# Patient Record
Sex: MALE | Race: BLACK OR AFRICAN AMERICAN | NOT HISPANIC OR LATINO | Employment: OTHER | URBAN - METROPOLITAN AREA
[De-identification: names, ages, dates, MRNs, and addresses within clinical notes are randomized per-mention and may not be internally consistent; named-entity substitution may affect disease eponyms.]

---

## 2017-01-23 ENCOUNTER — TELEPHONE (OUTPATIENT)
Dept: INTERNAL MEDICINE | Facility: CLINIC | Age: 72
End: 2017-01-23

## 2017-01-23 NOTE — TELEPHONE ENCOUNTER
Patient reports having burning pain in outer side of left thigh on Saturday, now has resolved. R thigh is normal. Pt reports this as he usually has bilateral, symmetric pains. Continues on Pamelor daily. Notify office if symptoms recur or worsen.    Ron Esquivel MD  Internal Medicine

## 2017-01-24 ENCOUNTER — PATIENT MESSAGE (OUTPATIENT)
Dept: RADIATION ONCOLOGY | Facility: CLINIC | Age: 72
End: 2017-01-24

## 2017-02-02 ENCOUNTER — LAB VISIT (OUTPATIENT)
Dept: LAB | Facility: HOSPITAL | Age: 72
End: 2017-02-02
Attending: RADIOLOGY
Payer: MEDICARE

## 2017-02-02 DIAGNOSIS — C61 PROSTATE CANCER: ICD-10-CM

## 2017-02-02 LAB — COMPLEXED PSA SERPL-MCNC: 0.02 NG/ML

## 2017-02-02 PROCEDURE — 84153 ASSAY OF PSA TOTAL: CPT

## 2017-02-02 PROCEDURE — 36415 COLL VENOUS BLD VENIPUNCTURE: CPT

## 2017-02-07 ENCOUNTER — HOSPITAL ENCOUNTER (OUTPATIENT)
Dept: RADIATION THERAPY | Facility: HOSPITAL | Age: 72
Discharge: HOME | End: 2017-02-07
Attending: RADIOLOGY
Payer: MEDICARE

## 2017-02-07 PROCEDURE — 77290 THER RAD SIMULAJ FIELD CPLX: CPT | Mod: 26,,, | Performed by: RADIOLOGY

## 2017-02-07 PROCEDURE — 77334 RADIATION TREATMENT AID(S): CPT | Mod: 26,,, | Performed by: RADIOLOGY

## 2017-02-07 PROCEDURE — 77263 THER RADIOLOGY TX PLNG CPLX: CPT | Mod: ,,, | Performed by: RADIOLOGY

## 2017-02-07 PROCEDURE — 77290 THER RAD SIMULAJ FIELD CPLX: CPT | Mod: TC | Performed by: RADIOLOGY

## 2017-02-07 PROCEDURE — 77334 RADIATION TREATMENT AID(S): CPT | Mod: TC | Performed by: RADIOLOGY

## 2017-02-07 PROCEDURE — 77014 HC CT GUIDANCE RADIATION THERAPY FLDS PLACEMENT: CPT | Mod: TC | Performed by: RADIOLOGY

## 2017-02-13 PROCEDURE — 77301 RADIOTHERAPY DOSE PLAN IMRT: CPT | Mod: TC | Performed by: RADIOLOGY

## 2017-02-13 PROCEDURE — 77301 RADIOTHERAPY DOSE PLAN IMRT: CPT | Mod: 26,,, | Performed by: RADIOLOGY

## 2017-02-14 PROCEDURE — 77417 THER RADIOLOGY PORT IMAGE(S): CPT | Performed by: RADIOLOGY

## 2017-02-14 PROCEDURE — 77300 RADIATION THERAPY DOSE PLAN: CPT | Mod: 26,,, | Performed by: RADIOLOGY

## 2017-02-14 PROCEDURE — 77338 DESIGN MLC DEVICE FOR IMRT: CPT | Mod: 26,,, | Performed by: RADIOLOGY

## 2017-02-14 PROCEDURE — 77385 HC IMRT, SIMPLE: CPT | Performed by: RADIOLOGY

## 2017-02-14 PROCEDURE — G6002 STEREOSCOPIC X-RAY GUIDANCE: HCPCS | Mod: 26,,, | Performed by: RADIOLOGY

## 2017-02-14 PROCEDURE — 77338 DESIGN MLC DEVICE FOR IMRT: CPT | Mod: TC | Performed by: RADIOLOGY

## 2017-02-14 PROCEDURE — 77300 RADIATION THERAPY DOSE PLAN: CPT | Mod: TC | Performed by: RADIOLOGY

## 2017-02-15 PROCEDURE — 77385 HC IMRT, SIMPLE: CPT | Performed by: RADIOLOGY

## 2017-02-15 PROCEDURE — G6002 STEREOSCOPIC X-RAY GUIDANCE: HCPCS | Mod: 26,,, | Performed by: RADIOLOGY

## 2017-02-16 PROCEDURE — 77385 HC IMRT, SIMPLE: CPT | Performed by: RADIOLOGY

## 2017-02-16 PROCEDURE — G6002 STEREOSCOPIC X-RAY GUIDANCE: HCPCS | Mod: 26,,, | Performed by: RADIOLOGY

## 2017-02-17 ENCOUNTER — DOCUMENTATION ONLY (OUTPATIENT)
Dept: RADIATION ONCOLOGY | Facility: CLINIC | Age: 72
End: 2017-02-17

## 2017-02-17 PROCEDURE — G6002 STEREOSCOPIC X-RAY GUIDANCE: HCPCS | Mod: 26,,, | Performed by: RADIOLOGY

## 2017-02-17 PROCEDURE — 77385 HC IMRT, SIMPLE: CPT | Performed by: RADIOLOGY

## 2017-02-17 NOTE — PLAN OF CARE
Problem: Patient Care Overview  Goal: Plan of Care Review  Outcome: Ongoing (interventions implemented as appropriate)  Pt. On day 4 of xrt to prostate bed.  Doing well.  Just started.

## 2017-02-20 PROCEDURE — 77385 HC IMRT, SIMPLE: CPT | Performed by: RADIOLOGY

## 2017-02-20 PROCEDURE — G6002 STEREOSCOPIC X-RAY GUIDANCE: HCPCS | Mod: 26,,, | Performed by: RADIOLOGY

## 2017-02-20 PROCEDURE — 77336 RADIATION PHYSICS CONSULT: CPT | Performed by: RADIOLOGY

## 2017-02-21 PROCEDURE — G6002 STEREOSCOPIC X-RAY GUIDANCE: HCPCS | Mod: 26,,, | Performed by: RADIOLOGY

## 2017-02-21 PROCEDURE — 77385 HC IMRT, SIMPLE: CPT | Performed by: RADIOLOGY

## 2017-02-21 PROCEDURE — 77417 THER RADIOLOGY PORT IMAGE(S): CPT | Performed by: RADIOLOGY

## 2017-02-23 ENCOUNTER — DOCUMENTATION ONLY (OUTPATIENT)
Dept: RADIATION ONCOLOGY | Facility: CLINIC | Age: 72
End: 2017-02-23

## 2017-02-23 PROCEDURE — G6002 STEREOSCOPIC X-RAY GUIDANCE: HCPCS | Mod: 26,,, | Performed by: RADIOLOGY

## 2017-02-23 PROCEDURE — 77385 HC IMRT, SIMPLE: CPT | Performed by: RADIOLOGY

## 2017-02-23 NOTE — PLAN OF CARE
Problem: Patient Care Overview  Goal: Plan of Care Review  Outcome: Ongoing (interventions implemented as appropriate)  Pt. On day 7 of xrt to prostate bed.  Doing well.  No dysuria or hematuria.  Nocturia x 2.

## 2017-02-24 PROCEDURE — G6002 STEREOSCOPIC X-RAY GUIDANCE: HCPCS | Mod: 26,,, | Performed by: RADIOLOGY

## 2017-02-24 PROCEDURE — 77385 HC IMRT, SIMPLE: CPT | Performed by: RADIOLOGY

## 2017-02-27 PROCEDURE — G6002 STEREOSCOPIC X-RAY GUIDANCE: HCPCS | Mod: 26,,, | Performed by: RADIOLOGY

## 2017-02-27 PROCEDURE — 77385 HC IMRT, SIMPLE: CPT | Performed by: RADIOLOGY

## 2017-03-01 ENCOUNTER — HOSPITAL ENCOUNTER (OUTPATIENT)
Dept: RADIATION THERAPY | Facility: HOSPITAL | Age: 72
Discharge: HOME | End: 2017-03-01
Attending: RADIOLOGY
Payer: MEDICARE

## 2017-03-01 PROCEDURE — G6002 STEREOSCOPIC X-RAY GUIDANCE: HCPCS | Mod: 26,,, | Performed by: RADIOLOGY

## 2017-03-01 PROCEDURE — 77417 THER RADIOLOGY PORT IMAGE(S): CPT | Performed by: RADIOLOGY

## 2017-03-01 PROCEDURE — 77385 HC IMRT, SIMPLE: CPT | Performed by: RADIOLOGY

## 2017-03-02 ENCOUNTER — DOCUMENTATION ONLY (OUTPATIENT)
Dept: RADIATION ONCOLOGY | Facility: CLINIC | Age: 72
End: 2017-03-02

## 2017-03-02 PROCEDURE — 77336 RADIATION PHYSICS CONSULT: CPT | Performed by: RADIOLOGY

## 2017-03-02 PROCEDURE — 77385 HC IMRT, SIMPLE: CPT | Performed by: RADIOLOGY

## 2017-03-02 PROCEDURE — G6002 STEREOSCOPIC X-RAY GUIDANCE: HCPCS | Mod: 26,,, | Performed by: RADIOLOGY

## 2017-03-02 NOTE — PLAN OF CARE
Problem: Patient Care Overview  Goal: Plan of Care Review  Outcome: Ongoing (interventions implemented as appropriate)  Pt. On day 12 of xrt to prostate.  No c/o burning or hematuria.  x2 nocturia.

## 2017-03-03 PROCEDURE — 77385 HC IMRT, SIMPLE: CPT | Performed by: RADIOLOGY

## 2017-03-03 PROCEDURE — G6002 STEREOSCOPIC X-RAY GUIDANCE: HCPCS | Mod: 26,,, | Performed by: RADIOLOGY

## 2017-03-06 PROCEDURE — 77385 HC IMRT, SIMPLE: CPT | Performed by: RADIOLOGY

## 2017-03-06 PROCEDURE — G6002 STEREOSCOPIC X-RAY GUIDANCE: HCPCS | Mod: 26,,, | Performed by: RADIOLOGY

## 2017-03-07 PROCEDURE — G6002 STEREOSCOPIC X-RAY GUIDANCE: HCPCS | Mod: 26,,, | Performed by: RADIOLOGY

## 2017-03-07 PROCEDURE — 77385 HC IMRT, SIMPLE: CPT | Performed by: RADIOLOGY

## 2017-03-08 PROCEDURE — G6002 STEREOSCOPIC X-RAY GUIDANCE: HCPCS | Mod: 26,,, | Performed by: RADIOLOGY

## 2017-03-08 PROCEDURE — 77385 HC IMRT, SIMPLE: CPT | Performed by: RADIOLOGY

## 2017-03-08 PROCEDURE — 77336 RADIATION PHYSICS CONSULT: CPT | Performed by: RADIOLOGY

## 2017-03-09 PROCEDURE — 77385 HC IMRT, SIMPLE: CPT | Performed by: RADIOLOGY

## 2017-03-09 PROCEDURE — G6002 STEREOSCOPIC X-RAY GUIDANCE: HCPCS | Mod: 26,,, | Performed by: RADIOLOGY

## 2017-03-10 ENCOUNTER — DOCUMENTATION ONLY (OUTPATIENT)
Dept: RADIATION ONCOLOGY | Facility: CLINIC | Age: 72
End: 2017-03-10

## 2017-03-10 PROCEDURE — 77385 HC IMRT, SIMPLE: CPT | Performed by: RADIOLOGY

## 2017-03-10 PROCEDURE — 77386 HC IMRT, COMPLEX: CPT | Performed by: RADIOLOGY

## 2017-03-10 PROCEDURE — 77417 THER RADIOLOGY PORT IMAGE(S): CPT | Performed by: RADIOLOGY

## 2017-03-10 PROCEDURE — G6002 STEREOSCOPIC X-RAY GUIDANCE: HCPCS | Mod: 26,,, | Performed by: RADIOLOGY

## 2017-03-10 NOTE — PLAN OF CARE
Problem: Patient Care Overview  Goal: Plan of Care Review  Outcome: Ongoing (interventions implemented as appropriate)  Pt. On day 17 of xrt to prostate bed.  Doing well.  No dysuria or hematuria.  Nocturia x2.  No diarrhea.

## 2017-03-13 PROCEDURE — 77385 HC IMRT, SIMPLE: CPT | Performed by: RADIOLOGY

## 2017-03-13 PROCEDURE — G6002 STEREOSCOPIC X-RAY GUIDANCE: HCPCS | Mod: 26,,, | Performed by: RADIOLOGY

## 2017-03-14 PROCEDURE — G6002 STEREOSCOPIC X-RAY GUIDANCE: HCPCS | Mod: 26,,, | Performed by: RADIOLOGY

## 2017-03-14 PROCEDURE — 77385 HC IMRT, SIMPLE: CPT | Performed by: RADIOLOGY

## 2017-03-15 PROCEDURE — G6002 STEREOSCOPIC X-RAY GUIDANCE: HCPCS | Mod: 26,,, | Performed by: RADIOLOGY

## 2017-03-15 PROCEDURE — 77385 HC IMRT, SIMPLE: CPT | Performed by: RADIOLOGY

## 2017-03-16 ENCOUNTER — DOCUMENTATION ONLY (OUTPATIENT)
Dept: RADIATION ONCOLOGY | Facility: CLINIC | Age: 72
End: 2017-03-16

## 2017-03-16 PROCEDURE — 77385 HC IMRT, SIMPLE: CPT | Performed by: RADIOLOGY

## 2017-03-16 PROCEDURE — 77417 THER RADIOLOGY PORT IMAGE(S): CPT | Performed by: RADIOLOGY

## 2017-03-16 PROCEDURE — G6002 STEREOSCOPIC X-RAY GUIDANCE: HCPCS | Mod: 26,,, | Performed by: RADIOLOGY

## 2017-03-17 PROCEDURE — 77385 HC IMRT, SIMPLE: CPT | Performed by: RADIOLOGY

## 2017-03-17 PROCEDURE — G6002 STEREOSCOPIC X-RAY GUIDANCE: HCPCS | Mod: 26,,, | Performed by: RADIOLOGY

## 2017-03-17 PROCEDURE — 77336 RADIATION PHYSICS CONSULT: CPT | Performed by: RADIOLOGY

## 2017-03-17 NOTE — PLAN OF CARE
Problem: Patient Care Overview  Goal: Plan of Care Review  Outcome: Ongoing (interventions implemented as appropriate)  Ay 21 of radiation to the prostate  C/o hot flashes

## 2017-03-20 PROCEDURE — G6002 STEREOSCOPIC X-RAY GUIDANCE: HCPCS | Mod: 26,,, | Performed by: RADIOLOGY

## 2017-03-20 PROCEDURE — 77385 HC IMRT, SIMPLE: CPT | Performed by: RADIOLOGY

## 2017-03-21 ENCOUNTER — TELEPHONE (OUTPATIENT)
Dept: INTERNAL MEDICINE | Facility: CLINIC | Age: 72
End: 2017-03-21

## 2017-03-21 DIAGNOSIS — G62.9 NEUROPATHY: ICD-10-CM

## 2017-03-21 PROCEDURE — G6002 STEREOSCOPIC X-RAY GUIDANCE: HCPCS | Mod: 26,,, | Performed by: RADIOLOGY

## 2017-03-21 PROCEDURE — 77385 HC IMRT, SIMPLE: CPT | Performed by: RADIOLOGY

## 2017-03-21 RX ORDER — NORTRIPTYLINE HYDROCHLORIDE 50 MG/1
50 CAPSULE ORAL NIGHTLY
Qty: 90 CAPSULE | Refills: 3 | Status: SHIPPED | OUTPATIENT
Start: 2017-03-21 | End: 2017-04-21

## 2017-03-21 NOTE — TELEPHONE ENCOUNTER
Called and spoke to pt he states that the Pamelor he is taking for his numbness in thighs has been working   However for the last month he has been feeling more tingling than usual   He wanted to know if you can up the dosage on his Pamelor or come in to see you?

## 2017-03-21 NOTE — TELEPHONE ENCOUNTER
----- Message from Dayna Vega sent at 3/21/2017  1:38 PM CDT -----  Contact: Self/[phone]  Pt said that he is calling in regards to he has been having numbness in both of his thighs for about a year pt stated that he is taking nortriptyline (PAMELOR) 25 MG capsule and he wants to know if the doctor wants to increase the dosage or speak with the pt to discuss the problem he is having. Please call and advise          Thank you

## 2017-03-22 PROCEDURE — 77385 HC IMRT, SIMPLE: CPT | Performed by: RADIOLOGY

## 2017-03-22 PROCEDURE — G6002 STEREOSCOPIC X-RAY GUIDANCE: HCPCS | Mod: 26,,, | Performed by: RADIOLOGY

## 2017-03-23 ENCOUNTER — DOCUMENTATION ONLY (OUTPATIENT)
Dept: RADIATION ONCOLOGY | Facility: CLINIC | Age: 72
End: 2017-03-23

## 2017-03-23 PROCEDURE — 77385 HC IMRT, SIMPLE: CPT | Performed by: RADIOLOGY

## 2017-03-23 PROCEDURE — G6002 STEREOSCOPIC X-RAY GUIDANCE: HCPCS | Mod: 26,,, | Performed by: RADIOLOGY

## 2017-03-23 NOTE — PLAN OF CARE
Problem: Patient Care Overview  Goal: Plan of Care Review  Outcome: Ongoing (interventions implemented as appropriate)  Pt. On day 26 of xrt to prostate bed.  No dysuria or hematuria.  Nocturia x 2-3.  No diarrhea.

## 2017-03-24 PROCEDURE — G6002 STEREOSCOPIC X-RAY GUIDANCE: HCPCS | Mod: 26,,, | Performed by: RADIOLOGY

## 2017-03-24 PROCEDURE — 77417 THER RADIOLOGY PORT IMAGE(S): CPT | Performed by: RADIOLOGY

## 2017-03-24 PROCEDURE — 77385 HC IMRT, SIMPLE: CPT | Performed by: RADIOLOGY

## 2017-03-27 PROCEDURE — 77336 RADIATION PHYSICS CONSULT: CPT | Performed by: RADIOLOGY

## 2017-03-29 PROCEDURE — G6002 STEREOSCOPIC X-RAY GUIDANCE: HCPCS | Mod: 26,,, | Performed by: RADIOLOGY

## 2017-03-29 PROCEDURE — 77385 HC IMRT, SIMPLE: CPT | Performed by: RADIOLOGY

## 2017-03-30 PROCEDURE — 77385 HC IMRT, SIMPLE: CPT | Performed by: RADIOLOGY

## 2017-03-30 PROCEDURE — G6002 STEREOSCOPIC X-RAY GUIDANCE: HCPCS | Mod: 26,,, | Performed by: RADIOLOGY

## 2017-03-31 ENCOUNTER — TELEPHONE (OUTPATIENT)
Dept: GASTROENTEROLOGY | Facility: CLINIC | Age: 72
End: 2017-03-31

## 2017-03-31 ENCOUNTER — DOCUMENTATION ONLY (OUTPATIENT)
Dept: RADIATION ONCOLOGY | Facility: CLINIC | Age: 72
End: 2017-03-31

## 2017-03-31 PROCEDURE — G6002 STEREOSCOPIC X-RAY GUIDANCE: HCPCS | Mod: 26,,, | Performed by: RADIOLOGY

## 2017-03-31 PROCEDURE — 77385 HC IMRT, SIMPLE: CPT | Performed by: RADIOLOGY

## 2017-03-31 NOTE — TELEPHONE ENCOUNTER
Spoke with patient in regards to scheduling for a follow up Colonoscopy. Patient stated that he will call back after his radiation treatment.

## 2017-03-31 NOTE — PLAN OF CARE
Problem: Patient Care Overview  Goal: Plan of Care Review  Outcome: Ongoing (interventions implemented as appropriate)  Day 30 of radiation to the prostate bed  Tolerating well

## 2017-04-03 ENCOUNTER — HOSPITAL ENCOUNTER (OUTPATIENT)
Dept: RADIATION THERAPY | Facility: HOSPITAL | Age: 72
Discharge: HOME | End: 2017-04-03
Attending: RADIOLOGY
Payer: MEDICARE

## 2017-04-03 PROCEDURE — 77385 HC IMRT, SIMPLE: CPT | Performed by: RADIOLOGY

## 2017-04-03 PROCEDURE — G6002 STEREOSCOPIC X-RAY GUIDANCE: HCPCS | Mod: 26,,, | Performed by: RADIOLOGY

## 2017-04-04 PROCEDURE — 77417 THER RADIOLOGY PORT IMAGE(S): CPT | Performed by: RADIOLOGY

## 2017-04-04 PROCEDURE — G6002 STEREOSCOPIC X-RAY GUIDANCE: HCPCS | Mod: 26,,, | Performed by: RADIOLOGY

## 2017-04-04 PROCEDURE — 77336 RADIATION PHYSICS CONSULT: CPT | Performed by: RADIOLOGY

## 2017-04-04 PROCEDURE — 77385 HC IMRT, SIMPLE: CPT | Performed by: RADIOLOGY

## 2017-04-05 PROCEDURE — 77385 HC IMRT, SIMPLE: CPT | Performed by: RADIOLOGY

## 2017-04-05 PROCEDURE — G6002 STEREOSCOPIC X-RAY GUIDANCE: HCPCS | Mod: 26,,, | Performed by: RADIOLOGY

## 2017-04-06 ENCOUNTER — DOCUMENTATION ONLY (OUTPATIENT)
Dept: RADIATION ONCOLOGY | Facility: CLINIC | Age: 72
End: 2017-04-06

## 2017-04-06 PROCEDURE — G6002 STEREOSCOPIC X-RAY GUIDANCE: HCPCS | Mod: 26,,, | Performed by: RADIOLOGY

## 2017-04-06 PROCEDURE — 77385 HC IMRT, SIMPLE: CPT | Performed by: RADIOLOGY

## 2017-04-06 NOTE — PLAN OF CARE
Problem: Patient Care Overview  Goal: Plan of Care Review  Outcome: Ongoing (interventions implemented as appropriate)  Day 34 of radiation to the prostate bed no complaints

## 2017-04-07 PROCEDURE — 77385 HC IMRT, SIMPLE: CPT | Performed by: RADIOLOGY

## 2017-04-07 PROCEDURE — G6002 STEREOSCOPIC X-RAY GUIDANCE: HCPCS | Mod: 26,,, | Performed by: RADIOLOGY

## 2017-04-10 PROCEDURE — 77385 HC IMRT, SIMPLE: CPT | Performed by: RADIOLOGY

## 2017-04-10 PROCEDURE — G6002 STEREOSCOPIC X-RAY GUIDANCE: HCPCS | Mod: 26,,, | Performed by: RADIOLOGY

## 2017-04-11 PROCEDURE — G6002 STEREOSCOPIC X-RAY GUIDANCE: HCPCS | Mod: 26,,, | Performed by: RADIOLOGY

## 2017-04-11 PROCEDURE — 77385 HC IMRT, SIMPLE: CPT | Performed by: RADIOLOGY

## 2017-04-11 PROCEDURE — 77336 RADIATION PHYSICS CONSULT: CPT | Performed by: RADIOLOGY

## 2017-04-12 ENCOUNTER — DOCUMENTATION ONLY (OUTPATIENT)
Dept: RADIATION ONCOLOGY | Facility: CLINIC | Age: 72
End: 2017-04-12

## 2017-04-12 PROCEDURE — 77385 HC IMRT, SIMPLE: CPT | Performed by: RADIOLOGY

## 2017-04-12 PROCEDURE — G6002 STEREOSCOPIC X-RAY GUIDANCE: HCPCS | Mod: 26,,, | Performed by: RADIOLOGY

## 2017-04-20 NOTE — PLAN OF CARE
Problem: Patient Care Overview  Goal: Plan of Care Review  Outcome: Outcome(s) achieved Date Met:  04/20/17  Radiation to the prostate bed completed on 4/12/17  F/u appt made

## 2017-04-21 ENCOUNTER — OFFICE VISIT (OUTPATIENT)
Dept: INTERNAL MEDICINE | Facility: CLINIC | Age: 72
End: 2017-04-21
Payer: MEDICARE

## 2017-04-21 VITALS
HEIGHT: 72 IN | SYSTOLIC BLOOD PRESSURE: 120 MMHG | HEART RATE: 84 BPM | DIASTOLIC BLOOD PRESSURE: 74 MMHG | WEIGHT: 193.13 LBS | BODY MASS INDEX: 26.16 KG/M2

## 2017-04-21 VITALS
BODY MASS INDEX: 26.16 KG/M2 | HEART RATE: 84 BPM | HEIGHT: 72 IN | WEIGHT: 193.13 LBS | DIASTOLIC BLOOD PRESSURE: 74 MMHG | SYSTOLIC BLOOD PRESSURE: 120 MMHG

## 2017-04-21 DIAGNOSIS — M81.8 OSTEOPOROSIS, IDIOPATHIC: ICD-10-CM

## 2017-04-21 DIAGNOSIS — E55.9 VITAMIN D INSUFFICIENCY: ICD-10-CM

## 2017-04-21 DIAGNOSIS — E78.5 HYPERLIPIDEMIA, UNSPECIFIED HYPERLIPIDEMIA TYPE: ICD-10-CM

## 2017-04-21 DIAGNOSIS — Z00.00 ANNUAL PHYSICAL EXAM: Primary | ICD-10-CM

## 2017-04-21 DIAGNOSIS — I51.89 LEFT VENTRICULAR DIASTOLIC DYSFUNCTION WITH PRESERVED SYSTOLIC FUNCTION: ICD-10-CM

## 2017-04-21 DIAGNOSIS — I70.0 ABDOMINAL AORTIC ATHEROSCLEROSIS: ICD-10-CM

## 2017-04-21 DIAGNOSIS — C61 PROSTATE CANCER: ICD-10-CM

## 2017-04-21 DIAGNOSIS — Z23 NEED FOR 23-POLYVALENT PNEUMOCOCCAL POLYSACCHARIDE VACCINE: ICD-10-CM

## 2017-04-21 DIAGNOSIS — I35.9 AORTIC VALVE DISEASE: ICD-10-CM

## 2017-04-21 DIAGNOSIS — Z00.00 ENCOUNTER FOR PREVENTIVE HEALTH EXAMINATION: Primary | ICD-10-CM

## 2017-04-21 DIAGNOSIS — K62.5 RECTAL BLEEDING: ICD-10-CM

## 2017-04-21 DIAGNOSIS — R21 SKIN RASH: ICD-10-CM

## 2017-04-21 DIAGNOSIS — G62.9 NEUROPATHY: ICD-10-CM

## 2017-04-21 DIAGNOSIS — I10 ESSENTIAL HYPERTENSION: ICD-10-CM

## 2017-04-21 DIAGNOSIS — Z12.11 COLON CANCER SCREENING: ICD-10-CM

## 2017-04-21 DIAGNOSIS — N39.3 STRESS INCONTINENCE, MALE: ICD-10-CM

## 2017-04-21 DIAGNOSIS — M79.606: ICD-10-CM

## 2017-04-21 PROCEDURE — 99999 PR PBB SHADOW E&M-EST. PATIENT-LVL III: CPT | Mod: PBBFAC,,, | Performed by: INTERNAL MEDICINE

## 2017-04-21 PROCEDURE — 99999 PR PBB SHADOW E&M-EST. PATIENT-LVL IV: CPT | Mod: PBBFAC,,, | Performed by: NURSE PRACTITIONER

## 2017-04-21 PROCEDURE — 3074F SYST BP LT 130 MM HG: CPT | Mod: S$GLB,,, | Performed by: INTERNAL MEDICINE

## 2017-04-21 PROCEDURE — 3078F DIAST BP <80 MM HG: CPT | Mod: S$GLB,,, | Performed by: INTERNAL MEDICINE

## 2017-04-21 PROCEDURE — G0439 PPPS, SUBSEQ VISIT: HCPCS | Mod: S$GLB,,, | Performed by: NURSE PRACTITIONER

## 2017-04-21 PROCEDURE — 99499 UNLISTED E&M SERVICE: CPT | Mod: S$GLB,,, | Performed by: INTERNAL MEDICINE

## 2017-04-21 PROCEDURE — 3074F SYST BP LT 130 MM HG: CPT | Mod: S$GLB,,, | Performed by: NURSE PRACTITIONER

## 2017-04-21 PROCEDURE — 99499 UNLISTED E&M SERVICE: CPT | Mod: S$GLB,,, | Performed by: NURSE PRACTITIONER

## 2017-04-21 PROCEDURE — 99397 PER PM REEVAL EST PAT 65+ YR: CPT | Mod: S$GLB,,, | Performed by: INTERNAL MEDICINE

## 2017-04-21 PROCEDURE — 3078F DIAST BP <80 MM HG: CPT | Mod: S$GLB,,, | Performed by: NURSE PRACTITIONER

## 2017-04-21 RX ORDER — TRIAMCINOLONE ACETONIDE 1 MG/G
CREAM TOPICAL 2 TIMES DAILY
Qty: 80 G | Refills: 2 | Status: SHIPPED | OUTPATIENT
Start: 2017-04-21 | End: 2017-05-16 | Stop reason: SDUPTHER

## 2017-04-21 RX ORDER — NORTRIPTYLINE HYDROCHLORIDE 10 MG/1
10 CAPSULE ORAL NIGHTLY
Qty: 90 CAPSULE | Refills: 3 | Status: SHIPPED | OUTPATIENT
Start: 2017-04-21 | End: 2017-04-21 | Stop reason: SDUPTHER

## 2017-04-21 RX ORDER — NORTRIPTYLINE HYDROCHLORIDE 10 MG/1
10 CAPSULE ORAL NIGHTLY
Qty: 30 CAPSULE | Refills: 0 | Status: SHIPPED | OUTPATIENT
Start: 2017-04-21 | End: 2017-11-14

## 2017-04-21 RX ORDER — AMOXICILLIN 500 MG
2 CAPSULE ORAL DAILY
COMMUNITY
End: 2021-05-27

## 2017-04-21 RX ORDER — NORTRIPTYLINE HYDROCHLORIDE 25 MG/1
25 CAPSULE ORAL NIGHTLY
Qty: 90 CAPSULE | Refills: 3 | Status: SHIPPED | OUTPATIENT
Start: 2017-04-21 | End: 2017-11-14

## 2017-04-21 RX ORDER — TRIAMCINOLONE ACETONIDE 1 MG/G
CREAM TOPICAL 2 TIMES DAILY
Qty: 80 G | Refills: 2 | Status: SHIPPED | OUTPATIENT
Start: 2017-04-21 | End: 2017-04-21 | Stop reason: SDUPTHER

## 2017-04-21 NOTE — MR AVS SNAPSHOT
[facility]  [address]  [place] [address]  Phone: [phone]  Fax: [phone]                  Primitivo Mitchell   2017 11:00 AM   Office Visit    Description:  Male : 1945   Provider:  [facility], NOM 3   Department:  [facility]           Reason for Visit     [facility] 3           Diagnoses this Visit        Comments    Encounter for preventive health examination    -  Primary            To Do List           Future Appointments        Provider Department Dept Phone    2017 1:00 PM MD [facility] [phone]    2017 9:00 AM LAB, APPOINTMENT [place] [facility] [phone]    2017 11:00 AM MD [facility] - Radiation Oncology [phone]      Goals (5 Years of Data)     None      [facility]     [facility] -  Assistance  Unless otherwise directed by your provider, please contact [facility], our nurse care line that is available for  assistance.     Registered nurses in the [facility] provide: appointment scheduling, clinical advisement, health education, and other advisory services.  Call: [phone] (toll free)               Medications           Message regarding Medications     Verify the changes and/or additions to your medication regime listed below are the same as discussed with your clinician today.  If any of these changes or additions are incorrect, please notify your healthcare provider.        STOP taking these medications     bicalutamide (CASODEX) 50 MG Tab Take 1 tablet (50 mg total) by mouth once daily.           Verify that the below list of medications is an accurate representation of the medications you are currently taking.  If none reported, the list may be blank. If incorrect, please contact your healthcare provider. Carry this list with you in case of emergency.           Current  Medications     atorvastatin (LIPITOR) 20 MG tablet Take 1 tablet (20 mg total) by mouth once daily.    cholecalciferol, vitamin D3, 5,000 unit Tab Take 5,000 Units by mouth once daily.    fluticasone (FLONASE) 50 mcg/actuation nasal spray 2 sprays by Each Nare route once daily.    ketotifen (ALAWAY) 0.025 % ophthalmic solution 1 drop 2 (two) times daily.    losartan (COZAAR) 50 MG tablet Take 1 tablet (50 mg total) by mouth once daily.    omeprazole (PRILOSEC) 40 MG capsule Take 1 capsule (40 mg total) by mouth daily as needed.    fish oil-omega-3 fatty acids 300-1,000 mg capsule Take 2 g by mouth once daily.    nortriptyline (PAMELOR) 10 MG capsule Take 1 capsule (10 mg total) by mouth every evening. Take 35 mg total per day    nortriptyline (PAMELOR) 25 MG capsule Take 1 capsule (25 mg total) by mouth every evening. Take 35 mg total per day    triamcinolone acetonide 0.1% (KENALOG) 0.1 % cream Apply topically 2 (two) times daily.           Clinical Reference Information           Your Vitals Were     BP Pulse Height Weight BMI    120/74 (BP Location: Right arm, Patient Position: Sitting, BP Method: Manual) 84 6' (1.829 m) 87.6 kg (193 lb 2 oz) 26.19 kg/m2      Blood Pressure          Most Recent Value    BP  120/74      Allergies as of 4/21/2017     Latex, Natural Rubber    Omnipaque 140 [Iohexol]    Allegra-d 12 Hour [Fexofenadine-pseudoephedrine]    Azithromycin    Bactrim [Sulfamethoxazole-trimethoprim]    Boniva [Ibandronate]    Celebrex [Celecoxib]    Ciprofloxacin    Claritin-d 12 Hour [Loratadine-pseudoephedrine]    Imipramine    Neomycin-polymyxin-hc    Neurontin [Gabapentin]    Nitrofuran Analogues    Sulfa (Sulfonamide Antibiotics)    Adhesive    Androderm [Testosterone]      Immunizations Administered on Date of Encounter - 4/21/2017     Name Date Dose VIS Date Route    Pneumococcal Polysaccharide - 23 Valent  Deferred   -- -- --      Instructions      Counseling and Referral of Other  Preventative  (Italic type indicates deductible and co-insurance are waived)    Patient Name: Primitivo Mitchell  Today's Date: 4/21/2017      SERVICE LIMITATIONS RECOMMENDATION    Vaccines    · Pneumococcal (once after 65)    · Influenza (annually)    · Hepatitis B (if medium/high risk)    · Prevnar 13      Hepatitis B medium/high risk factors:       - End-stage renal disease       - Hemophiliacs who received Factor VII or         IX concentrates       - Clients of institutions for the mentally             retarded       - Persons who live in the same house as          a HepB carrier       - Homosexual men       - Illicit injectable drug abusers     Pneumococcal: ? With latex allergy     Influenza: due fall 2017     Hepatitis B: N/A     Prevnar 13: Done, no repeat necessary    Prostate cancer screening (annually to age 75)     Prostate specific antigen (PSA) Shared decision making with Provider. Sometimes a co-pay may be required if the patient decides to have this test. The USPSTF no longer recommends prostate cancer screening routinely in medicine: N/A    Colorectal cancer screening (to age 75)    · Fecal occult blood test (annual)  · Flexible sigmoidoscopy (5y)  · Screening colonoscopy (10y)  · Barium enema   Last done 2014, recommend to repeat every 3  years    Diabetes self-management training (no USPSTF recommendations)  Requires referral by treating physician for patient with diabetes or renal disease. 10 hours of initial DSMT sessions of no less than 30 minutes each in a continuous 12-month period. 2 hours of follow-up DSMT in subsequent years.  N/A    Glaucoma screening (no USPSTF recommendation)  Diabetes mellitus, family history   , age 50 or over    American, age 65 or over  Last done 2015, recommend to repeat every 1  years    Medical nutrition therapy for diabetes or renal disease (no recommended schedule)  Requires referral by treating physician for patient with diabetes or  renal disease or kidney transplant within the past 3 years.  Can be provided in same year as diabetes self-management training (DSMT), and CMS recommends medical nutrition therapy take place after DSMT. Up to 3 hours for initial year and 2 hours in subsequent years.  N/A    Cardiovascular screening blood tests (every 5 years)  · Fasting lipid panel  Order as a panel if possible  Last done 12/2016, recommend to repeat every 1  years    Diabetes screening tests (at least every 3 years, Medicare covers annually or at 6-month intervals for prediabetic patients)  · Fasting blood sugar (FBS) or glucose tolerance test (GTT)  Patient must be diagnosed with one of the following:       - Hypertension       - Dyslipidemia       - Obesity (BMI 30kg/m2)       - Previous elevated impaired FBS or GTT       ... or any two of the following:       - Overweight (BMI 25 but <30)       - Family history of diabetes       - Age 65 or older       - History of gestational diabetes or birth of baby weighing more than 9 pounds  Last done 12/21016, recommend to repeat every 1  years    Abdominal aortic aneurysm screening (once)  · Sonogram   Limited to patients who meet one of the following criteria:       - Men who are 65-75 years old and have smoked more than 100 cigarette in their lifetime       - Anyone with a family history of abdominal aortic aneurysm       - Anyone recommended for screening by the USPSTF  N/A    HIV screening (annually for increased risk patients)  · HIV-1 and HIV-2 by EIA, or LEX, rapid antibody test or oral mucosa transudate  Patients must be at increased risk for HIV infection per USPSTF guidelines or pregnant. Tests covered annually for patient at increased risk or as requested by the patient. Pregnant patients may receive up to 3 tests during pregnancy.  Risks discussed, screening is not recommended    Smoking cessation counseling (up to 8 sessions per year)  Patients must be asymptomatic of tobacco-related  conditions to receive as a preventative service.  Non-smoker    Subsequent annual wellness visit  At least 12 months since last AWV  Return in one year     The following information is provided to all patients.  This information is to help you find resources for any of the problems found today that may be affecting your health:                Living healthy guide: www.[facility].[place].[facility]      Understanding Diabetes: www.diabetes.org      Eating healthy: www.cdc.gov/healthyweight      CDC home safety checklist: www.cdc.gov/steadi/patient.html      Agency on Aging: www.goea.[place].[facility]      Alcoholics anonymous (AA): www.aa.org      Physical Activity: www.roz.nih.gov/hn5rnji      Tobacco use: www.quitwithusla.org          Language Assistance Services     ATTENTION: Language assistance services are available, free of charge. Please call [phone].      ATENCIÓN: Si marcial toledo, tiene a oro disposición servicios gratuitos de asistencia lingüística. Llame al [phone].     CHÚ Ý: N?u b?n nói Ti?ng Vi?t, có các d?ch v? h? tr? ngôn ng? mi?n phí dành cho b?n. G?i s? [phone].         [facility] complies with applicable Federal civil rights laws and does not discriminate on the basis of race, color, national origin, age, disability, or sex.

## 2017-04-21 NOTE — PROGRESS NOTES
Subjective:       Patient ID: Primitivo Mitchell is a 71 y.o. male.    Chief Complaint: Annual Exam    HPI    Last visit with me 12/2016. Since then seen by Rad Onc for prostate cancer. Has upcoming visits with Lab, Cardiology, and Rad Onc.    This last Sunday after bowel movement had some blood on tissue paper, noted small blood the next day. Last radiation treatment was on Thursday, bleeding didn't occur until 5 days later. Stool normal without blood. No constipation. Takes Ensure milk two times a day.     Last colonoscopy 3 yrs ago, reports due for follow up because of colon polyps.     Reports continued pain in bilateral thighs. Tried increased dose but felt very dizzy.     Continues to exercise, doing walking 2 miles, also home exercise gym.     Red bumps on legs last 3 days.    Reviewed PMH, PSH, SH, FH, allergies, and medications.     Review of Systems   All other systems reviewed and are negative.      Objective:      Physical Exam   Constitutional: He is oriented to person, place, and time. No distress.   -American man whose Body mass index is 26.19 kg/(m^2).    HENT:   Head: Atraumatic.   Right Ear: Tympanic membrane normal.   Left Ear: Tympanic membrane normal.   Mouth/Throat: Oropharynx is clear and moist. No oropharyngeal exudate.   Eyes: Pupils are equal, round, and reactive to light. Right eye exhibits no discharge. Left eye exhibits no discharge.   Neck: Normal range of motion. No thyromegaly present.   Cardiovascular: Normal rate and regular rhythm.    Murmur heard.   Systolic (RUSB) murmur is present with a grade of 1/6   Pulmonary/Chest: Effort normal and breath sounds normal. He has no wheezes. He has no rales.   Abdominal: Soft. He exhibits no distension and no mass. There is no hepatosplenomegaly. There is no tenderness. There is no rigidity, no guarding and negative Shields's sign.   Genitourinary: Rectum normal. Rectal exam shows no external hemorrhoid and no fissure.    Musculoskeletal: He exhibits no edema or tenderness.   No tenderness to palpation along lateral thighs   Lymphadenopathy:     He has no cervical adenopathy.   Neurological: He is alert and oriented to person, place, and time.   Skin: Skin is warm and dry. Rash noted. Rash is papular (along b/L LE distal to knees).   Psychiatric: He has a normal mood and affect. His behavior is normal.   Nursing note and vitals reviewed.      Vitals:    04/21/17 1004   BP: 120/74   BP Location: Right arm   Patient Position: Sitting   BP Method: Manual   Pulse: 84   Weight: 87.6 kg (193 lb 2 oz)   Height: 6' (1.829 m)     Body mass index is 26.19 kg/(m^2).    RESULTS: Reviewed labs from last 12 months    Assessment:       1. Annual physical exam    2. Prostate cancer    3. Abdominal aortic atherosclerosis    4. Colon cancer screening    5. Neuropathy    6. Leg pain, lateral, unspecified laterality    7. Skin rash    8. Need for 23-polyvalent pneumococcal polysaccharide vaccine    9. Rectal bleeding    10. Hyperlipidemia, unspecified hyperlipidemia type    11. Essential hypertension    12. Vitamin D insufficiency        Plan:   Primitivo was seen today for annual exam.    Diagnoses and all orders for this visit:    Annual physical exam:  Age-appropriate health screening reviewed, indicated tests ordered.     Prostate cancer:  Treated by Rad Onc, recently completed round of radiation treatment.    Abdominal aortic atherosclerosis:  Seen on CT 09/2016, continue treatment of hypertension and hyperlipidemia.    Colon cancer screening  -     Case request GI: COLONOSCOPY    Neuropathy:  Will also refer to PM&R to ensure dx is appropriate, may have some tendinitis or muscular etiology. Increase Pamelor for now given good response.  -     Discontinue: nortriptyline (PAMELOR) 10 MG capsule; Take 1 capsule (10 mg total) by mouth every evening. Take 35 mg total per day  -     nortriptyline (PAMELOR) 25 MG capsule; Take 1 capsule (25 mg total)  by mouth every evening. Take 35 mg total per day  -     Ambulatory Referral to Physical Medicine Rehab  -     nortriptyline (PAMELOR) 10 MG capsule; Take 1 capsule (10 mg total) by mouth every evening. Take 35 mg total per day    Leg pain, lateral, unspecified laterality  -     Ambulatory Referral to Physical Medicine Rehab    Skin rash:  Unclear etiology of papular rash, try Kenalog, Please notify the office if the symptoms persist or worsen and will refer to Dermatology.  -     Discontinue: triamcinolone acetonide 0.1% (KENALOG) 0.1 % cream; Apply topically 2 (two) times daily.  -     triamcinolone acetonide 0.1% (KENALOG) 0.1 % cream; Apply topically 2 (two) times daily.    Need for 23-polyvalent pneumococcal polysaccharide vaccine:  Unable to get vaccine due to allergy to neomycin.  -     Pneumococcal Polysaccharide Vaccine (23 Valent) (SQ/IM)    Rectal bleeding:  May be secondary to recent radiation for prostate, also possible internal hemorrhoids. Has upcoming colonoscopy.    Hyperlipidemia, unspecified hyperlipidemia type  -     Lipid panel; Future    Essential hypertension  -     Comprehensive metabolic panel; Future  -     CBC Without Differential; Future    Vitamin D insufficiency  -     Vitamin D; Future    Return in about 6 months (around 10/21/2017) for fasting labs 1 week prior.  Ron Esquivel MD  Internal Medicine    Portions of this note were completed using [employer] dictation software. Please excuse typographical or syntax errors.

## 2017-04-21 NOTE — MR AVS SNAPSHOT
[facility]  [address]  [facility] [address]  Phone: [phone]  Fax: [phone]                  Primitivo Mitchell   2017 10:00 AM   Office Visit    Description:  Male : 1945   Provider:  Ron Esquivel MD   Department:  [facility]           Reason for Visit     Annual Exam           Diagnoses this Visit        Comments    Annual physical exam    -  Primary     Prostate cancer         Abdominal aortic atherosclerosis         Colon cancer screening         Neuropathy         Leg pain, lateral, unspecified laterality         Skin rash         Need for 23-polyvalent pneumococcal polysaccharide vaccine         Rectal bleeding         Hyperlipidemia, unspecified hyperlipidemia type         Essential hypertension         Vitamin D insufficiency                To Do List           Future Appointments        Provider Department Dept Phone    2017 11:00 AM HRA, NOM 3 [facility] Internal Medicine [phone]    2017 9:00 AM LAB, APPOINTMENT [place] [facility] [phone]    2017 11:00 AM Andrea Ochoa Jr., MD [facility] - Radiation Oncology [phone]      To Schedule:     Please call the Endoscopy Department at [phone] to schedule your appointment.          Goals (5 Years of Data)     None      Follow-Up and Disposition     Return in about 6 months (around 10/21/2017) for fasting labs 1 week prior.    Follow-up and Disposition History       These Medications        Disp Refills Start End    nortriptyline (PAMELOR) 25 MG capsule 90 capsule 3 2017    Take 1 capsule (25 mg total) by mouth every evening. Take 35 mg total per day - Oral    Pharmacy: [facility] - [facility] [address] [facility] Ph #: [phone]       triamcinolone acetonide 0.1% (KENALOG) 0.1 % cream 80 g 2 2017    Apply topically 2 (two) times daily. - Topical (Top)    Pharmacy:  [facility] [address] [place] [address], [place] - [address] Ph #: [phone]       nortriptyline (PAMELOR) 10 MG capsule 30 capsule 0 4/21/2017     Take 1 capsule (10 mg total) by mouth every evening. Take 35 mg total per day - Oral    Pharmacy: [facility] [address] [place] [address], [place] - [address] Ph #: [phone]         [facility]     [facility] Nurse Care Line - 24/7 Assistance  Unless otherwise directed by your provider, please contact [facility], our nurse care line that is available for 24/7 assistance.     Registered nurses in the [facility] provide: appointment scheduling, clinical advisement, health education, and other advisory services.  Call: [phone] (toll free)               Medications           Message regarding Medications     Verify the changes and/or additions to your medication regime listed below are the same as discussed with your clinician today.  If any of these changes or additions are incorrect, please notify your healthcare provider.        START taking these NEW medications        Refills    nortriptyline (PAMELOR) 25 MG capsule 3    Sig: Take 1 capsule (25 mg total) by mouth every evening. Take 35 mg total per day    Class: Normal    Route: Oral    triamcinolone acetonide 0.1% (KENALOG) 0.1 % cream 2    Sig: Apply topically 2 (two) times daily.    Class: Normal    Route: Topical (Top)    nortriptyline (PAMELOR) 10 MG capsule 0    Sig: Take 1 capsule (10 mg total) by mouth every evening. Take 35 mg total per day    Class: Normal    Route: Oral      STOP taking these medications     tadalafil (CIALIS) 5 MG tablet Take 1 PO QOD           Verify that the below list of medications is an accurate representation of the medications you are currently taking.  If none reported, the list may be blank. If incorrect, please contact your healthcare provider. Carry this list with you in case of emergency.           Current Medications     atorvastatin (LIPITOR) 20 MG tablet  Take 1 tablet (20 mg total) by mouth once daily.    cholecalciferol, vitamin D3, 5,000 unit Tab Take 5,000 Units by mouth once daily.    fluticasone (FLONASE) 50 mcg/actuation nasal spray 2 sprays by Each Nare route once daily.    ketotifen (ALAWAY) 0.025 % ophthalmic solution 1 drop 2 (two) times daily.    losartan (COZAAR) 50 MG tablet Take 1 tablet (50 mg total) by mouth once daily.    omeprazole (PRILOSEC) 40 MG capsule Take 1 capsule (40 mg total) by mouth daily as needed.    bicalutamide (CASODEX) 50 MG Tab Take 1 tablet (50 mg total) by mouth once daily.    nortriptyline (PAMELOR) 10 MG capsule Take 1 capsule (10 mg total) by mouth every evening. Take 35 mg total per day    nortriptyline (PAMELOR) 25 MG capsule Take 1 capsule (25 mg total) by mouth every evening. Take 35 mg total per day    triamcinolone acetonide 0.1% (KENALOG) 0.1 % cream Apply topically 2 (two) times daily.           Clinical Reference Information           Your Vitals Were     BP Pulse Height Weight BMI    120/74 (BP Location: Right arm, Patient Position: Sitting, BP Method: Manual) 84 6' (1.829 m) 87.6 kg (193 lb 2 oz) 26.19 kg/m2      Blood Pressure          Most Recent Value    BP  120/74      Allergies as of 4/21/2017     Latex, Natural Rubber    Omnipaque 140 [Iohexol]    Allegra-d 12 Hour [Fexofenadine-pseudoephedrine]    Azithromycin    Bactrim [Sulfamethoxazole-trimethoprim]    Boniva [Ibandronate]    Celebrex [Celecoxib]    Ciprofloxacin    Claritin-d 12 Hour [Loratadine-pseudoephedrine]    Imipramine    Neomycin-polymyxin-hc    Neurontin [Gabapentin]    Nitrofuran Analogues    Sulfa (Sulfonamide Antibiotics)    Adhesive    Androderm [Testosterone]      Immunizations Administered on Date of Encounter - 4/21/2017     Name Date Dose VIS Date Route    Pneumococcal Polysaccharide - 23 Valent  Deferred   -- -- --      Orders Placed During Today's Visit      Normal Orders This Visit    Ambulatory Referral to Physical Medicine Rehab      Case request GI: COLONOSCOPY     Pneumococcal Polysaccharide Vaccine (23 Valent) (SQ/IM)     Future Labs/Procedures Expected by Expires    CBC Without Differential  4/21/2017 (Approximate) 6/20/2018    Comprehensive metabolic panel  4/21/2017 4/21/2018    Lipid panel  4/21/2017 6/20/2018    Vitamin D  4/21/2017 (Approximate) 6/20/2018      Language Assistance Services     ATTENTION: Language assistance services are available, free of charge. Please call [phone].      ATENCIÓN: Si habla español, tiene a oro disposición servicios gratuitos de asistencia lingüística. Llame al [phone].     CHÚ Ý: N?u b?n nói Ti?ng Vi?t, có các d?ch v? h? tr? ngôn ng? mi?n phí dành cho b?n. G?i s? [phone].         [facility] complies with applicable Federal civil rights laws and does not discriminate on the basis of race, color, national origin, age, disability, or sex.

## 2017-04-21 NOTE — PROGRESS NOTES
Primitivo Mitchell presented for a  Medicare AWV and comprehensive Health Risk Assessment today. The following components were reviewed and updated:    · Medical history  · Family History  · Social history  · Allergies and Current Medications  · Health Risk Assessment  · Health Maintenance  · Care Team     ** See Completed Assessments for Annual Wellness Visit within the encounter summary.**       The following assessments were completed:  · Living Situation  · CAGE  · Depression Screening  · Timed Get Up and Go  · Whisper Test  · Cognitive Function Screening  ·   ·   ·   · Nutrition Screening  · ADL Screening  · PAQ Screening    Vitals:    04/21/17 1111   BP: 120/74   BP Location: Right arm   Patient Position: Sitting   BP Method: Manual   Pulse: 84   Weight: 87.6 kg (193 lb 2 oz)   Height: 6' (1.829 m)     Body mass index is 26.19 kg/(m^2).  Physical Exam   Constitutional: He is oriented to person, place, and time. He appears well-developed.   Musculoskeletal: Normal range of motion.   Neurological: He is alert and oriented to person, place, and time.   Skin: Skin is warm and dry.   Psychiatric: He has a normal mood and affect.         Diagnoses and health risks identified today and associated recommendations/orders:    1. Encounter for preventive health examination  Here for Health Risk Assessment. Follow up in one year.    2. Essential hypertension  Chronic, stable on current medications. Followed by PCP.    3. Aortic valve disease  Chronic, stable.  Followed by Cardiology.    4. Abdominal aortic atherosclerosis  Chronic, stable on current medications. Noted on CT of abdomen 12/14/16.  Followed by PCP.    5. Left ventricular diastolic dysfunction with preserved systolic function  Chronic, stable on current medications. Noted on Stress ECHO 11/11/14.   Followed by PCP.    6. Prostate cancer  Chronic, stable leuprolide injections, radiation. Followed by Urology, Radiation Oncology.    7. Stress incontinence,  male  Chronic, stable. Followed by Urology.    8. Neuropathy  Chronic, stable on current medications. Followed by PCP.    9. Hyperlipidemia, unspecified hyperlipidemia type  Chronic, stable on current medications. Followed by PCP, Cardiology.    10. Osteoporosis, idiopathic  Chronic, stable. Followed by PCP.      Provided Primitivo with a 5-10 year written screening schedule and personal prevention plan. Recommendations were developed using the USPSTF age appropriate recommendations. Education, counseling, and referrals were provided as needed. After Visit Summary printed and given to patient which includes a list of additional screenings\tests needed.    Return in about 6 months (around 10/21/2017).with PCP.    Rachel Kevin NP

## 2017-04-21 NOTE — PATIENT INSTRUCTIONS
Counseling and Referral of Other Preventative  (Italic type indicates deductible and co-insurance are waived)    Patient Name: Primitivo Mitchell  Today's Date: 4/21/2017      SERVICE LIMITATIONS RECOMMENDATION    Vaccines    · Pneumococcal (once after 65)    · Influenza (annually)    · Hepatitis B (if medium/high risk)    · Prevnar 13      Hepatitis B medium/high risk factors:       - End-stage renal disease       - Hemophiliacs who received Factor VII or         IX concentrates       - Clients of institutions for the mentally             retarded       - Persons who live in the same house as          a HepB carrier       - Homosexual men       - Illicit injectable drug abusers     Pneumococcal: ? With latex allergy     Influenza: due fall 2017     Hepatitis B: N/A     Prevnar 13: Done, no repeat necessary    Prostate cancer screening (annually to age 75)     Prostate specific antigen (PSA) Shared decision making with Provider. Sometimes a co-pay may be required if the patient decides to have this test. The USPSTF no longer recommends prostate cancer screening routinely in medicine: N/A    Colorectal cancer screening (to age 75)    · Fecal occult blood test (annual)  · Flexible sigmoidoscopy (5y)  · Screening colonoscopy (10y)  · Barium enema   Last done 2014, recommend to repeat every 3  years    Diabetes self-management training (no USPSTF recommendations)  Requires referral by treating physician for patient with diabetes or renal disease. 10 hours of initial DSMT sessions of no less than 30 minutes each in a continuous 12-month period. 2 hours of follow-up DSMT in subsequent years.  N/A    Glaucoma screening (no USPSTF recommendation)  Diabetes mellitus, family history   , age 50 or over    American, age 65 or over  Last done 2015, recommend to repeat every 1  years    Medical nutrition therapy for diabetes or renal disease (no recommended schedule)  Requires referral by treating  physician for patient with diabetes or renal disease or kidney transplant within the past 3 years.  Can be provided in same year as diabetes self-management training (DSMT), and CMS recommends medical nutrition therapy take place after DSMT. Up to 3 hours for initial year and 2 hours in subsequent years.  N/A    Cardiovascular screening blood tests (every 5 years)  · Fasting lipid panel  Order as a panel if possible  Last done 12/2016, recommend to repeat every 1  years    Diabetes screening tests (at least every 3 years, Medicare covers annually or at 6-month intervals for prediabetic patients)  · Fasting blood sugar (FBS) or glucose tolerance test (GTT)  Patient must be diagnosed with one of the following:       - Hypertension       - Dyslipidemia       - Obesity (BMI 30kg/m2)       - Previous elevated impaired FBS or GTT       ... or any two of the following:       - Overweight (BMI 25 but <30)       - Family history of diabetes       - Age 65 or older       - History of gestational diabetes or birth of baby weighing more than 9 pounds  Last done 12/21016, recommend to repeat every 1  years    Abdominal aortic aneurysm screening (once)  · Sonogram   Limited to patients who meet one of the following criteria:       - Men who are 65-75 years old and have smoked more than 100 cigarette in their lifetime       - Anyone with a family history of abdominal aortic aneurysm       - Anyone recommended for screening by the USPSTF  N/A    HIV screening (annually for increased risk patients)  · HIV-1 and HIV-2 by EIA, or LEX, rapid antibody test or oral mucosa transudate  Patients must be at increased risk for HIV infection per USPSTF guidelines or pregnant. Tests covered annually for patient at increased risk or as requested by the patient. Pregnant patients may receive up to 3 tests during pregnancy.  Risks discussed, screening is not recommended    Smoking cessation counseling (up to 8 sessions per year)  Patients must  be asymptomatic of tobacco-related conditions to receive as a preventative service.  Non-smoker    Subsequent annual wellness visit  At least 12 months since last AWV  Return in one year     The following information is provided to all patients.  This information is to help you find resources for any of the problems found today that may be affecting your health:                Living healthy guide: www.[facility].[place].[facility]      Understanding Diabetes: www.diabetes.org      Eating healthy: www.cdc.gov/healthyweight      CDC home safety checklist: www.cdc.gov/steadi/patient.html      Agency on Aging: www.goea.[place].[facility]      Alcoholics anonymous (AA): www.aa.org      Physical Activity: www.roz.nih.gov/dl3wfak      Tobacco use: www.quitwithusla.org

## 2017-04-22 ENCOUNTER — HOSPITAL ENCOUNTER (EMERGENCY)
Facility: HOSPITAL | Age: 72
Discharge: HOME | End: 2017-04-22
Attending: FAMILY MEDICINE
Payer: MEDICARE

## 2017-04-22 VITALS
WEIGHT: 189 LBS | RESPIRATION RATE: 16 BRPM | TEMPERATURE: 98 F | DIASTOLIC BLOOD PRESSURE: 62 MMHG | SYSTOLIC BLOOD PRESSURE: 109 MMHG | HEART RATE: 87 BPM | BODY MASS INDEX: 25.6 KG/M2 | OXYGEN SATURATION: 98 % | HEIGHT: 72 IN

## 2017-04-22 DIAGNOSIS — K60.2 ANAL FISSURE: Primary | ICD-10-CM

## 2017-04-22 LAB
BUN SERPL-MCNC: 21 MG/DL (ref 6–30)
CHLORIDE SERPL-SCNC: 102 MMOL/L (ref 95–110)
CREAT SERPL-MCNC: 1 MG/DL (ref 0.5–1.4)
GLUCOSE SERPL-MCNC: 94 MG/DL (ref 70–110)
HCT VFR BLD CALC: 40 %PCV (ref 36–54)
POC IONIZED CALCIUM: 1.12 MMOL/L (ref 1.06–1.42)
POC TCO2 (MEASURED): 26 MMOL/L (ref 23–29)
POTASSIUM BLD-SCNC: 4.1 MMOL/L (ref 3.5–5.1)
SAMPLE: NORMAL
SODIUM BLD-SCNC: 140 MMOL/L (ref 136–145)

## 2017-04-22 PROCEDURE — 99283 EMERGENCY DEPT VISIT LOW MDM: CPT | Mod: ,,, | Performed by: PHYSICIAN ASSISTANT

## 2017-04-22 PROCEDURE — 99283 EMERGENCY DEPT VISIT LOW MDM: CPT

## 2017-04-22 NOTE — ED NOTES
GENERAL: The patient is a well-developed, well-nourished male in no apparent distress. He is alert and oriented x3.    HEENT: Head is normocephalic and atraumatic. Extraocular muscles are intact. Pupils are equal, round, and reactive to light and accommodation. Nares appeared normal. Mouth is well hydrated and without lesions. Mucous membranes are moist. Posterior pharynx clear of any exudate or lesions.    NECK: Supple. No carotid bruits. No lymphadenopathy or thyromegaly.    LUNGS: Clear to auscultation.    HEART: Regular rate and rhythm without murmur.     ABDOMEN: Soft, nontender, and nondistended. Positive bowel sounds. No hepatosplenomegaly was noted.  Reports felt a nodule in his rectum area and bright red blood on his stool and toilet paper this am.     EXTREMITIES: Without any cyanosis, clubbing, rash, lesions or edema.     NEUROLOGIC: Cranial nerves II through XII are grossly intact.     PSYCHIATRIC: Flat affect, but denies suicidal or homicidal ideations.    SKIN: No ulceration or induration present.

## 2017-04-22 NOTE — ED PROVIDER NOTES
"Encounter Date: 4/22/2017       History     Chief Complaint   Patient presents with    Hemorrhoids     Pt blood with stool.      Review of patient's allergies indicates:   Allergen Reactions    Latex, natural rubber Rash    Omnipaque 140 [iohexol] Rash     Extensive rash over trunk font and back and legs the morning after CT dye given in late afternon the previous day. Omnipaque 350: 100cc IV & 30cc oral. Rash is severe, but listed as moderate because not shortness of breath    Allegra-d 12 hour [fexofenadine-pseudoephedrine] Other (See Comments)     Raise BP    Azithromycin     Bactrim [sulfamethoxazole-trimethoprim]     Boniva [ibandronate]     Celebrex [celecoxib]     Ciprofloxacin     Claritin-d 12 hour [loratadine-pseudoephedrine]     Imipramine     Neomycin-polymyxin-hc     Neurontin [gabapentin]     Nitrofuran analogues     Sulfa (sulfonamide antibiotics)     Adhesive Rash     Adhesive tape causes rash     Androderm [testosterone] Rash     HPI Comments: Patient is a 71-year-old male with a past medical history of prostate cancer receiving radiation, HTN, HLD who presents the ED with blood in stool in pain with BM.  Patient states that he has been constipated for the past few days.  He has to strain more than usual.  He reports noticing blood with wiping 2 days ago.  Today, he noticed blood with stool and with wiping.  He states that when he has a BM he had a "burning" sensation that was relieved after it past.  He denies any pain at rest, abdominal pain, fever, chills, urinary difficulties, nausea.  Patient has no other complaint at this time.    The history is provided by the patient.     Past Medical History:   Diagnosis Date    Aortic valve disorders 5/29/2013    3/21/2013: [place]. Mild aortic valve sclerosis. 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    ED (erectile dysfunction)     Enlarged prostate     Floaters in visual field     Hyperlipidemia     Hypertension     " Osteoporosis     Pre-operative cardiovascular examination 5/18/2016 5/23/2016: Plan is robotic prostatectomy.    Prostate cancer 4/5/2016    Stress incontinence, male 7/11/2016     Past Surgical History:   Procedure Laterality Date    COLONOSCOPY W/ POLYPECTOMY      PROSTATE SURGERY  05/23/2016    Prostatectomy for prostate cancer, done at [facility]     Family History   Problem Relation Age of Onset    Heart disease Mother     Alzheimer's disease Mother     Other Father      Colon problems    Ulcerative colitis Sister     Prostate cancer Brother     Anxiety disorder Son     Early death Paternal Grandmother     Cancer Brother      Social History   Substance Use Topics    Smoking status: Never Smoker    Smokeless tobacco: Never Used    Alcohol use No     Review of Systems   Constitutional: Negative for chills and fever.   HENT: Negative for ear pain and sore throat.    Eyes: Negative for visual disturbance.   Gastrointestinal: Positive for blood in stool, constipation and rectal pain (with BM). Negative for abdominal pain.   Endocrine: Negative for polyuria.   Genitourinary: Negative for dysuria and hematuria.   Musculoskeletal: Negative for back pain.   Skin: Negative for pallor and rash.   Neurological: Negative for weakness and headaches.       Physical Exam   Initial Vitals   BP Pulse Resp Temp SpO2   04/22/17 1037 04/22/17 1037 04/22/17 1037 04/22/17 1037 04/22/17 1037   109/62 87 16 97.7 °F (36.5 °C) 98 %     Physical Exam    Nursing note and vitals reviewed.  Constitutional: He appears well-developed and well-nourished.   HENT:   Head: Normocephalic and atraumatic.   Nose: Nose normal.   Eyes: Conjunctivae and EOM are normal.   Neck: Normal range of motion.   Cardiovascular: Normal rate, regular rhythm and normal heart sounds. Exam reveals no friction rub.    No murmur heard.  Pulmonary/Chest: Breath sounds normal. No respiratory distress. He has no wheezes. He has no rales.   Abdominal: Soft.  Bowel sounds are normal. He exhibits no distension. There is no tenderness.   Genitourinary: Rectal exam shows fissure and tenderness. Rectal exam shows no external hemorrhoid, no internal hemorrhoid and no mass. : Acceptable.  Genitourinary Comments: Hemoccult positive   Musculoskeletal: Normal range of motion.   Neurological: He is alert and oriented to person, place, and time. He has normal strength. No sensory deficit.   Skin: Skin is warm and dry. No erythema.   Psychiatric: He has a normal mood and affect. Thought content normal.         ED Course   Procedures  Labs Reviewed   ISTAT PROCEDURE             Medical Decision Making:   History:   Old Medical Records: I decided to obtain old medical records.  Clinical Tests:   Lab Tests: Ordered and Reviewed       APC / Resident Notes:   Patient presents the ED with blood in stool, constipation, pain with BM for 2 days.  On exam, hemodynamically stable.  Abdomen soft, nontender nondistended.  On rectal exam, no external or internal hemorrhoids appreciated.  There is tenderness and anal fissure.  Hemoccult positive. Given patient's history and physical exam, his symptoms today most likely due to anal fissure.  Istat chem with hct of 40%. Not anemic today. I advised high-fiber diet and stool softeners for constipation.  Sitz bath for 10 minutes 2 times a day as needed.  Follow-up with PCP and other appointments as scheduled.  Strict ED return precaution given.  Patient and wife voice understanding.  All questions answered.  They're comfortable with plan and stable for discharge.  I have reviewed patient's chart and labs and discussed this case with my supervising M.D.              ED Course     Clinical Impression:   The encounter diagnosis was Anal fissure.    Disposition:   Disposition: Discharged  Condition: Stable       Beryl Diaz PA-C  04/23/17 2049

## 2017-04-22 NOTE — DISCHARGE INSTRUCTIONS
Consider high fiber diet as discussed.  Eat foods that have wheat such as brown rice, wheat pasta, and wheat bread. Increase fruit and vegetable intake as the skin in these have fiber.  Consider Stool softeners in addition such as Colace or other generic medication.   Use warm sitz bath.  Soak for 10-15 minutes 2-3 times a day with warm water.  Follow-up with primary care physician if your symptoms do not improve or worsen or return to the emergency department.    Future Appointments  Date Time Provider Department Center   4/25/2017 1:00 PM Aubree Beltran MD [facility] PHYSMED [facility]   5/17/2017 10:00 AM MD [facility]IA [facility]   6/20/2017 9:00 AM LAB, APPOINTMENT [facility] LAB [facility]   6/27/2017 11:00 AM Andrea Ochoa Jr., MD [facility] RAD ONC [facility]       Eating a High-Fiber Diet  Fiber is what gives strength and structure to plants. Most grains, beans, vegetables, and fruits contain fiber. Foods rich in fiber are often low in calories and fat, and they fill you up more. They may also reduce your risks for certain health problems. To find out the amount of fiber in canned, packaged, or frozen foods, read the Nutrition Facts label. It tells you how much fiber is in a serving.    Types of fiber and their benefits  There are two types of fiber: insoluble and soluble. They both aid digestion and help you maintain a healthy weight.  · Insoluble fiber. This is found in whole grains, cereals, certain fruits and vegetables such as apple skin, corn, and carrots. Insoluble fiber may prevent constipation and reduce the risk for certain types of cancer.  · Soluble fiber. This type of fiber is in oats, beans, and certain fruits and vegetables such as strawberries and peas. Soluble fiber can reduce cholesterol, which may help lower the risk for heart disease. It also helps control blood sugar levels.  Look for high-fiber foods  Try these foods to add fiber to your diet:  · Whole-grain breads and cereals.  Try to eat 6 to 8 ounces a day. Include wheat and oat bran cereals, whole-wheat muffins or toast, and corn tortillas in your meals.  · Fruits. Try to eat 2 cups a day. Apples, oranges, strawberries, pears, and bananas are good sources. (Note: Fruit juice is low in fiber.)  · Vegetables. Try to eat at least 2.5 cups a day. Add asparagus, carrots, broccoli, peas, and corn to your meals.  · Beans. One cup of cooked lentils gives you over 15 grams of fiber. Try navy beans, lentils, and chickpeas.  · Seeds. A small handful of seeds gives you about 3 grams of fiber. Try sunflower seeds.  Keep track of your fiber  Keep track of how much fiber you eat. Start by reading food labels. Then eat a variety of foods high in fiber. As you begin to eat more fiber, ask your healthcare provider how much water you should be drinking to keep your digestive system working smoothly.  You should aim for a certain amount of fiber in your diet each day. If you are a woman, that amount is between 25 and 28 grams per day. Men should aim for 30 to 33 grams per day. After age 50, your daily fiber needs drop to 22 grams for women and 28 grams for men.  Before you reach for the fiber supplements, think about this. Fiber is found naturally in healthy whole foods. It gives you that feeling of fullness after you eat. Taking fiber supplements or eating fiber-enriched foods will not give you this full feeling.  Your fiber intake is a good measure for the quality of your overall diet. If you are missing out on your daily amount of fiber, you may be lacking other important nutrients as well.  Date Last Reviewed: 5/11/2015  © 8042-5040 [employer]. [address] [place] [address], [place] [address]. All rights reserved. This information is not intended as a substitute for professional medical care. Always follow your healthcare professional's instructions.      Anal Fissure    The anal canal is the end portion of the intestinal tract. It includes the  rectum and anus. Stool is passed through the anus. Sometimes a crack or tear develops in the lining of the anal canal. This condition is called an anal fissure.  Anal fissures are caused by trauma or stretching of the anal canal. This is most often due to constipation (having hard, wowxmtbzr-oc-elyt stools). Severe diarrhea or insertion of an object into the anal canal may also cause a fissure.  Symptoms include pain and bleeding, especially during a bowel movement. Sometimes there is swelling, itching, and skin irritation. The area may spasm, causing more pain and skin separation. The most common complication is infection, which may lead to an abscess (pocket of pus). When this happens, there may also be discharge from the fissure.  An anal fissure usually heals on its own with no special treatment. Home care instructions to help prevent constipation and relieve symptoms may be given. Once the area has healed, follow-up tests may be needed.  In some cases, a fissure does not heal on its own. Surgery may then be needed to close the tear.  Home care  Medicines  Your child may be prescribed medicines, such as pain relievers, stool softeners, or laxatives. Make sure to follow all instructions when giving any of these medicines to your child. Note: Do not give your child over-the-counter medicines without talking to the provider first.  General care  · To help ease constipation, you may be told to:  ¨ Increase the fiber in your childs diet. This includes foods such as whole grains, fresh fruits, and vegetables. Fiber adds bulk to stool and absorbs water to soften stool. This helps stool pass through the colon more easily. If needed, a fiber supplement may also be prescribed.  ¨ Encourage your child to drink lots of water. This can also help soften stool.  · To help relieve pain and relax the muscles in the anus, have your child soak in a bath with a few inches of warm water. This is a called a sitz bath. Your child  should do this for 10 to 15 minutes at time, a few times a day, or as advised.  · Keep a careful record of when your child has a bowel movement and the type of stool that was passed. This may help the provider determine future care for your child. Older children should be encouraged to keep their own record.  · Check your childs anus for bleeding or signs of infection (see below). Older children may be asked to help monitor their own symptoms.  Follow-up care  Follow up with your childs healthcare provider as advised. If testing was done, youll be told the results and any new findings that may affect your childs care.  When to seek medical advice  Unless your childs healthcare provider advises otherwise, call the provider right away if:  · Your child is 3 months old or younger and has a fever of 100.4°F (38°C) or higher. (Seek treatment right away. Fever in a young baby can be a sign of a serious infection.)  · Your child is younger than 2 years of age and has a fever of 100.4°F (38°C) that lasts for more than 1 day.  · Your child is 2 years old or older and has a fever of 100.4°F (38°C) that lasts for more than 3 days.  · Your child has repeated fevers above 104°F (40°C).  Also call the provider right away if:  · Your child symptoms worsen, or they dont improve with home care measures.  · Your child has signs of infection such as increased redness, swelling, or foul-smelling drainage in the area around the fissure.  · Your child has ongoing constipation or explosive diarrhea.  Call 911  Call 911 right away if:  · Your child has trouble breathing.  · Your child has trouble swallowing.  · Your child faints.  · Your child has an unusually fast heart rate.  · Your child has large amounts of bleeding from the anus or blood in the stool.  Date Last Reviewed: 6/15/2015  © 7700-9460 [employer]. [address] [place] [address], [place] [address]. All rights reserved. This information is not intended as a  substitute for professional medical care. Always follow your healthcare professional's instructions.

## 2017-04-22 NOTE — ED AVS SNAPSHOT
[facility]  [address] [facility] [address]               Primitivo Mitchell   2017 11:17 AM   ED    Description:  Male : 1945   Department:  [facility]           Your Care was Coordinated By:     Provider Role From To    Manuel Velazquez MD Attending Provider 17 1211 --    Beryl Diaz PA-C Physician Assistant 17 1211 --      Reason for Visit     Hemorrhoids           Diagnoses this Visit        Comments    Anal fissure    -  Primary       ED Disposition     None           To Do List           Follow-up Information     Schedule an appointment as soon as possible for a visit with Ron Esquivel MD.    Specialty:  Internal Medicine    Contact information:    [address]  [place] [address]  [phone]          Follow up with [facility].    Specialty:  Emergency Medicine    Why:  If symptoms worsen    Contact information:    [address] [facility] [id number] [phone]      [facility]     [facility] Nurse Care Line -  Assistance  Unless otherwise directed by your provider, please contact [facility], our nurse care line that is available for  assistance.     Registered nurses in the [facility] provide: appointment scheduling, clinical advisement, health education, and other advisory services.  Call: [phone] (toll free)               Medications           Message regarding Medications     Verify the changes and/or additions to your medication regime listed below are the same as discussed with your clinician today.  If any of these changes or additions are incorrect, please notify your healthcare provider.             Verify that the below list of medications is an accurate representation of the medications you are currently taking.  If none reported, the list may be blank. If incorrect, please contact your healthcare provider. Carry this  list with you in case of emergency.           Current Medications     atorvastatin (LIPITOR) 20 MG tablet Take 1 tablet (20 mg total) by mouth once daily.    cholecalciferol, vitamin D3, 5,000 unit Tab Take 5,000 Units by mouth once daily.    fish oil-omega-3 fatty acids 300-1,000 mg capsule Take 2 g by mouth once daily.    fluticasone (FLONASE) 50 mcg/actuation nasal spray 2 sprays by Each Nare route once daily.    ketotifen (ALAWAY) 0.025 % ophthalmic solution 1 drop 2 (two) times daily.    losartan (COZAAR) 50 MG tablet Take 1 tablet (50 mg total) by mouth once daily.    nortriptyline (PAMELOR) 10 MG capsule Take 1 capsule (10 mg total) by mouth every evening. Take 35 mg total per day    nortriptyline (PAMELOR) 25 MG capsule Take 1 capsule (25 mg total) by mouth every evening. Take 35 mg total per day    omeprazole (PRILOSEC) 40 MG capsule Take 1 capsule (40 mg total) by mouth daily as needed.    triamcinolone acetonide 0.1% (KENALOG) 0.1 % cream Apply topically 2 (two) times daily.           Clinical Reference Information           Your Vitals Were     BP Pulse Temp Resp Height Weight    109/62 87 97.7 °F (36.5 °C) (Oral) 16 6' (1.829 m) 85.7 kg (189 lb)    SpO2 BMI             98% 25.63 kg/m2         Allergies as of 4/22/2017        Reactions    Latex, Natural Rubber Rash    Omnipaque 140 [Iohexol] Rash    Extensive rash over trunk font and back and legs the morning after CT dye given in late afternon the previous day. Omnipaque 350: 100cc IV & 30cc oral. Rash is severe, but listed as moderate because not shortness of breath    Allegra-d 12 Hour [Fexofenadine-pseudoephedrine] Other (See Comments)    Raise BP    Azithromycin     Bactrim [Sulfamethoxazole-trimethoprim]     Boniva [Ibandronate]     Celebrex [Celecoxib]     Ciprofloxacin     Claritin-d 12 Hour [Loratadine-pseudoephedrine]     Imipramine     Neomycin-polymyxin-hc     Neurontin [Gabapentin]     Nitrofuran Analogues     Sulfa (Sulfonamide Antibiotics)      Adhesive Rash    Adhesive tape causes rash     Androderm [Testosterone] Rash      Immunizations Administered on Date of Encounter - 4/22/2017     None      ED Micro, Lab, POCT     Start Ordered       Status Ordering Provider    04/22/17 1244 04/22/17 1244  ISTAT PROCEDURE  Once      Final result     04/22/17 1232 04/22/17 1231  ISTAT CHEM8  Once      Acknowledged       ED Imaging Orders     None        Discharge Instructions       Consider high fiber diet as discussed.  Eat foods that have wheat such as brown rice, wheat pasta, and wheat bread. Increase fruit and vegetable intake as the skin in these have fiber.  Consider Stool softeners in addition such as Colace or other generic medication.   Use warm sitz bath.  Soak for 10-15 minutes 2-3 times a day with warm water.  Follow-up with primary care physician if your symptoms do not improve or worsen or return to the emergency department.    Future Appointments  Date Time Provider Department Center   4/25/2017 1:00 PM Aubree Beltran MD [facility] PHYSMED [facility]   5/17/2017 10:00 AM MD ANIA [facility] [address]   6/20/2017 9:00 AM LAB, APPOINTMENT [facility]   6/27/2017 11:00 AM Andrea Ochoa Jr., MD [facility] RAD ONC [facility]       Eating a High-Fiber Diet  Fiber is what gives strength and structure to plants. Most grains, beans, vegetables, and fruits contain fiber. Foods rich in fiber are often low in calories and fat, and they fill you up more. They may also reduce your risks for certain health problems. To find out the amount of fiber in canned, packaged, or frozen foods, read the Nutrition Facts label. It tells you how much fiber is in a serving.    Types of fiber and their benefits  There are two types of fiber: insoluble and soluble. They both aid digestion and help you maintain a healthy weight.  · Insoluble fiber. This is found in whole grains, cereals, certain fruits and vegetables such as apple skin, corn, and carrots.  Insoluble fiber may prevent constipation and reduce the risk for certain types of cancer.  · Soluble fiber. This type of fiber is in oats, beans, and certain fruits and vegetables such as strawberries and peas. Soluble fiber can reduce cholesterol, which may help lower the risk for heart disease. It also helps control blood sugar levels.  Look for high-fiber foods  Try these foods to add fiber to your diet:  · Whole-grain breads and cereals. Try to eat 6 to 8 ounces a day. Include wheat and oat bran cereals, whole-wheat muffins or toast, and corn tortillas in your meals.  · Fruits. Try to eat 2 cups a day. Apples, oranges, strawberries, pears, and bananas are good sources. (Note: Fruit juice is low in fiber.)  · Vegetables. Try to eat at least 2.5 cups a day. Add asparagus, carrots, broccoli, peas, and corn to your meals.  · Beans. One cup of cooked lentils gives you over 15 grams of fiber. Try navy beans, lentils, and chickpeas.  · Seeds. A small handful of seeds gives you about 3 grams of fiber. Try sunflower seeds.  Keep track of your fiber  Keep track of how much fiber you eat. Start by reading food labels. Then eat a variety of foods high in fiber. As you begin to eat more fiber, ask your healthcare provider how much water you should be drinking to keep your digestive system working smoothly.  You should aim for a certain amount of fiber in your diet each day. If you are a woman, that amount is between 25 and 28 grams per day. Men should aim for 30 to 33 grams per day. After age 50, your daily fiber needs drop to 22 grams for women and 28 grams for men.  Before you reach for the fiber supplements, think about this. Fiber is found naturally in healthy whole foods. It gives you that feeling of fullness after you eat. Taking fiber supplements or eating fiber-enriched foods will not give you this full feeling.  Your fiber intake is a good measure for the quality of your overall diet. If you are missing out on your  daily amount of fiber, you may be lacking other important nutrients as well.  Date Last Reviewed: 5/11/2015 © 2000-2016 [employer]. [address] [place] [address], [place] [address]. All rights reserved. This information is not intended as a substitute for professional medical care. Always follow your healthcare professional's instructions.      Anal Fissure    The anal canal is the end portion of the intestinal tract. It includes the rectum and anus. Stool is passed through the anus. Sometimes a crack or tear develops in the lining of the anal canal. This condition is called an anal fissure.  Anal fissures are caused by trauma or stretching of the anal canal. This is most often due to constipation (having hard, biodpdnyh-rt-gknv stools). Severe diarrhea or insertion of an object into the anal canal may also cause a fissure.  Symptoms include pain and bleeding, especially during a bowel movement. Sometimes there is swelling, itching, and skin irritation. The area may spasm, causing more pain and skin separation. The most common complication is infection, which may lead to an abscess (pocket of pus). When this happens, there may also be discharge from the fissure.  An anal fissure usually heals on its own with no special treatment. Home care instructions to help prevent constipation and relieve symptoms may be given. Once the area has healed, follow-up tests may be needed.  In some cases, a fissure does not heal on its own. Surgery may then be needed to close the tear.  Home care  Medicines  Your child may be prescribed medicines, such as pain relievers, stool softeners, or laxatives. Make sure to follow all instructions when giving any of these medicines to your child. Note: Do not give your child over-the-counter medicines without talking to the provider first.  General care  · To help ease constipation, you may be told to:  ¨ Increase the fiber in your childs diet. This includes foods such as whole  grains, fresh fruits, and vegetables. Fiber adds bulk to stool and absorbs water to soften stool. This helps stool pass through the colon more easily. If needed, a fiber supplement may also be prescribed.  ¨ Encourage your child to drink lots of water. This can also help soften stool.  · To help relieve pain and relax the muscles in the anus, have your child soak in a bath with a few inches of warm water. This is a called a sitz bath. Your child should do this for 10 to 15 minutes at time, a few times a day, or as advised.  · Keep a careful record of when your child has a bowel movement and the type of stool that was passed. This may help the provider determine future care for your child. Older children should be encouraged to keep their own record.  · Check your childs anus for bleeding or signs of infection (see below). Older children may be asked to help monitor their own symptoms.  Follow-up care  Follow up with your childs healthcare provider as advised. If testing was done, youll be told the results and any new findings that may affect your childs care.  When to seek medical advice  Unless your childs healthcare provider advises otherwise, call the provider right away if:  · Your child is 3 months old or younger and has a fever of 100.4°F (38°C) or higher. (Seek treatment right away. Fever in a young baby can be a sign of a serious infection.)  · Your child is younger than 2 years of age and has a fever of 100.4°F (38°C) that lasts for more than 1 day.  · Your child is 2 years old or older and has a fever of 100.4°F (38°C) that lasts for more than 3 days.  · Your child has repeated fevers above 104°F (40°C).  Also call the provider right away if:  · Your child symptoms worsen, or they dont improve with home care measures.  · Your child has signs of infection such as increased redness, swelling, or foul-smelling drainage in the area around the fissure.  · Your child has ongoing constipation or explosive  diarrhea.  Call 911  Call 911 right away if:  · Your child has trouble breathing.  · Your child has trouble swallowing.  · Your child faints.  · Your child has an unusually fast heart rate.  · Your child has large amounts of bleeding from the anus or blood in the stool.  Date Last Reviewed: 6/15/2015  © 3137-6750 [employer]. [address] [place] [address]. All rights reserved. This information is not intended as a substitute for professional medical care. Always follow your healthcare professional's instructions.    Your Scheduled Appointments     Apr 25, 2017  1:00 PM CDT   Consult with Aubree Beltran MD   [facility] ([facility] )    [address]  [place] [id number] [phone]            May 17, 2017 10:00 AM CDT   Established Patient Visit with Wisam Verde MD   [facility] ([facility]    [address] [place] [address], [address]  [facility] [address]   [phone]            Jun 20, 2017  9:00 AM CDT   Non-Fasting Lab with LAB, APPOINTMENT [place] [facility] ([facility])    [address] [facility] [id number] [phone]            Jun 27, 2017 11:00 AM CDT   Established Patient with Andrea Ochoa Jr., MD   [facility] ([facility] )    [address]  [place] [id number] [phone]               [facility] complies with applicable Federal civil rights laws and does not discriminate on the basis of race, color, national origin, age, disability, or sex.        Language Assistance Services     ATTENTION: Language assistance services are available, free of charge. Please call [phone].      ATENCIÓN: Si habla español, tiene a oro disposición servicios gratuitos de asistencia lingüística. Llame al [phone].     CHÚ Ý: N?u b?n nói Ti?ng Vi?t, có các d?ch v? h? tr? ngôn ng? mi?n phí dành cho b?n.  G?i s? [phone].

## 2017-04-23 ENCOUNTER — NURSE TRIAGE (OUTPATIENT)
Dept: ADMINISTRATIVE | Facility: CLINIC | Age: 72
End: 2017-04-23

## 2017-04-23 NOTE — TELEPHONE ENCOUNTER
"  Reason for Disposition   [1] Follow-up call to recent contact AND [2] information only call, no triage required    Answer Assessment - Initial Assessment Questions  1. REASON FOR CALL or QUESTION: "What is your reason for calling today?" or "How can I best help you?" or "What question do you have that I can help answer?"      Patient wanted advice as to what should he do if he has more blood in his stool.  Patient advised to call back to [facility] at the time of any finding. We will advise according to what is going on at that time    Protocols used: ST INFORMATION ONLY CALL-A-    "

## 2017-04-24 ENCOUNTER — TELEPHONE (OUTPATIENT)
Dept: INTERNAL MEDICINE | Facility: CLINIC | Age: 72
End: 2017-04-24

## 2017-04-24 NOTE — TELEPHONE ENCOUNTER
----- Message from Mejia Saldana MA sent at 4/21/2017 12:35 PM CDT -----  Contact: [employer] [facility] - [phone]   Did not receive the Rx for nortriptyline (PAMELOR) 25 MG capsule. Directions are confusing. Please call. Thanks!

## 2017-04-24 NOTE — TELEPHONE ENCOUNTER
----- Message from Chasity Jason sent at 4/24/2017  1:38 PM CDT -----  Contact: Self/[phone]  Pt states that he went to the Er on 4/15 because he has blood in his stool. Pt would like some information from the Dr in regards to his diagnose. PLease call and advise.

## 2017-04-24 NOTE — TELEPHONE ENCOUNTER
Spoke to pt states he went to the ED on Sat /see ED notes/  He is fine today no blood in stool wants to know if he can cont the preporation H?

## 2017-04-25 ENCOUNTER — INITIAL CONSULT (OUTPATIENT)
Dept: PHYSICAL MEDICINE AND REHAB | Facility: CLINIC | Age: 72
End: 2017-04-25
Payer: MEDICARE

## 2017-04-25 VITALS
HEIGHT: 72 IN | DIASTOLIC BLOOD PRESSURE: 79 MMHG | BODY MASS INDEX: 25.98 KG/M2 | HEART RATE: 85 BPM | WEIGHT: 191.81 LBS | SYSTOLIC BLOOD PRESSURE: 129 MMHG

## 2017-04-25 DIAGNOSIS — Z90.79 S/P PROSTATECTOMY: ICD-10-CM

## 2017-04-25 DIAGNOSIS — G62.9 NEUROPATHY: ICD-10-CM

## 2017-04-25 DIAGNOSIS — M54.16 LUMBAR RADICULOPATHY: Primary | ICD-10-CM

## 2017-04-25 DIAGNOSIS — M54.41 CHRONIC BILATERAL LOW BACK PAIN WITH BILATERAL SCIATICA: ICD-10-CM

## 2017-04-25 DIAGNOSIS — G89.29 CHRONIC BILATERAL LOW BACK PAIN WITH BILATERAL SCIATICA: ICD-10-CM

## 2017-04-25 DIAGNOSIS — M54.42 CHRONIC BILATERAL LOW BACK PAIN WITH BILATERAL SCIATICA: ICD-10-CM

## 2017-04-25 PROBLEM — M54.40 CHRONIC BILATERAL LOW BACK PAIN WITH SCIATICA: Status: ACTIVE | Noted: 2017-04-25

## 2017-04-25 PROCEDURE — 99204 OFFICE O/P NEW MOD 45 MIN: CPT | Mod: S$GLB,,, | Performed by: PHYSICAL MEDICINE & REHABILITATION

## 2017-04-25 PROCEDURE — 3074F SYST BP LT 130 MM HG: CPT | Mod: S$GLB,,, | Performed by: PHYSICAL MEDICINE & REHABILITATION

## 2017-04-25 PROCEDURE — 3078F DIAST BP <80 MM HG: CPT | Mod: S$GLB,,, | Performed by: PHYSICAL MEDICINE & REHABILITATION

## 2017-04-25 PROCEDURE — 99999 PR PBB SHADOW E&M-EST. PATIENT-LVL III: CPT | Mod: PBBFAC,,, | Performed by: PHYSICAL MEDICINE & REHABILITATION

## 2017-04-25 PROCEDURE — 1159F MED LIST DOCD IN RCRD: CPT | Mod: S$GLB,,, | Performed by: PHYSICAL MEDICINE & REHABILITATION

## 2017-04-25 PROCEDURE — 1125F AMNT PAIN NOTED PAIN PRSNT: CPT | Mod: S$GLB,,, | Performed by: PHYSICAL MEDICINE & REHABILITATION

## 2017-04-25 PROCEDURE — 1160F RVW MEDS BY RX/DR IN RCRD: CPT | Mod: S$GLB,,, | Performed by: PHYSICAL MEDICINE & REHABILITATION

## 2017-04-25 PROCEDURE — 99499 UNLISTED E&M SERVICE: CPT | Mod: S$GLB,,, | Performed by: PHYSICAL MEDICINE & REHABILITATION

## 2017-04-25 NOTE — LETTER
April 30, 2017      Ron Esquivel MD  [address]  [facility] [address]           [facility]  [address]  [facility] [address]  Phone: [phone]          Patient: Primitivo Mitchell   MR Number: [id number]   YOB: 1945   Date of Visit: 4/25/2017       Dear Dr. Ron Esquivel:    Thank you for referring Primitivo Mitchell to me for evaluation. Attached you will find relevant portions of my assessment and plan of care.    If you have questions, please do not hesitate to call me. I look forward to following Primitivo Mitchell along with you.    Sincerely,    Aubree Beltran MD    Enclosure  CC:  No Recipients    If you would like to receive this communication electronically, please contact [email or web address] or [phone] to request more information on [employer] access.    For providers and/or their staff who would like to refer a patient to [facility], please contact us through our one-stop-shop provider referral line, [facility], at [phone].    If you feel you have received this communication in error or would no longer like to receive these types of communications, please e-mail [email or web address]

## 2017-04-25 NOTE — PROGRESS NOTES
Subjective:       Patient ID: Primitivo Mitchell is a 71 y.o. male.    Chief Complaint: Leg Pain    HPI   Mr. Mitchell , is RT handed male with a history of stage II (T2c, N0, M0) adenocarcinoma of the prostate, who is coming first time to clinic for both leg pain.   He reports some of back pain, but legs hurts more than back. He is s/p chemo therapy, and XRT that recently started.  Back Pain Description:  Lenght: pain is a chronic pain, although it fluctuates in intensity. Length > since May 2016, after prostate surgery.   No past, recent injury, falls.  Intensity:  CURRENT  LEG pain is 3-4/10,  AVERAGE  Pain 3-4 /10. at BEST 2-3/10 , At WORST 6 /10 on the WORST day.   Location: pain is localized at  outsides of both legs, from hip to knee, It is more legs pain than back pain.   Radiation:no radiation of leg pain, he feels that both  pain are not related.   Timing : it is a constant pain,  day/night pain , does not awakes him at night, worse with activity, states no specific time.   QUALITY:   It is a dull numbness,   No Sharp/shooting, no  burning, tingling,   No leg weakness.    Worsening factors:  Leg hurts with sitting ,   Alleviating factors: Lying down,  Nortriptyline.   Symptoms interfere with daily activity, sleeping and work.   Current medications:  Nortriptyline at bedtime, helps.  Failed medications:None.   Prior procedures.   PT/OT: None.   Patient denies night fever/night sweats, bowel incontinence, significant weight loss and significant motor weakness ( red flags).  Patient denies any suicidal or homicidal ideations.  He states that he wants to know what is the reason for his pain, and does not need medications.  Here for evaluation and treatment.     Review of Systems   Constitutional: Negative for appetite change and fatigue.   Eyes: Negative for visual disturbance.   Respiratory: Negative for shortness of breath.    Cardiovascular: Negative for chest pain.   Gastrointestinal: Negative for  constipation and diarrhea.   Genitourinary: Negative for dysuria, frequency and urgency.   Musculoskeletal: Positive for back pain. Negative for arthralgias, gait problem, joint swelling, myalgias, neck pain and neck stiffness.   Neurological: Negative for dizziness, tremors, weakness, numbness and headaches.   Psychiatric/Behavioral: Negative for dysphoric mood.   All other systems reviewed and are negative.        Objective:      Physical Exam    GENERAL: The patient is alert, oriented, pleasant.   HEENT; PERRLA  NECK: supple,   MUSCULOSKELETAL:   Gait is normal .  Cervical spine : full  AROM in cervical spine   Lumbar spine, full range of motion in all planes, flexion to 90 degrees , ext. 0.  Side bending and rotation to 35-40 degrees, b/l.  Straight leg raising Negative bilaterally.   Full range of motion in all joints x4 extremities.   Muscle strength 5/5 throughout x4 extremities.   No  joint laxity throughout x4 extremities.   No pain to IR in hips b/l.  NEUROLOGIC: Cranial nerves II through XII intact.   Deep tendon reflexes is normal, +2 in the upper and lower extremities bilaterally.   Muscle tone is normal.   Sensory is intact to light touch and pinprick throughout x4 extremities.       Assessment:       1. Neuropathy    2. Lumbar radiculopathy    3. Chronic bilateral low back pain with bilateral sciatica    4. S/P prostatectomy        Plan:       Lumbar radiculopathy  -     X-Ray Lumbar Complete With Flex And Ext; Future  -     MRI Lumbar Spine Without Contrast; Future    Neuropathy  -     X-Ray Lumbar Complete With Flex And Ext; Future  -     MRI Lumbar Spine Without Contrast; Future    Chronic bilateral low back pain with bilateral sciatica  -     X-Ray Lumbar Complete With Flex And Ext; Future  -     MRI Lumbar Spine Without Contrast; Future    S/P prostatectomy  -     X-Ray Lumbar Complete With Flex And Ext; Future  -     MRI Lumbar Spine Without Contrast; Future    RTC in 4-5 weeks.     Total time  spent face to face with patient was 45 minutes.   More than 50% of that time was spent in counseling on diagnosis , prognosis and treatment options.   I also caunsel patient  on common and most usual side effect of prescribed medications.    I reviewed Primary care , and other specialty's notes to better coordinate patient's  care.   All questions were answered, and patient voiced understanding.

## 2017-04-25 NOTE — MR AVS SNAPSHOT
[facility]  [address]  [facility] [address]  Phone: [phone]                  Primitivo Mitchell   2017 1:00 PM   Initial consult    Description:  Male : 1945   Provider:  Aubree Beltran MD   Department:  [facility]           Reason for Visit     Leg Pain           Diagnoses this Visit        Comments    Lumbar radiculopathy    -  Primary     Neuropathy         Chronic bilateral low back pain with bilateral sciatica         S/P prostatectomy                To Do List           Future Appointments        Provider Department Dept Phone    2017 10:00 AM [facility] MRI OPEN [facility] [phone]    2017 12:00 PM [facility] [id number] LIMIT [facility] [phone]    2017 2:20 PM [facility] [phone]    2017 9:00 AM LAB, APPOINTMENT [place] [facility] [phone]    2017 11:00 AM Andrea Ochoa Jr., MD [facility] - Radiation Oncology [phone]      Goals (5 Years of Data)     None      Follow-Up and Disposition     Return in about 4 weeks (around 2017).      [facility]     [facility] Nurse Care Line - 24/ Assistance  Unless otherwise directed by your provider, please contact [facility], our nurse care line that is available for / assistance.     Registered nurses in the [facility] provide: appointment scheduling, clinical advisement, health education, and other advisory services.  Call: [phone] (toll free)               Medications           Message regarding Medications     Verify the changes and/or additions to your medication regime listed below are the same as discussed with your clinician today.  If any of these changes or additions are incorrect, please notify your healthcare provider.             Verify that the below list of medications is an accurate representation of the  medications you are currently taking.  If none reported, the list may be blank. If incorrect, please contact your healthcare provider. Carry this list with you in case of emergency.           Current Medications     atorvastatin (LIPITOR) 20 MG tablet Take 1 tablet (20 mg total) by mouth once daily.    cholecalciferol, vitamin D3, 5,000 unit Tab Take 5,000 Units by mouth once daily.    fish oil-omega-3 fatty acids 300-1,000 mg capsule Take 2 g by mouth once daily.    fluticasone (FLONASE) 50 mcg/actuation nasal spray 2 sprays by Each Nare route once daily.    ketotifen (ALAWAY) 0.025 % ophthalmic solution 1 drop 2 (two) times daily.    losartan (COZAAR) 50 MG tablet Take 1 tablet (50 mg total) by mouth once daily.    nortriptyline (PAMELOR) 10 MG capsule Take 1 capsule (10 mg total) by mouth every evening. Take 35 mg total per day    nortriptyline (PAMELOR) 25 MG capsule Take 1 capsule (25 mg total) by mouth every evening. Take 35 mg total per day    omeprazole (PRILOSEC) 40 MG capsule Take 1 capsule (40 mg total) by mouth daily as needed.    triamcinolone acetonide 0.1% (KENALOG) 0.1 % cream Apply topically 2 (two) times daily.           Clinical Reference Information           Your Vitals Were     BP Pulse Height Weight BMI    129/79 85 6' (1.829 m) 87 kg (191 lb 12.8 oz) 26.01 kg/m2      Blood Pressure          Most Recent Value    BP  129/79      Allergies as of 4/25/2017     Latex, Natural Rubber    Omnipaque 140 [Iohexol]    Allegra-d 12 Hour [Fexofenadine-pseudoephedrine]    Azithromycin    Bactrim [Sulfamethoxazole-trimethoprim]    Boniva [Ibandronate]    Celebrex [Celecoxib]    Ciprofloxacin    Claritin-d 12 Hour [Loratadine-pseudoephedrine]    Imipramine    Neomycin-polymyxin-hc    Neurontin [Gabapentin]    Nitrofuran Analogues    Sulfa (Sulfonamide Antibiotics)    Adhesive    Androderm [Testosterone]      Immunizations Administered on Date of Encounter - 4/25/2017     None      Orders Placed During  Today's Visit     Future Labs/Procedures Expected by Expires    MRI Lumbar Spine Without Contrast  4/25/2017 4/25/2018    X-Ray Lumbar Complete With Flex And Ext  4/25/2017 4/25/2018      Language Assistance Services     ATTENTION: Language assistance services are available, free of charge. Please call [phone].      ATENCIÓN: Si habla español, tiene a oro disposición servicios gratuitos de asistencia lingüística. Llame al [phone].     CHÚ Ý: N?u b?n nói Ti?ng Vi?t, có các d?ch v? h? tr? ngôn ng? mi?n phí dành cho b?n. G?i s? [phone].         [facility] complies with applicable Federal civil rights laws and does not discriminate on the basis of race, color, national origin, age, disability, or sex.

## 2017-04-27 ENCOUNTER — TELEPHONE (OUTPATIENT)
Dept: INTERNAL MEDICINE | Facility: CLINIC | Age: 72
End: 2017-04-27

## 2017-04-27 ENCOUNTER — TELEPHONE (OUTPATIENT)
Dept: GASTROENTEROLOGY | Facility: CLINIC | Age: 72
End: 2017-04-27

## 2017-04-27 NOTE — TELEPHONE ENCOUNTER
----- Message from Ana Sutton sent at 4/26/2017 11:24 AM CDT -----  Contact: Self/ [phone]   Pt stated he went to hospital and they put him on a stool softener over the weekend. He want to know how long should he stay on the stool softener. Please call and advise     Thank you

## 2017-04-27 NOTE — TELEPHONE ENCOUNTER
----- Message from Filipe Zarco sent at 4/27/2017 11:16 AM CDT -----  Contact: [phone]/self  Pt would like to schedule an colonoscopy  Please advise

## 2017-04-27 NOTE — TELEPHONE ENCOUNTER
Patient needs a fu colonoscopy. Patient has a few new health issues that he would like to discuss with Dr. Osorio. Appointment scheduled for May 8. Patient verbalized understanding.

## 2017-05-08 ENCOUNTER — OFFICE VISIT (OUTPATIENT)
Dept: GASTROENTEROLOGY | Facility: CLINIC | Age: 72
End: 2017-05-08
Payer: MEDICARE

## 2017-05-08 VITALS
DIASTOLIC BLOOD PRESSURE: 70 MMHG | WEIGHT: 193.81 LBS | HEART RATE: 84 BPM | HEIGHT: 72 IN | BODY MASS INDEX: 26.25 KG/M2 | SYSTOLIC BLOOD PRESSURE: 134 MMHG

## 2017-05-08 DIAGNOSIS — Z86.010 HISTORY OF ADENOMATOUS POLYP OF COLON: Chronic | ICD-10-CM

## 2017-05-08 DIAGNOSIS — Z92.3 HISTORY OF RADIATION THERAPY: Chronic | ICD-10-CM

## 2017-05-08 DIAGNOSIS — K21.9 GASTROESOPHAGEAL REFLUX DISEASE WITHOUT ESOPHAGITIS: ICD-10-CM

## 2017-05-08 DIAGNOSIS — K59.09 CHRONIC CONSTIPATION: Primary | Chronic | ICD-10-CM

## 2017-05-08 PROBLEM — Z86.0101 HISTORY OF ADENOMATOUS POLYP OF COLON: Status: ACTIVE | Noted: 2017-05-08

## 2017-05-08 PROCEDURE — 99204 OFFICE O/P NEW MOD 45 MIN: CPT | Mod: S$GLB,,, | Performed by: INTERNAL MEDICINE

## 2017-05-08 PROCEDURE — 1160F RVW MEDS BY RX/DR IN RCRD: CPT | Mod: S$GLB,,, | Performed by: INTERNAL MEDICINE

## 2017-05-08 PROCEDURE — 1126F AMNT PAIN NOTED NONE PRSNT: CPT | Mod: S$GLB,,, | Performed by: INTERNAL MEDICINE

## 2017-05-08 PROCEDURE — 3078F DIAST BP <80 MM HG: CPT | Mod: S$GLB,,, | Performed by: INTERNAL MEDICINE

## 2017-05-08 PROCEDURE — 3075F SYST BP GE 130 - 139MM HG: CPT | Mod: S$GLB,,, | Performed by: INTERNAL MEDICINE

## 2017-05-08 PROCEDURE — 99999 PR PBB SHADOW E&M-EST. PATIENT-LVL III: CPT | Mod: PBBFAC,,, | Performed by: INTERNAL MEDICINE

## 2017-05-08 PROCEDURE — 1159F MED LIST DOCD IN RCRD: CPT | Mod: S$GLB,,, | Performed by: INTERNAL MEDICINE

## 2017-05-08 PROCEDURE — 1157F ADVNC CARE PLAN IN RCRD: CPT | Mod: 8P,S$GLB,, | Performed by: INTERNAL MEDICINE

## 2017-05-08 RX ORDER — PNEUMOCOCCAL VACCINE POLYVALENT 25; 25; 25; 25; 25; 25; 25; 25; 25; 25; 25; 25; 25; 25; 25; 25; 25; 25; 25; 25; 25; 25; 25 UG/.5ML; UG/.5ML; UG/.5ML; UG/.5ML; UG/.5ML; UG/.5ML; UG/.5ML; UG/.5ML; UG/.5ML; UG/.5ML; UG/.5ML; UG/.5ML; UG/.5ML; UG/.5ML; UG/.5ML; UG/.5ML; UG/.5ML; UG/.5ML; UG/.5ML; UG/.5ML; UG/.5ML; UG/.5ML; UG/.5ML
INJECTION, SOLUTION INTRAMUSCULAR; SUBCUTANEOUS
COMMUNITY
Start: 2017-04-21 | End: 2017-05-16

## 2017-05-08 NOTE — MR AVS SNAPSHOT
"    [facility]  [address]  [address]  [place] [address]  Phone: [phone]                  Primitivo Mitchell   2017 9:20 AM   Office Visit    Description:  Male : 1945   Provider:  Segundo Osorio MD   Department:  [place] [facility]           Diagnoses this Visit        Comments    Chronic constipation    -  Primary Now with bowel movements daily or every other day; recent history of "anal fissure" diagnosis, symptoms resolved    Gastroesophageal reflux disease without esophagitis         History of adenomatous polyp of colon     Last colonoscopy occurred 2015, with diminutive tubular adenoma removed; follow-up colonoscopy in 2018 recommended    History of radiation therapy     Completed radiation therapy to prostate 2017, completed 38 treatments           To Do List           Future Appointments        Provider Department Dept Phone    2017 2:20 PM MD [facility] [phone]    2017 9:00 AM LAB, APPOINTMENT [place] [facility] [phone]    2017 11:00 AM MD [facility] - Radiation Oncology [phone]      Goals (5 Years of Data)     None      Follow-Up and Disposition     Return if symptoms worsen or fail to improve.      [facility]     [facility] Nurse Care Line - 24/ Assistance  Unless otherwise directed by your provider, please contact [facility], our nurse care line that is available for /7 assistance.     Registered nurses in the [facility] provide: appointment scheduling, clinical advisement, health education, and other advisory services.  Call: [phone] (toll free)               Medications           Message regarding Medications     Verify the changes and/or additions to your medication regime listed below are the same as discussed with your clinician today.  If any of these " changes or additions are incorrect, please notify your healthcare provider.             Verify that the below list of medications is an accurate representation of the medications you are currently taking.  If none reported, the list may be blank. If incorrect, please contact your healthcare provider. Carry this list with you in case of emergency.           Current Medications     atorvastatin (LIPITOR) 20 MG tablet Take 1 tablet (20 mg total) by mouth once daily.    cholecalciferol, vitamin D3, 5,000 unit Tab Take 5,000 Units by mouth once daily.    fish oil-omega-3 fatty acids 300-1,000 mg capsule Take 2 g by mouth once daily.    fluticasone (FLONASE) 50 mcg/actuation nasal spray 2 sprays by Each Nare route once daily.    ketotifen (ALAWAY) 0.025 % ophthalmic solution 1 drop 2 (two) times daily.    losartan (COZAAR) 50 MG tablet Take 1 tablet (50 mg total) by mouth once daily.    nortriptyline (PAMELOR) 10 MG capsule Take 1 capsule (10 mg total) by mouth every evening. Take 35 mg total per day    nortriptyline (PAMELOR) 25 MG capsule Take 1 capsule (25 mg total) by mouth every evening. Take 35 mg total per day    omeprazole (PRILOSEC) 40 MG capsule Take 1 capsule (40 mg total) by mouth daily as needed.    PNEUMOVAX 23 25 mcg/0.5 mL Syrg     triamcinolone acetonide 0.1% (KENALOG) 0.1 % cream Apply topically 2 (two) times daily.           Clinical Reference Information           Your Vitals Were     BP Pulse Height Weight BMI    134/70 84 6' (1.829 m) 87.9 kg (193 lb 12.6 oz) 26.28 kg/m2      Blood Pressure          Most Recent Value    BP  134/70      Allergies as of 5/8/2017     Latex, Natural Rubber    Omnipaque 140 [Iohexol]    Allegra-d 12 Hour [Fexofenadine-pseudoephedrine]    Azithromycin    Bactrim [Sulfamethoxazole-trimethoprim]    Boniva [Ibandronate]    Celebrex [Celecoxib]    Ciprofloxacin    Claritin-d 12 Hour [Loratadine-pseudoephedrine]    Imipramine    Neomycin-polymyxin-hc    Neurontin [Gabapentin]     Nitrofuran Analogues    Sulfa (Sulfonamide Antibiotics)    Adhesive    Androderm [Testosterone]      Immunizations Administered on Date of Encounter - 5/8/2017     None      Instructions    -Recommended follow-up colonoscopy in January 2018 for his history of adenomatous colon polyps.  --Last colonoscopy occurred 20 January 2015 with 1, 3 mm, tumor adenoma removed.  -Encouraged use of increased soluble fiber as well as current treatment with stool softeners to maintain bowel habits.  -Follow-up office visit as needed for recurrence of rectal bleeding and/or anorectal pain.  - Lifestyle changes to decrease esophageal reflux symptoms and damage. Recommendations include: weight loss, avoidance of coffee, chocolate, carbonated beverages, acidic foods (tomatoes, tomato sauce, citrus fruits);  large, fatty meals; 4 h between evening meal and recumbency. Also discussed elevation of the head of the bed using foam wedge between boxspring and mattress or with blocks under the head of the bed.         Language Assistance Services     ATTENTION: Language assistance services are available, free of charge. Please call [phone].      ATENCIÓN: Si marcial [place]ledo, tiene a oro disposición servicios gratuitos de asistencia lingüística. Llame al [phone].     SID Ý: N?u b?n nói Ti?ng Vi?t, có các d?ch v? h? tr? ngôn ng? mi?n phí dành cho b?n. G?i s? [phone].         [facility] complies with applicable Federal civil rights laws and does not discriminate on the basis of race, color, national origin, age, disability, or sex.

## 2017-05-08 NOTE — PATIENT INSTRUCTIONS
-Recommended follow-up colonoscopy in January 2018 for his history of adenomatous colon polyps.  --Last colonoscopy occurred 20 January 2015 with 1, 3 mm, tumor adenoma removed.  -Encouraged use of increased soluble fiber as well as current treatment with stool softeners to maintain bowel habits.  -Follow-up office visit as needed for recurrence of rectal bleeding and/or anorectal pain.  - Lifestyle changes to decrease esophageal reflux symptoms and damage. Recommendations include: weight loss, avoidance of coffee, chocolate, carbonated beverages, acidic foods (tomatoes, tomato sauce, citrus fruits);  large, fatty meals; 4 h between evening meal and recumbency. Also discussed elevation of the head of the bed using foam wedge between boxspring and mattress or with blocks under the head of the bed.

## 2017-05-08 NOTE — PROGRESS NOTES
[place] - Gastroenterology  History & Physical / New Patient  [facility]      SUBJECTIVE:     PCP: Ron Esquivel MD    Chief Complaint/Reason for Admission: No chief complaint on file.      History of Present Illness:  Patient is a 71 y.o. male presents with a long history of chronic constipation, with recent changes related to robotic prostatectomy, with subsequent radiation therapy treatments for prostate cancer.  Radiation treatments, 38 sessions, ended on 12 April 2017.  He was evaluated on 22 April in the emergency room for complaints of rectal bleeding and mild anorectal discomfort.  Diagnoses of anal fissure made at that time.  Symptoms are currently resolved.  Patient has a history of having had adenomatous colon polyps removed.  Last colonoscopy occurred 20 January 2015, with removal of a 3 mm, tubular adenoma.  Recommendations at the time of the review of the pathology report indicated that he shouldn't have a follow-up colonoscopy in 3 years.  Currently, he is taking PPI therapy only 3 or 4 days per week, with minimal reflux symptoms.    Accompanied by: No one .    Outpatient Medications Prior to Visit   Medication Sig Dispense Refill    atorvastatin (LIPITOR) 20 MG tablet Take 1 tablet (20 mg total) by mouth once daily. 90 tablet 3    cholecalciferol, vitamin D3, 5,000 unit Tab Take 5,000 Units by mouth once daily.      fish oil-omega-3 fatty acids 300-1,000 mg capsule Take 2 g by mouth once daily.      fluticasone (FLONASE) 50 mcg/actuation nasal spray 2 sprays by Each Nare route once daily. 16 g 3    ketotifen (ALAWAY) 0.025 % ophthalmic solution 1 drop 2 (two) times daily.      losartan (COZAAR) 50 MG tablet Take 1 tablet (50 mg total) by mouth once daily. 90 tablet 3    nortriptyline (PAMELOR) 10 MG capsule Take 1 capsule (10 mg total) by mouth every evening. Take 35 mg total per day 30 capsule 0    nortriptyline (PAMELOR) 25 MG capsule Take 1 capsule (25 mg total) by mouth  every evening. Take 35 mg total per day 90 capsule 3    omeprazole (PRILOSEC) 40 MG capsule Take 1 capsule (40 mg total) by mouth daily as needed. 90 capsule 3    triamcinolone acetonide 0.1% (KENALOG) 0.1 % cream Apply topically 2 (two) times daily. 80 g 2     No facility-administered medications prior to visit.        Review of patient's allergies indicates:   Allergen Reactions    Latex, natural rubber Rash    Omnipaque 140 [iohexol] Rash     Extensive rash over trunk font and back and legs the morning after CT dye given in late afternon the previous day. Omnipaque 350: 100cc IV & 30cc oral. Rash is severe, but listed as moderate because not shortness of breath    Allegra-d 12 hour [fexofenadine-pseudoephedrine] Other (See Comments)     Raise BP    Azithromycin     Bactrim [sulfamethoxazole-trimethoprim]     Boniva [ibandronate]     Celebrex [celecoxib]     Ciprofloxacin     Claritin-d 12 hour [loratadine-pseudoephedrine]     Imipramine     Neomycin-polymyxin-hc     Neurontin [gabapentin]     Nitrofuran analogues     Sulfa (sulfonamide antibiotics)     Adhesive Rash     Adhesive tape causes rash     Androderm [testosterone] Rash        Past Medical History:   Diagnosis Date    Aortic valve disorders 5/29/2013    3/21/2013: [facility]. Mild aortic valve sclerosis. 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    Chronic bilateral low back pain with sciatica 4/25/2017    ED (erectile dysfunction)     Enlarged prostate     Floaters in visual field     Hyperlipidemia     Hypertension     Osteoporosis     Pre-operative cardiovascular examination 5/18/2016 5/23/2016: Plan is robotic prostatectomy.    Prostate cancer 4/5/2016    Stress incontinence, male 7/11/2016     Past Surgical History:   Procedure Laterality Date    COLONOSCOPY      COLONOSCOPY W/ POLYPECTOMY      PROSTATE SURGERY  05/23/2016    Prostatectomy for prostate cancer, done at [facility]     Family History   Problem  Relation Age of Onset    Heart disease Mother     Alzheimer's disease Mother     Other Father      Colon problems    Ulcerative colitis Sister     Prostate cancer Brother     Anxiety disorder Son     Early death Paternal Grandmother     Cancer Brother      Social History   Substance Use Topics    Smoking status: Never Smoker    Smokeless tobacco: Never Used    Alcohol use No   -retired, also caring for his spouse, and mother.    Review of Systems:  Review of Systems   Constitutional: Negative for appetite change, chills, diaphoresis, fatigue, fever and unexpected weight change.   HENT: Negative for postnasal drip, sore throat, trouble swallowing and voice change.    Eyes: Negative for visual disturbance.   Respiratory: Negative for cough, choking, chest tightness, shortness of breath and wheezing.    Cardiovascular: Negative for chest pain and leg swelling.   Gastrointestinal: Positive for anal bleeding and constipation. Negative for abdominal distention, abdominal pain, blood in stool, diarrhea, nausea, rectal pain and vomiting.   Endocrine: Negative for cold intolerance, heat intolerance and polyuria.   Genitourinary: Negative for difficulty urinating.   Musculoskeletal: Negative for arthralgias, back pain, gait problem and joint swelling.   Skin: Negative.    Allergic/Immunologic: Negative for food allergies.   Neurological: Negative for dizziness, seizures, speech difficulty and headaches.   Hematological: Does not bruise/bleed easily.   Psychiatric/Behavioral: Negative.          OBJECTIVE:     Vital Signs (Most Recent):  /70  Pulse 84  Ht 6' (1.829 m)  Wt 87.9 kg (193 lb 12.6 oz)  BMI 26.28 kg/m2    Physical Exam:  Physical Exam   Constitutional: He is oriented to person, place, and time. He appears well-developed and well-nourished.   HENT:   Head: Normocephalic.   Eyes: EOM are normal. Pupils are equal, round, and reactive to light. No scleral icterus.   Neck: No JVD present. No tracheal  deviation present.   Cardiovascular: Normal rate, regular rhythm and normal heart sounds.  Exam reveals no gallop and no friction rub.    No murmur heard.  Pulmonary/Chest: Effort normal and breath sounds normal. He has no wheezes. He has no rales. He exhibits no tenderness.   Abdominal: Soft. Normal appearance and bowel sounds are normal. He exhibits no distension, no pulsatile liver, no abdominal bruit, no pulsatile midline mass and no mass. There is no hepatosplenomegaly. There is no tenderness. There is no rebound and no guarding. No hernia.   Mild obesity, nontender throughout examined area   Musculoskeletal: Normal range of motion. He exhibits no edema.   Lymphadenopathy:     He has no cervical adenopathy.   Neurological: He is alert and oriented to person, place, and time. No cranial nerve deficit. He exhibits normal muscle tone.   Skin: Skin is warm and dry. No rash noted.   Psychiatric: He has a normal mood and affect. Thought content normal.   Nursing note and vitals reviewed.      Laboratory:  Complete Blood Count  Lab Results   Component Value Date    RBC 4.46 (L) 09/21/2016    HGB 13.7 (L) 09/21/2016    HCT 40 04/22/2017    MCV 94 09/21/2016    MCH 30.7 09/21/2016    MCHC 32.8 09/21/2016    RDW 14.1 09/21/2016     09/21/2016    MPV 10.9 09/21/2016    GRAN 13.0 (H) 06/02/2016    GRAN 83.5 (H) 06/02/2016    LYMPH 1.4 06/02/2016    LYMPH 8.9 (L) 06/02/2016    MONO 0.8 06/02/2016    MONO 4.8 06/02/2016    EOS 0.4 06/02/2016    BASO 0.03 06/02/2016    EOSINOPHIL 2.6 06/02/2016    BASOPHIL 0.2 06/02/2016    DIFFMETHOD Automated 06/02/2016       Comprehensive Metabolic Panel  Lab Results   Component Value Date    GLU 87 09/21/2016    BUN 23 09/21/2016    CREATININE 1.1 09/21/2016     09/21/2016    K 4.8 09/21/2016     09/21/2016    PROT 7.6 09/21/2016    ALBUMIN 3.9 09/21/2016    BILITOT 0.3 09/21/2016    AST 28 09/21/2016    ALKPHOS 70 09/21/2016    CO2 25 09/21/2016    ALT 26 09/21/2016     "ANIONGAP 9 09/21/2016    EGFRNONAA >60.0 09/21/2016    ESTGFRAFRICA >60.0 09/21/2016       TSH  Lab Results   Component Value Date    TSH 0.794 02/15/2013     Diagnostic Results: -No pertinent imaging data available      ASSESSMENT/PLAN:     Diagnoses and all orders for this visit:    Chronic constipation  Comments:  Now with bowel movements daily or every other day; recent history of "anal fissure" diagnosis, symptoms resolved    Gastroesophageal reflux disease without esophagitis    History of adenomatous polyp of colon  Comments:  Last colonoscopy occurred January 2015, with diminutive tubular adenoma removed; follow-up colonoscopy in January 2018 recommended    History of radiation therapy  Comments:  Completed radiation therapy to prostate 12 April 2017, completed 38 treatments      Plan:   Return if symptoms worsen or fail to improve.    -Recommended follow-up colonoscopy in January 2018 for his history of adenomatous colon polyps.  --Last colonoscopy occurred 20 January 2015 with 1, 3 mm, tumor adenoma removed.  -Encouraged use of increased soluble fiber as well as current treatment with stool softeners to maintain bowel habits.  -Follow-up office visit as needed for recurrence of rectal bleeding and/or anorectal pain.  - Lifestyle changes to decrease esophageal reflux symptoms and damage. Recommendations include: weight loss, avoidance of coffee, chocolate, carbonated beverages, acidic foods (tomatoes, tomato sauce, citrus fruits);  large, fatty meals; 4 h between evening meal and recumbency. Also discussed elevation of the head of the bed using foam wedge between boxspring and mattress or with blocks under the head of the bed.        Segundo Osorio MD, FACP, FACG, AGAF [facility]  [address], [place] [address]  Phone: [phone] Fax: [phone]    [address], [address], [place] [address]  Phone: [phone] Fax: [phone]  "

## 2017-05-10 ENCOUNTER — TELEPHONE (OUTPATIENT)
Dept: RADIATION ONCOLOGY | Facility: CLINIC | Age: 72
End: 2017-05-10

## 2017-05-10 NOTE — TELEPHONE ENCOUNTER
Phone review s/p xrt; pt states he is doing very well. No dysuria or hematuria at this time. No diarrhea. Nocturia x 2-3.

## 2017-05-11 ENCOUNTER — OFFICE VISIT (OUTPATIENT)
Dept: PODIATRY | Facility: CLINIC | Age: 72
End: 2017-05-11
Payer: MEDICARE

## 2017-05-11 VITALS
HEIGHT: 72 IN | DIASTOLIC BLOOD PRESSURE: 67 MMHG | RESPIRATION RATE: 18 BRPM | BODY MASS INDEX: 26.28 KG/M2 | SYSTOLIC BLOOD PRESSURE: 117 MMHG | WEIGHT: 194 LBS | HEART RATE: 75 BPM

## 2017-05-11 DIAGNOSIS — Q82.8 POROKERATOSIS: ICD-10-CM

## 2017-05-11 DIAGNOSIS — M79.671 FOOT PAIN, RIGHT: Primary | ICD-10-CM

## 2017-05-11 PROCEDURE — 1160F RVW MEDS BY RX/DR IN RCRD: CPT | Mod: S$GLB,,, | Performed by: PODIATRIST

## 2017-05-11 PROCEDURE — 1159F MED LIST DOCD IN RCRD: CPT | Mod: S$GLB,,, | Performed by: PODIATRIST

## 2017-05-11 PROCEDURE — 99213 OFFICE O/P EST LOW 20 MIN: CPT | Mod: S$GLB,,, | Performed by: PODIATRIST

## 2017-05-11 PROCEDURE — 99999 PR PBB SHADOW E&M-EST. PATIENT-LVL III: CPT | Mod: PBBFAC,,, | Performed by: PODIATRIST

## 2017-05-11 PROCEDURE — 1125F AMNT PAIN NOTED PAIN PRSNT: CPT | Mod: S$GLB,,, | Performed by: PODIATRIST

## 2017-05-11 PROCEDURE — 3074F SYST BP LT 130 MM HG: CPT | Mod: S$GLB,,, | Performed by: PODIATRIST

## 2017-05-11 PROCEDURE — 3078F DIAST BP <80 MM HG: CPT | Mod: S$GLB,,, | Performed by: PODIATRIST

## 2017-05-12 NOTE — PROGRESS NOTES
Subjective:      Patient ID: Primitivo Mitchell is a 71 y.o. male.    Chief Complaint: PCP ( Rachel Kevin, NP 4/21/17); Foot Problem; Callouses; and Foot Pain    Primitivo is a 71 y.o. male who presents to the clinic complaining of R foot painful callus    Review of Systems   Constitution: Negative for chills, decreased appetite and fever.   Cardiovascular: Negative for leg swelling.   Skin: Positive for dry skin and nail changes.   Musculoskeletal: Negative for arthritis, joint pain, joint swelling and myalgias.   Gastrointestinal: Negative for nausea and vomiting.   Neurological: Negative for loss of balance, numbness and paresthesias.           Objective:      Physical Exam   Constitutional: He is oriented to person, place, and time. He appears well-developed and well-nourished.   Cardiovascular: Intact distal pulses.    dorsalis pedis and posterior tibial pulses are palpable bilaterally. Capillary refill time is within normal limits. + pedal hair growth          Musculoskeletal: Normal range of motion. He exhibits no edema or tenderness.   Adequate joint range of motion without pain, limitation, nor crepitation Bilateral feet and ankle joints. Muscle strength is 5/5 in all groups bilaterally.         Neurological: He is alert and oriented to person, place, and time. He has normal strength. No sensory deficit.   [place] 5.07 monofilament is intact bilateral feet.      Skin: Skin is warm, dry and intact. No lesion and no rash noted. No erythema.   Nucleated solitary  hyperkeratotic lesion noted to plantar R 5th MTPJ. There is TTP to lesion . Divergent skin lines are noted      Psychiatric: He has a normal mood and affect. His behavior is normal.   Vitals reviewed.            Assessment:       Encounter Diagnoses   Name Primary?    Foot pain, right Yes    Porokeratosis          Plan:       Primitivo was seen today for pcp, foot problem, callouses and foot pain.    Diagnoses and all orders  for this visit:    Foot pain, right    Porokeratosis      I counseled the patient on his conditions, their implications and medical management.    The affected area was cleansed with an alcohol prep pad. Next utilizing a mechanical   the hyperkeratotic tissues were filed. Attention was then directed to the nucleated center where this area was carefully excised. No pinpoint bleeding was encountered.    Felt offloading aperture  applied to site to help offload region     Discussed use of OTC wart medications as per packing instructions     Use pumice stone to region after bathing 2-3x/week to decrease callus build up       .

## 2017-05-16 ENCOUNTER — OFFICE VISIT (OUTPATIENT)
Dept: INTERNAL MEDICINE | Facility: CLINIC | Age: 72
End: 2017-05-16
Payer: MEDICARE

## 2017-05-16 VITALS
HEART RATE: 70 BPM | SYSTOLIC BLOOD PRESSURE: 107 MMHG | WEIGHT: 191.13 LBS | DIASTOLIC BLOOD PRESSURE: 59 MMHG | HEIGHT: 72 IN | BODY MASS INDEX: 25.89 KG/M2

## 2017-05-16 DIAGNOSIS — R21 SKIN RASH: ICD-10-CM

## 2017-05-16 DIAGNOSIS — R21 RASH AND NONSPECIFIC SKIN ERUPTION: Primary | ICD-10-CM

## 2017-05-16 PROCEDURE — 3074F SYST BP LT 130 MM HG: CPT | Mod: S$GLB,,, | Performed by: PHYSICIAN ASSISTANT

## 2017-05-16 PROCEDURE — 1126F AMNT PAIN NOTED NONE PRSNT: CPT | Mod: S$GLB,,, | Performed by: PHYSICIAN ASSISTANT

## 2017-05-16 PROCEDURE — 1160F RVW MEDS BY RX/DR IN RCRD: CPT | Mod: S$GLB,,, | Performed by: PHYSICIAN ASSISTANT

## 2017-05-16 PROCEDURE — 99999 PR PBB SHADOW E&M-EST. PATIENT-LVL IV: CPT | Mod: PBBFAC,,, | Performed by: PHYSICIAN ASSISTANT

## 2017-05-16 PROCEDURE — 1159F MED LIST DOCD IN RCRD: CPT | Mod: S$GLB,,, | Performed by: PHYSICIAN ASSISTANT

## 2017-05-16 PROCEDURE — 3078F DIAST BP <80 MM HG: CPT | Mod: S$GLB,,, | Performed by: PHYSICIAN ASSISTANT

## 2017-05-16 PROCEDURE — 99213 OFFICE O/P EST LOW 20 MIN: CPT | Mod: S$GLB,,, | Performed by: PHYSICIAN ASSISTANT

## 2017-05-16 RX ORDER — TRIAMCINOLONE ACETONIDE 1 MG/G
CREAM TOPICAL 2 TIMES DAILY
Qty: 80 G | Refills: 2 | Status: SHIPPED | OUTPATIENT
Start: 2017-05-16 | End: 2018-01-16

## 2017-05-16 NOTE — PROGRESS NOTES
Subjective:       Patient ID: Primitivo Mitchell is a 71 y.o. male.        Chief Complaint: Rash (on pt stomach/chest itchy x1day)    HPI Comments: Primitivo Mitchell is an established patient of Ron Esquivel MD here today for urgent care visit.    Several week ago seen by PCP for rash on lower legs.  Resolved when Kenalog cream for a couple of days.  This morning, woke up with the same rash on the lower legs and now also on the abdomen.  Slightly pruritic.  Not painful.  No new detergents or exposures of which he is aware.  No chest pain, shortness of breath, lip swelling.  He notes he had a GI virus a couple of days ago but that resolved within about 8 hours.           Review of Systems   Constitutional: Negative for appetite change, chills, fatigue and fever.   HENT: Negative for congestion and sore throat.    Eyes: Negative for visual disturbance.   Respiratory: Negative for cough, chest tightness and shortness of breath.    Cardiovascular: Negative for chest pain, palpitations and leg swelling.   Gastrointestinal: Negative for abdominal pain, blood in stool, constipation, diarrhea, nausea and vomiting.   Genitourinary: Negative for dysuria, frequency, hematuria and urgency.   Musculoskeletal: Negative for arthralgias and back pain.   Skin: Positive for rash.   Neurological: Negative for dizziness, syncope, weakness and headaches.   Psychiatric/Behavioral: Negative for dysphoric mood and sleep disturbance. The patient is not nervous/anxious.        Objective:      Physical Exam   Constitutional: He appears well-developed and well-nourished.   HENT:   Head: Normocephalic.   Right Ear: External ear normal.   Left Ear: External ear normal.   Mouth/Throat: Oropharynx is clear and moist.   Eyes: Pupils are equal, round, and reactive to light.   Cardiovascular: Normal rate, regular rhythm and normal heart sounds.  Exam reveals no gallop and no friction rub.    No murmur heard.  Pulmonary/Chest: Effort normal and  "breath sounds normal. No respiratory distress.   Abdominal: Soft. There is no tenderness.   Musculoskeletal: He exhibits no edema.   Neurological: He is alert.   Skin: Skin is warm and dry. Rash noted. Rash is papular (pinpoint pink papules on abdomen and lower legs). He is not diaphoretic.   Psychiatric: He has a normal mood and affect.   Nursing note and vitals reviewed.      Assessment:       1. Rash and nonspecific skin eruption    2. Skin rash        Plan:       Primitivo was seen today for rash.    Diagnoses and all orders for this visit:    Rash and nonspecific skin eruption  -     Ambulatory referral to Dermatology  -     triamcinolone acetonide 0.1% (KENALOG) 0.1 % cream; Apply topically 2 (two) times daily.    Pt has been given instructions populated from [facility] database and has verbalized understanding of the after visit summary and information contained wherein.    Follow up with a primary care provider. May go to ER for acute shortness of breath, lightheadedness, fever, or any other emergent complaints or changes in condition.    "This note will be shared with the patient"    Future Appointments  Date Time Provider Department Center   6/20/2017 9:00 AM LAB, APPOINTMENT [facility] LAB [facility]   6/27/2017 11:00 AM Andrea Ochoa Jr., MD [facility] RAD [facility] [place] [facility]   6/29/2017 1:45 PM MD [facility]   7/10/2017 3:15 PM Fern Son MD [facility] DERM [place] [facility]               "

## 2017-05-16 NOTE — MR AVS SNAPSHOT
[facility] Medicine  [address]  [place] [address]  Phone: [phone]  Fax: [phone]                  Primitivo Mitchell   2017 3:00 PM   Office Visit    Description:  Male : 1945   Provider:  Dyana Jane PA-C   Department:  [facility] - Internal Medicine           Reason for Visit     Rash           Diagnoses this Visit        Comments    Rash and nonspecific skin eruption    -  Primary     Skin rash                To Do List           Future Appointments        Provider Department Dept Phone    2017 9:00 AM LAB, APPOINTMENT [place] [facility] [phone]    2017 11:00 AM Andrea Ochoa Jr., MD [facility] - Radiation Oncology [phone]    7/10/2017 3:15 PM Fern Son MD [facility] - Dermatology [phone]      Goals (5 Years of Data)     None      Follow-Up and Disposition     Return if symptoms worsen or fail to improve.       These Medications        Disp Refills Start End    triamcinolone acetonide 0.1% (KENALOG) 0.1 % cream 80 g 2 2017    Apply topically 2 (two) times daily. - Topical (Top)    Pharmacy: [facility] [address] [facility] [id number] [facility] [address]  #: [phone]         [facility]     [facility] -  Assistance  Unless otherwise directed by your provider, please contact [facility], our nurse care line that is available for  assistance.     Registered nurses in the [facility] provide: appointment scheduling, clinical advisement, health education, and other advisory services.  Call: [phone] (toll free)               Medications           Message regarding Medications     Verify the changes and/or additions to your medication regime listed below are the same as discussed with your clinician today.  If any of these changes or additions are incorrect, please notify your healthcare provider.        STOP taking these medications      PNEUMOVAX 23 25 mcg/0.5 mL Syrg            Verify that the below list of medications is an accurate representation of the medications you are currently taking.  If none reported, the list may be blank. If incorrect, please contact your healthcare provider. Carry this list with you in case of emergency.           Current Medications     atorvastatin (LIPITOR) 20 MG tablet Take 1 tablet (20 mg total) by mouth once daily.    cholecalciferol, vitamin D3, 5,000 unit Tab Take 5,000 Units by mouth once daily.    fish oil-omega-3 fatty acids 300-1,000 mg capsule Take 2 g by mouth once daily.    fluticasone (FLONASE) 50 mcg/actuation nasal spray 2 sprays by Each Nare route once daily.    ketotifen (ALAWAY) 0.025 % ophthalmic solution 1 drop 2 (two) times daily.    losartan (COZAAR) 50 MG tablet Take 1 tablet (50 mg total) by mouth once daily.    nortriptyline (PAMELOR) 10 MG capsule Take 1 capsule (10 mg total) by mouth every evening. Take 35 mg total per day    nortriptyline (PAMELOR) 25 MG capsule Take 1 capsule (25 mg total) by mouth every evening. Take 35 mg total per day    omeprazole (PRILOSEC) 40 MG capsule Take 1 capsule (40 mg total) by mouth daily as needed.    triamcinolone acetonide 0.1% (KENALOG) 0.1 % cream Apply topically 2 (two) times daily.           Clinical Reference Information           Your Vitals Were     BP Pulse Height Weight BMI    107/59 (BP Location: Right arm, Patient Position: Sitting, BP Method: Manual) 70 6' (1.829 m) 86.7 kg (191 lb 2.2 oz) 25.92 kg/m2      Blood Pressure          Most Recent Value    BP  (!)  107/59      Allergies as of 5/16/2017     Latex, Natural Rubber    Omnipaque 140 [Iohexol]    Allegra-d 12 Hour [Fexofenadine-pseudoephedrine]    Azithromycin    Bactrim [Sulfamethoxazole-trimethoprim]    Boniva [Ibandronate]    Celebrex [Celecoxib]    Ciprofloxacin    Claritin-d 12 Hour [Loratadine-pseudoephedrine]    Imipramine    Neomycin-polymyxin-hc    Neurontin [Gabapentin]     Nitrofuran Analogues    Sulfa (Sulfonamide Antibiotics)    Adhesive    Androderm [Testosterone]      Immunizations Administered on Date of Encounter - 5/16/2017     None      Orders Placed During Today's Visit      Normal Orders This Visit    Ambulatory referral to Dermatology       Instructions      Self-Care for Skin Rashes     Pat your skin dry. Do not rub.     When your skin reacts to a substance your body is sensitive to, it can cause a rash. You can treat most rashes at home by keeping the skin clean and dry. Many rashes may get better on their own within 2 to 3 days. You may need medical attention if your rash itches, drains, or hurts, particularly if the rash is getting worse.  What can cause a skin rash?  · Sun poisoning, caused by too much exposure to the sun  · An irritant or allergic reaction to a certain type of food, plant, or chemical, such as  shellfish, poison ivy, and or cleaning products  · An infection caused by a fungus (ringworm), virus (chickenpox), or bacteria (strep)  · Bites or infestation caused by insects or pests, such as ticks, lice, or mites  · Dry skin, which is often seen during the winter months and in older people  How can I control itching and skin damage?  · Take soothing lukewarm baths in a colloidal oatmeal product. You can buy this at the [employer].  · Do your best not to scratch. Clip fingernails short, especially in young children, to reduce skin damage if scratching does occur.  · Use moisturizing skin lotion instead of scratching your dry skin.  · Use sunscreen whenever going out into direct sun.  · Use only mild cleansing agents whenever possible.  · Wash with mild, nonirritating soap and warm water.  · Wear clothing that breathes, such as cotton shirts or canvas shoes.  · If fluid is seeping from the rash, cover it loosely with clean gauze to absorb the discharge.  · Many rashes are contagious. Prevent the rash from spreading to others by washing your hands often before  or after touching others with any skin rash.  Use medicine  · Antihistamines such as diphenhydramine can help control itching. But use with caution because they can make you drowsy.  · Using over-the-counter hydrocortisone cream on small rashes may help reduce swelling and itching  · Most over-the-counter antifungal medicines can treat athletes foot and many other fungal infections of the skin.  Check with your healthcare provider  Call your healthcare provider if:  · You were told that you have a fungal infection on your skin to make sure you have the correct type of medicine.  · You have questions or concerns about medicines or their side effects.     Call 911  Call 911 if either of these occur:  · Your tongue or lips start to swell  · You have difficulty breathing      Call your healthcare provider  Call your healthcare provider if any of these occur:  · Temperature of more than 101.0°F (38.3°C), or as directed  · Sore throat, a cough, or unusual fatigue  · Red, oozy, or painful rash gets worse. These are signs of infection.  · Rash covers your face, genitals, or most of your body  · Crusty sores or red rings that begin to spread  · You were exposed to someone who has a contagious rash, such as scabies or lice.  · Red bulls-eye rash with a white center (a sign of Lyme disease)  · You were told that you have resistant bacteria (MRSA) on your skin.   Date Last Reviewed: 5/12/2015  © 2589-0243 [employer]. [address] [place] [address]. All rights reserved. This information is not intended as a substitute for professional medical care. Always follow your healthcare professional's instructions.             Language Assistance Services     ATTENTION: Language assistance services are available, free of charge. Please call [phone].      ATENCIÓN: Si lastla tre, tiene a oro disposición servicios gratuitos de asistencia lingüística. Llame al [phone].     SID Ý: N?u b?n nói Ti?ng  Vi?t, có các d?ch v? h? tr? ngôn ng? mi?n phí dành cho b?n. G?i s? [phone].         [facility] complies with applicable Federal civil rights laws and does not discriminate on the basis of race, color, national origin, age, disability, or sex.         Statement Selected

## 2017-05-16 NOTE — PATIENT INSTRUCTIONS
Self-Care for Skin Rashes     Pat your skin dry. Do not rub.     When your skin reacts to a substance your body is sensitive to, it can cause a rash. You can treat most rashes at home by keeping the skin clean and dry. Many rashes may get better on their own within 2 to 3 days. You may need medical attention if your rash itches, drains, or hurts, particularly if the rash is getting worse.  What can cause a skin rash?  · Sun poisoning, caused by too much exposure to the sun  · An irritant or allergic reaction to a certain type of food, plant, or chemical, such as  shellfish, poison ivy, and or cleaning products  · An infection caused by a fungus (ringworm), virus (chickenpox), or bacteria (strep)  · Bites or infestation caused by insects or pests, such as ticks, lice, or mites  · Dry skin, which is often seen during the winter months and in older people  How can I control itching and skin damage?  · Take soothing lukewarm baths in a colloidal oatmeal product. You can buy this at the [employer].  · Do your best not to scratch. Clip fingernails short, especially in young children, to reduce skin damage if scratching does occur.  · Use moisturizing skin lotion instead of scratching your dry skin.  · Use sunscreen whenever going out into direct sun.  · Use only mild cleansing agents whenever possible.  · Wash with mild, nonirritating soap and warm water.  · Wear clothing that breathes, such as cotton shirts or canvas shoes.  · If fluid is seeping from the rash, cover it loosely with clean gauze to absorb the discharge.  · Many rashes are contagious. Prevent the rash from spreading to others by washing your hands often before or after touching others with any skin rash.  Use medicine  · Antihistamines such as diphenhydramine can help control itching. But use with caution because they can make you drowsy.  · Using over-the-counter hydrocortisone cream on small rashes may help reduce swelling and itching  · Most  over-the-counter antifungal medicines can treat athletes foot and many other fungal infections of the skin.  Check with your healthcare provider  Call your healthcare provider if:  · You were told that you have a fungal infection on your skin to make sure you have the correct type of medicine.  · You have questions or concerns about medicines or their side effects.     Call 911  Call 911 if either of these occur:  · Your tongue or lips start to swell  · You have difficulty breathing      Call your healthcare provider  Call your healthcare provider if any of these occur:  · Temperature of more than 101.0°F (38.3°C), or as directed  · Sore throat, a cough, or unusual fatigue  · Red, oozy, or painful rash gets worse. These are signs of infection.  · Rash covers your face, genitals, or most of your body  · Crusty sores or red rings that begin to spread  · You were exposed to someone who has a contagious rash, such as scabies or lice.  · Red bulls-eye rash with a white center (a sign of Lyme disease)  · You were told that you have resistant bacteria (MRSA) on your skin.   Date Last Reviewed: 5/12/2015  © 9805-9598 [employer]. [address] [place] [address], [place] [address]. All rights reserved. This information is not intended as a substitute for professional medical care. Always follow your healthcare professional's instructions.

## 2017-05-18 ENCOUNTER — OFFICE VISIT (OUTPATIENT)
Dept: PODIATRY | Facility: CLINIC | Age: 72
End: 2017-05-18
Payer: MEDICARE

## 2017-05-18 ENCOUNTER — TELEPHONE (OUTPATIENT)
Dept: PODIATRY | Facility: CLINIC | Age: 72
End: 2017-05-18

## 2017-05-18 VITALS
DIASTOLIC BLOOD PRESSURE: 81 MMHG | BODY MASS INDEX: 25.87 KG/M2 | WEIGHT: 191 LBS | TEMPERATURE: 98 F | HEIGHT: 72 IN | SYSTOLIC BLOOD PRESSURE: 134 MMHG | HEART RATE: 74 BPM

## 2017-05-18 DIAGNOSIS — M77.51 CAPSULITIS OF TOE OF RIGHT FOOT: Primary | ICD-10-CM

## 2017-05-18 PROCEDURE — 3075F SYST BP GE 130 - 139MM HG: CPT | Mod: S$GLB,,, | Performed by: PODIATRIST

## 2017-05-18 PROCEDURE — 1159F MED LIST DOCD IN RCRD: CPT | Mod: S$GLB,,, | Performed by: PODIATRIST

## 2017-05-18 PROCEDURE — 99213 OFFICE O/P EST LOW 20 MIN: CPT | Mod: S$GLB,,, | Performed by: PODIATRIST

## 2017-05-18 PROCEDURE — 3079F DIAST BP 80-89 MM HG: CPT | Mod: S$GLB,,, | Performed by: PODIATRIST

## 2017-05-18 PROCEDURE — 1125F AMNT PAIN NOTED PAIN PRSNT: CPT | Mod: S$GLB,,, | Performed by: PODIATRIST

## 2017-05-18 PROCEDURE — 99999 PR PBB SHADOW E&M-EST. PATIENT-LVL III: CPT | Mod: PBBFAC,,, | Performed by: PODIATRIST

## 2017-05-18 PROCEDURE — 1160F RVW MEDS BY RX/DR IN RCRD: CPT | Mod: S$GLB,,, | Performed by: PODIATRIST

## 2017-05-18 RX ORDER — MELOXICAM 15 MG/1
15 TABLET ORAL DAILY
Qty: 30 TABLET | Refills: 0 | Status: SHIPPED | OUTPATIENT
Start: 2017-05-18 | End: 2018-01-16

## 2017-05-18 NOTE — TELEPHONE ENCOUNTER
----- Message from Chanelle Junior sent at 5/18/2017  9:24 AM CDT -----  Contact: self@ home   Pt is calling to get  A appt for tomorrow for right foot pain.

## 2017-05-18 NOTE — PROGRESS NOTES
Subjective:      Patient ID: Primitivo Mitchell is a 71 y.o. male.    Chief Complaint: John Langford is a 71 y.o. male who presents to the podiatry clinic  with complaint of  right foot pain. Onset of the symptoms was several weeks ago. Precipitating event: none known. Current symptoms include: ability to bear weight, but with some pain.  Reports White discoloration to the skin from compound w usage. He is concerned for infection and presented as a walk in         Review of Systems   Constitution: Negative for chills, decreased appetite and fever.   Cardiovascular: Negative for leg swelling.   Musculoskeletal: Negative for arthritis, joint pain, joint swelling and myalgias.   Gastrointestinal: Negative for nausea and vomiting.   Neurological: Negative for loss of balance, numbness and paresthesias.           Objective:       Vitals:    05/18/17 1134   BP: 134/81   Pulse: 74   Temp: 97.5 °F (36.4 °C)   TempSrc: Oral   Weight: 86.6 kg (191 lb)   Height: 6' (1.829 m)   PainSc:   4        Physical Exam   Constitutional: He is oriented to person, place, and time. He appears well-developed and well-nourished.   Cardiovascular: Intact distal pulses.    dorsalis pedis and posterior tibial pulses are palpable bilaterally. Capillary refill time is within normal limits. + pedal hair growth          Musculoskeletal: Normal range of motion. He exhibits no edema or tenderness.   Adequate joint range of motion without pain, limitation, nor crepitation Bilateral feet and ankle joints. Muscle strength is 5/5 in all groups bilaterally.      Tenderness on palpation of the R 2nd  metatarsal phalangeal joint. Mild tenderness with palpation of the adjacent intermetatarsal space with a negative tony's click      Neurological: He is alert and oriented to person, place, and time. He has normal strength. No sensory deficit.   [place]-Melissa 5.07 monofilament is intact bilateral feet.      Skin: Skin is warm, dry and intact.  No lesion and no rash noted. No erythema.   Psychiatric: He has a normal mood and affect. His behavior is normal.   Vitals reviewed.            Assessment:       Encounter Diagnosis   Name Primary?    Capsulitis of toe of right foot Yes         Plan:       Primitivo was seen today for callouses.    Diagnoses and all orders for this visit:    Capsulitis of toe of right foot    Other orders  -     meloxicam (MOBIC) 15 MG tablet; Take 1 tablet (15 mg total) by mouth once daily.      I counseled the patient on his conditions, their implications and medical management.    Patient instructed on adequate icing techniques. Patient should ice the affected area at least once per day x 10 minutes for 10 days . I advised the  patient that extra icing would also be beneficial to ensure adequate anti inflammatory effect     Mobic prescribed. Patient was instructed on dosing information. Discontinue if adverse effects occur     Felt offloading aperture  applied to site to help offload region     Continue  compound W as prescribed   .

## 2017-05-24 RX ORDER — OMEPRAZOLE 40 MG/1
CAPSULE, DELAYED RELEASE ORAL
Qty: 90 CAPSULE | Refills: 0 | Status: SHIPPED | OUTPATIENT
Start: 2017-05-24 | End: 2018-05-31

## 2017-06-20 ENCOUNTER — LAB VISIT (OUTPATIENT)
Dept: LAB | Facility: HOSPITAL | Age: 72
End: 2017-06-20
Attending: RADIOLOGY
Payer: MEDICARE

## 2017-06-20 DIAGNOSIS — C61 PROSTATE CANCER: ICD-10-CM

## 2017-06-20 LAB — COMPLEXED PSA SERPL-MCNC: <0.01 NG/ML

## 2017-06-20 PROCEDURE — 36415 COLL VENOUS BLD VENIPUNCTURE: CPT

## 2017-06-20 PROCEDURE — 84153 ASSAY OF PSA TOTAL: CPT

## 2017-06-26 ENCOUNTER — OFFICE VISIT (OUTPATIENT)
Dept: RADIATION ONCOLOGY | Facility: CLINIC | Age: 72
End: 2017-06-26
Payer: MEDICARE

## 2017-06-26 VITALS
HEART RATE: 80 BPM | HEIGHT: 72 IN | WEIGHT: 193.31 LBS | SYSTOLIC BLOOD PRESSURE: 146 MMHG | BODY MASS INDEX: 26.18 KG/M2 | RESPIRATION RATE: 16 BRPM | DIASTOLIC BLOOD PRESSURE: 79 MMHG

## 2017-06-26 DIAGNOSIS — C61 PROSTATE CANCER: Primary | ICD-10-CM

## 2017-06-26 PROCEDURE — 99999 PR PBB SHADOW E&M-EST. PATIENT-LVL III: CPT | Mod: PBBFAC,,, | Performed by: RADIOLOGY

## 2017-06-26 PROCEDURE — 99499 UNLISTED E&M SERVICE: CPT | Mod: S$GLB,,, | Performed by: RADIOLOGY

## 2017-06-26 NOTE — PROGRESS NOTES
Subjective:       Patient ID: Primitivo Mitchell is a 71 y.o. male.    Chief Complaint: Prostate Cancer (f/u after xrt;psa)    This patient returns today for initial follow up visit.     Mr. Mitchell has a history of pathological stage II (T2c, N0, M0) adenocarcinoma of the prostate.  He was referred to Dr. Reyes in February of this year for evaluation of an increasing PSA and BPH symptoms. He had been taking Finasteride and Jayln. PSA had increased to 3.4 ng/ml. GEORGI revealed a 30 gm prostate with no palpable nodules. The patient underwent TRUS and biopsy of the prostate on 3/24/16. The prostate measured 29 cc. Biopsies from the Lt. apex and Rt. base returned positive for [place] score 6 (3+3) adenocarcinoma. The patient subsequently underwent RALP in May of 2016. Pathology revealed a 46 gm prostate measuring 5.5 x 4 x 3.5 cm. There was [place] 6 (3+3) adenocarcinoma involving 5% of the prostate. There was no perineural or lympho vascular invasion. No ELLYN or seminal vesicle involvement. Tumor was present at the apical margin. Nine Rt. pelvic nodes and 13 Lt. pelvic nodes were negative for tumor involvement. Postoperatively PSA in June returned at 0.06 ng/ml. Repeat PSA on 11/8/16 returned at 0.32 ng/ml. The patient was seen in multidisciplinary clinic.  GEORGI was negative but repeat PSA on 12/8/16 returned at 0.41 ng/ml.  The patient was referred for salvage irradiation to the prostate bed.  We also elected to treat the patient with LHRH agonist.  He was given a 6 month  Eligard injection on 12/27/16.  He completed 68.4 Gy to the prostate bed on 4/12/17.  Today, the patient states he feels well.  No significant  complaints.        Review of Systems   Constitutional: Negative for activity change, appetite change, chills, fatigue and fever.   Gastrointestinal: Positive for constipation. Negative for abdominal pain, blood in stool and diarrhea.   Genitourinary: Negative for difficulty urinating, dysuria, hematuria and  urgency.       Objective:      Physical Exam   Constitutional: He appears well-developed and well-nourished. No distress.   Genitourinary:   Genitourinary Comments: rectal - deferred.        PSA - < 0.01 ng/ml  Assessment:       1. Prostate cancer        Plan:       Patient doing well.  Recovered from the acute effects of radiotherapy.  Discussed the PSA results.  Explained he currently has a very good response.  Will continue to follow PSA. Plan follow up in 6 months with psa

## 2017-07-19 ENCOUNTER — TELEPHONE (OUTPATIENT)
Dept: PODIATRY | Facility: CLINIC | Age: 72
End: 2017-07-19

## 2017-07-19 RX ORDER — CLOTRIMAZOLE 1 %
CREAM (GRAM) TOPICAL 2 TIMES DAILY
Qty: 113 G | Refills: 2 | Status: SHIPPED | OUTPATIENT
Start: 2017-07-19 | End: 2019-10-14

## 2017-07-19 NOTE — TELEPHONE ENCOUNTER
----- Message from Latrell Rojas sent at 7/19/2017  1:51 PM CDT -----  Contact: self/home  Pt would like to speak with you regarding another brand of creme due to the medication being expensive.

## 2017-07-20 RX ORDER — KETOCONAZOLE 20 MG/G
CREAM TOPICAL DAILY
Qty: 60 G | Refills: 3 | Status: SHIPPED | OUTPATIENT
Start: 2017-07-20 | End: 2017-07-20 | Stop reason: CLARIF

## 2017-07-20 RX ORDER — KETOCONAZOLE 20 MG/G
CREAM TOPICAL DAILY
Qty: 60 G | Refills: 3 | Status: SHIPPED | OUTPATIENT
Start: 2017-07-20 | End: 2018-07-20 | Stop reason: SDUPTHER

## 2017-07-20 NOTE — TELEPHONE ENCOUNTER
Called pt made aware dr. Hi sent script to [facility]  For  kentconzole  2%  Per pt thank you

## 2017-08-19 ENCOUNTER — OFFICE VISIT (OUTPATIENT)
Dept: URGENT CARE | Facility: CLINIC | Age: 72
End: 2017-08-19
Payer: MEDICARE

## 2017-08-19 VITALS
TEMPERATURE: 98 F | SYSTOLIC BLOOD PRESSURE: 125 MMHG | WEIGHT: 192 LBS | HEIGHT: 72 IN | OXYGEN SATURATION: 97 % | RESPIRATION RATE: 18 BRPM | DIASTOLIC BLOOD PRESSURE: 80 MMHG | HEART RATE: 73 BPM | BODY MASS INDEX: 26.01 KG/M2

## 2017-08-19 DIAGNOSIS — T78.40XA ALLERGIC REACTION CAUSED BY A DRUG, INITIAL ENCOUNTER: Primary | ICD-10-CM

## 2017-08-19 PROCEDURE — 1159F MED LIST DOCD IN RCRD: CPT | Mod: S$GLB,,, | Performed by: EMERGENCY MEDICINE

## 2017-08-19 PROCEDURE — 99214 OFFICE O/P EST MOD 30 MIN: CPT | Mod: 25,S$GLB,, | Performed by: EMERGENCY MEDICINE

## 2017-08-19 PROCEDURE — 3008F BODY MASS INDEX DOCD: CPT | Mod: S$GLB,,, | Performed by: EMERGENCY MEDICINE

## 2017-08-19 PROCEDURE — 3074F SYST BP LT 130 MM HG: CPT | Mod: S$GLB,,, | Performed by: EMERGENCY MEDICINE

## 2017-08-19 PROCEDURE — 3079F DIAST BP 80-89 MM HG: CPT | Mod: S$GLB,,, | Performed by: EMERGENCY MEDICINE

## 2017-08-19 PROCEDURE — 96372 THER/PROPH/DIAG INJ SC/IM: CPT | Mod: S$GLB,,, | Performed by: EMERGENCY MEDICINE

## 2017-08-19 RX ORDER — DOXYCYCLINE 100 MG/1
100 CAPSULE ORAL 2 TIMES DAILY
Qty: 20 CAPSULE | Refills: 0 | Status: SHIPPED | OUTPATIENT
Start: 2017-08-19 | End: 2017-08-29

## 2017-08-19 RX ORDER — BETAMETHASONE SODIUM PHOSPHATE AND BETAMETHASONE ACETATE 3; 3 MG/ML; MG/ML
6 INJECTION, SUSPENSION INTRA-ARTICULAR; INTRALESIONAL; INTRAMUSCULAR; SOFT TISSUE
Status: COMPLETED | OUTPATIENT
Start: 2017-08-19 | End: 2017-08-19

## 2017-08-19 RX ADMIN — BETAMETHASONE SODIUM PHOSPHATE AND BETAMETHASONE ACETATE 6 MG: 3; 3 INJECTION, SUSPENSION INTRA-ARTICULAR; INTRALESIONAL; INTRAMUSCULAR; SOFT TISSUE at 11:08

## 2017-08-19 NOTE — PATIENT INSTRUCTIONS
Generalized Allergic Reaction (Other)  You are having an allergic reaction. Almost anything can cause one. Different people are allergic to different things. It is usually something that you ate or swallowed, came into contact with by getting or putting it on your skin or clothes, or something you breathed in the air. This can be very annoying and sometimes scary.  Most of us think of allergic reactions when we have a rash or itchy skin. Symptoms can include:  · Rash, hives, redness, welts, blisters  · Itching, burning, stinging, pain  · Dry, flaky, cracking, scaly skin  · Swelling of the face, lips or other parts of the body  · Hoarse voice  · Trouble swallowing, feeling like your throat is closing  · Trouble breathing, wheezing  · Nausea, vomiting, diarrhea, stomach cramps  · Feeling faint or lightheaded, rapid heart rate  Sometimes the cause may be obvious. However, there are so many things that can cause a reaction that you may not be able to figure out. The most important things to help find your allergen are:  · Remembering when it started  · What you were doing at the time or just before that  · Any activities you were involved in  · Any new products or contacts  Here are some common causes, but remember almost anything can cause a reaction, and you may not even be aware that you came into contact with one of these things.  · Dust, mold, pollen  · Plants, such aspoison ivy and poison oak are common ones, but there are many others  · Animals  · Foods such as shrimp, shellfish, peanuts, milk products, gluten, eggs; also colorings, flavorings, additives  · Insect bites or stings such as bees, mosquitos, flees, ticks  · Medicines such as penicillin, sulfa drugs, amoxicillin, aspirin, ibuprofen; any medicine can cause a reaction  · Jewelry such as nickel, gold (new, or something youve worn for a while including zippers, and buttons)  · Latex such as in gloves, clothes, toys, balloons, or some tapes (some people  allergic to latex may also have problems with foods like bananas, avocados, kiwi, papaya, or chestnuts)  · Lotions, perfumes, cosmetics, soaps, shampoos, skincare products, nail products  · Chemicals or dyes in clothing, linen, , hair dyes, soaps, iodine  Many viruses and common colds can cause a rash, but is not an allergic reaction. Sometimes it is hard to tell the difference between allergies, sensitivity and intolerance to something. This is especially true with food. Many things can cause diarrhea, vomiting, stomach cramps, and skin irritation.  Home care    The goal of our treatment is to help relieve the symptoms, and get you feeling better. The rash will usually fade over several days, but can sometimes last a couple of weeks. Over the next couple of days, there may be times when it is gets a little worse, and then better again. Here are some things to do:  · If you know what you are allergic to, avoid it because future reactions could be worse than this one.  · Avoid tight clothing and anything that heats up your skin (hot showers/baths, direct sunlight) since heat will make itching worse.  · An ice pack will relieve local areas of intense itching and redness. Dont put the ice directly on the skin, because it can damage the skin. You can also ice put it in a plastic bag. Wrap it in something like a thin towel, kevin shirt, or cloth, or use a bag of frozen peas.  · Oral Benadryl (diphenhydramine) is an antihistamine available at drug and grocery stores. Unless a prescription antihistamine was given, Benadryl may be used to reduce itching if large areas of the skin are involved. It may make you sleepy, so be careful using it in the daytime or when going to school, working, or driving. [NOTE: Do not use Benadryl if you have glaucoma or if you are a man with trouble urinating due to an enlarged prostate.] There are antihistamines that causes less drowsiness and are good alternatives for daytime use. Ask  your pharmacist for suggestions.  · Do not use Benadryl cream on your skin, because in some people it can cause a further reaction, and make you allergic to Benadryl.  · Try not to scratch. This can tear the skin and cause an infection.  · Using heat-steam to clean your home, using high-efficiency particulate (HEPA) vacuums and filters, avoiding food and pet triggers, exterminating cockroaches, and frequent house cleaning are a few of the strategies used to decrease allergic reactions.  Follow-up care  Follow up with your healthcare provider, or as advised. If you had a severe reaction today, or if you have had several mild-moderate allergic reactions in the past, ask your doctor about allergy testing to find out what you are allergic to. If your reaction included dizziness, fainting or trouble breathing or swallowing, ask your doctor about carrying injectable epinephrine for home use.  Call 911  Call 911 if any of these occur:  · Trouble breathing or swallowing, wheezing  · Hoarse voice or trouble speaking  · Confused  · Very drowsy or trouble awakening  · Fainting or loss of consciousness  · Rapid heart rate  · Low blood pressure  · Feeling of doom  · Nausea, vomiting, abdominal pain, diarrhea  · Vomiting blood, or large amounts of blood in stool  · Seizure  When to seek medical advice  Call your healthcare provider right away if any of these occur:  · Spreading areas of itching, redness or swelling  · New or worse swelling in the face, eyelids, lips, mouth, throat or tongue  · Dizziness, weakness  · Signs of infection:  ¨ Spreading redness  ¨ Increased pain or swelling  ¨ Fever (1 degree above your normal temperature) lasting for 24 to 48 hours  Date Last Reviewed: 7/30/2015  © 7146-7019 [employer]. [address] [place] [id number]. All rights reserved. This information is not intended as a substitute for professional medical care. Always follow your healthcare professional's  instructions.        Drug Reaction: Allergic  You are having an allergic reaction to a drug you have taken. This causes an itchy rash and sometimes swelling of various parts of the body. It may also cause trouble swallowing or breathing. The rash may take a few hours or up to 2 weeks to go away. In the future, remember to tell your doctor about your allergy to this drug so that drugs of this type won't be used again.  Any medicine can cause an allergic reaction. However, penicillin and related drugs, sulfa drugs, aspirin, ibuprofen, and seizure medicines cause the most allergic reactions. Vaccines may also trigger allergies. People whose parents or siblings have allergies are at a higher risk of developing a drug allergy. Allergy testing may sometimes be required to determine the cause.  Symptoms may occur within minutes, hours, or even weeks after exposure to the drug. It can be a mild or severe reaction, or potentially life threatening. Most of us think of allergic reactions when we have a rash or itchy skin. Symptoms can include:  · Rash, hives, redness, welts, blisters  · Itching, burning, stinging, pain  · Dry, flaky, cracking, scaly skin  · Swelling of the face, lips or other parts of the body  · Fever.  Sometimes fever is the only symptom of a drug reaction.  In elderly people, the risk of fever increases with the number of drugs the older person takes.  More severe symptoms include:  · Trouble swallowing, feeling like your throat is closing  · Trouble breathing, wheezing  · Hoarse voice or trouble speaking  · Nausea, vomiting, diarrhea, stomach cramps  · Feeling faint or lightheaded, rapid heart rate  Home care    The goal of treatment is to help relieve the symptoms, and get you feeling better. Mild to moderate drug reactions usually respond quickly to antihistamines and steroids. The rash will usually fade over several days, but can sometimes last a couple of weeks. Over the next couple of days, there may  be times when it is gets a little worse, and then better again. Here are some things to do:  · Throw the drug away and do not take it again. The next reaction may be much worse.  · Add this drug reaction to your electronic medical record.  · When getting a new medicine, always tell the healthcare provider that you are allergic to this drug. Make certain they write it down in your medical record.  · Avoid tight clothing and anything that heats up your skin (hot showers/baths, direct sunlight) since heat will make itching worse.  · An ice pack (ice cubes in a plastic bag, wrapped in a thin towel, or a frozen bag of peas) will relieve local areas of intense itching and redness.  Don't put ice directly on the skin.  · Avoid scratching which may worsen the reaction, damage your skin and lead to an infection.  · Oral Benadryl (diphenhydramine) is an antihistamine available at drug and grocery stores. Unless a prescription antihistamine was given, Benadryl may be used to reduce itching if large areas of the skin are involved. It may make you sleepy, so be careful using it in the daytime or when going to school, working, or driving. [Note: Do not use Benadryl if you have glaucoma or if you are a man with trouble urinating due to an enlarged prostate.]  There are other antihistamines that cause less drowsiness and are a good alternative for daytime use. Ask your pharmacist for suggestions.  · Do not use Benadryl cream on your skin, because in some people it can cause a further reaction, and make you allergic to Benadryl.  · Calamine lotion or oatmeal baths sometimes help with itching.  Follow-up care  Follow up with your healthcare provider, or as advised if your symptoms do not continue to improve or they get worse.  Call 911  Call 911 if any of these occur:  · Trouble breathing or swallowing, wheezing  · New or worsening swelling in the mouth, throat, or tongue  · Hoarse voice or trouble speaking   · Fainting or loss of  consciousness  · Rapid heart rate  · Low blood pressure  · Feeling of doom  · Nausea, vomiting, abdominal pain, diarrhea  When to seek medical advice  Call your healthcare provider right away if any of these occur:  · Shortness of breath  · Increased swelling in the face, eyelids, or lips  · Dizziness, weakness  · Continuing or recurring symptoms  · Spreading areas of itching, redness or swelling  · Signs of infection:  ¨ Spreading redness  ¨ Increased pain or swelling  ¨ Fever (1 degree above your normal temperature) lasting for 24 to 48 hours Or, whatever your healthcare provider told you to report based on your medical condition  ¨ Colored fluid draining from the inflamed area  Date Last Reviewed: 7/30/2015 © 2000-2016 [employer]. [address] [place] [address], [place] [address]. All rights reserved. This information is not intended as a substitute for professional medical care. Always follow your healthcare professional's instructions.      Yrn Blank MD  Go to the Emergency Department for any problems  Stop the neosporin.

## 2017-08-19 NOTE — PROGRESS NOTES
Subjective:       Patient ID: Primitivo Mitchell is a 71 y.o. male.    Vitals:  height is 6' (1.829 m) and weight is 87.1 kg (192 lb). His oral temperature is 97.6 °F (36.4 °C). His blood pressure is 125/80 and his pulse is 73. His respiration is 18 and oxygen saturation is 97%.     Chief Complaint: Rash    Arm was wrapped up by cristobal verma last Saturday. Rash came on and has gradually worsened. He has been using Neosporen, but not getting any better. Pt. Forgot he is allergic to neosporin, noticed rash 2 d ago, been using neosporin daily, no drainage.  Right handed.      Rash   This is a new problem. The current episode started in the past 7 days. The problem has been gradually worsening since onset. The affected locations include the right arm and right hand. The rash is characterized by blistering and redness. He was exposed to plant contact. Pertinent negatives include no fever, joint pain, shortness of breath or sore throat. Treatments tried: neosporin. The treatment provided no relief.     Review of Systems   Constitution: Negative for chills and fever.   HENT: Negative for sore throat.    Respiratory: Negative for shortness of breath.    Skin: Positive for rash (right arm). Negative for itching.   Musculoskeletal: Negative for joint pain.       Objective:      Physical Exam   Constitutional: He is oriented to person, place, and time. He appears well-developed and well-nourished.   HENT:   Head: Normocephalic and atraumatic.   Eyes: Pupils are equal, round, and reactive to light.   Neck: Normal range of motion. Neck supple.   Cardiovascular: Normal rate, regular rhythm, normal heart sounds and intact distal pulses.    Pulmonary/Chest: Breath sounds normal.   Musculoskeletal: Normal range of motion.   Lymphadenopathy:   NO RIGHT AXILLARY LN palpable.   Neurological: He is alert and oriented to person, place, and time.   Skin:        Right mid forearm erythema with vesicles intact, itchy, no pustules noted.    Psychiatric: He has a normal mood and affect. His behavior is normal.       Assessment:       1. Allergic reaction caused by a drug, initial encounter        Plan:         Allergic reaction caused by a drug, initial encounter  -     betamethasone acetate-betamethasone sodium phosphate injection 6 mg; Inject 1 mL (6 mg total) into the muscle one time.  -     doxycycline (VIBRAMYCIN) 100 MG Cap; Take 1 capsule (100 mg total) by mouth 2 (two) times daily. Start in 1-2 days if not improved.  Dispense: 20 capsule; Refill: 0      Yrn Blank MD  Go to the Emergency Department for any problems

## 2017-08-21 ENCOUNTER — TELEPHONE (OUTPATIENT)
Dept: URGENT CARE | Facility: CLINIC | Age: 72
End: 2017-08-21

## 2017-08-30 ENCOUNTER — TELEPHONE (OUTPATIENT)
Dept: INTERNAL MEDICINE | Facility: CLINIC | Age: 72
End: 2017-08-30

## 2017-08-30 ENCOUNTER — PATIENT MESSAGE (OUTPATIENT)
Dept: INTERNAL MEDICINE | Facility: CLINIC | Age: 72
End: 2017-08-30

## 2017-08-30 DIAGNOSIS — Z12.11 COLON CANCER SCREENING: Primary | ICD-10-CM

## 2017-08-30 NOTE — TELEPHONE ENCOUNTER
----- Message from Shantanu Dickens sent at 8/29/2017  2:31 PM CDT -----  Contact: Self [phone]  Type: Orders Request    What orders/ testing are being requested? Colonoscopy    Is there a future appointment scheduled for the patient with PCP? Yes     When?11/14/2017    Comments:Advice    Thanks

## 2017-08-30 NOTE — TELEPHONE ENCOUNTER
"Order placed. Please notify the patient and give the patient endoscopy number to call to schedule:    "Please call the Endoscopy team at [phone] to schedule your colonoscopy."  "

## 2017-09-06 ENCOUNTER — TELEPHONE (OUTPATIENT)
Dept: GASTROENTEROLOGY | Facility: CLINIC | Age: 72
End: 2017-09-06

## 2017-09-06 NOTE — TELEPHONE ENCOUNTER
----- Message from Kandy Ruff sent at 9/6/2017 10:13 AM CDT -----  Contact: [phone]/ self   Pt wants to know when was the last time he had a colonoscopy . Pt had the colonoscopy with Dr Osorio . Please advise

## 2017-09-06 NOTE — TELEPHONE ENCOUNTER
Left a message for patient to call the office back in regards to last Colonoscopy which was on 01/20/2015. Patient is due for his follow up Colonoscopy on 01/2018.

## 2017-09-06 NOTE — TELEPHONE ENCOUNTER
----- Message from Lu Herr RN sent at 9/6/2017 10:09 AM CDT -----  Contact: ILDA Herr RN Endoscopy  Mr. Mitchell is trying to get report of a colonoscopy done by Dr. Buchanan on jan.20,2015. I cannot find this proc. In [facility] documents from [facility]. Please call pt. If u can be of assistance.Harry, Ec

## 2017-09-12 ENCOUNTER — OFFICE VISIT (OUTPATIENT)
Dept: INTERNAL MEDICINE | Facility: CLINIC | Age: 72
End: 2017-09-12
Payer: COMMERCIAL

## 2017-09-12 ENCOUNTER — HOSPITAL ENCOUNTER (OUTPATIENT)
Dept: RADIOLOGY | Facility: HOSPITAL | Age: 72
Discharge: HOME | End: 2017-09-12
Attending: INTERNAL MEDICINE
Payer: COMMERCIAL

## 2017-09-12 DIAGNOSIS — Z77.090 ASBESTOS EXPOSURE: Primary | ICD-10-CM

## 2017-09-12 DIAGNOSIS — Z77.090 ASBESTOS EXPOSURE: ICD-10-CM

## 2017-09-12 PROCEDURE — 99999 PR PBB SHADOW E&M-EST. PATIENT-LVL II: CPT | Mod: PBBFAC,,, | Performed by: INTERNAL MEDICINE

## 2017-09-12 PROCEDURE — 99212 OFFICE O/P EST SF 10 MIN: CPT | Mod: PBBFAC,PN,25 | Performed by: INTERNAL MEDICINE

## 2017-09-12 PROCEDURE — 94010 BREATHING CAPACITY TEST: CPT | Mod: S$GLB,,, | Performed by: INTERNAL MEDICINE

## 2017-09-12 PROCEDURE — 71020 XR CHEST PA AND LATERAL: CPT | Mod: TC,PO

## 2017-09-12 PROCEDURE — 99499 UNLISTED E&M SERVICE: CPT | Mod: S$GLB,,, | Performed by: INTERNAL MEDICINE

## 2017-09-12 NOTE — PROGRESS NOTES
Subjective:       Patient ID: Primitivo Mitchell is a 71 y.o. male.    Chief Complaint: No chief complaint on file.    HPI  WC  Review of Systems    Objective:      Physical Exam    Assessment:       1. Asbestos exposure        Plan:       Diagnoses and all orders for this visit:    Asbestos exposure  -     X-Ray Chest PA And Lateral; Future      No Follow-up on file.

## 2017-09-25 ENCOUNTER — TELEPHONE (OUTPATIENT)
Dept: INTERNAL MEDICINE | Facility: CLINIC | Age: 72
End: 2017-09-25

## 2017-09-25 NOTE — TELEPHONE ENCOUNTER
Spoke with the patient.  He reports neuropathy in lateral thighs generally better on nortriptyline 35 mg nightly, however occasionally he still has flares of pain. recommended trying to increase Pamelor to 50 mg (25 mg 2 tabs every night); counseled about side effects. If not effective, consider topical capsaicin and/or change to pregabalin (has gabapentin allergy).

## 2017-09-28 ENCOUNTER — OFFICE VISIT (OUTPATIENT)
Dept: URGENT CARE | Facility: CLINIC | Age: 72
End: 2017-09-28
Payer: MEDICARE

## 2017-09-28 VITALS
TEMPERATURE: 97 F | DIASTOLIC BLOOD PRESSURE: 66 MMHG | RESPIRATION RATE: 16 BRPM | OXYGEN SATURATION: 98 % | HEART RATE: 80 BPM | WEIGHT: 192 LBS | HEIGHT: 72 IN | SYSTOLIC BLOOD PRESSURE: 113 MMHG | BODY MASS INDEX: 26.01 KG/M2

## 2017-09-28 DIAGNOSIS — L30.9 DERMATITIS: Primary | ICD-10-CM

## 2017-09-28 PROCEDURE — 3074F SYST BP LT 130 MM HG: CPT | Mod: S$GLB,,, | Performed by: PHYSICIAN ASSISTANT

## 2017-09-28 PROCEDURE — 3078F DIAST BP <80 MM HG: CPT | Mod: S$GLB,,, | Performed by: PHYSICIAN ASSISTANT

## 2017-09-28 PROCEDURE — 3008F BODY MASS INDEX DOCD: CPT | Mod: S$GLB,,, | Performed by: PHYSICIAN ASSISTANT

## 2017-09-28 PROCEDURE — 1159F MED LIST DOCD IN RCRD: CPT | Mod: S$GLB,,, | Performed by: PHYSICIAN ASSISTANT

## 2017-09-28 PROCEDURE — 99214 OFFICE O/P EST MOD 30 MIN: CPT | Mod: S$GLB,,, | Performed by: PHYSICIAN ASSISTANT

## 2017-09-28 RX ORDER — METHYLPREDNISOLONE 4 MG/1
4 TABLET ORAL
Qty: 1 PACKAGE | Refills: 0 | Status: SHIPPED | OUTPATIENT
Start: 2017-09-28 | End: 2017-10-04

## 2017-09-28 RX ORDER — DOXYCYCLINE 100 MG/1
100 CAPSULE ORAL 2 TIMES DAILY
Qty: 20 CAPSULE | Refills: 0 | Status: SHIPPED | OUTPATIENT
Start: 2017-09-28 | End: 2017-10-08

## 2017-09-28 NOTE — PATIENT INSTRUCTIONS
Nonspecific Dermatitis  Dermatitis is a skin rash caused by something that touches the skin and makes it irritated and inflamed.  Your skin may be red, swollen, dry, and may be cracked. Blisters may form and ooze. The rash will itch.  Dermatitis can form on the face and neck, backs of hands, forearms, genitals, and lower legs. Dermatitis is not passed from person to person.  Talk with your health care provider about what may have caused the rash. Common things that cause skin allergies are metal in jewelry, plants like poison ivy or poison oak, and certain skin care products. You will need to avoid the source of your rash in the future to prevent it from coming back. In some cases, the cause of the dermatitis may not be found.  Treatment is done to relieve itching and prevent the rash from coming back. The rash should go away in a few days to a few weeks.  Home care  The health care provider may prescribe medications to relieve swelling and itching. Follow all instructions when using these medications.  · Avoid anything that heats up your skin, such as hot showers or baths, or direct sunlight. This can make itching worse.  · Stay away from whatever you think caused the rash.  · Bathe in warm, not hot, water. Apply a moisturizing lotion after bathing to prevent dry skin.  · Avoid skin irritants such as wool or silk clothing, grease, oils, harsh soaps, and detergents.  · Apply cold compresses to soothe your sores to help relieve your symptoms. Do this for 30 minutes 3 to 4 times a day. You can make a cold compress by soaking a cloth in cold water. Squeeze out excess water. You can add colloidal oatmeal to the water to help reduce itching. For severe itching in a small area, apply an ice pack wrapped in a thin towel. Do this for 20 minutes 3 to 4 times a day.  · You can also help relieve large areas of itching by taking a lukewarm bath with colloidal oatmeal added to the water.  · Use hydrocortisone cream for redness  and irritation, unless another medicine was prescribed. You can also use benzocaine anesthetic cream or spray.  · Use oral diphenhydramine to help reduce itching. This is an antihistamine you can buy at drug and grocery stores. It can make you sleepy, so use lower doses during the daytime. Or you can use loratadine. This is an antihistamine that will not make you sleepy. Dont use diphenhydramine if you have glaucoma or have trouble urinating because of an enlarged prostate.  · Wash your hands or use an antibacterial gel often to prevent the spread of the rash.  Follow-up care  Follow up with your health care provider. Make an appointment with your health care provider if your symptoms do not get better in the next 1 to 2 weeks.  When to seek medical advice  Call your health care provider right away if any of these occur:  · Spreading of the rash to other parts of your body  · Severe swelling of your face, eyelids, mouth, throat or tongue  · Trouble urinating due to swelling in the genital area  · Fever of 100.4°F (38°C) or higher  · Redness or swelling that gets worse  · Pain that gets worse  · Foul-smelling fluid leaking from the skin  · Yellow-brown crusts on the open blisters  · Joint pain   Date Last Reviewed: 7/23/2014  © 4179-2107 [employer]. [address] [place] [address], [place] [address]. All rights reserved. This information is not intended as a substitute for professional medical care. Always follow your healthcare professional's instructions.      FOLLOW UP WITH DERMATOLOGIST AS DISCUSSED    Please follow up with your Primary care provider within 2-5 days if your signs and symptoms have not resolved or worsen.     If your condition worsens or fails to improve we recommend that you receive another evaluation at the emergency room immediately or contact your primary medical clinic to discuss your concerns.   You must understand that you have received an Urgent Care treatment only and that you may  be released before all of your medical problems are known or treated. You, the patient, will arrange for follow up care as instructed.

## 2017-09-28 NOTE — PROGRESS NOTES
Subjective:       Patient ID: Primitivo Mitchell is a 71 y.o. male.    Vitals:  height is 6' (1.829 m) and weight is 87.1 kg (192 lb). His temperature is 97 °F (36.1 °C). His blood pressure is 113/66 and his pulse is 80. His respiration is 16 and oxygen saturation is 98%.     Chief Complaint: Rash (RT ARM)    Rash   This is a recurrent problem. The current episode started yesterday. The problem is unchanged. The affected locations include the right arm. The rash is characterized by blistering and draining. He was exposed to nothing. Pertinent negatives include no fever, joint pain, shortness of breath or sore throat. Past treatments include nothing. The treatment provided no relief.     Review of Systems   Constitution: Negative for chills and fever.   HENT: Negative for sore throat.    Respiratory: Negative for shortness of breath.    Skin: Positive for rash. Negative for itching.        RT FOREARM RASH   Musculoskeletal: Negative for joint pain.       Objective:      Physical Exam   Constitutional: He is oriented to person, place, and time. He appears well-developed and well-nourished.   HENT:   Head: Normocephalic and atraumatic. Head is without abrasion, without contusion and without laceration.   Right Ear: External ear normal.   Left Ear: External ear normal.   Nose: Nose normal.   Mouth/Throat: Oropharynx is clear and moist.   Eyes: Conjunctivae, EOM and lids are normal. Pupils are equal, round, and reactive to light.   Neck: Trachea normal, full passive range of motion without pain and phonation normal. Neck supple.   Cardiovascular: Normal rate, regular rhythm and normal heart sounds.    Pulmonary/Chest: Effort normal and breath sounds normal. No stridor. No respiratory distress.   Musculoskeletal: Normal range of motion.   Neurological: He is alert and oriented to person, place, and time.   Skin: Skin is warm, dry and intact. Capillary refill takes less than 2 seconds. Rash noted. No abrasion, no  bruising, no burn, no ecchymosis, no laceration, no lesion and no purpura noted. Rash is vesicular. Rash is not macular, not papular, not maculopapular, not nodular, not pustular and not urticarial. No erythema.        1 serous vesicle about 2mm in diameter noted to L forearm without E/E/E.    Psychiatric: He has a normal mood and affect. His speech is normal and behavior is normal. Judgment and thought content normal. Cognition and memory are normal.   Nursing note and vitals reviewed.      Assessment:       1. Dermatitis        Plan:         Dermatitis  -     methylPREDNISolone (MEDROL DOSEPACK) 4 mg tablet; Take 1 tablet (4 mg total) by mouth as needed (Take as directed). Take as directed  Dispense: 1 Package; Refill: 0  -     doxycycline (VIBRAMYCIN) 100 MG Cap; Take 1 capsule (100 mg total) by mouth 2 (two) times daily.  Dispense: 20 capsule; Refill: 0        Nonspecific Dermatitis  Dermatitis is a skin rash caused by something that touches the skin and makes it irritated and inflamed.  Your skin may be red, swollen, dry, and may be cracked. Blisters may form and ooze. The rash will itch.  Dermatitis can form on the face and neck, backs of hands, forearms, genitals, and lower legs. Dermatitis is not passed from person to person.  Talk with your health care provider about what may have caused the rash. Common things that cause skin allergies are metal in jewelry, plants like poison ivy or poison oak, and certain skin care products. You will need to avoid the source of your rash in the future to prevent it from coming back. In some cases, the cause of the dermatitis may not be found.  Treatment is done to relieve itching and prevent the rash from coming back. The rash should go away in a few days to a few weeks.  Home care  The health care provider may prescribe medications to relieve swelling and itching. Follow all instructions when using these medications.  · Avoid anything that heats up your skin, such as hot  showers or baths, or direct sunlight. This can make itching worse.  · Stay away from whatever you think caused the rash.  · Bathe in warm, not hot, water. Apply a moisturizing lotion after bathing to prevent dry skin.  · Avoid skin irritants such as wool or silk clothing, grease, oils, harsh soaps, and detergents.  · Apply cold compresses to soothe your sores to help relieve your symptoms. Do this for 30 minutes 3 to 4 times a day. You can make a cold compress by soaking a cloth in cold water. Squeeze out excess water. You can add colloidal oatmeal to the water to help reduce itching. For severe itching in a small area, apply an ice pack wrapped in a thin towel. Do this for 20 minutes 3 to 4 times a day.  · You can also help relieve large areas of itching by taking a lukewarm bath with colloidal oatmeal added to the water.  · Use hydrocortisone cream for redness and irritation, unless another medicine was prescribed. You can also use benzocaine anesthetic cream or spray.  · Use oral diphenhydramine to help reduce itching. This is an antihistamine you can buy at drug and grocery stores. It can make you sleepy, so use lower doses during the daytime. Or you can use loratadine. This is an antihistamine that will not make you sleepy. Dont use diphenhydramine if you have glaucoma or have trouble urinating because of an enlarged prostate.  · Wash your hands or use an antibacterial gel often to prevent the spread of the rash.  Follow-up care  Follow up with your health care provider. Make an appointment with your health care provider if your symptoms do not get better in the next 1 to 2 weeks.  When to seek medical advice  Call your health care provider right away if any of these occur:  · Spreading of the rash to other parts of your body  · Severe swelling of your face, eyelids, mouth, throat or tongue  · Trouble urinating due to swelling in the genital area  · Fever of 100.4°F (38°C) or higher  · Redness or swelling that  gets worse  · Pain that gets worse  · Foul-smelling fluid leaking from the skin  · Yellow-brown crusts on the open blisters  · Joint pain   Date Last Reviewed: 7/23/2014  © 9811-9548 [employer]. [address] [place] [address], [place] [address]. All rights reserved. This information is not intended as a substitute for professional medical care. Always follow your healthcare professional's instructions.      FOLLOW UP WITH DERMATOLOGIST AS DISCUSSED    Please follow up with your Primary care provider within 2-5 days if your signs and symptoms have not resolved or worsen.     If your condition worsens or fails to improve we recommend that you receive another evaluation at the emergency room immediately or contact your primary medical clinic to discuss your concerns.   You must understand that you have received an Urgent Care treatment only and that you may be released before all of your medical problems are known or treated. You, the patient, will arrange for follow up care as instructed.

## 2017-09-30 ENCOUNTER — TELEPHONE (OUTPATIENT)
Dept: URGENT CARE | Facility: CLINIC | Age: 72
End: 2017-09-30

## 2017-10-02 ENCOUNTER — OFFICE VISIT (OUTPATIENT)
Dept: URGENT CARE | Facility: CLINIC | Age: 72
End: 2017-10-02
Payer: MEDICARE

## 2017-10-02 VITALS
TEMPERATURE: 98 F | HEIGHT: 72 IN | SYSTOLIC BLOOD PRESSURE: 116 MMHG | OXYGEN SATURATION: 98 % | DIASTOLIC BLOOD PRESSURE: 76 MMHG | WEIGHT: 192 LBS | HEART RATE: 81 BPM | RESPIRATION RATE: 16 BRPM | BODY MASS INDEX: 26.01 KG/M2

## 2017-10-02 DIAGNOSIS — T14.8XXA BLISTER: Primary | ICD-10-CM

## 2017-10-02 PROCEDURE — 99214 OFFICE O/P EST MOD 30 MIN: CPT | Mod: S$GLB,,, | Performed by: NURSE PRACTITIONER

## 2017-10-02 RX ORDER — MUPIROCIN 20 MG/G
OINTMENT TOPICAL
Qty: 1 TUBE | Refills: 0 | Status: SHIPPED | OUTPATIENT
Start: 2017-10-02 | End: 2018-01-16

## 2017-10-02 NOTE — PATIENT INSTRUCTIONS
Complete the Doxycycline that you were already prescribed.  Bactroban as directed for 2 days.  F/u with Derm if this continues.  Blister (Adult)  A blister is a raised area of skin with clear, watery fluid inside. A blister can occur when the skin is damaged. Blisters can hurt when they are pressed, or if they break open.  Blisters can be caused in many ways. This can happen if the skin is rubbed too hard or often. Or they can occur if the skin is hurt by the sun, a virus, or even a medicine.  Most blisters need little treatment. They often dry up and go away in a few days to weeks after the cause is stopped. A blister may need to be cleaned. A broken (open) blister may be bandaged to prevent infection. Blisters caused by insect bites or drug reactions may be more serious. These should be looked at by a healthcare provider.  Home care  Your healthcare provider may prescribe pain medicine. He or she may also prescribe an antibiotic cream or ointment to put on an open blister. Follow all instructions when using these medicines.  General care:  · Follow all instructions on how to care for the blister. If a bandage was put on, change the bandage as instructed. .  · If the blister breaks, the area will leak a clear fluid for a day or 2. Wash the area with soap and water every day or as advised by your healthcare provider.  · You may use over-the-counter pain medicines to control pain, unless another medicine was prescribed. If you have chronic liver or kidney disease, talk with your healthcare provider before using these medicines. Also talk with your provider if you've had a stomach ulcer or gastrointestinal bleeding.  Follow-up care  Follow up with your healthcare provider, or as advised.  When to seek medical advice  Call your healthcare provider right away if any of these occur:  · Fever of 100.4°F (38°C) or higher  · Redness or swelling that is new or gets worse  · Foul-smelling fluid leaking from the  blister  · Pain doesnt go away, or gets worse  · Increase in size of the blister  · Blister doesnt get better after several days  Date Last Reviewed: 9/1/2016  © 6201-5299 [employer], "[employer]. [address] [place] [address], [place] [address]. All rights reserved. This information is not intended as a substitute for professional medical care. Always follow your healthcare professional's instructions.

## 2017-10-02 NOTE — PROGRESS NOTES
Subjective:       Patient ID: Primitivo Mitchell is a 71 y.o. male.    Vitals:  height is 6' (1.829 m) and weight is 87.1 kg (192 lb). His temperature is 97.8 °F (36.6 °C). His blood pressure is 116/76 and his pulse is 81. His respiration is 16 and oxygen saturation is 98%.     Chief Complaint: Rash    Patient seen twice before for dermatitis to his forearms.  Seen previously and told to try medrol dosepak and then start Doxy if it is not better.  He is still on Medrol and started the Doxy yesterday.  He has no fever.  He noticed that now it's no longer an itchy rash but a fluid filled blister.  There was one right next to it but he popped it healed after that.  He wants advisement before popping the second one.  No complaints in terms or itching, burning, or pain.  No fever.  1st time this happened it was after he used Neosporin, he has not used that since.      Rash   This is a recurrent problem. The current episode started more than 1 month ago. The affected locations include the right arm. The rash is characterized by blistering. He was exposed to nothing. Pertinent negatives include no congestion, cough, facial edema, fatigue, fever, joint pain, shortness of breath or sore throat. Past treatments include nothing. The treatment provided no relief. His past medical history is significant for allergies. There is no history of asthma or eczema.     Review of Systems   Constitution: Negative for chills, fatigue and fever.   HENT: Negative for congestion and sore throat.    Respiratory: Negative for cough and shortness of breath.    Skin: Positive for suspicious lesions. Negative for itching.   Musculoskeletal: Negative for joint pain.       Objective:      Physical Exam   Constitutional: He is oriented to person, place, and time. He appears well-developed and well-nourished.   HENT:   Head: Normocephalic and atraumatic. Head is without abrasion, without contusion and without laceration.   Right Ear: External ear  normal.   Left Ear: External ear normal.   Nose: Nose normal.   Mouth/Throat: Oropharynx is clear and moist.   Eyes: Conjunctivae, EOM and lids are normal. Pupils are equal, round, and reactive to light.   Neck: Trachea normal, full passive range of motion without pain and phonation normal. Neck supple.   Cardiovascular: Normal rate, regular rhythm and normal heart sounds.    Pulmonary/Chest: Effort normal and breath sounds normal. No stridor. No respiratory distress.   Musculoskeletal: Normal range of motion.   Neurological: He is alert and oriented to person, place, and time.   Skin: Skin is warm, dry and intact. Capillary refill takes less than 2 seconds. Lesion noted. No abrasion, no bruising, no burn, no ecchymosis, no laceration and no rash noted. No erythema.        Singular fluid filled blister to R forearm, circular approx .5cm.  Non tender to palpation.  No active drainage.  No erythema.     Psychiatric: He has a normal mood and affect. His speech is normal and behavior is normal. Judgment and thought content normal. Cognition and memory are normal.   Nursing note and vitals reviewed.      Pt requesting drainage of blister.  Cleaned with alcohol.  Punctured with 25G needle with serous and scant bloody drainage expressed.  Now flat.  Covered with bactroban and bandaid.  Instructed on wound care and when and how long to use Bactroban.    Assessment:       1. Blister        Plan:         Blister  -     mupirocin (BACTROBAN) 2 % ointment; Apply topically to wound twice a day  Dispense: 1 Tube; Refill: 0    Instructed on wound care and when and how long to use Bactroban.  Continue with Doxy.  F/u with derm if this continues.

## 2017-10-04 ENCOUNTER — TELEPHONE (OUTPATIENT)
Dept: URGENT CARE | Facility: CLINIC | Age: 72
End: 2017-10-04

## 2017-11-01 RX ORDER — FLUTICASONE PROPIONATE 50 MCG
2 SPRAY, SUSPENSION (ML) NASAL DAILY
Qty: 16 G | Refills: 0 | Status: SHIPPED | OUTPATIENT
Start: 2017-11-01 | End: 2017-11-14 | Stop reason: SDUPTHER

## 2017-11-14 ENCOUNTER — OFFICE VISIT (OUTPATIENT)
Dept: INTERNAL MEDICINE | Facility: CLINIC | Age: 72
End: 2017-11-14
Payer: MEDICARE

## 2017-11-14 VITALS
WEIGHT: 197.06 LBS | DIASTOLIC BLOOD PRESSURE: 74 MMHG | HEIGHT: 72 IN | BODY MASS INDEX: 26.69 KG/M2 | SYSTOLIC BLOOD PRESSURE: 122 MMHG | HEART RATE: 88 BPM

## 2017-11-14 DIAGNOSIS — R09.81 SINUS CONGESTION: ICD-10-CM

## 2017-11-14 DIAGNOSIS — C61 PROSTATE CANCER: ICD-10-CM

## 2017-11-14 DIAGNOSIS — G62.9 NEUROPATHY: Primary | ICD-10-CM

## 2017-11-14 DIAGNOSIS — M81.8 OSTEOPOROSIS, IDIOPATHIC: ICD-10-CM

## 2017-11-14 DIAGNOSIS — I10 ESSENTIAL HYPERTENSION: ICD-10-CM

## 2017-11-14 DIAGNOSIS — E78.5 HYPERLIPIDEMIA, UNSPECIFIED HYPERLIPIDEMIA TYPE: ICD-10-CM

## 2017-11-14 DIAGNOSIS — E55.9 VITAMIN D INSUFFICIENCY: ICD-10-CM

## 2017-11-14 PROCEDURE — 99499 UNLISTED E&M SERVICE: CPT | Mod: S$GLB,,, | Performed by: INTERNAL MEDICINE

## 2017-11-14 PROCEDURE — 99999 PR PBB SHADOW E&M-EST. PATIENT-LVL III: CPT | Mod: PBBFAC,,, | Performed by: INTERNAL MEDICINE

## 2017-11-14 PROCEDURE — 99214 OFFICE O/P EST MOD 30 MIN: CPT | Mod: S$GLB,,, | Performed by: INTERNAL MEDICINE

## 2017-11-14 RX ORDER — NORTRIPTYLINE HYDROCHLORIDE 50 MG/1
50 CAPSULE ORAL NIGHTLY
Qty: 90 CAPSULE | Refills: 3 | Status: SHIPPED | OUTPATIENT
Start: 2017-11-14 | End: 2018-05-31

## 2017-11-14 RX ORDER — FLUTICASONE PROPIONATE 50 MCG
2 SPRAY, SUSPENSION (ML) NASAL DAILY
Qty: 48 G | Refills: 3 | Status: SHIPPED | OUTPATIENT
Start: 2017-11-14 | End: 2019-07-25 | Stop reason: SDUPTHER

## 2017-11-14 RX ORDER — MOMETASONE FUROATE 1 MG/G
OINTMENT TOPICAL
COMMUNITY
Start: 2017-10-16 | End: 2018-01-16

## 2017-11-14 NOTE — PROGRESS NOTES
Subjective:       Patient ID: Primitivo Mitchell is a 72 y.o. male.    Chief Complaint: Follow-up    HPI    Last visit with me 04/2017. Since then seen by [facility], PM&R, Gastroenterology, Podiatry, Rad Onc, Cardiology, Urgent Care.     Took Pamelor 50 mg once, but otherwise on 35 mg daily after starting to have more neuropathy.    Went to outside ENT, has been taking Flonase 2 sprays daily. Given prescription for medication and was not sent to [facility].     Continue regular follow up with Rad Onc, reports good response to treatment, has had good PSA level response. Radiotherapy effects have resolved.    Review of Systems   All other systems reviewed and are negative.      Objective:      Physical Exam   Constitutional: He is oriented to person, place, and time. No distress.   -American man whose Body mass index is 26.73 kg/m².    Neurological: He is alert and oriented to person, place, and time.   Nursing note and vitals reviewed.      Vitals:    11/14/17 1130   BP: 122/74   BP Location: Right arm   Patient Position: Sitting   BP Method: Large (Manual)   Pulse: 88   Weight: 89.4 kg (197 lb 1.5 oz)   Height: 6' (1.829 m)     Body mass index is 26.73 kg/m².    RESULTS: Reviewed labs from last 6 months    Assessment:       1. Neuropathy    2. Sinus congestion    3. Essential hypertension    4. Hyperlipidemia, unspecified hyperlipidemia type    5. Osteoporosis, idiopathic    6. Prostate cancer    7. Vitamin D insufficiency        Plan:   Primitivo was seen today for follow-up.    Diagnoses and all orders for this visit:    Neuropathy:  Patient continues to have neuropathy and anterior upper legs, likely related to surgery for prostate cancer.  Has had some relief with use of nortriptyline.  After discussion, decision made to increase dose, counseled the patient to monitor for side effects.   -     nortriptyline (PAMELOR) 50 MG capsule; Take 1 capsule (50 mg total) by mouth every evening.    Sinus congestion:   improved with use of Flonase, continue daily use.  -     fluticasone (FLONASE) 50 mcg/actuation nasal spray; 2 sprays by Each Nare route once daily.    Essential hypertension:  Prior diagnosis, well controlled on current management. No changes at this time, will continue to monitor.   -     Comprehensive metabolic panel; Future  -     CBC Without Differential; Future    Hyperlipidemia, unspecified hyperlipidemia type:  Prior diagnosis, well controlled on current management. No changes at this time, will continue to monitor.   -     Lipid panel; Future    Osteoporosis, idiopathic:  Not on treatment currently, continue Ca and Vit D and recheck DXA at next visit to see if symptoms have resolved.  -     Vitamin D; Future    Prostate cancer:  Good response to surgery and XRT, continue follow up with Rad Onc.    Vitamin D insufficiency:  Continue supplementation, recheck levels.  -     Vitamin D; Future    Return in about 6 months (around 5/14/2018) for EPP annual exam. reorder DXA at that time.  Ron Esquivel MD  Internal Medicine    Portions of this note were completed using [employer] dictation software. Please excuse typographical or syntax errors.

## 2017-11-15 ENCOUNTER — LAB VISIT (OUTPATIENT)
Dept: LAB | Facility: HOSPITAL | Age: 72
End: 2017-11-15
Attending: INTERNAL MEDICINE
Payer: MEDICARE

## 2017-11-15 DIAGNOSIS — E78.5 HYPERLIPIDEMIA, UNSPECIFIED HYPERLIPIDEMIA TYPE: ICD-10-CM

## 2017-11-15 DIAGNOSIS — M81.8 OSTEOPOROSIS, IDIOPATHIC: ICD-10-CM

## 2017-11-15 DIAGNOSIS — I10 ESSENTIAL HYPERTENSION: ICD-10-CM

## 2017-11-15 DIAGNOSIS — E55.9 VITAMIN D INSUFFICIENCY: ICD-10-CM

## 2017-11-15 LAB
25(OH)D3+25(OH)D2 SERPL-MCNC: 35 NG/ML
ALBUMIN SERPL BCP-MCNC: 3.6 G/DL
ALP SERPL-CCNC: 94 U/L
ALT SERPL W/O P-5'-P-CCNC: 23 U/L
ANION GAP SERPL CALC-SCNC: 9 MMOL/L
AST SERPL-CCNC: 27 U/L
BILIRUB SERPL-MCNC: 0.4 MG/DL
BUN SERPL-MCNC: 16 MG/DL
CALCIUM SERPL-MCNC: 9.7 MG/DL
CHLORIDE SERPL-SCNC: 105 MMOL/L
CHOLEST SERPL-MCNC: 146 MG/DL
CHOLEST/HDLC SERPL: 2.9 {RATIO}
CO2 SERPL-SCNC: 26 MMOL/L
CREAT SERPL-MCNC: 1.1 MG/DL
ERYTHROCYTE [DISTWIDTH] IN BLOOD BY AUTOMATED COUNT: 13 %
EST. GFR  (AFRICAN AMERICAN): >60 ML/MIN/1.73 M^2
EST. GFR  (NON AFRICAN AMERICAN): >60 ML/MIN/1.73 M^2
GLUCOSE SERPL-MCNC: 96 MG/DL
HCT VFR BLD AUTO: 37.6 %
HDLC SERPL-MCNC: 50 MG/DL
HDLC SERPL: 34.2 %
HGB BLD-MCNC: 12.6 G/DL
LDLC SERPL CALC-MCNC: 73.6 MG/DL
MCH RBC QN AUTO: 32.7 PG
MCHC RBC AUTO-ENTMCNC: 33.5 G/DL
MCV RBC AUTO: 98 FL
NONHDLC SERPL-MCNC: 96 MG/DL
PLATELET # BLD AUTO: 222 K/UL
PMV BLD AUTO: 10.1 FL
POTASSIUM SERPL-SCNC: 4.9 MMOL/L
PROT SERPL-MCNC: 7.7 G/DL
RBC # BLD AUTO: 3.85 M/UL
SODIUM SERPL-SCNC: 140 MMOL/L
TRIGL SERPL-MCNC: 112 MG/DL
WBC # BLD AUTO: 6.42 K/UL

## 2017-11-15 PROCEDURE — 82306 VITAMIN D 25 HYDROXY: CPT

## 2017-11-15 PROCEDURE — 85027 COMPLETE CBC AUTOMATED: CPT

## 2017-11-15 PROCEDURE — 36415 COLL VENOUS BLD VENIPUNCTURE: CPT

## 2017-11-15 PROCEDURE — 80061 LIPID PANEL: CPT

## 2017-11-15 PROCEDURE — 80053 COMPREHEN METABOLIC PANEL: CPT

## 2017-11-16 ENCOUNTER — PATIENT MESSAGE (OUTPATIENT)
Dept: INTERNAL MEDICINE | Facility: CLINIC | Age: 72
End: 2017-11-16

## 2017-11-20 ENCOUNTER — OFFICE VISIT (OUTPATIENT)
Dept: URGENT CARE | Facility: CLINIC | Age: 72
End: 2017-11-20
Payer: MEDICARE

## 2017-11-20 VITALS
SYSTOLIC BLOOD PRESSURE: 118 MMHG | BODY MASS INDEX: 26.68 KG/M2 | DIASTOLIC BLOOD PRESSURE: 69 MMHG | TEMPERATURE: 97 F | HEIGHT: 72 IN | RESPIRATION RATE: 18 BRPM | WEIGHT: 197 LBS | OXYGEN SATURATION: 98 % | HEART RATE: 82 BPM

## 2017-11-20 DIAGNOSIS — R21 EXANTHEM: Primary | ICD-10-CM

## 2017-11-20 PROCEDURE — 96372 THER/PROPH/DIAG INJ SC/IM: CPT | Mod: S$GLB,,, | Performed by: EMERGENCY MEDICINE

## 2017-11-20 PROCEDURE — 99214 OFFICE O/P EST MOD 30 MIN: CPT | Mod: 25,S$GLB,, | Performed by: EMERGENCY MEDICINE

## 2017-11-20 RX ORDER — BETAMETHASONE SODIUM PHOSPHATE AND BETAMETHASONE ACETATE 3; 3 MG/ML; MG/ML
6 INJECTION, SUSPENSION INTRA-ARTICULAR; INTRALESIONAL; INTRAMUSCULAR; SOFT TISSUE
Status: COMPLETED | OUTPATIENT
Start: 2017-11-20 | End: 2017-11-20

## 2017-11-20 RX ADMIN — BETAMETHASONE SODIUM PHOSPHATE AND BETAMETHASONE ACETATE 6 MG: 3; 3 INJECTION, SUSPENSION INTRA-ARTICULAR; INTRALESIONAL; INTRAMUSCULAR; SOFT TISSUE at 12:11

## 2017-11-20 NOTE — PROGRESS NOTES
Subjective:       Patient ID: Primitivo Mitchell is a 72 y.o. male.    Vitals:  height is 6' (1.829 m) and weight is 89.4 kg (197 lb). His oral temperature is 97 °F (36.1 °C). His blood pressure is 118/69 and his pulse is 82. His respiration is 18 and oxygen saturation is 98%.     Chief Complaint: Rash    He has a rash on his left chest. He recently took Mucinex DM for the first time and also had an ink pen that leaked through his shirt and onto his skin and the next day he started having the rash.  OK with steroid IM.      Rash   This is a new problem. The current episode started in the past 7 days. The problem has been gradually worsening since onset. The affected locations include the chest. The rash is characterized by redness, swelling, burning and itchiness. He was exposed to a new medication. Pertinent negatives include no fever, joint pain, shortness of breath or sore throat. Treatments tried: aleo vera gel. The treatment provided moderate relief. His past medical history is significant for allergies.     Review of Systems   Constitution: Negative for chills and fever.   HENT: Negative for sore throat.    Respiratory: Negative for shortness of breath.    Skin: Positive for itching and rash.   Musculoskeletal: Negative for joint pain.       Objective:      Physical Exam   Constitutional: He is oriented to person, place, and time. He appears well-developed and well-nourished.   HENT:   Head: Normocephalic and atraumatic.   Eyes: EOM are normal. Pupils are equal, round, and reactive to light.   Neck: Normal range of motion. Neck supple.   Cardiovascular: Normal rate, regular rhythm and normal heart sounds.    Pulmonary/Chest: Breath sounds normal.   Musculoskeletal: Normal range of motion.   Neurological: He is alert and oriented to person, place, and time.   Skin:        Large oval erythema left anterior chest, no vesicles/pustules/drainage noted   Psychiatric: He has a normal mood and affect. His behavior is  normal.       Assessment:       1. Exanthem        Plan:         Exanthem  -     betamethasone acetate-betamethasone sodium phosphate injection 6 mg; Inject 1 mL (6 mg total) into the muscle one time.      Yrn Blank MD  Go to the Emergency Department for any problems  Call your PCP for follow up next available.  Discussed with pt and wife unclear etiology, ? Allergic reaction/contact dermatitis/shingles, tx for allergic reaction, f/u with PCP and/or Dermatology, and if noticed water blisters return ASAP for reassessment and ? Shingles treatment.  Also call PCP for OK taking antihistamine.

## 2017-11-20 NOTE — PATIENT INSTRUCTIONS
Local Allergic Reaction, Other  You are having an allergic reaction. Almost anything can cause one. Different people are allergic to different things. It is usually something that you ate or swallowed, came into contact with by getting or putting it on your skin or clothes, or something you breathed in the air. This can be very annoying and sometimes scary.  Symptoms of an allergic reaction can include:  · Rash, hives, redness, welts, blisters  · Itching, burning, stinging, pain  · Dry, flaky, cracking, scaly skin  · Swelling of the face, lips or other parts of the body  Sometimes the cause of an allergic reaction may be obvious. To help identify your allergen, remember:  · When it started  · What you were doing at the time or just before that  · Any activities you were involved in  · Any new products or contacts  Here are some common causes, but remember almost anything can cause a reaction, and you may not even be aware that you came into contact with one of these things.  · Dust, mold, pollen  · Plants such as poison ivy and poison oak are common ones, but there are many others  · Animals  · Foods such as shrimp, shellfish, peanuts, milk products, gluten, eggs; also colorings, flavorings, additives  · Insect bites or stings such as bees, mosquitos, flees, ticks  · Medicines such as penicillin, sulfa drugs, amoxicillin, aspirin, ibuprofen; any medicine can cause a reaction  · Jewelry such as nickel, gold  (new, or something youve worn for a while including zippers, and  buttons)  · Latex such as in gloves, clothes, toys, balloons, or some tapes (some people allergic to latex may also have problems with foods like bananas, avocados, kiwi, papaya, or chestnuts)  · Lotions, perfumes, cosmetics, soaps, shampoos, skincare products, nail products  · Chemicals or dyes in clothing, linen, , hair dyes, soaps, iodine  Home care    The goal of our treatment is to help relieve the symptoms, and get you feeling  better. The rash will usually fade over several days, but can sometimes last a couple of weeks. Over the next couple of days, there may be times when it is gets a little worse, and then better again. Here are some things to do:  · If you know what you are allergic to, avoid it because future reactions could be worse than this one.  · Avoid tight clothing and anything that heats up your skin (hot showers/baths, direct sunlight) since heat will make itching worse.  · An ice pack will relieve local areas of intense itching and redness. Dont put the ice directly on the skin, because it can damage the skin. You can also ice put it in a plastic bag. Wrap it in something like a towel, kevin shirt, or cloth.  · Oral Benadryl (diphenhydramine) is an antihistamine available at drug and grocery stores. Unless a prescription antihistamine was given, Benadryl may be used to reduce itching if large areas of the skin are involved. It may make you sleepy, so be careful using it in the daytime or when going to school, working, or driving. [NOTE: Do not use Benadryl if you have glaucoma or if you are a man with trouble urinating due to an enlarged prostate.] There are antihistamines that causes less drowsiness and is a good alternatives for daytime use. Ask your pharmacist for suggestions.  · Do not use Benadryl cream on your skin, because in some people it can cause a further reaction, and make you allergic to Benadryl.  · Try not to scratch. This can tear the skin and cause an infection.  · Using heat-steam to clean your home, using high-efficiency particulate (HEPA) vacuums and filters, avoiding food and pet triggers, exterminating cockroaches, and frequent house cleaning are a few of the strategies used to decrease allergic reactions.  Follow-up care  Follow up with your healthcare provider, or as advised if your symptoms do not continue to improve or get worse.  Call 911  Call 911 if any of these occur:  · Trouble breathing or  swallowing, wheezing  · New or worsening swelling in the mouth, throat, or tongue  · Hoarse voice or trouble speaking  · Confused   · Very drowsy or trouble awakening  · Fainting or loss of consciousness  · Rapid heart rate  · Low blood pressure  · Feeling of doom  · Nausea, vomiting, abdominal pain, diarrhea  · Vomiting blood, or large amounts of blood in stool  · Seizure  When to seek medical advice  Call your healthcare provider right away if any of the following occur:  · Spreading areas of itching, redness or swelling  · New or worse swelling in the face, eyelids, or  lips  · Dizziness, weakness  · Signs of infection:  ¨ Spreading redness  ¨ Increased pain or swelling  ¨ Fever of 100.4ºF (38ºC) or higher, or as directed by your healthcare provider  ¨ Colored fluid draining from the inflamed areas  Date Last Reviewed: 7/30/2015  © 5463-7152 [employer]. [address] [place] [address]. All rights reserved. This information is not intended as a substitute for professional medical care. Always follow your healthcare professional's instructions.        Shingles (Herpes Zoster)     Talk to your healthcare provider about the shingles vaccine.     Shingles is also called herpes zoster. It is a painful skin rash caused by the herpes zoster virus. This is the same virus that causes chickenpox. After a person has chickenpox, the virus remains inactive in the nerve cells. Years later, the virus can become active again and travel to the skin. Most people have shingles only once, but it is possible to have it more than once.  What are the risk factors for shingles?  Anyone who has ever had chickenpox can develop shingles. But your risk is greater if you:  · Are 50 years of age or older  · Have an illness that weakens your immune system, such as HIV/AIDS  · Have cancer, especially Hodgkin disease or lymphoma  · Take medicines that weaken your immune system  What are the symptoms of shingles?  · The  first sign of shingles is usually pain, burning, tingling, or itching on one part of your face or body. You may also feel as if you have the flu, with fever and chills.  · A red rash with small blisters appears within a few days. The rash may appear as follows:   ¨ The blisters can occur anywhere, but theyre most common on the back, chest, or abdomen.  ¨ They usually appear on only one side of the body, spreading along the nerve pathway where the virus was inactive.   ¨ The rash can also form around an eye, along one side of the face or neck, or in the mouth.  ¨ In a few people, usually those with weakened immune systems, shingles appear on more than one part of the body at once.  · After a few days, the blisters become dry and form a crust. The crust falls off in days to weeks. The blisters generally do not leave scars.  How is shingles treated?  For most people, shingles heals on its own in a few weeks. But treatment is recommended to help relieve pain, speed healing, and reduce the risk of complications. Antiviral medicines are prescribed within the first 72 hours of the appearance of the rash. To lessen symptoms:  · Apply ice packs (wrapped in a thin towel) or cool compresses, or soak in a cool bath.  · Use calamine lotion to calm itchy skin.  · Ask your healthcare provider about over-the-counter pain relievers. If your pain is severe, your healthcare provider may prescribe stronger pain medicines.  What are the complications of shingles?  Shingles often goes away with no lasting effects. But some people have serious problems long after the blisters have healed:  · Postherpetic neuralgia. This is the most common complication. It is severe nerve pain at the place where the rash used to be. It can last for months, or even years after you have had shingles. Medicines can be prescribed to help relieve the pain and improve quality of life.  · Bacterial infection. Shingles blisters may become infected with bacteria.  Antibiotic medicine is used to treat the infection.  · Eye problems. A person with shingles on the face should see his or her healthcare provider right away. Shingles can cause serious problems with vision, and even blindness.  Very rarely shingles can also lead to pneumonia, hearing problems, brain inflammation, or even death.   When to seek medical care  Contact your healthcare provider if you experience any of the following:  · Symptoms that dont go away with treatment  · A rash or blisters near your eye  · Increased drainage, fever, or rash after treatment, or severe pain that doesnt go away   How can shingles be prevented?  You can only get shingles if you have had chicken pox in the past. Those who have never had chickenpox can get the virus from you. Although instead of developing shingles, the person may get chickenpox. Until your blisters form scabs, avoid contact with others, especially the following:  · Pregnant women who have never had chickenpox or the vaccine  · Infants who were born early (prematurely) or who had low weight at birth  · People with weak immune system (for example, people receiving chemotherapy for cancer, people who have had organ transplants, or people with HIV infections)     The shingles vaccine  If youre 60 years of age or older, ask your healthcare provider if you should receive the shingles vaccine. The vaccine makes it less likely that you will develop shingles. If you do develop shingles, your symptoms will likely be milder than if you hadnt been vaccinated. Note: Although the vaccine is licensed for people 50 years of age or older, the CDC does not recommend the vaccine for those who are 50 to 59 years old.   Date Last Reviewed: 10/1/2016  © 9634-2952 [employer]. [address] [place] [address]. All rights reserved. This information is not intended as a substitute for professional medical care. Always follow your healthcare professional's  instructions.      Shingles info FYI    Call your PCP and/or Dermatologist today for follow up next available, and return ASAP if see water blisters to area.    Yrn Blank MD  Go to the Emergency Department for any problems  Call your PCP for follow up next available.

## 2017-12-27 ENCOUNTER — TELEPHONE (OUTPATIENT)
Dept: RADIATION ONCOLOGY | Facility: CLINIC | Age: 72
End: 2017-12-27

## 2017-12-27 DIAGNOSIS — C61 PROSTATE CANCER: Primary | ICD-10-CM

## 2017-12-27 NOTE — TELEPHONE ENCOUNTER
----- Message from Angela Casillas sent at 12/27/2017 12:56 PM CST -----  Pt needs to speak to you. Please call at [phone].

## 2017-12-28 ENCOUNTER — LAB VISIT (OUTPATIENT)
Dept: LAB | Facility: HOSPITAL | Age: 72
End: 2017-12-28
Payer: MEDICARE

## 2017-12-28 DIAGNOSIS — C61 PROSTATE CANCER: ICD-10-CM

## 2017-12-28 LAB — COMPLEXED PSA SERPL-MCNC: <0.01 NG/ML

## 2017-12-28 PROCEDURE — 84153 ASSAY OF PSA TOTAL: CPT

## 2017-12-28 PROCEDURE — 36415 COLL VENOUS BLD VENIPUNCTURE: CPT

## 2018-01-04 ENCOUNTER — OFFICE VISIT (OUTPATIENT)
Dept: CARDIOLOGY | Facility: CLINIC | Age: 73
End: 2018-01-04
Attending: INTERNAL MEDICINE
Payer: MEDICARE

## 2018-01-04 VITALS
HEART RATE: 78 BPM | SYSTOLIC BLOOD PRESSURE: 130 MMHG | DIASTOLIC BLOOD PRESSURE: 68 MMHG | BODY MASS INDEX: 26.55 KG/M2 | WEIGHT: 196 LBS | HEIGHT: 72 IN

## 2018-01-04 DIAGNOSIS — I10 ESSENTIAL HYPERTENSION: ICD-10-CM

## 2018-01-04 DIAGNOSIS — I35.9 AORTIC VALVE DISEASE: ICD-10-CM

## 2018-01-04 DIAGNOSIS — C61 PROSTATE CANCER: ICD-10-CM

## 2018-01-04 DIAGNOSIS — I70.0 ABDOMINAL AORTIC ATHEROSCLEROSIS: ICD-10-CM

## 2018-01-04 PROCEDURE — 99214 OFFICE O/P EST MOD 30 MIN: CPT | Mod: S$GLB,,, | Performed by: INTERNAL MEDICINE

## 2018-01-04 RX ORDER — ATORVASTATIN CALCIUM 20 MG/1
20 TABLET, FILM COATED ORAL DAILY
Qty: 90 TABLET | Refills: 3 | Status: SHIPPED | OUTPATIENT
Start: 2018-01-04 | End: 2018-07-05 | Stop reason: SDUPTHER

## 2018-01-04 RX ORDER — ASPIRIN 81 MG/1
81 TABLET ORAL DAILY
Qty: 90 TABLET | Refills: 3 | COMMUNITY
Start: 2018-01-04 | End: 2018-07-05 | Stop reason: SDUPTHER

## 2018-01-04 RX ORDER — LOSARTAN POTASSIUM 50 MG/1
50 TABLET ORAL DAILY
Qty: 90 TABLET | Refills: 3 | Status: SHIPPED | OUTPATIENT
Start: 2018-01-04 | End: 2018-07-05 | Stop reason: SDUPTHER

## 2018-01-04 NOTE — PROGRESS NOTES
Subjective:     Primitivo Mitchell is a 72 y.o. male with hypertension. He is overweight. He has mild aortic valve sclerosis. 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 He was seen at [facility] in 11/2014 and a Stress Echocardiogram was done. He did 9 minutes and there was no chest pain or significant ST depression. The Echo was read as an inferolateral wall motion abnormality and the aortic root was mildly dilated. The left ventricular function was mildly depressed. In 4/2016 he was diagnosed with prostate cancer and after thinking the options over he decided on robotic prostatectomy that was done on 5/23/2016. He had a CT scan of the abdomen that revealed aortic plaquing. There is no exertional chest pain or exertional dyspnea. No palpitations or weak spells. No bleeding. Feeling well overall.     Hypertension   This is a chronic problem. The current episode started more than 1 year ago. The problem is unchanged. The problem is controlled (usually 120-130/70-80 mmHg at home). Pertinent negatives include no anxiety, blurred vision, chest pain, headaches, malaise/fatigue, neck pain, orthopnea, palpitations, peripheral edema, PND, shortness of breath or sweats. There is no history of angina.       Review of Systems   Constitution: Negative for chills, fever, malaise/fatigue and weight loss.   HENT: Negative for nosebleeds.    Eyes: Negative for blurred vision, pain, vision loss in left eye and vision loss in right eye.   Cardiovascular: Negative for chest pain, claudication, dyspnea on exertion, irregular heartbeat, leg swelling, near-syncope, orthopnea, palpitations, paroxysmal nocturnal dyspnea and syncope.   Respiratory: Negative for cough, hemoptysis, shortness of breath, sputum production and wheezing.    Endocrine: Negative for cold intolerance and heat intolerance.   Hematologic/Lymphatic: Negative for bleeding problem. Does not bruise/bleed easily.   Skin: Negative for color change and  rash.   Musculoskeletal: Negative for falls and neck pain.   Gastrointestinal: Negative for heartburn, hematemesis, hematochezia, hemorrhoids, jaundice, melena, nausea and vomiting.   Genitourinary: Positive for bladder incontinence. Negative for dysuria and hematuria.   Neurological: Negative for dizziness, focal weakness, headaches, light-headedness, loss of balance, numbness and vertigo.   Psychiatric/Behavioral: Negative for altered mental status, depression and memory loss. The patient is not nervous/anxious.    Allergic/Immunologic: Negative for hives and persistent infections.       Current Outpatient Prescriptions on File Prior to Visit   Medication Sig Dispense Refill    atorvastatin (LIPITOR) 20 MG tablet Take 1 tablet (20 mg total) by mouth once daily. 90 tablet 3    cholecalciferol, vitamin D3, 5,000 unit Tab Take 5,000 Units by mouth once daily.      clotrimazole (ANTIFUNGAL, CLOTRIMAZOLE,) 1 % cream Apply topically 2 (two) times daily. 113 g 2    fish oil-omega-3 fatty acids 300-1,000 mg capsule Take 2 g by mouth once daily.      fluticasone (FLONASE) 50 mcg/actuation nasal spray 2 sprays by Each Nare route once daily. 48 g 3    ketoconazole (NIZORAL) 2 % cream Apply topically once daily. 60 g 3    ketotifen (ALAWAY) 0.025 % ophthalmic solution 1 drop 2 (two) times daily.      losartan (COZAAR) 50 MG tablet Take 1 tablet (50 mg total) by mouth once daily. 90 tablet 3    meloxicam (MOBIC) 15 MG tablet Take 1 tablet (15 mg total) by mouth once daily. 30 tablet 0    mometasone (ELOCON) 0.1 % ointment       mupirocin (BACTROBAN) 2 % ointment Apply topically to wound twice a day 1 Tube 0    nortriptyline (PAMELOR) 50 MG capsule Take 1 capsule (50 mg total) by mouth every evening. 90 capsule 3    omeprazole (PRILOSEC) 40 MG capsule TAKE 1 CAPSULE EVERY DAY AS NEEDED AS DIRECTED 90 capsule 0    triamcinolone acetonide 0.1% (KENALOG) 0.1 % cream Apply topically 2 (two) times daily. 80 g 2     No  current facility-administered medications on file prior to visit.        /68   Pulse 78   Ht 6' (1.829 m)   Wt 88.9 kg (196 lb)   BMI 26.58 kg/m²       Objective:     Physical Exam   Constitutional: He is oriented to person, place, and time. He appears well-developed and well-nourished. He does not appear ill. No distress.   HENT:   Head: Normocephalic and atraumatic.   Nose: Nose normal.   Mouth/Throat: No oropharyngeal exudate.   Eyes: Right eye exhibits no discharge. Left eye exhibits no discharge. Right conjunctiva is not injected. Left conjunctiva is not injected. Right pupil is round. Left pupil is round. Pupils are equal.   Neck: No JVD present. Carotid bruit is not present. No thyromegaly present.   Cardiovascular: Normal rate, regular rhythm, S1 normal and S2 normal.  Exam reveals no gallop.    Murmur heard.   Midsystolic murmur is present with a grade of 2/6  at the upper right sternal border  High-pitched blowing decrescendo early diastolic murmur is present with a grade of 2/6  at the upper right sternal border, lower left sternal border  Pulses:       Radial pulses are 2+ on the right side, and 2+ on the left side.        Dorsalis pedis pulses are 2+ on the right side, and 2+ on the left side.        Posterior tibial pulses are 2+ on the right side, and 2+ on the left side.   Pulmonary/Chest: Effort normal and breath sounds normal.   Abdominal: Soft. Normal appearance. There is no hepatosplenomegaly. There is no tenderness.   Musculoskeletal:        Right ankle: He exhibits no swelling, no ecchymosis and no deformity.        Left ankle: He exhibits no swelling, no ecchymosis and no deformity.   Lymphadenopathy:        Head (right side): No submandibular adenopathy present.        Head (left side): No submandibular adenopathy present.     He has no cervical adenopathy.   Neurological: He is alert and oriented to person, place, and time. He is not disoriented. No cranial nerve deficit or sensory  deficit.   Skin: Skin is warm, dry and intact. No rash noted. He is not diaphoretic. No cyanosis. Nails show no clubbing.   Psychiatric: He has a normal mood and affect. His speech is normal and behavior is normal. Judgment and thought content normal. Cognition and memory are normal.       Assessment:      1. Aortic valve disease    2. Abdominal aortic atherosclerosis    3. Essential hypertension    4. Prostate cancer        Plan:     1. Aortic Valve Disease   2011: Treated for presumed endocarditis.   3/21/2013: Echo: Mild aortic valve sclerosis.    5/5/2015: Echo: Normal left ventricular size and systolic function. Moderate aortic valve sclerosis - 1.8 m/s - trace AR.   Reassurance.   Followed.    2. Peripheral Artery Disease   2015: Ct Scan: Aortic plaquing seen.   9/2/2015: Chol 199. HDL 60. . .   On atorvastatin 20 mg Q24.   12/14/2016: Chol 156. HDL 57. LDL 82. TG 85.   11/15/2017: Chol 146. HDL 50. LDL 74. .   On atorvastatin 20 mg Q24.   On aspirin 81 mg Q24.   Well controlled lipid panel.    3. Hypertension   2009: Diagnosed.   On losartan 50 mg Q24.   Keeping log at home.   Well controlled.    4. Wall Motion Abnormalities on Echo   11/2014: Stress Echo: 9:00 min. No CP. ECG negative. Echo: inferolateral WMA with mild LV dysfunction. Aortic root mildly enlarged.   Followed.    5. Prostate Cancer   4/2016: Diagnosed.   5/23/2016: Robotic prostatectomy.   Dr. Manuel Reyes.    6. Erectile Dysfunction   On tadalafil for BPH with good effect.   May take 20 mg PRN.    7. Primary Care   Dr. Ron Esquivel.    F/u 6 months.    Wisam Verde M.D.

## 2018-01-16 ENCOUNTER — OFFICE VISIT (OUTPATIENT)
Dept: RADIATION ONCOLOGY | Facility: CLINIC | Age: 73
End: 2018-01-16
Payer: MEDICARE

## 2018-01-16 VITALS
HEIGHT: 68 IN | RESPIRATION RATE: 16 BRPM | HEART RATE: 74 BPM | SYSTOLIC BLOOD PRESSURE: 152 MMHG | DIASTOLIC BLOOD PRESSURE: 85 MMHG | WEIGHT: 196.13 LBS | BODY MASS INDEX: 29.72 KG/M2

## 2018-01-16 DIAGNOSIS — C61 PROSTATE CANCER: Primary | ICD-10-CM

## 2018-01-16 DIAGNOSIS — Z92.3 HISTORY OF RADIATION THERAPY: ICD-10-CM

## 2018-01-16 PROCEDURE — 99212 OFFICE O/P EST SF 10 MIN: CPT | Mod: S$GLB,,, | Performed by: RADIOLOGY

## 2018-01-16 PROCEDURE — 99999 PR PBB SHADOW E&M-EST. PATIENT-LVL III: CPT | Mod: PBBFAC,,, | Performed by: RADIOLOGY

## 2018-01-16 NOTE — PROGRESS NOTES
Subjective:       Patient ID: Primitivo Mitchell is a 72 y.o. male.    Chief Complaint: Prostate Cancer (6mo f/u;psa)    This patent returns for follow up visit.    Mr. Mitchell has a history of pathological stage II (T2c, N0, M0) adenocarcinoma of the prostate.  He initially presented in 2016, PSA had increased to 3.4 ng/ml. GEORGI revealed a 30 gm prostate with no palpable nodules. The patient underwent TRUS and biopsy of the prostate on 3/24/16. The prostate measured 29 cc. Biopsies from the Lt. apex and Rt. base returned positive for Skinny score 6 (3+3) adenocarcinoma. The patient subsequently underwent RALP in May of 2016. Pathology revealed a 46 gm prostate measuring 5.5 x 4 x 3.5 cm. There was Arbuckle 6 (3+3) adenocarcinoma involving 5% of the prostate. There was no perineural or lympho vascular invasion. No ELLYN or seminal vesicle involvement. Tumor was present at the apical margin. Nine Rt. pelvic nodes and 13 Lt. pelvic nodes were negative for tumor involvement. Postoperatively PSA in June returned at 0.06 ng/ml. Repeat PSA on 11/8/16 returned at 0.32 ng/ml. GEORGI was negative but repeat PSA on 12/8/16 returned at 0.41 ng/ml.  The patient was referred for salvage irradiation to the prostate bed.  We also elected to treat the patient with LHRH agonist.  He was given a 6 month  Eligard injection on 12/27/16.  He completed 68.4 Gy to the prostate bed on 4/12/17.  Today, the patient states he feels well.  No significant  complaints.  Denies incontinence.      Review of Systems   Constitutional: Negative for activity change, appetite change, chills and fatigue.   Respiratory: Negative for cough and shortness of breath.    Gastrointestinal: Positive for blood in stool (one or two episodes in the past 6 months.). Negative for abdominal pain and constipation.   Genitourinary: Negative for difficulty urinating, dysuria, flank pain, frequency and urgency.       Objective:      Physical Exam   Constitutional: He  appears well-developed. No distress.   Abdominal: Soft. He exhibits no distension.       PSA - < 0.01 ng/ml  Assessment:       1. Prostate cancer    2. History of radiation therapy        Plan:       Doing well  no evidence of tumor recurrence or progression.  Plan follow up in 6 months with PSA and testosterone.

## 2018-02-08 ENCOUNTER — TELEPHONE (OUTPATIENT)
Dept: INTERNAL MEDICINE | Facility: CLINIC | Age: 73
End: 2018-02-08

## 2018-02-08 NOTE — TELEPHONE ENCOUNTER
----- Message from Candice Kendrick sent at 2/8/2018  1:22 PM CST -----  Contact: self [phone]  Patient is calling to speak with someone regarding a colonoscopy he is scheduled to have tomorrow and he has some questions regarding the procedure, he would like for someone to call him back today as soon as possible to determine if he should have the procedure or not.    Thank you

## 2018-02-12 ENCOUNTER — PES CALL (OUTPATIENT)
Dept: ADMINISTRATIVE | Facility: CLINIC | Age: 73
End: 2018-02-12

## 2018-03-20 ENCOUNTER — OFFICE VISIT (OUTPATIENT)
Dept: INTERNAL MEDICINE | Facility: CLINIC | Age: 73
End: 2018-03-20
Payer: MEDICARE

## 2018-03-20 VITALS
HEIGHT: 68 IN | SYSTOLIC BLOOD PRESSURE: 100 MMHG | DIASTOLIC BLOOD PRESSURE: 60 MMHG | HEART RATE: 67 BPM | BODY MASS INDEX: 29.4 KG/M2 | WEIGHT: 194 LBS | OXYGEN SATURATION: 97 %

## 2018-03-20 DIAGNOSIS — I70.0 ABDOMINAL AORTIC ATHEROSCLEROSIS: ICD-10-CM

## 2018-03-20 DIAGNOSIS — Z92.3 HISTORY OF RADIATION THERAPY: ICD-10-CM

## 2018-03-20 DIAGNOSIS — Z90.79 S/P PROSTATECTOMY: ICD-10-CM

## 2018-03-20 DIAGNOSIS — K21.9 GASTROESOPHAGEAL REFLUX DISEASE WITHOUT ESOPHAGITIS: ICD-10-CM

## 2018-03-20 DIAGNOSIS — E78.5 HYPERLIPIDEMIA, UNSPECIFIED HYPERLIPIDEMIA TYPE: ICD-10-CM

## 2018-03-20 DIAGNOSIS — M81.8 OSTEOPOROSIS, IDIOPATHIC: ICD-10-CM

## 2018-03-20 DIAGNOSIS — Z86.010 HISTORY OF ADENOMATOUS POLYP OF COLON: ICD-10-CM

## 2018-03-20 DIAGNOSIS — Z00.00 ENCOUNTER FOR PREVENTIVE HEALTH EXAMINATION: Primary | ICD-10-CM

## 2018-03-20 DIAGNOSIS — I51.89 LEFT VENTRICULAR DIASTOLIC DYSFUNCTION WITH PRESERVED SYSTOLIC FUNCTION: ICD-10-CM

## 2018-03-20 DIAGNOSIS — Z85.46 HISTORY OF PROSTATE CANCER: ICD-10-CM

## 2018-03-20 DIAGNOSIS — N52.31 ERECTILE DYSFUNCTION FOLLOWING RADICAL PROSTATECTOMY: ICD-10-CM

## 2018-03-20 DIAGNOSIS — I10 ESSENTIAL HYPERTENSION: ICD-10-CM

## 2018-03-20 DIAGNOSIS — R25.1 TREMOR: ICD-10-CM

## 2018-03-20 DIAGNOSIS — I35.9 AORTIC VALVE DISEASE: ICD-10-CM

## 2018-03-20 DIAGNOSIS — N39.3 STRESS INCONTINENCE, MALE: ICD-10-CM

## 2018-03-20 PROCEDURE — 99999 PR PBB SHADOW E&M-EST. PATIENT-LVL V: CPT | Mod: PBBFAC,,, | Performed by: NURSE PRACTITIONER

## 2018-03-20 PROCEDURE — G0439 PPPS, SUBSEQ VISIT: HCPCS | Mod: S$GLB,,, | Performed by: NURSE PRACTITIONER

## 2018-03-20 PROCEDURE — 99499 UNLISTED E&M SERVICE: CPT | Mod: S$GLB,,, | Performed by: NURSE PRACTITIONER

## 2018-03-20 NOTE — PATIENT INSTRUCTIONS
Counseling and Referral of Other Preventative  (Italic type indicates deductible and co-insurance are waived)    Patient Name: Primitivo Mitchell  Today's Date: 3/20/2018    Health Maintenance       Date Due Completion Date    DEXA SCAN 05/20/2018 5/20/2015    Lipid Panel 11/15/2018 11/15/2017    High Dose Statin 03/20/2019 3/20/2018    Colonoscopy 03/09/2021 3/9/2018 (Done)    Override on 3/9/2018: Done    Override on 1/5/2015: Done (Done at outside facility; per patient 1 polyp that was benign removed)    TETANUS VACCINE 02/02/2022 2/2/2012        Orders Placed This Encounter   Procedures    Ambulatory Referral to Neurology     The following information is provided to all patients.  This information is to help you find resources for any of the problems found today that may be affecting your health:                Living healthy guide: www.[facility].[place].gov      Understanding Diabetes: www.diabetes.org      Eating healthy: www.cdc.gov/healthyweight      CDC home safety checklist: www.cdc.gov/steadi/patient.html      Agency on Aging: www.goea.[place].[facility]      Alcoholics anonymous (AA): www.aa.org      Physical Activity: www.roz.nih.gov/no3ospi      Tobacco use: www.quitwithusla.org

## 2018-03-21 NOTE — PROGRESS NOTES
"Primitivo Mitchell presented for a  Medicare AWV and comprehensive Health Risk Assessment today. The following components were reviewed and updated:    · Medical history  · Family History  · Social history  · Allergies and Current Medications  · Health Risk Assessment  · Health Maintenance  · Care Team     ** See Completed Assessments for Annual Wellness Visit within the encounter summary.**       The following assessments were completed:  · Living Situation  · CAGE  · Depression Screening  · Timed Get Up and Go  · Whisper Test  · Cognitive Function Screening  ·   ·   · Nutrition Screening  · ADL Screening  · PAQ Screening    Vitals:    03/20/18 1044 03/20/18 1111   BP:  100/60   Pulse: 67    SpO2: 97%    Weight: 88 kg (194 lb 0.1 oz)    Height: 5' 8" (1.727 m)      Body mass index is 29.5 kg/m².  Physical Exam   Constitutional: He is oriented to person, place, and time. He appears well-developed.   overweight   HENT:   Head: Normocephalic and atraumatic.   Nose: Nose normal.   Eyes: Conjunctivae and EOM are normal.   Cardiovascular: Normal rate, regular rhythm, normal heart sounds and intact distal pulses.    Pulmonary/Chest: Effort normal and breath sounds normal.   Musculoskeletal: Normal range of motion.   Neurological: He is alert and oriented to person, place, and time.   Skin: Skin is warm and dry.   Psychiatric: He has a normal mood and affect. His behavior is normal. Judgment and thought content normal.   Nursing note and vitals reviewed.        Diagnoses and health risks identified today and associated recommendations/orders:    1. Encounter for preventive health examination  Assessment performed. Health maintenance updated. Chart review completed.    2. Tremor  - Ambulatory Referral to Neurology    3. History of radiation therapy  - DXA Bone Density Spine And Hip; Future    4. Osteoporosis, idiopathic  - DXA Bone Density Spine And Hip; Future    5. S/P prostatectomy  Stable.    6. Abdominal aortic " atherosclerosis  Noted on imaging. Stable with blood pressure control and statin therapy. Followed by PCP.    7. Essential hypertension  Chronic. Stable on current regimen. Followed by PCP.    8. Hyperlipidemia, unspecified hyperlipidemia type  Chronic. Stable on current regimen. Followed by PCP.    9. Aortic valve disease  Chronic. Stable. Continue current regimen as instructed by Cardiology.    10. Left ventricular diastolic dysfunction with preserved systolic function  Chronic. Stable. Continue current regimen as instructed by Cardiology.    11. Erectile dysfunction following radical prostatectomy  Stable Followed by Radiation Oncology and Urology.    12. Stress incontinence, male  Stable Followed by Radiation Oncology and Urology.    13. History of prostate cancer  Stable Followed by Radiation Oncology and Urology.    14. Gastroesophageal reflux disease without esophagitis  Chronic. Stable on current regimen. Followed by Gastroenterology.    15. History of adenomatous polyp of colon  Stable.  Last colonoscopy 3/9/2018 outside facility.  Awaiting pathology results.  Followed by PCP.      Provided [place] with a 5-10 year written screening schedule and personal prevention plan. Recommendations were developed using the USPSTF age appropriate recommendations. Education, counseling, and referrals were provided as needed. After Visit Summary printed and given to patient which includes a list of additional screenings\tests needed.    Follow-up for follow up with Primary Care Provider as instructed, ;sooner if problems, HRA in 1 year.    KEATON Lamar

## 2018-04-16 ENCOUNTER — DOCUMENTATION ONLY (OUTPATIENT)
Dept: INTERNAL MEDICINE | Facility: CLINIC | Age: 73
End: 2018-04-16

## 2018-04-16 NOTE — PROGRESS NOTES
Received outside records from outside GI, chart updated as appropriate, results will be scanned into EPIC.    Briefly, colonoscopy 3/9/18 showed polyps. Plan for repeat colonoscopy to be determined after path results return.    Ron Esquivel MD  Internal Medicine    Portions of this note were completed using Dragon medical dictation software. Please excuse typographical or syntax errors.

## 2018-04-25 ENCOUNTER — PATIENT MESSAGE (OUTPATIENT)
Dept: ADMINISTRATIVE | Facility: OTHER | Age: 73
End: 2018-04-25

## 2018-05-29 ENCOUNTER — PATIENT MESSAGE (OUTPATIENT)
Dept: RADIATION ONCOLOGY | Facility: CLINIC | Age: 73
End: 2018-05-29

## 2018-05-30 ENCOUNTER — HOSPITAL ENCOUNTER (EMERGENCY)
Facility: HOSPITAL | Age: 73
Discharge: HOME | End: 2018-05-30
Attending: EMERGENCY MEDICINE
Payer: MEDICARE

## 2018-05-30 VITALS
HEIGHT: 72 IN | OXYGEN SATURATION: 98 % | HEART RATE: 78 BPM | BODY MASS INDEX: 25.19 KG/M2 | TEMPERATURE: 98 F | WEIGHT: 186 LBS | SYSTOLIC BLOOD PRESSURE: 150 MMHG | DIASTOLIC BLOOD PRESSURE: 85 MMHG
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
## 2018-05-30 PROCEDURE — 99283 EMERGENCY DEPT VISIT LOW MDM: CPT | Mod: ,,, | Performed by: EMERGENCY MEDICINE

## 2018-05-30 PROCEDURE — 99283 EMERGENCY DEPT VISIT LOW MDM: CPT

## 2018-05-30 RX ORDER — AMOXICILLIN AND CLAVULANATE POTASSIUM 875; 125 MG/1; MG/1
1 TABLET, FILM COATED ORAL 2 TIMES DAILY
Qty: 10 TABLET | Refills: 0 | Status: SHIPPED | OUTPATIENT
Start: 2018-05-30 | End: 2018-05-31 | Stop reason: SDUPTHER

## 2018-05-31 ENCOUNTER — OFFICE VISIT (OUTPATIENT)
Dept: INTERNAL MEDICINE | Facility: CLINIC | Age: 73
End: 2018-05-31
Payer: MEDICARE

## 2018-05-31 VITALS
WEIGHT: 194.25 LBS | HEART RATE: 83 BPM | HEIGHT: 72 IN | SYSTOLIC BLOOD PRESSURE: 116 MMHG | DIASTOLIC BLOOD PRESSURE: 72 MMHG | BODY MASS INDEX: 26.31 KG/M2

## 2018-05-31 DIAGNOSIS — I10 ESSENTIAL HYPERTENSION: ICD-10-CM

## 2018-05-31 DIAGNOSIS — M81.8 OSTEOPOROSIS, IDIOPATHIC: ICD-10-CM

## 2018-05-31 DIAGNOSIS — K21.9 GASTROESOPHAGEAL REFLUX DISEASE WITHOUT ESOPHAGITIS: ICD-10-CM

## 2018-05-31 DIAGNOSIS — E78.5 HYPERLIPIDEMIA, UNSPECIFIED HYPERLIPIDEMIA TYPE: ICD-10-CM

## 2018-05-31 DIAGNOSIS — G25.2 INTENTION TREMOR: ICD-10-CM

## 2018-05-31 DIAGNOSIS — G31.84 MILD COGNITIVE IMPAIRMENT: ICD-10-CM

## 2018-05-31 DIAGNOSIS — G62.9 NEUROPATHY: Primary | ICD-10-CM
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
## 2018-05-31 PROCEDURE — 3078F DIAST BP <80 MM HG: CPT | Mod: CPTII,S$GLB,, | Performed by: INTERNAL MEDICINE

## 2018-05-31 PROCEDURE — 99215 OFFICE O/P EST HI 40 MIN: CPT | Mod: S$GLB,,, | Performed by: INTERNAL MEDICINE

## 2018-05-31 PROCEDURE — 99999 PR PBB SHADOW E&M-EST. PATIENT-LVL IV: CPT | Mod: PBBFAC,,, | Performed by: INTERNAL MEDICINE

## 2018-05-31 PROCEDURE — 99499 UNLISTED E&M SERVICE: CPT | Mod: S$GLB,,, | Performed by: INTERNAL MEDICINE

## 2018-05-31 PROCEDURE — 3074F SYST BP LT 130 MM HG: CPT | Mod: CPTII,S$GLB,, | Performed by: INTERNAL MEDICINE

## 2018-05-31 RX ORDER — AMOXICILLIN AND CLAVULANATE POTASSIUM 875; 125 MG/1; MG/1
1 TABLET, FILM COATED ORAL 2 TIMES DAILY
Qty: 10 TABLET | Refills: 0 | Status: SHIPPED | OUTPATIENT
Start: 2018-05-31 | End: 2018-06-05
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
## 2018-05-31 NOTE — PATIENT INSTRUCTIONS
Take nortriptyline 25 mg capsule nightly for 1 week. Then take 25 mg capsule at night every other day for one week. Then stop. If you don't have the 25 mg capsules at home, please notify the office and we can send to [facility].
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
## 2018-06-01 ENCOUNTER — TELEPHONE (OUTPATIENT)
Dept: INTERNAL MEDICINE | Facility: CLINIC | Age: 73
End: 2018-06-01

## 2018-06-01 DIAGNOSIS — G62.9 NEUROPATHY: ICD-10-CM

## 2018-06-01 RX ORDER — NORTRIPTYLINE HYDROCHLORIDE 25 MG/1
CAPSULE ORAL
Qty: 12 CAPSULE | Refills: 0 | Status: SHIPPED | OUTPATIENT
Start: 2018-06-01 | End: 2019-03-18

## 2018-06-01 RX ORDER — NORTRIPTYLINE HYDROCHLORIDE 50 MG/1
50 CAPSULE ORAL NIGHTLY
Qty: 90 CAPSULE | Refills: 3 | Status: CANCELLED | OUTPATIENT
Start: 2018-06-01 | End: 2019-06-01

## 2018-06-01 NOTE — TELEPHONE ENCOUNTER
"----- Message from Luciana Ilya sent at 6/1/2018  9:51 AM CDT -----  Contact: Self. call  [phone]  RX request - refill or new RX.  Is this a refill or new RX:  New  RX name and strength: Nortriptyline (PAMELOR) 25 MG capsule  Directions:   Is this a 30 day or 90 day RX:  90  Local pharmacy or mail order pharmacy:  Mail order  Pharmacy name and phone # (DON'T enter "on file" or "in chart"):  [facility]  [phone] (Phone) or [phone] (Fax)      Comments:  He thought he had the 25 mg at home but he do not.     "

## 2018-06-01 NOTE — TELEPHONE ENCOUNTER
Called davin spoke to pt and he verbalized understanding that he is to get the lab 1 week prioir to his next appt in 4 mnths

## 2018-06-01 NOTE — TELEPHONE ENCOUNTER
25 mg dose sent to listed pharmacy   As a wean off and to stop medication  As recommended by dr. Esquivel over 2 weeks

## 2018-06-01 NOTE — TELEPHONE ENCOUNTER
----- Message from Fidelina Mcallister sent at 6/1/2018  9:01 AM CDT -----  Contact: self/[phone]  Pt called in regards to having questions about his after visit summary. It said he was suppose to do labs but he did not get them done.      Please advise

## 2018-06-18 ENCOUNTER — OFFICE VISIT (OUTPATIENT)
Dept: PODIATRY | Facility: CLINIC | Age: 73
End: 2018-06-18
Payer: MEDICARE

## 2018-06-18 VITALS
WEIGHT: 194 LBS | HEIGHT: 72 IN | BODY MASS INDEX: 26.28 KG/M2 | DIASTOLIC BLOOD PRESSURE: 72 MMHG | SYSTOLIC BLOOD PRESSURE: 125 MMHG | RESPIRATION RATE: 18 BRPM | HEART RATE: 73 BPM

## 2018-06-18 DIAGNOSIS — Q82.8 POROKERATOSIS: Primary | ICD-10-CM

## 2018-06-18 PROCEDURE — 3078F DIAST BP <80 MM HG: CPT | Mod: CPTII,S$GLB,, | Performed by: PODIATRIST

## 2018-06-18 PROCEDURE — 99999 PR PBB SHADOW E&M-EST. PATIENT-LVL III: CPT | Mod: PBBFAC,,, | Performed by: PODIATRIST

## 2018-06-18 PROCEDURE — 99213 OFFICE O/P EST LOW 20 MIN: CPT | Mod: S$GLB,,, | Performed by: PODIATRIST

## 2018-06-18 PROCEDURE — 3074F SYST BP LT 130 MM HG: CPT | Mod: CPTII,S$GLB,, | Performed by: PODIATRIST

## 2018-06-19 NOTE — PROGRESS NOTES
Subjective:      Patient ID: Primitivo Mitchell is a 72 y.o. male.    Chief Complaint: PCP (Ron Esquivel MD 5/31/18); Diabetic Foot Exam; Foot Pain; and Foot Problem    Primitivo is a 72 y.o. male who presents to the podiatry clinic  with complaint of  right foot pain. Onset of the symptoms was several weeks ago. Precipitating event: none known. Current symptoms include: worsening symptoms after a period of activity. Aggravating factors: standing and walking. Symptoms have gradually worsened. Patient has had prior foot problems      Review of Systems   Constitution: Negative for chills, decreased appetite and fever.   Cardiovascular: Negative for leg swelling.   Skin: Positive for dry skin.   Musculoskeletal: Negative for arthritis, joint pain, joint swelling and myalgias.   Gastrointestinal: Negative for nausea and vomiting.   Neurological: Negative for loss of balance, numbness and paresthesias.           Objective:       Vitals:    06/18/18 1434   BP: 125/72   Pulse: 73   Resp: 18   Weight: 88 kg (194 lb)   Height: 6' (1.829 m)   PainSc:   5   PainLoc: Foot        Physical Exam   Constitutional: He is oriented to person, place, and time. He appears well-developed and well-nourished.   Cardiovascular: Intact distal pulses.    dorsalis pedis and posterior tibial pulses are palpable bilaterally. Capillary refill time is within normal limits. + pedal hair growth          Musculoskeletal: Normal range of motion. He exhibits no edema or tenderness.   Adequate joint range of motion without pain, limitation, nor crepitation Bilateral feet and ankle joints. Muscle strength is 5/5 in all groups bilaterally.         Neurological: He is alert and oriented to person, place, and time. He has normal strength. No sensory deficit.   [place] 5.07 monofilament is intact bilateral feet.      Skin: Skin is warm, dry and intact. No lesion and no rash noted. No erythema.   Nucleated solitary  hyperkeratotic lesion noted  to plantar r foot . There is TTP to lesion . Divergent skin lines are noted      Psychiatric: He has a normal mood and affect. His behavior is normal.   Vitals reviewed.            Assessment:       Encounter Diagnosis   Name Primary?    Porokeratosis Yes         Plan:       Primitivo was seen today for pcp, diabetic foot exam, foot pain and foot problem.    Diagnoses and all orders for this visit:    Porokeratosis      I counseled the patient on his conditions, their implications and medical management.      The affected area was cleansed with an alcohol prep pad. Next utilizing a mechanical   the hyperkeratotic tissues were filed. Attention was then directed to the nucleated center where this area was carefully excised. No pinpoint bleeding was encountered.    Felt offloading aperture  applied to site to help offload region     Discussed use of OTC wart medications as per packing instructions     Use pumice stone to region after bathing 2-3x/week to decrease callus build up

## 2018-06-26 DIAGNOSIS — R21 SKIN RASH: ICD-10-CM

## 2018-06-26 RX ORDER — TRIAMCINOLONE ACETONIDE 1 MG/G
CREAM TOPICAL
Refills: 2 | OUTPATIENT
Start: 2018-06-26

## 2018-06-28 ENCOUNTER — TELEPHONE (OUTPATIENT)
Dept: INTERNAL MEDICINE | Facility: CLINIC | Age: 73
End: 2018-06-28

## 2018-06-28 DIAGNOSIS — R21 RASH: Primary | ICD-10-CM

## 2018-06-28 RX ORDER — TRIAMCINOLONE ACETONIDE 1 MG/G
CREAM TOPICAL 2 TIMES DAILY
Qty: 80 G | Refills: 3 | Status: SHIPPED | OUTPATIENT
Start: 2018-06-28 | End: 2018-06-28 | Stop reason: SDUPTHER

## 2018-06-28 RX ORDER — TRIAMCINOLONE ACETONIDE 1 MG/G
CREAM TOPICAL 2 TIMES DAILY
Qty: 80 G | Refills: 3 | Status: SHIPPED | OUTPATIENT
Start: 2018-06-28 | End: 2018-08-10 | Stop reason: SDUPTHER

## 2018-06-29 ENCOUNTER — OFFICE VISIT (OUTPATIENT)
Dept: NEUROLOGY | Facility: CLINIC | Age: 73
End: 2018-06-29
Payer: MEDICARE

## 2018-06-29 VITALS
SYSTOLIC BLOOD PRESSURE: 100 MMHG | WEIGHT: 188 LBS | HEIGHT: 72 IN | BODY MASS INDEX: 25.47 KG/M2 | DIASTOLIC BLOOD PRESSURE: 61 MMHG | HEART RATE: 72 BPM

## 2018-06-29 DIAGNOSIS — R41.89 OTHER SYMPTOMS AND SIGNS INVOLVING COGNITIVE FUNCTIONS AND AWARENESS: Primary | ICD-10-CM

## 2018-06-29 DIAGNOSIS — G89.29 CHRONIC BILATERAL LOW BACK PAIN WITH BILATERAL SCIATICA: ICD-10-CM

## 2018-06-29 DIAGNOSIS — Z85.46 HISTORY OF PROSTATE CANCER: ICD-10-CM

## 2018-06-29 DIAGNOSIS — I10 ESSENTIAL HYPERTENSION: ICD-10-CM

## 2018-06-29 DIAGNOSIS — I70.0 ABDOMINAL AORTIC ATHEROSCLEROSIS: ICD-10-CM

## 2018-06-29 DIAGNOSIS — M54.42 CHRONIC BILATERAL LOW BACK PAIN WITH BILATERAL SCIATICA: ICD-10-CM

## 2018-06-29 DIAGNOSIS — M54.41 CHRONIC BILATERAL LOW BACK PAIN WITH BILATERAL SCIATICA: ICD-10-CM

## 2018-06-29 DIAGNOSIS — I35.9 AORTIC VALVE DISEASE: ICD-10-CM

## 2018-06-29 DIAGNOSIS — I51.89 LEFT VENTRICULAR DIASTOLIC DYSFUNCTION WITH PRESERVED SYSTOLIC FUNCTION: ICD-10-CM

## 2018-06-29 PROBLEM — I86.1 VARICOCELE: Status: ACTIVE | Noted: 2018-06-29

## 2018-06-29 PROBLEM — R41.9 UNSPECIFIED SYMPTOMS AND SIGNS INVOLVING COGNITIVE FUNCTIONS AND AWARENESS: Status: ACTIVE | Noted: 2018-06-29

## 2018-06-29 PROBLEM — C61 CARCINOMA OF PROSTATE: Status: ACTIVE | Noted: 2018-06-29

## 2018-06-29 PROBLEM — N40.0 BENIGN PROSTATIC HYPERPLASIA: Status: ACTIVE | Noted: 2018-06-29

## 2018-06-29 PROCEDURE — 99499 UNLISTED E&M SERVICE: CPT | Mod: S$GLB,,, | Performed by: PSYCHIATRY & NEUROLOGY

## 2018-06-29 PROCEDURE — 3074F SYST BP LT 130 MM HG: CPT | Mod: CPTII,S$GLB,, | Performed by: PSYCHIATRY & NEUROLOGY

## 2018-06-29 PROCEDURE — 99204 OFFICE O/P NEW MOD 45 MIN: CPT | Mod: S$GLB,,, | Performed by: PSYCHIATRY & NEUROLOGY

## 2018-06-29 PROCEDURE — 3078F DIAST BP <80 MM HG: CPT | Mod: CPTII,S$GLB,, | Performed by: PSYCHIATRY & NEUROLOGY

## 2018-06-29 PROCEDURE — 99999 PR PBB SHADOW E&M-EST. PATIENT-LVL III: CPT | Mod: PBBFAC,,, | Performed by: PSYCHIATRY & NEUROLOGY

## 2018-06-29 RX ORDER — ECONAZOLE NITRATE 10 MG/G
CREAM TOPICAL
COMMUNITY
End: 2019-06-27 | Stop reason: SDUPTHER

## 2018-06-29 RX ORDER — FINASTERIDE 5 MG/1
TABLET, FILM COATED ORAL
COMMUNITY
End: 2018-07-16

## 2018-06-29 RX ORDER — SILDENAFIL 100 MG/1
TABLET, FILM COATED ORAL
COMMUNITY
End: 2018-07-16 | Stop reason: SDUPTHER

## 2018-06-29 RX ORDER — DUTASTERIDE AND TAMSULOSIN HYDROCHLORIDE CAPSULES .5; .4 MG/1; MG/1
CAPSULE ORAL
COMMUNITY
End: 2018-07-16

## 2018-06-29 RX ORDER — CLOBETASOL PROPIONATE 0.5 MG/G
OINTMENT TOPICAL
COMMUNITY
End: 2019-03-18

## 2018-06-29 RX ORDER — TAMSULOSIN HYDROCHLORIDE 0.4 MG/1
CAPSULE ORAL
COMMUNITY
End: 2018-07-16

## 2018-06-29 RX ORDER — TADALAFIL 5 MG/1
TABLET ORAL
COMMUNITY
End: 2018-07-16

## 2018-06-29 RX ORDER — OMEPRAZOLE 40 MG/1
CAPSULE, DELAYED RELEASE ORAL
COMMUNITY
End: 2019-03-18

## 2018-06-29 RX ORDER — MOMETASONE FUROATE 50 UG/1
SPRAY, METERED NASAL
COMMUNITY
End: 2019-03-18

## 2018-06-29 RX ORDER — DUTASTERIDE 0.5 MG/1
CAPSULE, LIQUID FILLED ORAL
COMMUNITY
End: 2018-07-16

## 2018-06-29 NOTE — PATIENT INSTRUCTIONS
Discussed with patient and spouse.  From history does appear to have some difficulty with attention span and concentration with associated obsessive thinking that is of recent onset.  Will get neuropsychological testing done and an MRI scan of the brain, without contrast, because of his multiple vascular risk factors.  He will follow up after completion of the neuropsychological testing.

## 2018-06-29 NOTE — PROGRESS NOTES
Subjective:       Patient ID: Primitivo Mitchell is a 72 y.o. male.    Chief Complaint:  Memory Loss      History of Present Illness  HPI   This is a 72-year-old male who was referred for evaluation of cognitive difficulties.  His spouse was present today and collaborated with the history.  She reports that she first noted the problem about 6 months ago when he was somewhat inattentive, asking the same questions or repeating himself.  Symptoms are usually intermittent.    She has also noted some behavioral issues in that he has become very obsessive specially when parking the car when he has to get out and check that he has parked exactly between the lines. He is otherwise able to take care of his day-to-day needs at home without any problems including driving and managing his medications and finances.  However spouse reports that she has always been taking care of bill payment.  The patient used to work as an  at [employer] however retired in 2007 and subsequently was doing some yard work until a few years ago.  He reports that he has been taking care of his mother's paperwork.  She is in her 90s and has a dementia.  The patient reports that he noted problems with attention span and concentration ever since he had his prostate surgery reporting that he had been sedated for surgery and this may have affected his memory.  However he was otherwise functioning adequately without any problems.  He denies any headaches, visual difficulties or hearing impairment.  He has no history of any significant head trauma in the past.       Review of Systems  Review of Systems   Constitutional: Negative.    HENT: Negative.  Negative for hearing loss.    Eyes: Negative.  Negative for visual disturbance.   Respiratory: Negative.  Negative for shortness of breath.    Cardiovascular: Negative.  Negative for chest pain and palpitations.   Gastrointestinal: Negative.    Endocrine: Negative.    Genitourinary: Negative.     Musculoskeletal: Negative.  Negative for back pain and gait problem.   Skin: Negative.    Allergic/Immunologic: Negative.    Neurological: Negative.  Negative for dizziness, tremors, seizures, syncope, speech difficulty, weakness, numbness and headaches.   Hematological: Negative.    Psychiatric/Behavioral: Positive for decreased concentration. Negative for sleep disturbance. The patient is nervous/anxious.        Objective:      Neurologic Exam     Mental Status   Oriented to person, place, and time.   Registration: recalls 3 of 3 objects. Recall at 5 minutes: recalls 3 of 3 objects. Follows 3 step commands.   Attention: normal. Concentration: normal.   Speech: speech is normal   Level of consciousness: alert  Knowledge: good.   Able to name object. Able to read. Able to repeat. Able to write. Normal comprehension.     Cranial Nerves   Cranial nerves II through XII intact.     Motor Exam   Muscle bulk: normal  Overall muscle tone: normal    Strength   Strength 5/5 throughout.     Sensory Exam   Light touch normal.   Proprioception normal.   Pinprick normal.     Gait, Coordination, and Reflexes     Gait  Gait: normal    Coordination   Romberg: negative  Finger to nose coordination: normal    Tremor   Resting tremor: absent  Intention tremor: absent  Action tremor: absent    Reflexes   Right brachioradialis: 1+  Left brachioradialis: 1+  Right biceps: 1+  Left biceps: 1+  Right triceps: 1+  Left triceps: 1+  Right patellar: 1+  Left patellar: 1+  Right achilles: 1+  Left achilles: 1+  Right plantar: normal  Left plantar: normal      Physical Exam   Constitutional: He is oriented to person, place, and time. He appears well-developed and well-nourished.   HENT:   Head: Normocephalic and atraumatic.   Neck: Normal range of motion. Neck supple. Carotid bruit is not present.   Neurological: He is oriented to person, place, and time. He has normal strength. He has a normal Finger-Nose-Finger Test and a normal Romberg  Test. Gait normal.   Reflex Scores:       Tricep reflexes are 1+ on the right side and 1+ on the left side.       Bicep reflexes are 1+ on the right side and 1+ on the left side.       Brachioradialis reflexes are 1+ on the right side and 1+ on the left side.       Patellar reflexes are 1+ on the right side and 1+ on the left side.       Achilles reflexes are 1+ on the right side and 1+ on the left side.  Psychiatric: His speech is normal.   Vitals reviewed.        Assessment:        1. Other symptoms and signs involving cognitive functions and awareness    2. Essential hypertension    3. Aortic valve disease    4. Abdominal aortic atherosclerosis    5. Left ventricular diastolic dysfunction with preserved systolic function    6. Chronic bilateral low back pain with bilateral sciatica    7. History of prostate cancer            Plan:       Discussed with patient and spouse.  From history does appear to have some difficulty with attention span and concentration with associated obsessive thinking that is of recent onset.  Will get neuropsychological testing done and an MRI scan of the brain, without contrast, because of his multiple vascular risk factors.  He will follow up after completion of the neuropsychological testing.

## 2018-07-05 ENCOUNTER — LAB VISIT (OUTPATIENT)
Dept: LAB | Facility: OTHER | Age: 73
End: 2018-07-05
Attending: RADIOLOGY
Payer: MEDICARE

## 2018-07-05 ENCOUNTER — OFFICE VISIT (OUTPATIENT)
Dept: CARDIOLOGY | Facility: CLINIC | Age: 73
End: 2018-07-05
Attending: INTERNAL MEDICINE
Payer: MEDICARE

## 2018-07-05 VITALS
HEART RATE: 64 BPM | WEIGHT: 188 LBS | SYSTOLIC BLOOD PRESSURE: 130 MMHG | DIASTOLIC BLOOD PRESSURE: 70 MMHG | HEIGHT: 72 IN | BODY MASS INDEX: 25.47 KG/M2

## 2018-07-05 DIAGNOSIS — C61 PROSTATE CANCER: ICD-10-CM

## 2018-07-05 DIAGNOSIS — Z85.46 HISTORY OF PROSTATE CANCER: ICD-10-CM

## 2018-07-05 DIAGNOSIS — I70.0 ABDOMINAL AORTIC ATHEROSCLEROSIS: ICD-10-CM

## 2018-07-05 DIAGNOSIS — I10 ESSENTIAL HYPERTENSION: ICD-10-CM

## 2018-07-05 DIAGNOSIS — I35.9 AORTIC VALVE DISEASE: ICD-10-CM

## 2018-07-05 LAB
COMPLEXED PSA SERPL-MCNC: <0.01 NG/ML
TESTOST SERPL-MCNC: 359 NG/DL

## 2018-07-05 PROCEDURE — 3075F SYST BP GE 130 - 139MM HG: CPT | Mod: CPTII,S$GLB,, | Performed by: INTERNAL MEDICINE

## 2018-07-05 PROCEDURE — 3078F DIAST BP <80 MM HG: CPT | Mod: CPTII,S$GLB,, | Performed by: INTERNAL MEDICINE

## 2018-07-05 PROCEDURE — 99214 OFFICE O/P EST MOD 30 MIN: CPT | Mod: S$GLB,,, | Performed by: INTERNAL MEDICINE

## 2018-07-05 PROCEDURE — 36415 COLL VENOUS BLD VENIPUNCTURE: CPT

## 2018-07-05 PROCEDURE — 84403 ASSAY OF TOTAL TESTOSTERONE: CPT

## 2018-07-05 PROCEDURE — 84153 ASSAY OF PSA TOTAL: CPT

## 2018-07-05 RX ORDER — LOSARTAN POTASSIUM 50 MG/1
50 TABLET ORAL DAILY
Qty: 90 TABLET | Refills: 3 | Status: SHIPPED | OUTPATIENT
Start: 2018-07-05 | End: 2018-11-12 | Stop reason: SDUPTHER

## 2018-07-05 RX ORDER — ASPIRIN 81 MG/1
81 TABLET ORAL DAILY
Qty: 90 TABLET | Refills: 3 | COMMUNITY
Start: 2018-07-05 | End: 2019-05-28 | Stop reason: SDUPTHER

## 2018-07-05 RX ORDER — ATORVASTATIN CALCIUM 20 MG/1
20 TABLET, FILM COATED ORAL DAILY
Qty: 90 TABLET | Refills: 3 | Status: SHIPPED | OUTPATIENT
Start: 2018-07-05 | End: 2018-09-29 | Stop reason: SDUPTHER

## 2018-07-05 NOTE — PROGRESS NOTES
Subjective:     Primitivo Mitchell is a 72 y.o. male with hypertension. He is overweight. He has mild aortic valve sclerosis. 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 He was seen at [facility] in 11/2014 and a Stress Echocardiogram was done. He did 9:00 minutes and there was no chest pain or significant ST depression. The Echo was read as an inferolateral wall motion abnormality and the aortic root was mildly dilated. The left ventricular function was mildly depressed. In 4/2016 he was diagnosed with prostate cancer and after thinking the options over he decided on robotic prostatectomy that was done on 5/23/2016. He had a CT scan of the abdomen that revealed aortic plaquing. In 2018 he expresses some concern for that his memory is slightly affected. There is no exertional chest pain or exertional dyspnea. No palpitations or weak spells. No bleeding. Feeling well overall.     Hypertension   This is a chronic problem. The current episode started more than 1 year ago. The problem is unchanged. The problem is controlled (usually 120-130/70-80 mmHg at home). Pertinent negatives include no anxiety, blurred vision, chest pain, headaches, malaise/fatigue, neck pain, orthopnea, palpitations, peripheral edema, PND, shortness of breath or sweats. There is no history of angina.       Review of Systems   Constitution: Negative for chills, fever, malaise/fatigue and weight loss.   HENT: Negative for nosebleeds.    Eyes: Negative for blurred vision, pain, vision loss in left eye and vision loss in right eye.   Cardiovascular: Negative for chest pain, claudication, dyspnea on exertion, irregular heartbeat, leg swelling, near-syncope, orthopnea, palpitations, paroxysmal nocturnal dyspnea and syncope.   Respiratory: Negative for cough, hemoptysis, shortness of breath, sputum production and wheezing.    Endocrine: Negative for cold intolerance and heat intolerance.   Hematologic/Lymphatic: Negative for bleeding  problem. Does not bruise/bleed easily.   Skin: Negative for color change and rash.   Musculoskeletal: Negative for falls and neck pain.   Gastrointestinal: Negative for heartburn, hematemesis, hematochezia, hemorrhoids, jaundice, melena, nausea and vomiting.   Genitourinary: Positive for bladder incontinence. Negative for dysuria and hematuria.   Neurological: Negative for dizziness, focal weakness, headaches, light-headedness, loss of balance, numbness and vertigo.   Psychiatric/Behavioral: Positive for memory loss. Negative for altered mental status and depression. The patient is not nervous/anxious.    Allergic/Immunologic: Negative for hives and persistent infections.       Current Outpatient Prescriptions on File Prior to Visit   Medication Sig Dispense Refill    aspirin (ECOTRIN) 81 MG EC tablet Take 1 tablet (81 mg total) by mouth once daily. 90 tablet 3    atorvastatin (LIPITOR) 20 MG tablet Take 1 tablet (20 mg total) by mouth once daily. 90 tablet 3    cholecalciferol, vitamin D3, 5,000 unit Tab Take 5,000 Units by mouth once daily.      clobetasol 0.05% (TEMOVATE) 0.05 % Oint clobetasol 0.05 % topical ointment      clotrimazole (ANTIFUNGAL, CLOTRIMAZOLE,) 1 % cream Apply topically 2 (two) times daily. 113 g 2    dutasteride (AVODART) 0.5 mg capsule dutasteride 0.5 mg capsule      dutasteride-tamsulosin (TYSON) 0.5-0.4 mg CM24 Tyson 0.5 mg-0.4 mg capsule, extended release      econazole nitrate 1 % cream econazole 1 % topical cream      finasteride (PROSCAR) 5 mg tablet finasteride 5 mg tablet      fish oil-omega-3 fatty acids 300-1,000 mg capsule Take 2 g by mouth once daily.      flu vac [id number],65yr,up,,PF, (FLUZONE) 180 mcg/0.5 mL Syrg Fluzone High-Dose 2015-16 (PF) 180 mcg/0.5 mL intramuscular syringe   TO BE ADMINISTERED BY PHARMACIST FOR IMMUNIZATION      fluticasone (FLONASE) 50 mcg/actuation nasal spray 2 sprays by Each Nare route once daily. 48 g 3    ketoconazole (NIZORAL) 2 %  cream Apply topically once daily. 60 g 3    ketotifen (ALAWAY) 0.025 % ophthalmic solution 1 drop 2 (two) times daily.      losartan (COZAAR) 50 MG tablet Take 1 tablet (50 mg total) by mouth once daily. 90 tablet 3    mometasone (NASONEX) 50 mcg/actuation nasal spray Nasonex 50 mcg/actuation Spray      nortriptyline (PAMELOR) 25 MG capsule Take 25 mg tablet qhs x 1 week then 25 mg tablet every other night for 1 week then stop 12 capsule 0    omeprazole (PRILOSEC) 40 MG capsule omeprazole 40 mg capsule,delayed release      sildenafil (VIAGRA) 100 MG tablet Viagra 100 mg tablet      tadalafil (CIALIS) 5 MG tablet Cialis 5 mg tablet      tamsulosin (FLOMAX) 0.4 mg Cp24 tamsulosin 0.4 mg capsule      triamcinolone acetonide 0.1% (KENALOG) 0.1 % cream Apply topically 2 (two) times daily. for 10 days 80 g 3     No current facility-administered medications on file prior to visit.        /70   Pulse 64   Ht 6' (1.829 m)   Wt 85.3 kg (188 lb)   BMI 25.50 kg/m²       Objective:     Physical Exam   Constitutional: He is oriented to person, place, and time. He appears well-developed and well-nourished. He does not appear ill. No distress.   HENT:   Head: Normocephalic and atraumatic.   Nose: Nose normal.   Mouth/Throat: No oropharyngeal exudate.   Eyes: Right eye exhibits no discharge. Left eye exhibits no discharge. Right conjunctiva is not injected. Left conjunctiva is not injected. Right pupil is round. Left pupil is round. Pupils are equal.   Neck: No JVD present. Carotid bruit is not present. No thyromegaly present.   Cardiovascular: Normal rate, regular rhythm, S1 normal and S2 normal.  Exam reveals no gallop.    Murmur heard.   Midsystolic murmur is present with a grade of 2/6  at the upper right sternal border  High-pitched blowing decrescendo early diastolic murmur is present with a grade of 2/6  at the upper right sternal border, lower left sternal border  Pulses:       Radial pulses are 2+ on the  right side, and 2+ on the left side.        Dorsalis pedis pulses are 2+ on the right side, and 2+ on the left side.        Posterior tibial pulses are 2+ on the right side, and 2+ on the left side.   Pulmonary/Chest: Effort normal and breath sounds normal.   Abdominal: Soft. Normal appearance. There is no hepatosplenomegaly. There is no tenderness.   Musculoskeletal:        Right ankle: He exhibits no swelling, no ecchymosis and no deformity.        Left ankle: He exhibits no swelling, no ecchymosis and no deformity.   Lymphadenopathy:        Head (right side): No submandibular adenopathy present.        Head (left side): No submandibular adenopathy present.     He has no cervical adenopathy.   Neurological: He is alert and oriented to person, place, and time. He is not disoriented. No cranial nerve deficit.   Skin: Skin is warm, dry and intact. No rash noted. He is not diaphoretic. Nails show no clubbing.   Psychiatric: He has a normal mood and affect. His speech is normal and behavior is normal. Judgment and thought content normal. Cognition and memory are normal.       Assessment:      1. Aortic valve disease    2. Abdominal aortic atherosclerosis    3. Essential hypertension    4. History of prostate cancer        Plan:     1. Aortic Valve Disease   2011: Treated for presumed endocarditis.   3/21/2013: Echo: Mild aortic valve sclerosis.    5/5/2015: Echo: Normal left ventricular size and systolic function. Moderate aortic valve sclerosis - 1.8 m/s - trace AR.   Reassurance.   Followed.    2. Peripheral Artery Disease   2015: CT Scan: Aortic plaquing seen.   9/2/2015: Chol 199. HDL 60. . .   On atorvastatin 20 mg Q24.   12/14/2016: Chol 156. HDL 57. LDL 82. TG 85.   11/15/2017: Chol 146. HDL 50. LDL 74. .   On atorvastatin 20 mg Q24.   On aspirin 81 mg Q24.   Well controlled lipid panel.    3. Hypertension   2009: Diagnosed.   On losartan 50 mg Q24.   Keeping log at home.   Well  controlled.    4. Wall Motion Abnormalities on Echo   11/2014: Stress Echo: 9:00 min. No CP. ECG negative. Echo: Inferolateral WMA with mild LV dysfunction. Aortic root mildly enlarged.   Followed.    5. Prostate Cancer   4/2016: Diagnosed.   5/23/2016: Robotic prostatectomy.   Dr. Manuel Reyes.    6. Erectile Dysfunction   On tadalafil for BPH with good effect.   May take tadalafil 20 mg PRN.    7. Primary Care   Dr. Ron Esquivel.    F/u 6 months.    Wisam Verde M.D.

## 2018-07-10 ENCOUNTER — HOSPITAL ENCOUNTER (OUTPATIENT)
Dept: RADIOLOGY | Facility: HOSPITAL | Age: 73
Discharge: HOME | End: 2018-07-10
Attending: PSYCHIATRY & NEUROLOGY
Payer: MEDICARE

## 2018-07-10 DIAGNOSIS — M54.41 CHRONIC BILATERAL LOW BACK PAIN WITH BILATERAL SCIATICA: ICD-10-CM

## 2018-07-10 DIAGNOSIS — I70.0 ABDOMINAL AORTIC ATHEROSCLEROSIS: ICD-10-CM

## 2018-07-10 DIAGNOSIS — G89.29 CHRONIC BILATERAL LOW BACK PAIN WITH BILATERAL SCIATICA: ICD-10-CM

## 2018-07-10 DIAGNOSIS — I10 ESSENTIAL HYPERTENSION: ICD-10-CM

## 2018-07-10 DIAGNOSIS — Z85.46 HISTORY OF PROSTATE CANCER: ICD-10-CM

## 2018-07-10 DIAGNOSIS — I51.89 LEFT VENTRICULAR DIASTOLIC DYSFUNCTION WITH PRESERVED SYSTOLIC FUNCTION: ICD-10-CM

## 2018-07-10 DIAGNOSIS — I35.9 AORTIC VALVE DISEASE: ICD-10-CM

## 2018-07-10 DIAGNOSIS — M54.42 CHRONIC BILATERAL LOW BACK PAIN WITH BILATERAL SCIATICA: ICD-10-CM

## 2018-07-10 DIAGNOSIS — R41.89 OTHER SYMPTOMS AND SIGNS INVOLVING COGNITIVE FUNCTIONS AND AWARENESS: ICD-10-CM

## 2018-07-10 PROCEDURE — 70551 MRI BRAIN STEM W/O DYE: CPT | Mod: TC,PO

## 2018-07-16 ENCOUNTER — OFFICE VISIT (OUTPATIENT)
Dept: RADIATION ONCOLOGY | Facility: CLINIC | Age: 73
End: 2018-07-16
Payer: MEDICARE

## 2018-07-16 ENCOUNTER — HOSPITAL ENCOUNTER (OUTPATIENT)
Dept: RADIOLOGY | Facility: CLINIC | Age: 73
Discharge: HOME | End: 2018-07-16
Attending: INTERNAL MEDICINE
Payer: MEDICARE

## 2018-07-16 VITALS
DIASTOLIC BLOOD PRESSURE: 71 MMHG | HEART RATE: 68 BPM | SYSTOLIC BLOOD PRESSURE: 136 MMHG | WEIGHT: 190.25 LBS | RESPIRATION RATE: 16 BRPM | BODY MASS INDEX: 25.77 KG/M2 | HEIGHT: 72 IN | TEMPERATURE: 98 F

## 2018-07-16 DIAGNOSIS — M81.8 OSTEOPOROSIS, IDIOPATHIC: ICD-10-CM

## 2018-07-16 DIAGNOSIS — C61 CARCINOMA OF PROSTATE: Primary | ICD-10-CM

## 2018-07-16 PROCEDURE — 99999 PR PBB SHADOW E&M-EST. PATIENT-LVL III: CPT | Mod: PBBFAC,,, | Performed by: RADIOLOGY

## 2018-07-16 PROCEDURE — 99212 OFFICE O/P EST SF 10 MIN: CPT | Mod: S$GLB,,, | Performed by: RADIOLOGY

## 2018-07-16 PROCEDURE — 77080 DXA BONE DENSITY AXIAL: CPT | Mod: 26,,, | Performed by: INTERNAL MEDICINE

## 2018-07-16 PROCEDURE — 77080 DXA BONE DENSITY AXIAL: CPT | Mod: TC

## 2018-07-16 RX ORDER — SILDENAFIL 100 MG/1
100 TABLET, FILM COATED ORAL
Qty: 5 TABLET | Refills: 5 | Status: SHIPPED | OUTPATIENT
Start: 2018-07-16 | End: 2019-10-14

## 2018-07-16 NOTE — PROGRESS NOTES
Subjective:       Patient ID: Primitivo Mitchell is a 72 y.o. male.    Chief Complaint: Prostate Cancer (6mo f/u;labs)    This patient returns for follow up visit.     Mr. Mitchell has a history of pathological stage II (T2c, N0, M0) adenocarcinoma of the prostate.  He initially presented in 2016, PSA had increased to 3.4 ng/ml. GEORGI revealed a 30 gm prostate with no palpable nodules. The patient underwent TRUS and biopsy of the prostate on 3/24/16. The prostate measured 29 cc. Biopsies from the Lt. apex and Rt. base returned positive for Moriarty score 6 (3+3) adenocarcinoma. The patient subsequently underwent RALP in May of 2016. Pathology revealed a 46 gm prostate measuring 5.5 x 4 x 3.5 cm. There was Skinny 6 (3+3) adenocarcinoma involving 5% of the prostate. There was no perineural or lympho vascular invasion. No ELLYN or seminal vesicle involvement. Tumor was present at the apical margin. Nine Rt. pelvic nodes and 13 Lt. pelvic nodes were negative for tumor involvement. Postoperatively PSA in June returned at 0.06 ng/ml. Repeat PSA on 11/8/16 returned at 0.32 ng/ml. GEORGI was negative but repeat PSA on 12/8/16 returned at 0.41 ng/ml.  The patient was referred for salvage irradiation to the prostate bed.  We also elected to treat the patient with LHRH agonist.  He was given a 6 month  Eligard injection on 12/27/16.  He completed 68.4 Gy to the prostate bed on 4/12/17. The patient has been doing well since that time.   Today, the patient states he feels well.  No significant complaints.       Review of Systems   Constitutional: Negative for activity change, appetite change, chills, fatigue and fever.   Gastrointestinal: Negative for abdominal pain, anal bleeding, blood in stool, constipation and diarrhea.   Genitourinary: Negative for difficulty urinating, dysuria, flank pain, frequency and hematuria.       Objective:      Physical Exam   Constitutional: He appears well-developed and well-nourished. No distress.    Abdominal: Soft. He exhibits no distension.       PSA - < 0.01 ng/ml  Assessment:       1. Carcinoma of prostate        Plan:       Doing well  no evidence of tumor recurrence or progression.  Plan follow up in 6 months with PSA.

## 2018-07-18 PROBLEM — M85.80 OSTEOPENIA: Status: ACTIVE | Noted: 2018-07-18

## 2018-07-22 RX ORDER — KETOCONAZOLE 20 MG/G
CREAM TOPICAL DAILY
Qty: 60 G | Refills: 3 | Status: SHIPPED | OUTPATIENT
Start: 2018-07-22 | End: 2018-08-10 | Stop reason: SDUPTHER

## 2018-08-10 DIAGNOSIS — R21 RASH: ICD-10-CM

## 2018-08-10 RX ORDER — KETOCONAZOLE 20 MG/G
CREAM TOPICAL DAILY
Qty: 60 G | Refills: 3 | Status: SHIPPED | OUTPATIENT
Start: 2018-08-10 | End: 2018-09-28 | Stop reason: SDUPTHER

## 2018-08-10 RX ORDER — TRIAMCINOLONE ACETONIDE 1 MG/G
CREAM TOPICAL 2 TIMES DAILY
Qty: 80 G | Refills: 3 | Status: SHIPPED | OUTPATIENT
Start: 2018-08-10 | End: 2019-06-27 | Stop reason: ALTCHOICE

## 2018-09-21 ENCOUNTER — LAB VISIT (OUTPATIENT)
Dept: LAB | Facility: HOSPITAL | Age: 73
End: 2018-09-21
Attending: INTERNAL MEDICINE
Payer: MEDICARE

## 2018-09-21 DIAGNOSIS — I10 ESSENTIAL HYPERTENSION: ICD-10-CM

## 2018-09-21 DIAGNOSIS — E78.5 HYPERLIPIDEMIA, UNSPECIFIED HYPERLIPIDEMIA TYPE: ICD-10-CM

## 2018-09-21 LAB
ALBUMIN SERPL BCP-MCNC: 3.8 G/DL
ALP SERPL-CCNC: 76 U/L
ALT SERPL W/O P-5'-P-CCNC: 21 U/L
ANION GAP SERPL CALC-SCNC: 6 MMOL/L
AST SERPL-CCNC: 24 U/L
BILIRUB SERPL-MCNC: 0.6 MG/DL
BUN SERPL-MCNC: 19 MG/DL
CALCIUM SERPL-MCNC: 8.9 MG/DL
CHLORIDE SERPL-SCNC: 109 MMOL/L
CHOLEST SERPL-MCNC: 119 MG/DL
CHOLEST/HDLC SERPL: 2.1 {RATIO}
CO2 SERPL-SCNC: 24 MMOL/L
CREAT SERPL-MCNC: 1.1 MG/DL
ERYTHROCYTE [DISTWIDTH] IN BLOOD BY AUTOMATED COUNT: 14 %
EST. GFR  (AFRICAN AMERICAN): >60 ML/MIN/1.73 M^2
EST. GFR  (NON AFRICAN AMERICAN): >60 ML/MIN/1.73 M^2
GLUCOSE SERPL-MCNC: 87 MG/DL
HCT VFR BLD AUTO: 39.9 %
HDLC SERPL-MCNC: 56 MG/DL
HDLC SERPL: 47.1 %
HGB BLD-MCNC: 13.3 G/DL
LDLC SERPL CALC-MCNC: 55 MG/DL
MCH RBC QN AUTO: 32.4 PG
MCHC RBC AUTO-ENTMCNC: 33.3 G/DL
MCV RBC AUTO: 97 FL
NONHDLC SERPL-MCNC: 63 MG/DL
PLATELET # BLD AUTO: 179 K/UL
PMV BLD AUTO: 10.3 FL
POTASSIUM SERPL-SCNC: 4.4 MMOL/L
PROT SERPL-MCNC: 6.9 G/DL
RBC # BLD AUTO: 4.11 M/UL
SODIUM SERPL-SCNC: 139 MMOL/L
TRIGL SERPL-MCNC: 40 MG/DL
WBC # BLD AUTO: 5.7 K/UL

## 2018-09-21 PROCEDURE — 80061 LIPID PANEL: CPT

## 2018-09-21 PROCEDURE — 36415 COLL VENOUS BLD VENIPUNCTURE: CPT

## 2018-09-21 PROCEDURE — 85027 COMPLETE CBC AUTOMATED: CPT

## 2018-09-21 PROCEDURE — 80053 COMPREHEN METABOLIC PANEL: CPT

## 2018-09-28 ENCOUNTER — IMMUNIZATION (OUTPATIENT)
Dept: INTERNAL MEDICINE | Facility: CLINIC | Age: 73
End: 2018-09-28
Payer: MEDICARE

## 2018-09-28 ENCOUNTER — OFFICE VISIT (OUTPATIENT)
Dept: INTERNAL MEDICINE | Facility: CLINIC | Age: 73
End: 2018-09-28
Payer: MEDICARE

## 2018-09-28 VITALS
HEIGHT: 72 IN | HEART RATE: 65 BPM | BODY MASS INDEX: 25.71 KG/M2 | DIASTOLIC BLOOD PRESSURE: 78 MMHG | SYSTOLIC BLOOD PRESSURE: 118 MMHG | OXYGEN SATURATION: 97 % | WEIGHT: 189.81 LBS

## 2018-09-28 DIAGNOSIS — R21 EXANTHEM: ICD-10-CM

## 2018-09-28 DIAGNOSIS — R41.89 OTHER SYMPTOMS AND SIGNS INVOLVING COGNITIVE FUNCTIONS AND AWARENESS: ICD-10-CM

## 2018-09-28 DIAGNOSIS — Z00.00 ANNUAL PHYSICAL EXAM: Primary | ICD-10-CM

## 2018-09-28 PROCEDURE — 3074F SYST BP LT 130 MM HG: CPT | Mod: CPTII,S$GLB,, | Performed by: INTERNAL MEDICINE

## 2018-09-28 PROCEDURE — 99999 PR PBB SHADOW E&M-EST. PATIENT-LVL I: CPT | Mod: PBBFAC,,,

## 2018-09-28 PROCEDURE — 99999 PR PBB SHADOW E&M-EST. PATIENT-LVL III: CPT | Mod: PBBFAC,,, | Performed by: INTERNAL MEDICINE

## 2018-09-28 PROCEDURE — 99211 OFF/OP EST MAY X REQ PHY/QHP: CPT | Mod: PBBFAC,25

## 2018-09-28 PROCEDURE — 3078F DIAST BP <80 MM HG: CPT | Mod: CPTII,S$GLB,, | Performed by: INTERNAL MEDICINE

## 2018-09-28 PROCEDURE — 90662 IIV NO PRSV INCREASED AG IM: CPT | Mod: PBBFAC

## 2018-09-28 PROCEDURE — 99397 PER PM REEVAL EST PAT 65+ YR: CPT | Mod: S$GLB,,, | Performed by: INTERNAL MEDICINE

## 2018-09-28 RX ORDER — KETOCONAZOLE 20 MG/G
CREAM TOPICAL DAILY
Qty: 180 G | Refills: 3 | Status: SHIPPED | OUTPATIENT
Start: 2018-09-28 | End: 2018-09-28 | Stop reason: SDUPTHER

## 2018-09-28 RX ORDER — KETOCONAZOLE 20 MG/G
CREAM TOPICAL DAILY
Qty: 360 G | Refills: 3 | Status: SHIPPED | OUTPATIENT
Start: 2018-09-28 | End: 2019-04-09 | Stop reason: SDUPTHER

## 2018-09-28 NOTE — PROGRESS NOTES
Subjective:       Patient ID: Primitivo Mitchell is a 73 y.o. male.    Chief Complaint: Annual Exam    HPI    Patient is accompanied by mother and wife.    Last visit with me 3 mo ago. Since then seen by Podiatry, Neurology, Cardiology, Rad Onc. Samantharal continues to help with tenia pedis.    occasionally reports some difficulty with memory. At visit with Neurology plan to have neuropsych testing along with MRI brain and then follow up with Neurology.    Reviewed PMH, PSH, SH, FH, allergies, and medications.     Review of Systems   All other systems reviewed and are negative.      Objective:      Physical Exam   Constitutional: He is oriented to person, place, and time. No distress.   HENT:   Head: Atraumatic.   Right Ear: Tympanic membrane normal. No tenderness.   Left Ear: Tympanic membrane normal. No tenderness.   Mouth/Throat: Oropharynx is clear and moist. No oropharyngeal exudate.   Eyes: Pupils are equal, round, and reactive to light. Right eye exhibits no discharge. Left eye exhibits no discharge.   Neck: Normal range of motion. No thyromegaly present.   Cardiovascular: Regular rhythm and normal heart sounds. Frequent extrasystoles are present. Bradycardia present.   Pulmonary/Chest: Effort normal and breath sounds normal. No stridor. He has no wheezes. He has no rales.   Abdominal: Soft. There is no tenderness. There is no guarding.   Musculoskeletal: He exhibits no edema or tenderness.   Lymphadenopathy:     He has no cervical adenopathy.   Neurological: He is alert and oriented to person, place, and time. He displays tremor (mild low amplitude high frequency tremor in hands).   Skin: Skin is warm and dry. No rash noted.   Psychiatric: He has a normal mood and affect. His behavior is normal.   Nursing note and vitals reviewed.      Vitals:    09/28/18 1446   BP: 118/78   BP Location: Left arm   Patient Position: Sitting   BP Method: Large (Manual)   Pulse: 65   SpO2: 97%   Weight: 86.1 kg (189 lb 13.1 oz)    Height: 6' (1.829 m)     Body mass index is 25.74 kg/m².    RESULTS: Reviewed labs from last 3 months    Assessment:       1. Annual physical exam    2. Other symptoms and signs involving cognitive functions and awareness    3. Exanthem        Plan:   Primitivo was seen today for annual exam.    Diagnoses and all orders for this visit:    Annual physical exam:  Age-appropriate health screening reviewed, indicated tests ordered.     Other symptoms and signs involving cognitive functions and awareness:  Prior dx, seen by Neurology, plan for MRI brain and Neuropsych testing and then follow up in Neurology clinic.    Exanthem:  Prior diagnosis, stable, well controlled on current management. No changes at this time, will continue to monitor.   -     Discontinue: ketoconazole (NIZORAL) 2 % cream; Apply topically once daily. Please dispense 3 mo supply  -     ketoconazole (NIZORAL) 2 % cream; Apply topically once daily. Please dispense 3 mo supply (2 tubes per month)      Follow-up in about 6 months (around 3/28/2019) for follow up visit.  Ron Esquivel MD  Internal Medicine    Portions of this note were completed using medical dictation software. Please excuse typographical or syntax errors that were missed on review.

## 2018-09-29 DIAGNOSIS — I70.0 ABDOMINAL AORTIC ATHEROSCLEROSIS: ICD-10-CM

## 2018-10-01 ENCOUNTER — TELEPHONE (OUTPATIENT)
Dept: INTERNAL MEDICINE | Facility: CLINIC | Age: 73
End: 2018-10-01

## 2018-10-01 RX ORDER — ATORVASTATIN CALCIUM 20 MG/1
TABLET, FILM COATED ORAL
Qty: 90 TABLET | Refills: 3 | Status: SHIPPED | OUTPATIENT
Start: 2018-10-01 | End: 2019-04-09

## 2018-10-01 NOTE — TELEPHONE ENCOUNTER
----- Message from Gardenia Macdonald sent at 10/1/2018  9:48 AM CDT -----  I am unable to schedule for Neuropsych.  Patient was given # to call to get that scheudled.  Thanks

## 2018-10-19 ENCOUNTER — OFFICE VISIT (OUTPATIENT)
Dept: URGENT CARE | Facility: CLINIC | Age: 73
End: 2018-10-19
Payer: MEDICARE

## 2018-10-19 VITALS
RESPIRATION RATE: 16 BRPM | OXYGEN SATURATION: 98 % | DIASTOLIC BLOOD PRESSURE: 64 MMHG | BODY MASS INDEX: 25.6 KG/M2 | HEART RATE: 64 BPM | WEIGHT: 189 LBS | HEIGHT: 72 IN | TEMPERATURE: 97 F | SYSTOLIC BLOOD PRESSURE: 116 MMHG

## 2018-10-19 DIAGNOSIS — R05.9 COUGH: ICD-10-CM

## 2018-10-19 DIAGNOSIS — J01.90 ACUTE SINUSITIS, RECURRENCE NOT SPECIFIED, UNSPECIFIED LOCATION: Primary | ICD-10-CM

## 2018-10-19 DIAGNOSIS — J02.9 ACUTE PHARYNGITIS, UNSPECIFIED ETIOLOGY: ICD-10-CM

## 2018-10-19 LAB
CTP QC/QA: YES
S PYO RRNA THROAT QL PROBE: NEGATIVE

## 2018-10-19 PROCEDURE — 87880 STREP A ASSAY W/OPTIC: CPT | Mod: QW,S$GLB,, | Performed by: EMERGENCY MEDICINE

## 2018-10-19 PROCEDURE — 1101F PT FALLS ASSESS-DOCD LE1/YR: CPT | Mod: CPTII,S$GLB,, | Performed by: EMERGENCY MEDICINE

## 2018-10-19 PROCEDURE — 99214 OFFICE O/P EST MOD 30 MIN: CPT | Mod: S$GLB,,, | Performed by: EMERGENCY MEDICINE

## 2018-10-19 PROCEDURE — 3074F SYST BP LT 130 MM HG: CPT | Mod: CPTII,S$GLB,, | Performed by: EMERGENCY MEDICINE

## 2018-10-19 PROCEDURE — 3078F DIAST BP <80 MM HG: CPT | Mod: CPTII,S$GLB,, | Performed by: EMERGENCY MEDICINE

## 2018-10-19 RX ORDER — AMOXICILLIN 500 MG/1
500 TABLET, FILM COATED ORAL EVERY 12 HOURS
Qty: 20 TABLET | Refills: 0 | Status: SHIPPED | OUTPATIENT
Start: 2018-10-19 | End: 2018-10-29

## 2018-10-19 NOTE — PATIENT INSTRUCTIONS
Yrn Blank MD  Go to the Emergency Department for any problems  Call your PCP for follow up next available.    Self-Care for Sore Throats    Sore throats happen for many reasons, such as colds, allergies, and infections caused by viruses or bacteria. In any case, your throat becomes red and sore. Your goal for self-care is to reduce your discomfort while giving your throat a chance to heal.  Moisten and soothe your throat  Tips include the following:  · Try a sip of water first thing after waking up.  · Keep your throat moist by drinking 6 or more glasses of clear liquids every day.  · Run a cool-air humidifier in your room overnight.  · Avoid cigarette smoke.   · Suck on throat lozenges, cough drops, hard candy, ice chips, or frozen fruit-juice bars. Use the sugar-free versions if your diet or medical condition requires them.  Gargle to ease irritation  Gargling every hour or 2 can ease irritation. Try gargling with 1 of these solutions:  · 1/4 teaspoon of salt in 1/2 cup of warm water  · An over-the-counter anesthetic gargle  Use medicine for more relief  Over-the-counter medicine can reduce sore throat symptoms. Ask your pharmacist if you have questions about which medicine to use:  · Ease pain with anesthetic sprays. Aspirin or an aspirin substitute also helps. Remember, never give aspirin to anyone 18 or younger, or if you are already taking blood thinners.   · For sore throats caused by allergies, try antihistamines to block the allergic reaction.  · Remember: unless a sore throat is caused by a bacterial infection, antibiotics wont help you.  Prevent future sore throats  Prevention tips include the following:  · Stop smoking or reduce contact with secondhand smoke. Smoke irritates the tender throat lining.  · Limit contact with pets and with allergy-causing substances, such as pollen and mold.  · When youre around someone with a sore throat or cold, wash your hands often to keep viruses or bacteria from  spreading.  · Dont strain your vocal cords.  Call your healthcare provider  Contact your healthcare provider if you have:  · A temperature over 101°F (38.3°C)  · White spots on the throat  · Great difficulty swallowing  · Trouble breathing  · A skin rash  · Recent exposure to someone else with strep bacteria  · Severe hoarseness and swollen glands in the neck or jaw   Date Last Reviewed: 8/1/2016  © 1648-5135 [employer]. [address] [place] [address], [place] [address]. All rights reserved. This information is not intended as a substitute for professional medical care. Always follow your healthcare professional's instructions.        Sinusitis (Antibiotic Treatment)    The sinuses are air-filled spaces within the bones of the face. They connect to the inside of the nose. Sinusitis is an inflammation of the tissue lining the sinus cavity. Sinus inflammation can occur during a cold. It can also be due to allergies to pollens and other particles in the air. Sinusitis can cause symptoms of sinus congestion and fullness. A sinus infection causes fever, headache and facial pain. There is often green or yellow drainage from the nose or into the back of the throat (post-nasal drip). You have been given antibiotics to treat this condition.  Home care:  · Take the full course of antibiotics as instructed. Do not stop taking them, even if you feel better.  · Drink plenty of water, hot tea, and other liquids. This may help thin mucus. It also may promote sinus drainage.  · Heat may help soothe painful areas of the face. Use a towel soaked in hot water. Or,  the shower and direct the hot spray onto your face. Using a vaporizer along with a menthol rub at night may also help.   · An expectorant containing guaifenesin may help thin the mucus and promote drainage from the sinuses.  · Over-the-counter decongestants may be used unless a similar medicine was prescribed. Nasal sprays work the fastest. Use one that  contains phenylephrine or oxymetazoline. First blow the nose gently. Then use the spray. Do not use these medicines more often than directed on the label or symptoms may get worse. You may also use tablets containing pseudoephedrine. Avoid products that combine ingredients, because side effects may be increased. Read labels. You can also ask the pharmacist for help. (NOTE: Persons with high blood pressure should not use decongestants. They can raise blood pressure.)  · Over-the-counter antihistamines may help if allergies contributed to your sinusitis.    · Do not use nasal rinses or irrigation during an acute sinus infection, unless told to by your health care provider. Rinsing may spread the infection to other sinuses.  · Use acetaminophen or ibuprofen to control pain, unless another pain medicine was prescribed. (If you have chronic liver or kidney disease or ever had a stomach ulcer, talk with your doctor before using these medicines. Aspirin should never be used in anyone under 18 years of age who is ill with a fever. It may cause severe liver damage.)  · Don't smoke. This can worsen symptoms.  Follow-up care  Follow up with your healthcare provider or our staff if you are not improving within the next week.  When to seek medical advice  Call your healthcare provider if any of these occur:  · Facial pain or headache becoming more severe  · Stiff neck  · Unusual drowsiness or confusion  · Swelling of the forehead or eyelids  · Vision problems, including blurred or double vision  · Fever of 100.4ºF (38ºC) or higher, or as directed by your healthcare provider  · Seizure  · Breathing problems  · Symptoms not resolving within 10 days  Date Last Reviewed: 4/13/2015  © 0336-9145 [employer]. [address] [place] [address], [place] [address]. All rights reserved. This information is not intended as a substitute for professional medical care. Always follow your healthcare professional's instructions.        Cough,  Chronic, Uncertain Cause (Adult)    Everyone has had a cough as part of the common cold, flu, or bronchitis. This kind of cough occurs along with an achy feeling, low-grade fever, nasal and sinus congestion, and a scratchy or sore throat. This usually gets better in 2 to 3 weeks. A cough that lasts longer than 3 weeks may be due to other causes.  If your cough does not improve over the next 2 weeks, further testing may be needed. Follow up with your healthcare provider as advised. Cough suppressants may be recommended. Based on your exam today, the exact cause of your cough is not certain. Below are some common causes for persistent cough.  Smokers cough  Smokers cough doesnt go away. If you continue to smoke, it only gets worse. The cough is from irritation in the air passages. Talk to your healthcare provider about quitting. Medicines or nicotine-replacement products, like gum or the patch, may make quitting easier.  Postnasal drip  A cough that is worse at night may be due to postnasal drip. Excess mucus in the nose drains from the back of your nose to your throat. This triggers the cough reflex. Postnasal drip may be due to a sinus infection or allergy. Common allergens include dust, tobacco smoke (both inhaled and secondhand smoke), environmental pollutants, pollen, mold, pets, cleaning agents, room deodorizers, and chemical fumes. Over-the-counter antihistamines or decongestants may be helpful for allergies. A sinus infection may requires antibiotic treatment. See your healthcare provider if symptoms continue.  Medicines  Certain prescribed medicines can cause a chronic cough in some people:  · ACE inhibitors for high blood pressure. These include benazepril, captopril, enalapril, fosinopril, lisinopril, quinapril, ramipril, and others.  · Beta-blockers for high blood pressure and other conditions. These include propranolol, atenolol, metoprolol, nadolol, and others.  Let your healthcare provider know if you  are taking any of these.  Asthma  Cough may be the only sign of mild asthma. You may have tests to find out if asthma is causing your cough. You may also take asthma medicine on a trial basis.  Acid reflux (heartburn, GERD)  The esophagus is the tube that carries food from the mouth to the stomach. A valve at its lower end prevents stomach acids from flowing upward. If this valve does not work properly, acid from the stomach enters the esophagus. This may cause a burning pain in the upper abdomen or lower chest, belching, or cough. Symptoms are often worse when lying flat. Avoid eating or drinking before bedtime. Try using extra pillows to raise your upper body, or place 4-inch blocks under the head of your bed. You may try an over-the-counter antacid or an acid-blocking medicine such as famotidine, cimetidine, ranitidine, esomeprazole, lansoprazole, or omeprazole. Stronger medicines for this condition can be prescribed by your healthcare provider.  Follow-up care  Follow up with your healthcare provider, or as advised, if your cough does not improve. Further testing may be needed.  Note: If an X-ray was taken, a specialist will review it. You will be notified of any new findings that may affect your care.  When to seek medical advice  Call your healthcare provider right away if any of these occur:  · Mild wheezing or difficulty breathing  · Fever of 100.4ºF (38ºC) or higher, or as directed by your healthcare provider  · Unexpected weight loss  · Coughing up large amounts of colored sputum  · Night sweats (sheets and pajamas get soaking wet)  Call 911, or get immediate medical care  Contact emergency services right away if any of these occur:  · Coughing up blood  · Moderate to severe trouble breathing or wheezing  Date Last Reviewed: 9/13/2015 © 2000-2017 [employer]. [address] [place] [address], [place] [address]. All rights reserved. This information is not intended as a substitute for professional  medical care. Always follow your healthcare professional's instructions.

## 2018-10-19 NOTE — PROGRESS NOTES
Subjective:       Patient ID: Primitivo Mitchell is a 73 y.o. male.    Vitals:  height is 6' (1.829 m) and weight is 85.7 kg (189 lb). His temperature is 97.3 °F (36.3 °C). His blood pressure is 116/64 and his pulse is 64. His respiration is 16 and oxygen saturation is 98%.     Chief Complaint: Cough and Sinus Problem    Patient states he has had a cough and sinus congestion for about 2 weeks . He was taking mucinex and it helped for a bit but then stopped. C/o sinus drainage/congestion/pressure/post nasal drip/sore throat/occas mucus prod cough, OK with amoxicillin.      Review of Systems   Constitution: Negative for chills and fever.   HENT: Positive for congestion and sore throat.    Eyes: Negative for blurred vision.   Cardiovascular: Negative for chest pain.   Respiratory: Positive for cough and sputum production. Negative for shortness of breath.    Skin: Negative for rash.   Musculoskeletal: Negative for back pain and joint pain.   Gastrointestinal: Negative for abdominal pain, diarrhea, nausea and vomiting.   Neurological: Negative for headaches.   Psychiatric/Behavioral: The patient is not nervous/anxious.        Objective:      Physical Exam   Constitutional: He is oriented to person, place, and time.   Occas non prod cough   HENT:   Head: Normocephalic and atraumatic.   Right Ear: Tympanic membrane, external ear and ear canal normal.   Left Ear: Tympanic membrane, external ear and ear canal normal.   Nose: Mucosal edema present. No rhinorrhea. Right sinus exhibits maxillary sinus tenderness. Right sinus exhibits no frontal sinus tenderness. Left sinus exhibits maxillary sinus tenderness. Left sinus exhibits no frontal sinus tenderness.   Mouth/Throat: Uvula is midline and mucous membranes are normal. Posterior oropharyngeal erythema present. No oropharyngeal exudate or posterior oropharyngeal edema.   Neck: Normal range of motion. Neck supple.   Cardiovascular: Normal rate, regular rhythm and normal  heart sounds.   Pulmonary/Chest: Breath sounds normal.   Musculoskeletal: Normal range of motion.   Lymphadenopathy:     He has no cervical adenopathy.   Neurological: He is alert and oriented to person, place, and time.   Skin: Skin is warm and dry.   Psychiatric: He has a normal mood and affect. His behavior is normal.       Assessment:       1. Acute sinusitis, recurrence not specified, unspecified location    2. Acute pharyngitis, unspecified etiology    3. Cough        Plan:         Acute sinusitis, recurrence not specified, unspecified location  -     amoxicillin (AMOXIL) 500 MG Tab; Take 1 tablet (500 mg total) by mouth every 12 (twelve) hours. Start in 1-2 days if not improved for 10 days  Dispense: 20 tablet; Refill: 0    Acute pharyngitis, unspecified etiology  -     POCT rapid strep A  -     amoxicillin (AMOXIL) 500 MG Tab; Take 1 tablet (500 mg total) by mouth every 12 (twelve) hours. Start in 1-2 days if not improved for 10 days  Dispense: 20 tablet; Refill: 0    Cough  -     amoxicillin (AMOXIL) 500 MG Tab; Take 1 tablet (500 mg total) by mouth every 12 (twelve) hours. Start in 1-2 days if not improved for 10 days  Dispense: 20 tablet; Refill: 0        Yrn Blank MD  Go to the Emergency Department for any problems  Call your PCP for follow up next available.

## 2018-11-12 DIAGNOSIS — I10 ESSENTIAL HYPERTENSION: ICD-10-CM

## 2018-11-12 RX ORDER — LOSARTAN POTASSIUM 50 MG/1
TABLET ORAL
Qty: 90 TABLET | Refills: 3 | Status: SHIPPED | OUTPATIENT
Start: 2018-11-12 | End: 2019-05-28 | Stop reason: SDUPTHER

## 2019-01-21 DIAGNOSIS — R10.9 AP (ABDOMINAL PAIN): Primary | ICD-10-CM

## 2019-01-21 PROBLEM — J01.90 ACUTE SINUSITIS: Status: RESOLVED | Noted: 2018-10-19 | Resolved: 2019-01-21

## 2019-01-30 ENCOUNTER — HOSPITAL ENCOUNTER (OUTPATIENT)
Dept: RADIOLOGY | Facility: HOSPITAL | Age: 74
Discharge: HOME | End: 2019-01-30
Attending: INTERNAL MEDICINE
Payer: MEDICARE

## 2019-01-30 DIAGNOSIS — R10.9 AP (ABDOMINAL PAIN): ICD-10-CM

## 2019-01-30 PROCEDURE — 76700 US EXAM ABDOM COMPLETE: CPT | Mod: TC,HCNC,PO

## 2019-03-11 ENCOUNTER — LAB VISIT (OUTPATIENT)
Dept: LAB | Facility: HOSPITAL | Age: 74
End: 2019-03-11
Attending: RADIOLOGY
Payer: MEDICARE

## 2019-03-11 DIAGNOSIS — C61 CARCINOMA OF PROSTATE: ICD-10-CM

## 2019-03-11 LAB — COMPLEXED PSA SERPL-MCNC: <0.01 NG/ML

## 2019-03-11 PROCEDURE — 84153 ASSAY OF PSA TOTAL: CPT | Mod: HCNC

## 2019-03-11 PROCEDURE — 36415 COLL VENOUS BLD VENIPUNCTURE: CPT | Mod: HCNC,PO

## 2019-03-18 ENCOUNTER — OFFICE VISIT (OUTPATIENT)
Dept: RADIATION ONCOLOGY | Facility: CLINIC | Age: 74
End: 2019-03-18
Payer: MEDICARE

## 2019-03-18 VITALS
HEIGHT: 72 IN | WEIGHT: 194.5 LBS | HEART RATE: 75 BPM | SYSTOLIC BLOOD PRESSURE: 141 MMHG | BODY MASS INDEX: 26.34 KG/M2 | RESPIRATION RATE: 16 BRPM | DIASTOLIC BLOOD PRESSURE: 74 MMHG

## 2019-03-18 DIAGNOSIS — C61 CARCINOMA OF PROSTATE: Primary | ICD-10-CM

## 2019-03-18 DIAGNOSIS — Z92.3 HISTORY OF RADIATION THERAPY: ICD-10-CM

## 2019-03-18 PROCEDURE — 3078F DIAST BP <80 MM HG: CPT | Mod: HCNC,CPTII,S$GLB, | Performed by: RADIOLOGY

## 2019-03-18 PROCEDURE — 99999 PR PBB SHADOW E&M-EST. PATIENT-LVL III: ICD-10-PCS | Mod: PBBFAC,HCNC,, | Performed by: RADIOLOGY

## 2019-03-18 PROCEDURE — 99212 OFFICE O/P EST SF 10 MIN: CPT | Mod: HCNC,S$GLB,, | Performed by: RADIOLOGY

## 2019-03-18 PROCEDURE — 99212 PR OFFICE/OUTPT VISIT, EST, LEVL II, 10-19 MIN: ICD-10-PCS | Mod: HCNC,S$GLB,, | Performed by: RADIOLOGY

## 2019-03-18 PROCEDURE — 1101F PT FALLS ASSESS-DOCD LE1/YR: CPT | Mod: HCNC,CPTII,S$GLB, | Performed by: RADIOLOGY

## 2019-03-18 PROCEDURE — 99999 PR PBB SHADOW E&M-EST. PATIENT-LVL III: CPT | Mod: PBBFAC,HCNC,, | Performed by: RADIOLOGY

## 2019-03-18 PROCEDURE — 3077F SYST BP >= 140 MM HG: CPT | Mod: HCNC,CPTII,S$GLB, | Performed by: RADIOLOGY

## 2019-03-18 PROCEDURE — 3078F PR MOST RECENT DIASTOLIC BLOOD PRESSURE < 80 MM HG: ICD-10-PCS | Mod: HCNC,CPTII,S$GLB, | Performed by: RADIOLOGY

## 2019-03-18 PROCEDURE — 3077F PR MOST RECENT SYSTOLIC BLOOD PRESSURE >= 140 MM HG: ICD-10-PCS | Mod: HCNC,CPTII,S$GLB, | Performed by: RADIOLOGY

## 2019-03-18 PROCEDURE — 1101F PR PT FALLS ASSESS DOC 0-1 FALLS W/OUT INJ PAST YR: ICD-10-PCS | Mod: HCNC,CPTII,S$GLB, | Performed by: RADIOLOGY

## 2019-03-18 NOTE — PROGRESS NOTES
Subjective:       Patient ID: Primitivo Mitchell is a 73 y.o. male.    Chief Complaint: Prostate Cancer (6mo f/u;psa)    This patient returns for follow up visit.     Mr. Mitchell has a history of pathological stage II (T2c, N0, M0) adenocarcinoma of the prostate.  He initially presented in 2016, PSA had increased to 3.4 ng/ml. GEORGI revealed a 30 gm prostate with no palpable nodules. The patient underwent TRUS and biopsy of the prostate on 3/24/16. The prostate measured 29 cc. Biopsies from the Lt. apex and Rt. base returned positive for [place] score 6 (3+3) adenocarcinoma. The patient subsequently underwent RALP in May of 2016. Pathology revealed a 46 gm prostate measuring 5.5 x 4 x 3.5 cm. There was Skinny 6 (3+3) adenocarcinoma involving 5% of the prostate. There was no perineural or lympho vascular invasion. No ELLYN or seminal vesicle involvement. Tumor was present at the apical margin. Nine Rt. pelvic nodes and 13 Lt. pelvic nodes were negative for tumor involvement. Postoperatively PSA in June returned at 0.06 ng/ml. Repeat PSA on 11/8/16 returned at 0.32 ng/ml. GEORGI was negative but repeat PSA on 12/8/16 returned at 0.41 ng/ml.  The patient was referred for salvage irradiation to the prostate bed.  We also elected to treat the patient with LHRH agonist.  He was given a 6 month  Eligard injection on 12/27/16.  He completed 68.4 Gy to the prostate bed on 4/12/17.   The patient has remained JEREMIAH since that time.  Today, the patient states he feels well.  No complaints.       Review of Systems   Constitutional: Negative for activity change, appetite change, chills and fatigue.   Respiratory: Negative for cough and shortness of breath.    Gastrointestinal: Negative for abdominal pain, blood in stool, constipation and diarrhea.   Genitourinary: Negative for difficulty urinating, dysuria, hematuria, testicular pain and urgency.       Objective:      Physical Exam   Constitutional: He appears well-developed and  well-nourished. No distress.   Abdominal: Soft. He exhibits no distension.       PSA < 0.01 ng/ml.  Assessment:       1. Carcinoma of prostate    2. History of radiation therapy        Plan:       Doing well  no evidence of disease progression or recurrence.  Plan PSA in 6 months with phone follow up.  RTC in one year with PSA.

## 2019-03-25 ENCOUNTER — OFFICE VISIT (OUTPATIENT)
Dept: UROLOGY | Facility: CLINIC | Age: 74
End: 2019-03-25
Payer: MEDICARE

## 2019-03-25 VITALS
BODY MASS INDEX: 26.28 KG/M2 | HEART RATE: 63 BPM | SYSTOLIC BLOOD PRESSURE: 116 MMHG | DIASTOLIC BLOOD PRESSURE: 70 MMHG | WEIGHT: 194 LBS | RESPIRATION RATE: 15 BRPM | HEIGHT: 72 IN

## 2019-03-25 DIAGNOSIS — N28.1 RENAL CYST, RIGHT: Primary | ICD-10-CM

## 2019-03-25 DIAGNOSIS — Z85.46 PERSONAL HISTORY OF PROSTATE CANCER: ICD-10-CM

## 2019-03-25 PROCEDURE — 99214 OFFICE O/P EST MOD 30 MIN: CPT | Mod: HCNC,S$GLB,, | Performed by: UROLOGY

## 2019-03-25 PROCEDURE — 3074F PR MOST RECENT SYSTOLIC BLOOD PRESSURE < 130 MM HG: ICD-10-PCS | Mod: HCNC,CPTII,S$GLB, | Performed by: UROLOGY

## 2019-03-25 PROCEDURE — 3078F DIAST BP <80 MM HG: CPT | Mod: HCNC,CPTII,S$GLB, | Performed by: UROLOGY

## 2019-03-25 PROCEDURE — 99214 PR OFFICE/OUTPT VISIT, EST, LEVL IV, 30-39 MIN: ICD-10-PCS | Mod: HCNC,S$GLB,, | Performed by: UROLOGY

## 2019-03-25 PROCEDURE — 3078F PR MOST RECENT DIASTOLIC BLOOD PRESSURE < 80 MM HG: ICD-10-PCS | Mod: HCNC,CPTII,S$GLB, | Performed by: UROLOGY

## 2019-03-25 PROCEDURE — 3074F SYST BP LT 130 MM HG: CPT | Mod: HCNC,CPTII,S$GLB, | Performed by: UROLOGY

## 2019-03-25 PROCEDURE — 1101F PR PT FALLS ASSESS DOC 0-1 FALLS W/OUT INJ PAST YR: ICD-10-PCS | Mod: HCNC,CPTII,S$GLB, | Performed by: UROLOGY

## 2019-03-25 PROCEDURE — 99999 PR PBB SHADOW E&M-EST. PATIENT-LVL III: CPT | Mod: PBBFAC,HCNC,, | Performed by: UROLOGY

## 2019-03-25 PROCEDURE — 99999 PR PBB SHADOW E&M-EST. PATIENT-LVL III: ICD-10-PCS | Mod: PBBFAC,HCNC,, | Performed by: UROLOGY

## 2019-03-25 PROCEDURE — 1101F PT FALLS ASSESS-DOCD LE1/YR: CPT | Mod: HCNC,CPTII,S$GLB, | Performed by: UROLOGY

## 2019-03-25 NOTE — PROGRESS NOTES
Patient ID: Primitivo Mitchell is a 73 y.o. male.    Chief Complaint: Prostate Cancer (f/u)      HPI: Primitivo Mitchell is a 73 y.o. Black or  male who presents today for evaluation and management of a right renal cyst.    The cyst was discovered incidentally during evaluation for unrelated abdominal pain.    The patient has not had any hematuria or any urinary symptoms of note.    The patient does not have any pain or colic-like symptoms attributable to the cyst.    The patient does not have a personal or family history of kidney cancer.    The patient was treated for prostate cancer with a radical prostatectomy followed by salvage XRT.    The patient has no urologic complaints or urologic concerns.    Review of patient's allergies indicates:   Allergen Reactions    Latex, natural rubber Rash    Omnipaque 140 [iohexol] Rash     Extensive rash over trunk font and back and legs the morning after CT dye given in late afternon the previous day. Omnipaque 350: 100cc IV & 30cc oral. Rash is severe, but listed as moderate because not shortness of breath    Allegra-d 12 hour [fexofenadine-pseudoephedrine] Other (See Comments)     Raise BP    Azithromycin     Bactrim [sulfamethoxazole-trimethoprim]     Boniva [ibandronate]     Celebrex [celecoxib]     Ciprofloxacin     Claritin-d 12 hour [loratadine-pseudoephedrine]     Fexofenadine     Hydrocortisone     Ibandronate sodium     Imipramine     Iodinated contrast- oral and iv dye     Loratadine     Neomycin     Neomycin-polymyxin-hc     Neurontin [gabapentin]     Nitrofuran analogues     Sulfa (sulfonamide antibiotics)     Adhesive Rash     Adhesive tape causes rash     Androderm [testosterone] Rash       Current Outpatient Medications   Medication Sig Dispense Refill    aspirin (ECOTRIN) 81 MG EC tablet Take 1 tablet (81 mg total) by mouth once daily. 90 tablet 3    atorvastatin (LIPITOR) 20 MG tablet TAKE 1 TABLET ONE TIME DAILY  90 tablet 3    cholecalciferol, vitamin D3, 5,000 unit Tab Take 5,000 Units by mouth once daily.      clotrimazole (ANTIFUNGAL, CLOTRIMAZOLE,) 1 % cream Apply topically 2 (two) times daily. 113 g 2    econazole nitrate 1 % cream econazole 1 % topical cream      fish oil-omega-3 fatty acids 300-1,000 mg capsule Take 2 g by mouth once daily.      fluticasone (FLONASE) 50 mcg/actuation nasal spray 2 sprays by Each Nare route once daily. 48 g 3    ketoconazole (NIZORAL) 2 % cream Apply topically once daily. Please dispense 3 mo supply (2 tubes per month) 360 g 3    ketotifen (ALAWAY) 0.025 % ophthalmic solution 1 drop 2 (two) times daily.      losartan (COZAAR) 50 MG tablet TAKE 1 TABLET ONE TIME DAILY 90 tablet 3    sildenafil (VIAGRA) 100 MG tablet Take 1 tablet (100 mg total) by mouth as needed for Erectile Dysfunction. 5 tablet 5    triamcinolone acetonide 0.1% (KENALOG) 0.1 % cream Apply topically 2 (two) times daily. for 10 days 80 g 3     No current facility-administered medications for this visit.        Past Medical History:   Diagnosis Date    Aortic valve disorders 5/29/2013    3/21/2013: [facility]. Mild aortic valve sclerosis. 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    Chronic bilateral low back pain with sciatica 4/25/2017    ED (erectile dysfunction)     Enlarged prostate     Floaters in visual field     Hyperlipidemia     Hypertension     Osteoporosis     Pre-operative cardiovascular examination 5/18/2016 5/23/2016: Plan is robotic prostatectomy.    Prostate cancer 4/5/2016    Stress incontinence, male 7/11/2016       Past Surgical History:   Procedure Laterality Date    COLONOSCOPY      COLONOSCOPY W/ POLYPECTOMY      PROSTATE SURGERY  05/23/2016    Prostatectomy for prostate cancer, done at [facility]    ROBOTIC ASSISTED LAPAROSCOPIC PROSTATECTOMY N/A 5/23/2016    Performed by Manuel Reyes MD at [facility] OR [address] [place] [address]       Family History   Problem Relation Age of Onset     Heart disease Mother     Alzheimer's disease Mother     Tremor Mother     Neuropathy Mother     Other Father         Colon problems    Ulcerative colitis Sister     Prostate cancer Brother     Anxiety disorder Son     Hepatomegaly Son     Early death Paternal Grandmother     Cancer Brother         Brain, Lung, Kidney       Review of Systems    Review of Systems   Constitutional: Negative for fever, chills, activity change, appetite change and unexpected weight change.   HENT: Negative for congestion, nosebleeds, sneezing, sore throat and trouble swallowing.    Eyes: Negative for pain, discharge, redness and visual disturbance.   Respiratory: Negative for cough, choking, chest tightness and shortness of breath.    Cardiovascular: Negative for chest pain, palpitations and leg swelling.   Gastrointestinal: Negative for nausea, vomiting, abdominal pain, diarrhea, blood in stool, abdominal distention and anal bleeding.  Genitourinary: As documented per HPI   Endocrine: Negative for cold intolerance, heat intolerance, polydipsia, polyphagia and polyuria.   Musculoskeletal: Negative for myalgias, gait problem, neck pain and neck stiffness.   Skin: Negative for color change, pallor, rash and wound.   Allergic/Immunologic: Negative for immunocompromised state.   Neurological: Negative for seizures, facial asymmetry, speech difficulty, weakness and light-headedness.   Hematological: Negative for adenopathy. Does not bruise/bleed easily.   Psychiatric/Behavioral: Negative for hallucinations, behavioral problems, self-injury and agitation. The patient is not hyperactive.      All other systems were reviewed and were negative.    Objective:     Vitals:    03/25/19 1253   BP: 116/70   Pulse: 63   Resp: 15     Physical Exam   Vitals reviewed.  Constitutional: He is oriented to person, place, and time. He appears well-developed and well-nourished. No distress.   HENT:   Head: Normocephalic and atraumatic.   Right Ear:  External ear normal.   Left Ear: External ear normal.   Nose: Nose normal.   Eyes: EOM are normal. Pupils are equal, round, and reactive to light. Right eye exhibits no discharge. Left eye exhibits no discharge.   Neck: Normal range of motion. Neck supple. No tracheal deviation present. No thyroid enlargement or tenderness.  Cardiovascular: Regular rhythm and intact distal pulses. No signs of peripheral edema.   Pulmonary/Chest: Effort normal and breath sounds normal. No stridor. No respiratory distress. He has no wheezes.   Abdominal: Soft. Bowel sounds are normal. He exhibits no distension. There is no tenderness. Hernia confirmed negative in the right inguinal area and confirmed negative in the left inguinal area. No hepatic or splenic enlargement or tenderness.  Musculoskeletal: Normal range of motion. He exhibits no edema.   Neurological: He is alert and oriented to person, place, and time. He exhibits normal muscle tone. Coordination normal.   Skin: Skin is warm. No rash noted.   Lymphatic: No palpable lymph nodes.  Psychiatric: He has a normal mood and affect. His behavior is normal. Judgment and thought content normal.     Lab Results   Component Value Date    PSA 2.0 09/02/2015    PSA 2.8 11/03/2014    PSA 2.5 05/21/2014     Lab Results   Component Value Date    CREATININE 1.13 03/11/2019     Lab Results   Component Value Date    EGFRNONAA >60.0 03/11/2019     Lab Results   Component Value Date    ESTGFRAFRICA >60.0 03/11/2019     I have personally reviewed all the patient's relevant  imaging.    Ultrasound abdomen (1/30/19):  There is a 21 mm cyst in the inferior pole of the right kidney.  It has a thin septation associated with it.  The left kidney is normal in appearance.    Assessment:       1. Renal cyst, right    2. Personal history of prostate cancer        Plan:     Primitivo was seen today for prostate cancer.    Diagnoses and all orders for this visit:    Renal cyst, right  -     US Kidney;  Future    Personal history of prostate cancer    The patient has a minimally complex right renal cyst.    I discussed with the patient and his wife the findings of the ultrasound. I also spoke with them about renal cysts--their cause, natural history, and classification system. The patient appears to have a Bosniak I cyst, which is a simple renal cyst. These cysts require no further evaluation and work-up. They may get bigger, but that is of no concern and almost uniformly does not cause symptoms. Importantly, I stressed to the patient that simple renal cysts do not carry a malignant transformation risk.    Since the patient has a minimally complex cyst, I will have him return in 6 months with an interval ultrasound. If unchanged, the patient will then only have to return as needed.    I answered all questions.    I encouraged him or any of his family members to call or email me with questions and/or concerns.    I spent 25 minutes with the patient of which more than half was spent in coordinating the patient's care as well as in direct consultation with the patient in regards to our treatment and plan.

## 2019-03-25 NOTE — PATIENT INSTRUCTIONS
Simple Kidney Cysts    Simple kidney cysts are fluid-filled sacs that form in your kidneys. These cysts usually dont affect how the kidneys function. Simple kidney cysts are very common. They rarely need treatment. Most people dont even know that they have them.  Understanding the kidneys  The kidneys are 2 bean-shaped organs located near the middle of your back. They filter large amounts of blood each day. They also help regulate the fluid and salts (electrolytes) in your blood. They release waste products through your urine. The kidneys have tiny tubules. These structures collect newly formed urine. Cysts may result when the tubules get blocked. Small sacs sometimes form on the tubules. These may detach and become simple kidney cysts.  What causes simple kidney cysts?  Researchers are still not sure what causes simple kidney cysts. You might have a single kidney cyst. Or you might have more than one. You might have them on only 1 kidney or on both of them. In most cases, a person has only 1 cyst. Over time the cyst may slowly increase in size.  Some health conditions can cause kidney cysts to grow. For example, a person with polycystic kidney disease develops a large number of kidney cysts. Too many cysts can keep the kidney from working properly.  Other health conditions that can cause simple kidney cysts include:  · Chronic kidney disease  · Dialysis for chronic kidney disease  · Medullary cystic kidney disease  · Autosomal dominant polycystic kidney disease  · Von Hippel-Lindau disease  · Tuberous sclerosis complex  If you smoke or have high blood pressure, you may have a higher risk for a simple kidney cyst.  Symptoms of simple kidney cysts  Simple kidney cysts often dont cause symptoms. In rare cases, they may cause symptoms such as:  · Blood in your urine if the cyst bursts  · Pain in your upper belly or back if the cyst bursts  · Fever and chills if the cyst is infected  · High blood pressure if the  cyst compresses the rest of the kidney  · Trouble urinating if the cyst blocks the tube that sends urine from the kidneys to the bladder (ureter)  A simple kidney cyst usually doesnt greatly impair kidney function unless it blocks the ureter. More commonly, a cyst may cause a slight drop in kidney function that doesnt cause any problems or symptoms.  Diagnosing simple kidney cysts  Simple kidney cysts are often first found with an imaging test that was done for another reason. Your health care provider will ask about your medical history and symptoms. Youll also be given a physical exam.  It is important to distinguish simple kidney cysts from complex cysts. Complex cysts are a different kind of cyst that may be cancer. A complex cyst needs to be removed. For this reason, you may need tests such as:  · Kidney ultrasound  · Kidney CT scan, if a detailed picture of the cyst is needed  · Kidney MRI, if more information is needed about the cyst  A radiologist will look at these pictures to see if your kidney cyst is simple or complex. A cyst may be rated with the Bosniak CT system. This has 5 categories based on how the cyst looks. If your cyst is a category 1, you likely wont need any more tests. A kidney cyst with a higher rating may need more tests or treatment. A category 5 cyst is most often linked with cancer.  Your health care provider will also check for other conditions that may be causing the cysts. You may need to have genetic testing. It can find other problems, such as polycystic kidney disease.  Treatment for simple kidney cysts  Many people with simple kidney cysts dont need treatment. Your health care provider may want to keep track of the cyst over time. You may need ultrasound of the kidneys several times a year.  If you have symptoms, or if the cyst is blocking the flow of urine, you may need treatment such as:  · Over-the-counter pain medicine  · A procedure to puncture the cyst with a long  needle inserted through the skin (sclerotherapy)  · Surgery to drain the cyst and remove its outer tissue  · Blood pressure medicine  · Antibiotics and drainage to treat a kidney cyst infection     When to call your health care provider  Call your health care provider right away if you have any of these:  · Blood in your urine  · Pain in your back  · Trouble urinating   Date Last Reviewed: 5/20/2015  © 9866-1343 [employer]. [address] [place] [address], [place] [address]. All rights reserved. This information is not intended as a substitute for professional medical care. Always follow your healthcare professional's instructions.

## 2019-04-09 ENCOUNTER — OFFICE VISIT (OUTPATIENT)
Dept: INTERNAL MEDICINE | Facility: CLINIC | Age: 74
End: 2019-04-09
Payer: MEDICARE

## 2019-04-09 VITALS
HEART RATE: 53 BPM | DIASTOLIC BLOOD PRESSURE: 80 MMHG | SYSTOLIC BLOOD PRESSURE: 122 MMHG | BODY MASS INDEX: 25.06 KG/M2 | HEIGHT: 72 IN | WEIGHT: 185 LBS

## 2019-04-09 DIAGNOSIS — R21 EXANTHEM: ICD-10-CM

## 2019-04-09 DIAGNOSIS — I70.0 ABDOMINAL AORTIC ATHEROSCLEROSIS: ICD-10-CM

## 2019-04-09 DIAGNOSIS — E78.5 HYPERLIPIDEMIA, UNSPECIFIED HYPERLIPIDEMIA TYPE: ICD-10-CM

## 2019-04-09 DIAGNOSIS — K59.00 CONSTIPATION, UNSPECIFIED CONSTIPATION TYPE: Primary | ICD-10-CM

## 2019-04-09 PROCEDURE — 3079F DIAST BP 80-89 MM HG: CPT | Mod: HCNC,CPTII,S$GLB, | Performed by: INTERNAL MEDICINE

## 2019-04-09 PROCEDURE — 99999 PR PBB SHADOW E&M-EST. PATIENT-LVL III: ICD-10-PCS | Mod: PBBFAC,HCNC,, | Performed by: INTERNAL MEDICINE

## 2019-04-09 PROCEDURE — 1101F PR PT FALLS ASSESS DOC 0-1 FALLS W/OUT INJ PAST YR: ICD-10-PCS | Mod: HCNC,CPTII,S$GLB, | Performed by: INTERNAL MEDICINE

## 2019-04-09 PROCEDURE — 3079F PR MOST RECENT DIASTOLIC BLOOD PRESSURE 80-89 MM HG: ICD-10-PCS | Mod: HCNC,CPTII,S$GLB, | Performed by: INTERNAL MEDICINE

## 2019-04-09 PROCEDURE — 1101F PT FALLS ASSESS-DOCD LE1/YR: CPT | Mod: HCNC,CPTII,S$GLB, | Performed by: INTERNAL MEDICINE

## 2019-04-09 PROCEDURE — 3074F PR MOST RECENT SYSTOLIC BLOOD PRESSURE < 130 MM HG: ICD-10-PCS | Mod: HCNC,CPTII,S$GLB, | Performed by: INTERNAL MEDICINE

## 2019-04-09 PROCEDURE — 99214 OFFICE O/P EST MOD 30 MIN: CPT | Mod: HCNC,S$GLB,, | Performed by: INTERNAL MEDICINE

## 2019-04-09 PROCEDURE — 99999 PR PBB SHADOW E&M-EST. PATIENT-LVL III: CPT | Mod: PBBFAC,HCNC,, | Performed by: INTERNAL MEDICINE

## 2019-04-09 PROCEDURE — 3074F SYST BP LT 130 MM HG: CPT | Mod: HCNC,CPTII,S$GLB, | Performed by: INTERNAL MEDICINE

## 2019-04-09 PROCEDURE — 99214 PR OFFICE/OUTPT VISIT, EST, LEVL IV, 30-39 MIN: ICD-10-PCS | Mod: HCNC,S$GLB,, | Performed by: INTERNAL MEDICINE

## 2019-04-09 RX ORDER — KETOCONAZOLE 20 MG/G
CREAM TOPICAL DAILY
Qty: 360 G | Refills: 3 | Status: SHIPPED | OUTPATIENT
Start: 2019-04-09 | End: 2020-10-22

## 2019-04-09 RX ORDER — ROSUVASTATIN CALCIUM 10 MG/1
10 TABLET, COATED ORAL DAILY
Qty: 90 TABLET | Refills: 3 | Status: SHIPPED | OUTPATIENT
Start: 2019-04-09 | End: 2020-02-27 | Stop reason: SDUPTHER

## 2019-04-09 NOTE — Clinical Note
Pt today with concerns regarding atorvastatin use and memory loss; I discussed that studies did not conclusively show atorvastatin leading to memory problems. For reassurance I changed the patient to Rita. He will call your clinic to schedule routine follow up sometime in next few weeks. Please let me know if there are additional questions; thanks so much. --Ron

## 2019-04-09 NOTE — PROGRESS NOTES
"Subjective:       Patient ID: Primitivo Mitchell is a 73 y.o. male.    Chief Complaint: Follow-up    HPI    Patient is accompanied by his wife.    Dark stool a few weeks ago. Seen by Gastroenterology yesterday. Has upcoming colonoscopy due for May. Hard stool with blood on toilet paper. yesterday had some straining with bowel movement, noted some blood in stool at that time. normal bowel movement daily. Reports Ensure also seems to help. Also doing vegetable smoothie.     Patient reports concerns about memory loss associated with atorvastatin.    Skin rash is still well controlled with use of ketoconazole cream regularly.    Review of Systems   All other systems reviewed and are negative.      Objective:      Physical Exam   Constitutional: No distress.   Cardiovascular: Normal rate, regular rhythm and normal heart sounds.   Pulmonary/Chest: Effort normal and breath sounds normal. No stridor. He has no wheezes. He has no rales.   Neurological: He is alert. He displays no tremor.   Psychiatric: He has a normal mood and affect. His behavior is normal.   Nursing note and vitals reviewed.      Vitals:    04/09/19 1548   BP: 122/80   BP Location: Right arm   Patient Position: Sitting   BP Method: Large (Manual)   Pulse: (!) 53   Weight: 83.9 kg (185 lb)   Height: 6' (1.829 m)     Body mass index is 25.09 kg/m².    RESULTS: Reviewed labs from last 12 months    Assessment:       1. Constipation, unspecified constipation type    2. Exanthem    3. Hyperlipidemia, unspecified hyperlipidemia type    4. Abdominal aortic atherosclerosis        Plan:   Primitivo was seen today for follow-up.    Diagnoses and all orders for this visit:    Constipation, unspecified constipation type:  Prior dx, sees outside Gastroenterology, has plan for colonoscopy in May because of BRBPR. Try over-the-counter meds for treatment of constipation:  "Please start over-the-counter Colace 1 pill two times a day with meals. Continue drinking plenty " "of water. If after 3 days there's still straining with bowel movements, then change instead to Miralax every other day."    Exanthem:  Prior diagnosis, stable, well controlled on current management. No changes at this time, will continue to monitor.   -     ketoconazole (NIZORAL) 2 % cream; Apply topically once daily. Please dispense 3 mo supply (2 tubes per month)    Hyperlipidemia, unspecified hyperlipidemia type:  Prior diagnosis, stable, well controlled on current management. Pt concerned about reports noting memory loss with atorvastatin; discussed that studies did not conclusively show atorvastatin leading to memory problems. For reassurance will change to hydrophilic statin Crestor. I will notify the patient's Cardiology team.   -     rosuvastatin (CRESTOR) 10 MG tablet; Take 1 tablet (10 mg total) by mouth once daily.  -     Comprehensive metabolic panel; Future  -     CBC Without Differential; Future  -     Lipid panel; Future    Abdominal aortic atherosclerosis:  Prior dx, asymptomatic. Continue control of blood pressure and cholesterol to limit further atherosclerosis.     Follow up in about 6 months (around 10/9/2019) for EPP annual exam, fasting labs 1 week prior.  Ron Esquivel MD  Internal Medicine    Portions of this note were completed using medical dictation software. Please excuse typographical or syntax errors that were missed on review.    "

## 2019-04-09 NOTE — PATIENT INSTRUCTIONS
Please start over-the-counter Colace 1 pill two times a day with meals. Continue drinking plenty of water. If after 3 days there's still straining with bowel movements, then change instead to Miralax every other day.    Please schedule follow up with Cardiology Dr Verde.    Once the rosuvastatin arrives please start taking this once a day and stop atorvastatin.

## 2019-04-10 ENCOUNTER — LAB VISIT (OUTPATIENT)
Dept: LAB | Facility: HOSPITAL | Age: 74
End: 2019-04-10
Attending: INTERNAL MEDICINE
Payer: MEDICARE

## 2019-04-10 DIAGNOSIS — E78.5 HYPERLIPIDEMIA, UNSPECIFIED HYPERLIPIDEMIA TYPE: ICD-10-CM

## 2019-04-10 LAB
ALBUMIN SERPL BCP-MCNC: 3.9 G/DL (ref 3.5–5.2)
ALP SERPL-CCNC: 84 U/L (ref 55–135)
ALT SERPL W/O P-5'-P-CCNC: 20 U/L (ref 10–44)
ANION GAP SERPL CALC-SCNC: 4 MMOL/L (ref 8–16)
AST SERPL-CCNC: 25 U/L (ref 10–40)
BILIRUB SERPL-MCNC: 0.8 MG/DL (ref 0.1–1)
BUN SERPL-MCNC: 14 MG/DL (ref 8–23)
CALCIUM SERPL-MCNC: 9.6 MG/DL (ref 8.7–10.5)
CHLORIDE SERPL-SCNC: 104 MMOL/L (ref 95–110)
CHOLEST SERPL-MCNC: 122 MG/DL (ref 120–199)
CHOLEST/HDLC SERPL: 2.1 {RATIO} (ref 2–5)
CO2 SERPL-SCNC: 30 MMOL/L (ref 23–29)
CREAT SERPL-MCNC: 1.2 MG/DL (ref 0.5–1.4)
ERYTHROCYTE [DISTWIDTH] IN BLOOD BY AUTOMATED COUNT: 13.2 % (ref 11.5–14.5)
EST. GFR  (AFRICAN AMERICAN): >60 ML/MIN/1.73 M^2
EST. GFR  (NON AFRICAN AMERICAN): >60 ML/MIN/1.73 M^2
GLUCOSE SERPL-MCNC: 67 MG/DL (ref 70–110)
HCT VFR BLD AUTO: 43.2 % (ref 40–54)
HDLC SERPL-MCNC: 57 MG/DL (ref 40–75)
HDLC SERPL: 46.7 % (ref 20–50)
HGB BLD-MCNC: 14.5 G/DL (ref 14–18)
LDLC SERPL CALC-MCNC: 41.2 MG/DL (ref 63–159)
MCH RBC QN AUTO: 33.3 PG (ref 27–31)
MCHC RBC AUTO-ENTMCNC: 33.6 G/DL (ref 32–36)
MCV RBC AUTO: 99 FL (ref 82–98)
NONHDLC SERPL-MCNC: 65 MG/DL
PLATELET # BLD AUTO: 181 K/UL (ref 150–350)
PMV BLD AUTO: 9.9 FL (ref 9.2–12.9)
POTASSIUM SERPL-SCNC: 4.5 MMOL/L (ref 3.5–5.1)
PROT SERPL-MCNC: 7.2 G/DL (ref 6–8.4)
RBC # BLD AUTO: 4.36 M/UL (ref 4.6–6.2)
SODIUM SERPL-SCNC: 138 MMOL/L (ref 136–145)
TRIGL SERPL-MCNC: 119 MG/DL (ref 30–150)
WBC # BLD AUTO: 6.31 K/UL (ref 3.9–12.7)

## 2019-04-10 PROCEDURE — 36415 COLL VENOUS BLD VENIPUNCTURE: CPT | Mod: HCNC

## 2019-04-10 PROCEDURE — 80053 COMPREHEN METABOLIC PANEL: CPT | Mod: HCNC

## 2019-04-10 PROCEDURE — 80061 LIPID PANEL: CPT | Mod: HCNC

## 2019-04-10 PROCEDURE — 85027 COMPLETE CBC AUTOMATED: CPT | Mod: HCNC

## 2019-04-23 ENCOUNTER — CLINICAL SUPPORT (OUTPATIENT)
Dept: INTERNAL MEDICINE | Facility: CLINIC | Age: 74
End: 2019-04-23
Payer: MEDICARE

## 2019-04-23 ENCOUNTER — HOSPITAL ENCOUNTER (OUTPATIENT)
Dept: RADIOLOGY | Facility: HOSPITAL | Age: 74
Discharge: HOME | End: 2019-04-23
Attending: INTERNAL MEDICINE
Payer: MEDICARE

## 2019-04-23 DIAGNOSIS — Z77.090 H/O ASBESTOS EXPOSURE: ICD-10-CM

## 2019-04-23 DIAGNOSIS — Z77.090 H/O ASBESTOS EXPOSURE: Primary | ICD-10-CM

## 2019-04-23 PROCEDURE — 71046 X-RAY EXAM CHEST 2 VIEWS: CPT | Mod: TC,FY,PO

## 2019-04-23 PROCEDURE — 94010 BREATHING CAPACITY TEST: ICD-10-PCS | Mod: ,,, | Performed by: INTERNAL MEDICINE

## 2019-04-23 PROCEDURE — 94010 BREATHING CAPACITY TEST: CPT | Mod: ,,, | Performed by: INTERNAL MEDICINE

## 2019-05-08 ENCOUNTER — DOCUMENTATION ONLY (OUTPATIENT)
Dept: INTERNAL MEDICINE | Facility: CLINIC | Age: 74
End: 2019-05-08

## 2019-05-08 NOTE — PROGRESS NOTES
EXAMINATION: Thyroid ultrasound

 

HISTORY: Thyrotoxicosis

 

COMPARISON: None

 

TECHNIQUE: Grayscale and color Doppler images obtained of the thyroid gland.

 

FINDINGS: The right thyroid lobe measures 4.4 x 1.6 x 1.7 cm. The left thyroid lobe measures 4.4 x 2
.2 x 1.9 cm. The thyroid echotexture appears minimally heterogeneous. Normal color Doppler flow is n
oted. No dominant nodule identified.

 

IMPRESSION: Grossly unremarkable thyroid ultrasound. Received outside records from [facility], chart updated as appropriate, results will be scanned into EPIC.    Briefly, patient had upper and lower endoscopy performed on May 3rd.  Colonoscopy demonstrated nonbleeding colonic angio ectasias and nonbleeding internal hemorrhoids.  Also notable radiation proctitis.  Recommendation for repeat in 5 years.    Upper endoscopy demonstrated erythematous duodenopathy.  Also congested, erythematous, eroded, and hemorrhagic appearing mucosa in the antrum and pre-pyloric region of the stomach, biopsies were performed.    Pathology report reviewed.  Demonstrates complete intestinal metaplasia (5% of mucosa).  Negative for H pylori and dysplasia.    Plan to return to clinic for follow-up in 4 weeks for re-evaluation, and to take Nexium 20 mg daily for 8 weeks.    Ron Esquivel MD  Internal Medicine    Portions of this note were completed using medical dictation software. Please excuse typographical or syntax errors.

## 2019-05-22 ENCOUNTER — TELEPHONE (OUTPATIENT)
Dept: INTERNAL MEDICINE | Facility: CLINIC | Age: 74
End: 2019-05-22

## 2019-05-22 NOTE — TELEPHONE ENCOUNTER
----- Message from Raghav Varma sent at 5/22/2019 11:47 AM CDT -----  Contact: self/[phone]  Pt is calling to speak with someone in regards to some papers her dropped off about 2 weeks ago. He states that he hasn't gotten in touch with anyone since. Please call pt in regards to forms.          Thanks

## 2019-05-28 ENCOUNTER — OFFICE VISIT (OUTPATIENT)
Dept: CARDIOLOGY | Facility: CLINIC | Age: 74
End: 2019-05-28
Attending: INTERNAL MEDICINE
Payer: MEDICARE

## 2019-05-28 VITALS
WEIGHT: 187 LBS | BODY MASS INDEX: 25.33 KG/M2 | HEART RATE: 64 BPM | DIASTOLIC BLOOD PRESSURE: 67 MMHG | SYSTOLIC BLOOD PRESSURE: 117 MMHG | HEIGHT: 72 IN

## 2019-05-28 DIAGNOSIS — Z85.46 HISTORY OF PROSTATE CANCER: ICD-10-CM

## 2019-05-28 DIAGNOSIS — I35.9 AORTIC VALVE DISEASE: ICD-10-CM

## 2019-05-28 DIAGNOSIS — I10 ESSENTIAL HYPERTENSION: ICD-10-CM

## 2019-05-28 DIAGNOSIS — I70.0 ABDOMINAL AORTIC ATHEROSCLEROSIS: ICD-10-CM

## 2019-05-28 PROCEDURE — 3074F SYST BP LT 130 MM HG: CPT | Mod: CPTII,S$GLB,, | Performed by: INTERNAL MEDICINE

## 2019-05-28 PROCEDURE — 99214 OFFICE O/P EST MOD 30 MIN: CPT | Mod: 25,S$GLB,, | Performed by: INTERNAL MEDICINE

## 2019-05-28 PROCEDURE — 3078F DIAST BP <80 MM HG: CPT | Mod: CPTII,S$GLB,, | Performed by: INTERNAL MEDICINE

## 2019-05-28 PROCEDURE — 93000 PR ELECTROCARDIOGRAM, COMPLETE: ICD-10-PCS | Mod: S$GLB,,, | Performed by: INTERNAL MEDICINE

## 2019-05-28 PROCEDURE — 3074F PR MOST RECENT SYSTOLIC BLOOD PRESSURE < 130 MM HG: ICD-10-PCS | Mod: CPTII,S$GLB,, | Performed by: INTERNAL MEDICINE

## 2019-05-28 PROCEDURE — 3078F PR MOST RECENT DIASTOLIC BLOOD PRESSURE < 80 MM HG: ICD-10-PCS | Mod: CPTII,S$GLB,, | Performed by: INTERNAL MEDICINE

## 2019-05-28 PROCEDURE — 1101F PR PT FALLS ASSESS DOC 0-1 FALLS W/OUT INJ PAST YR: ICD-10-PCS | Mod: CPTII,S$GLB,, | Performed by: INTERNAL MEDICINE

## 2019-05-28 PROCEDURE — 99214 PR OFFICE/OUTPT VISIT, EST, LEVL IV, 30-39 MIN: ICD-10-PCS | Mod: 25,S$GLB,, | Performed by: INTERNAL MEDICINE

## 2019-05-28 PROCEDURE — 93000 ELECTROCARDIOGRAM COMPLETE: CPT | Mod: S$GLB,,, | Performed by: INTERNAL MEDICINE

## 2019-05-28 PROCEDURE — 1101F PT FALLS ASSESS-DOCD LE1/YR: CPT | Mod: CPTII,S$GLB,, | Performed by: INTERNAL MEDICINE

## 2019-05-28 RX ORDER — LOSARTAN POTASSIUM 50 MG/1
50 TABLET ORAL DAILY
Qty: 90 TABLET | Refills: 3 | Status: SHIPPED | OUTPATIENT
Start: 2019-05-28 | End: 2020-05-19 | Stop reason: SDUPTHER

## 2019-05-28 RX ORDER — ASPIRIN 81 MG/1
81 TABLET ORAL DAILY
Qty: 90 TABLET | Refills: 3 | COMMUNITY
Start: 2019-05-28 | End: 2020-05-19 | Stop reason: SDUPTHER

## 2019-05-28 NOTE — PROGRESS NOTES
Subjective:     Primitivo Mitchell is a 73 y.o. male with hypertension. He is overweight. He has mild aortic valve sclerosis. 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 [place] [facility] and a Stress Echocardiogram was done. He did 9:00 minutes and there was no chest pain or significant ST depression. The Echo was read as an inferolateral wall motion abnormality and the aortic root was mildly dilated. The left ventricular function was mildly depressed. In 4/2016 he was diagnosed with prostate cancer and after thinking the options over he decided on robotic prostatectomy that was done on 5/23/2016. He had a CT scan of the abdomen that revealed aortic plaquing. In 2018 he expresses some concern for that his memory is slightly affected. There is no exertional chest pain or exertional dyspnea. No palpitations or weak spells. No bleeding. Feeling well overall.       Hypertension   This is a chronic problem. The current episode started more than 1 year ago. The problem is unchanged. The problem is controlled (usually 120-130/70-80 mmHg at home). Pertinent negatives include no anxiety, blurred vision, chest pain, headaches, malaise/fatigue, neck pain, orthopnea, palpitations, peripheral edema, PND, shortness of breath or sweats. There is no history of angina.       Review of Systems   Constitution: Negative for chills, fever, malaise/fatigue and weight loss.   HENT: Negative for nosebleeds.    Eyes: Negative for blurred vision, pain, vision loss in left eye and vision loss in right eye.   Cardiovascular: Negative for chest pain, claudication, dyspnea on exertion, irregular heartbeat, leg swelling, near-syncope, orthopnea, palpitations, paroxysmal nocturnal dyspnea and syncope.   Respiratory: Negative for cough, hemoptysis, shortness of breath, sputum production and wheezing.    Endocrine: Negative for cold intolerance and heat intolerance.   Hematologic/Lymphatic: Negative for  bleeding problem. Does not bruise/bleed easily.   Skin: Negative for color change and rash.   Musculoskeletal: Negative for falls and neck pain.   Gastrointestinal: Negative for heartburn, hematemesis, hematochezia, hemorrhoids, jaundice, melena, nausea and vomiting.   Genitourinary: Positive for bladder incontinence. Negative for dysuria and hematuria.   Neurological: Negative for dizziness, focal weakness, headaches, light-headedness, loss of balance, numbness and vertigo.   Psychiatric/Behavioral: Positive for memory loss. Negative for altered mental status and depression. The patient is not nervous/anxious.    Allergic/Immunologic: Negative for hives and persistent infections.       Current Outpatient Medications on File Prior to Visit   Medication Sig Dispense Refill    aspirin (ECOTRIN) 81 MG EC tablet Take 1 tablet (81 mg total) by mouth once daily. 90 tablet 3    cholecalciferol, vitamin D3, 5,000 unit Tab Take 5,000 Units by mouth once daily.      clotrimazole (ANTIFUNGAL, CLOTRIMAZOLE,) 1 % cream Apply topically 2 (two) times daily. 113 g 2    econazole nitrate 1 % cream econazole 1 % topical cream      fish oil-omega-3 fatty acids 300-1,000 mg capsule Take 2 g by mouth once daily.      fluticasone (FLONASE) 50 mcg/actuation nasal spray 2 sprays by Each Nare route once daily. 48 g 3    ketoconazole (NIZORAL) 2 % cream Apply topically once daily. Please dispense 3 mo supply (2 tubes per month) 360 g 3    ketotifen (ALAWAY) 0.025 % ophthalmic solution 1 drop 2 (two) times daily.      losartan (COZAAR) 50 MG tablet TAKE 1 TABLET ONE TIME DAILY 90 tablet 3    rosuvastatin (CRESTOR) 10 MG tablet Take 1 tablet (10 mg total) by mouth once daily. 90 tablet 3    sildenafil (VIAGRA) 100 MG tablet Take 1 tablet (100 mg total) by mouth as needed for Erectile Dysfunction. 5 tablet 5    triamcinolone acetonide 0.1% (KENALOG) 0.1 % cream Apply topically 2 (two) times daily. for 10 days 80 g 3     No current  facility-administered medications on file prior to visit.        /67   Pulse 64   Ht 6' (1.829 m)   Wt 84.8 kg (187 lb)   BMI 25.36 kg/m²       Objective:     Physical Exam   Constitutional: He is oriented to person, place, and time. He appears well-developed and well-nourished. He does not appear ill. No distress.   HENT:   Head: Normocephalic and atraumatic.   Nose: Nose normal.   Mouth/Throat: No oropharyngeal exudate.   Eyes: Right eye exhibits no discharge. Left eye exhibits no discharge. Right conjunctiva is not injected. Left conjunctiva is not injected. Right pupil is round. Left pupil is round. Pupils are equal.   Neck: No JVD present. Carotid bruit is not present. No thyromegaly present.   Cardiovascular: Normal rate, regular rhythm, S1 normal and S2 normal. Exam reveals no gallop.   Murmur heard.   Midsystolic murmur is present with a grade of 2/6 at the upper right sternal border.  High-pitched blowing decrescendo early diastolic murmur is present with a grade of 2/6 at the upper right sternal border and lower left sternal border.  Pulses:       Radial pulses are 2+ on the right side, and 2+ on the left side.        Dorsalis pedis pulses are 2+ on the right side, and 2+ on the left side.        Posterior tibial pulses are 2+ on the right side, and 2+ on the left side.   Pulmonary/Chest: Effort normal and breath sounds normal.   Abdominal: Soft. Normal appearance. There is no hepatosplenomegaly. There is no tenderness.   Musculoskeletal:        Right ankle: He exhibits no swelling, no ecchymosis and no deformity.        Left ankle: He exhibits no swelling, no ecchymosis and no deformity.   Lymphadenopathy:        Head (right side): No submandibular adenopathy present.        Head (left side): No submandibular adenopathy present.     He has no cervical adenopathy.   Neurological: He is alert and oriented to person, place, and time. He is not disoriented. No cranial nerve deficit.   Skin: Skin is  warm, dry and intact. No rash noted. He is not diaphoretic. Nails show clubbing.   Psychiatric: He has a normal mood and affect. His speech is normal and behavior is normal. Judgment and thought content normal. Cognition and memory are normal.       Assessment:      1. Aortic valve disease    2. Abdominal aortic atherosclerosis    3. Essential hypertension    4. History of prostate cancer        Plan:     1. Aortic Valve Disease   2011: Treated for presumed endocarditis.   3/21/2013: Echo: Mild aortic valve sclerosis.    5/5/2015: Echo: Normal left ventricular size and systolic function. Moderate aortic valve sclerosis - 1.8 m/s - trace AR.   Reassurance.   Followed.   5/2019: Do Echo.    2. Peripheral Artery Disease   2015: CT Scan: Aortic plaquing seen.   9/2/2015: Chol 199. HDL 60. . .   On atorvastatin 20 mg Q24.   12/14/2016: Chol 156. HDL 57. LDL 82. TG 85.   11/15/2017: Chol 146. HDL 50. LDL 74. .   On rosuvastatin 10 mg Q24.   4/10/2019: Chol 122. HDL 57. LDL 41. .   On rosuvastatin 10 mg Q24.   On aspirin 81 mg Q24.   Well controlled lipid panel.    3. Hypertension   2009: Diagnosed.   On losartan 50 mg Q24.   Keeping log at home.   Well controlled.    4. Wall Motion Abnormalities on Echo   11/2014: Stress Echo: 9:00 min. No CP. ECG negative. Echo: Inferolateral WMA with mild LV dysfunction. Aortic root mildly enlarged.   Followed.    5. Prostate Cancer   4/2016: Diagnosed.   5/23/2016: Robotic prostatectomy.   Dr. Manuel Reyes.    6. Erectile Dysfunction   On tadalafil for BPH with good effect.   May take tadalafil 20 mg PRN.    7. Primary Care   Dr. Ron Esquivel.    F/u 6 months.    Wisam Verde M.D.      6/13/2019 5:55 PM, Addendum:    6/12/2019: Echo: Normal left ventricular size and systolic function. Mild LVH with sigmoid septum. Moderate aortic valve sclerosis - 1.7 m/s. Mild AR.    I discussed the above test result and the implications of the findings over the  srinivasa.    Wisam Verde M.D.

## 2019-06-12 ENCOUNTER — CLINICAL SUPPORT (OUTPATIENT)
Dept: CARDIOLOGY | Facility: CLINIC | Age: 74
End: 2019-06-12
Attending: INTERNAL MEDICINE
Payer: MEDICARE

## 2019-06-12 DIAGNOSIS — I35.9 AORTIC VALVE DISEASE: ICD-10-CM

## 2019-06-12 PROCEDURE — 93306 PR ECHO HEART XTHORACIC,COMPLETE W DOPPLER: ICD-10-PCS | Mod: S$GLB,,, | Performed by: INTERNAL MEDICINE

## 2019-06-12 PROCEDURE — 93306 TTE W/DOPPLER COMPLETE: CPT | Mod: S$GLB,,, | Performed by: INTERNAL MEDICINE

## 2019-06-15 ENCOUNTER — OFFICE VISIT (OUTPATIENT)
Dept: URGENT CARE | Facility: CLINIC | Age: 74
End: 2019-06-15
Payer: MEDICARE

## 2019-06-15 ENCOUNTER — NURSE TRIAGE (OUTPATIENT)
Dept: ADMINISTRATIVE | Facility: CLINIC | Age: 74
End: 2019-06-15

## 2019-06-15 VITALS
SYSTOLIC BLOOD PRESSURE: 120 MMHG | BODY MASS INDEX: 25.33 KG/M2 | TEMPERATURE: 98 F | HEART RATE: 64 BPM | WEIGHT: 187 LBS | HEIGHT: 72 IN | RESPIRATION RATE: 16 BRPM | OXYGEN SATURATION: 98 % | DIASTOLIC BLOOD PRESSURE: 68 MMHG

## 2019-06-15 DIAGNOSIS — B34.9 VIRAL SYNDROME: Primary | ICD-10-CM

## 2019-06-15 LAB
CTP QC/QA: YES
S PYO RRNA THROAT QL PROBE: NEGATIVE

## 2019-06-15 PROCEDURE — 99214 OFFICE O/P EST MOD 30 MIN: CPT | Mod: S$GLB,,, | Performed by: NURSE PRACTITIONER

## 2019-06-15 PROCEDURE — 87880 STREP A ASSAY W/OPTIC: CPT | Mod: QW,S$GLB,, | Performed by: NURSE PRACTITIONER

## 2019-06-15 PROCEDURE — 3074F PR MOST RECENT SYSTOLIC BLOOD PRESSURE < 130 MM HG: ICD-10-PCS | Mod: CPTII,S$GLB,, | Performed by: NURSE PRACTITIONER

## 2019-06-15 PROCEDURE — 87880 POCT RAPID STREP A: ICD-10-PCS | Mod: QW,S$GLB,, | Performed by: NURSE PRACTITIONER

## 2019-06-15 PROCEDURE — 3074F SYST BP LT 130 MM HG: CPT | Mod: CPTII,S$GLB,, | Performed by: NURSE PRACTITIONER

## 2019-06-15 PROCEDURE — 3078F DIAST BP <80 MM HG: CPT | Mod: CPTII,S$GLB,, | Performed by: NURSE PRACTITIONER

## 2019-06-15 PROCEDURE — 3078F PR MOST RECENT DIASTOLIC BLOOD PRESSURE < 80 MM HG: ICD-10-PCS | Mod: CPTII,S$GLB,, | Performed by: NURSE PRACTITIONER

## 2019-06-15 PROCEDURE — 1101F PR PT FALLS ASSESS DOC 0-1 FALLS W/OUT INJ PAST YR: ICD-10-PCS | Mod: CPTII,S$GLB,, | Performed by: NURSE PRACTITIONER

## 2019-06-15 PROCEDURE — 1101F PT FALLS ASSESS-DOCD LE1/YR: CPT | Mod: CPTII,S$GLB,, | Performed by: NURSE PRACTITIONER

## 2019-06-15 PROCEDURE — 99214 PR OFFICE/OUTPT VISIT, EST, LEVL IV, 30-39 MIN: ICD-10-PCS | Mod: S$GLB,,, | Performed by: NURSE PRACTITIONER

## 2019-06-15 RX ORDER — PREDNISONE 10 MG/1
10 TABLET ORAL DAILY
Qty: 3 TABLET | Refills: 0 | Status: SHIPPED | OUTPATIENT
Start: 2019-06-15 | End: 2019-06-16

## 2019-06-15 NOTE — PATIENT INSTRUCTIONS
"Please follow up with your Primary care provider within 2-5 days if your signs and symptoms have not resolved or worsen.  The usual course of cold symptoms are 10-14 days.     If your condition worsens or fails to improve we recommend that you receive another evaluation at the emergency room immediately or contact your primary medical clinic to discuss your concerns.     You must understand that you have received an Urgent Care treatment only and that you may be released before all of your medical problems are known or treated.   You, the patient, will arrange for follow up care as instructed.     Tylenol or Ibuprofen can also be used as directed for pain/fever unless you have an allergy to them or medical condition such as stomach ulcers, kidney or liver disease or blood thinners etc for which you should not be taking these type of medications.     Take over the counter cough medication as directed as needed for cough.  You should avoid medications with pseudoephedrine or phenylephrine (any medication with "D") if you have high blood pressure as this can cause an elevation in your blood pressure. Instead consider Corcidin HBP as needed to prevent an elevated blood pressure.     Natural remedies of symptoms (as needed) include humidification, saline nasal sprays, and/or steamy showers.  Increase fluids, warm tea with honey, cough drops as needed.  You may also use salt water gargles for sore throat.    IF you received a steroid shot today - As discussed, this can elevate your blood pressure, elevate your blood sugar, water weight gain, nervous energy, redness to the face and dimpling of the skin at the injection site.         Viral Upper Respiratory Illness (Adult)  You have a viral upper respiratory illness (URI), which is another term for the common cold. This illness is contagious during the first few days. It is spread through the air by coughing and sneezing. It may also be spread by direct contact (touching " the sick person and then touching your own eyes, nose, or mouth). Frequent handwashing will decrease risk of spread. Most viral illnesses go away within 7 to 10 days with rest and simple home remedies. Sometimes the illness may last for several weeks. Antibiotics will not kill a virus, and they are generally not prescribed for this condition.    Home care  · If symptoms are severe, rest at home for the first 2 to 3 days. When you resume activity, don't let yourself get too tired.  · Avoid being exposed to cigarette smoke (yours or others).  · You may use acetaminophen or ibuprofen to control pain and fever, unless another medicine was prescribed. (Note: If you have chronic liver or kidney disease, have ever had a stomach ulcer or gastrointestinal bleeding, or are taking blood-thinning medicines, talk with your healthcare provider before using these medicines.) Aspirin should never be given to anyone under 18 years of age who is ill with a viral infection or fever. It may cause severe liver or brain damage.  · Your appetite may be poor, so a light diet is fine. Avoid dehydration by drinking 6 to 8 glasses of fluids per day (water, soft drinks, juices, tea, or soup). Extra fluids will help loosen secretions in the nose and lungs.  · Over-the-counter cold medicines will not shorten the length of time youre sick, but they may be helpful for the following symptoms: cough, sore throat, and nasal and sinus congestion. (Note: Do not use decongestants if you have high blood pressure.)  Follow-up care  Follow up with your healthcare provider, or as advised.  When to seek medical advice  Call your healthcare provider right away if any of these occur:  · Cough with lots of colored sputum (mucus)  · Severe headache; face, neck, or ear pain  · Difficulty swallowing due to throat pain  · Fever of 100.4°F (38°C)  Call 911, or get immediate medical care  Call emergency services right away if any of these occur:  · Chest pain,  shortness of breath, wheezing, or difficulty breathing  · Coughing up blood  · Inability to swallow due to throat pain  Date Last Reviewed: 9/13/2015  © 4414-8082 [employer]. [address] [place] [address], [place] [address]. All rights reserved. This information is not intended as a substitute for professional medical care. Always follow your healthcare professional's instructions.

## 2019-06-15 NOTE — PROGRESS NOTES
Subjective:       Patient ID: Primitivo Mitchell is a 73 y.o. male.    Vitals:  height is 6' (1.829 m) and weight is 84.8 kg (187 lb). His oral temperature is 97.5 °F (36.4 °C). His blood pressure is 120/68 and his pulse is 64. His respiration is 16 and oxygen saturation is 98%.     Chief Complaint: Sore Throat    Today he states he noticed a slight sore/scratchy throat.     Sore Throat    This is a new problem. The current episode started today. The problem has been unchanged. The pain is worse on the left side. There has been no fever. The pain is at a severity of 5/10. The pain is moderate. Associated symptoms include a hoarse voice, neck pain and swollen glands. Pertinent negatives include no abdominal pain, congestion, coughing, diarrhea, drooling, ear discharge, ear pain, headaches, plugged ear sensation, shortness of breath, stridor, trouble swallowing or vomiting. He has had no exposure to strep or mono. He has tried nothing for the symptoms. The treatment provided no relief.       Constitution: Negative for chills, sweating, fatigue and fever.   HENT: Positive for postnasal drip and sore throat. Negative for ear pain, ear discharge, drooling, facial swelling, congestion, sinus pain, sinus pressure, trouble swallowing and voice change.    Neck: Positive for neck pain. Negative for painful lymph nodes and neck swelling.   Eyes: Negative for eye redness and eyelid swelling.   Respiratory: Negative for chest tightness, cough, sputum production, bloody sputum, COPD, shortness of breath, stridor, wheezing and asthma.    Gastrointestinal: Negative for abdominal pain, nausea, vomiting and diarrhea.   Musculoskeletal: Negative for muscle ache.   Skin: Negative for rash and hives.   Allergic/Immunologic: Negative for seasonal allergies, asthma, hives and itching.   Neurological: Negative for headaches.   Hematologic/Lymphatic: Negative for swollen lymph nodes.       Objective:      Physical Exam   Constitutional: He  is oriented to person, place, and time. He appears well-developed and well-nourished. He is cooperative.  Non-toxic appearance. He does not appear ill. No distress.   HENT:   Head: Normocephalic and atraumatic.   Right Ear: Hearing, tympanic membrane, external ear and ear canal normal.   Left Ear: Hearing, tympanic membrane, external ear and ear canal normal.   Nose: Nose normal. No mucosal edema, rhinorrhea or nasal deformity. No epistaxis. Right sinus exhibits no maxillary sinus tenderness and no frontal sinus tenderness. Left sinus exhibits no maxillary sinus tenderness and no frontal sinus tenderness.   Mouth/Throat: Uvula is midline and mucous membranes are normal. No trismus in the jaw. Normal dentition. No uvula swelling. Posterior oropharyngeal erythema (mild) present. No oropharyngeal exudate or posterior oropharyngeal edema.   Eyes: Conjunctivae and lids are normal. No scleral icterus.   Sclera clear bilat   Neck: Trachea normal, full passive range of motion without pain and phonation normal. Neck supple.   Cardiovascular: Normal rate, regular rhythm, normal heart sounds, intact distal pulses and normal pulses.   Pulmonary/Chest: Effort normal and breath sounds normal. No respiratory distress.   Abdominal: Soft. Normal appearance and bowel sounds are normal. He exhibits no distension. There is no tenderness.   Musculoskeletal: Normal range of motion. He exhibits no edema or deformity.   Lymphadenopathy:     He has no cervical adenopathy.   Neurological: He is alert and oriented to person, place, and time. He exhibits normal muscle tone. Coordination normal.   Skin: Skin is warm, dry and intact. He is not diaphoretic. No pallor.   Psychiatric: He has a normal mood and affect. His speech is normal and behavior is normal. Judgment and thought content normal. Cognition and memory are normal.   Nursing note and vitals reviewed.      Assessment:       1. Viral syndrome        Plan:       Results for orders  "placed or performed in visit on 06/15/19   POCT rapid strep A   Result Value Ref Range    Rapid Strep A Screen Negative Negative     Acceptable Yes          Viral syndrome  -     POCT rapid strep A  -     (Magic mouthwash) 1:1:1 Benadryl 12.5mg/5ml liq, aluminum & magnesium hydroxide-simehticone (Maalox), lidocaine viscous 2%; Swish and spit 10 mLs every 4 (four) hours as needed. Gargle and spit for sore throat  Dispense: 90 mL; Refill: 0  -     Discontinue: predniSONE (DELTASONE) 10 MG tablet; Take 1 tablet (10 mg total) by mouth once daily. for 3 days  Dispense: 3 tablet; Refill: 0      Patient Instructions   Please follow up with your Primary care provider within 2-5 days if your signs and symptoms have not resolved or worsen.  The usual course of cold symptoms are 10-14 days.     If your condition worsens or fails to improve we recommend that you receive another evaluation at the emergency room immediately or contact your primary medical clinic to discuss your concerns.     You must understand that you have received an Urgent Care treatment only and that you may be released before all of your medical problems are known or treated.   You, the patient, will arrange for follow up care as instructed.     Tylenol or Ibuprofen can also be used as directed for pain/fever unless you have an allergy to them or medical condition such as stomach ulcers, kidney or liver disease or blood thinners etc for which you should not be taking these type of medications.     Take over the counter cough medication as directed as needed for cough.  You should avoid medications with pseudoephedrine or phenylephrine (any medication with "D") if you have high blood pressure as this can cause an elevation in your blood pressure. Instead consider Corcidin HBP as needed to prevent an elevated blood pressure.     Natural remedies of symptoms (as needed) include humidification, saline nasal sprays, and/or steamy showers.  " Increase fluids, warm tea with honey, cough drops as needed.  You may also use salt water gargles for sore throat.    IF you received a steroid shot today - As discussed, this can elevate your blood pressure, elevate your blood sugar, water weight gain, nervous energy, redness to the face and dimpling of the skin at the injection site.         Viral Upper Respiratory Illness (Adult)  You have a viral upper respiratory illness (URI), which is another term for the common cold. This illness is contagious during the first few days. It is spread through the air by coughing and sneezing. It may also be spread by direct contact (touching the sick person and then touching your own eyes, nose, or mouth). Frequent handwashing will decrease risk of spread. Most viral illnesses go away within 7 to 10 days with rest and simple home remedies. Sometimes the illness may last for several weeks. Antibiotics will not kill a virus, and they are generally not prescribed for this condition.    Home care  · If symptoms are severe, rest at home for the first 2 to 3 days. When you resume activity, don't let yourself get too tired.  · Avoid being exposed to cigarette smoke (yours or others).  · You may use acetaminophen or ibuprofen to control pain and fever, unless another medicine was prescribed. (Note: If you have chronic liver or kidney disease, have ever had a stomach ulcer or gastrointestinal bleeding, or are taking blood-thinning medicines, talk with your healthcare provider before using these medicines.) Aspirin should never be given to anyone under 18 years of age who is ill with a viral infection or fever. It may cause severe liver or brain damage.  · Your appetite may be poor, so a light diet is fine. Avoid dehydration by drinking 6 to 8 glasses of fluids per day (water, soft drinks, juices, tea, or soup). Extra fluids will help loosen secretions in the nose and lungs.  · Over-the-counter cold medicines will not shorten the  length of time youre sick, but they may be helpful for the following symptoms: cough, sore throat, and nasal and sinus congestion. (Note: Do not use decongestants if you have high blood pressure.)  Follow-up care  Follow up with your healthcare provider, or as advised.  When to seek medical advice  Call your healthcare provider right away if any of these occur:  · Cough with lots of colored sputum (mucus)  · Severe headache; face, neck, or ear pain  · Difficulty swallowing due to throat pain  · Fever of 100.4°F (38°C)  Call 911, or get immediate medical care  Call emergency services right away if any of these occur:  · Chest pain, shortness of breath, wheezing, or difficulty breathing  · Coughing up blood  · Inability to swallow due to throat pain  Date Last Reviewed: 9/13/2015  © 8460-4712 [employer]. [address] [place] [address]. All rights reserved. This information is not intended as a substitute for professional medical care. Always follow your healthcare professional's instructions.

## 2019-06-16 ENCOUNTER — TELEPHONE (OUTPATIENT)
Dept: URGENT CARE | Facility: CLINIC | Age: 74
End: 2019-06-16

## 2019-06-16 NOTE — TELEPHONE ENCOUNTER
"Patient left message on Urgent care voicemail and I returned his call.  Discussed with patient regarding Prednisone.  Discussed side effects.  Patient did not take due to its side effects.  Reminded patient that it was an option, but not required and he did not have to take.  Verbalized understanding.  Discontinued Prednisone from care plan.  Patient stated his sore throat feels better today; however, it is "still there."  Reminded patient that his strep was negative and he had a virus.  Said he was advised by triage nurse that he should follow up with PCP on Monday.  Agreed with this plan.    "

## 2019-06-16 NOTE — TELEPHONE ENCOUNTER
Reason for Disposition   [1] Sore throat is the only symptom AND [2] sore throat present < 48 hours    Protocols used: SORE THROAT-A-AH  Pt called re dxd with virus. Prescribed magic mouthwash and prednisone. Using mouth wash. Pt wants to know about taking steroids. Pt is concerned about side effects.    c/o rates pain 5. spoke with dr justine blackmon to hold steroids call PCP on Monday.  Call back with questions

## 2019-06-17 ENCOUNTER — TELEPHONE (OUTPATIENT)
Dept: INTERNAL MEDICINE | Facility: CLINIC | Age: 74
End: 2019-06-17

## 2019-06-17 NOTE — TELEPHONE ENCOUNTER
Spoke with pt and pt advised he had a sore throat on Saturday he went to [facility] then and was diagnose with a sore throat they gave him prednisone and he was not sure if he soul take it

## 2019-06-17 NOTE — TELEPHONE ENCOUNTER
I recommend he avoid taking the prednisone for now. It can raise blood pressure and blood sugar. I recommend using over-the-counter throat lozenges as needed, and he can also use over-the-counter extra-strength Tylenol 500 mg, 1 tab every 6 hours as needed for pain.     If pain starts to worsen and he is unable to swallow because of pain, then he should start the prednisone.

## 2019-06-18 ENCOUNTER — TELEPHONE (OUTPATIENT)
Dept: URGENT CARE | Facility: CLINIC | Age: 74
End: 2019-06-18

## 2019-06-19 ENCOUNTER — TELEPHONE (OUTPATIENT)
Dept: CARDIOLOGY | Facility: CLINIC | Age: 74
End: 2019-06-19

## 2019-06-19 NOTE — TELEPHONE ENCOUNTER
----- Message from Jelly Romero sent at 6/19/2019  2:57 PM CDT -----  Questioning getting off the crestor

## 2019-06-19 NOTE — TELEPHONE ENCOUNTER
Patient states at his last visit you stated he could stop Rosuvastatin for a few weeks and check lipids to see if levels change.    Please advise.

## 2019-06-27 ENCOUNTER — OFFICE VISIT (OUTPATIENT)
Dept: INTERNAL MEDICINE | Facility: CLINIC | Age: 74
End: 2019-06-27
Payer: MEDICARE

## 2019-06-27 VITALS
SYSTOLIC BLOOD PRESSURE: 102 MMHG | WEIGHT: 190.5 LBS | DIASTOLIC BLOOD PRESSURE: 60 MMHG | HEART RATE: 71 BPM | HEIGHT: 72 IN | BODY MASS INDEX: 25.8 KG/M2 | OXYGEN SATURATION: 96 %

## 2019-06-27 DIAGNOSIS — K21.9 GASTROESOPHAGEAL REFLUX DISEASE WITHOUT ESOPHAGITIS: ICD-10-CM

## 2019-06-27 DIAGNOSIS — Z86.010 HISTORY OF ADENOMATOUS POLYP OF COLON: ICD-10-CM

## 2019-06-27 DIAGNOSIS — N52.31 ERECTILE DYSFUNCTION FOLLOWING RADICAL PROSTATECTOMY: ICD-10-CM

## 2019-06-27 DIAGNOSIS — Z92.3 HISTORY OF RADIATION THERAPY: ICD-10-CM

## 2019-06-27 DIAGNOSIS — H91.92 DECREASED HEARING OF LEFT EAR: ICD-10-CM

## 2019-06-27 DIAGNOSIS — I70.0 ABDOMINAL AORTIC ATHEROSCLEROSIS: ICD-10-CM

## 2019-06-27 DIAGNOSIS — Z85.46 HISTORY OF PROSTATE CANCER: ICD-10-CM

## 2019-06-27 DIAGNOSIS — E78.5 HYPERLIPIDEMIA, UNSPECIFIED HYPERLIPIDEMIA TYPE: ICD-10-CM

## 2019-06-27 DIAGNOSIS — M85.80 OSTEOPENIA, UNSPECIFIED LOCATION: ICD-10-CM

## 2019-06-27 DIAGNOSIS — I10 ESSENTIAL HYPERTENSION: ICD-10-CM

## 2019-06-27 DIAGNOSIS — I77.1 TORTUOUS AORTA: ICD-10-CM

## 2019-06-27 DIAGNOSIS — Z00.00 ENCOUNTER FOR PREVENTIVE HEALTH EXAMINATION: Primary | ICD-10-CM

## 2019-06-27 DIAGNOSIS — Z90.79 S/P PROSTATECTOMY: ICD-10-CM

## 2019-06-27 PROCEDURE — 3078F PR MOST RECENT DIASTOLIC BLOOD PRESSURE < 80 MM HG: ICD-10-PCS | Mod: HCNC,CPTII,S$GLB, | Performed by: NURSE PRACTITIONER

## 2019-06-27 PROCEDURE — 3078F DIAST BP <80 MM HG: CPT | Mod: HCNC,CPTII,S$GLB, | Performed by: NURSE PRACTITIONER

## 2019-06-27 PROCEDURE — 99499 RISK ADDL DX/OHS AUDIT: ICD-10-PCS | Mod: HCNC,S$GLB,, | Performed by: NURSE PRACTITIONER

## 2019-06-27 PROCEDURE — 99999 PR PBB SHADOW E&M-EST. PATIENT-LVL V: ICD-10-PCS | Mod: PBBFAC,HCNC,, | Performed by: NURSE PRACTITIONER

## 2019-06-27 PROCEDURE — 99397 PR PREVENTIVE VISIT,EST,65 & OVER: ICD-10-PCS | Mod: HCNC,S$GLB,, | Performed by: NURSE PRACTITIONER

## 2019-06-27 PROCEDURE — 3074F PR MOST RECENT SYSTOLIC BLOOD PRESSURE < 130 MM HG: ICD-10-PCS | Mod: HCNC,CPTII,S$GLB, | Performed by: NURSE PRACTITIONER

## 2019-06-27 PROCEDURE — 99397 PER PM REEVAL EST PAT 65+ YR: CPT | Mod: HCNC,S$GLB,, | Performed by: NURSE PRACTITIONER

## 2019-06-27 PROCEDURE — 99999 PR PBB SHADOW E&M-EST. PATIENT-LVL V: CPT | Mod: PBBFAC,HCNC,, | Performed by: NURSE PRACTITIONER

## 2019-06-27 PROCEDURE — 3074F SYST BP LT 130 MM HG: CPT | Mod: HCNC,CPTII,S$GLB, | Performed by: NURSE PRACTITIONER

## 2019-06-27 PROCEDURE — 99499 UNLISTED E&M SERVICE: CPT | Mod: HCNC,S$GLB,, | Performed by: NURSE PRACTITIONER

## 2019-06-27 RX ORDER — CEFUROXIME AXETIL 250 MG/1
250 TABLET ORAL 2 TIMES DAILY
COMMUNITY
Start: 2019-06-27 | End: 2019-07-11

## 2019-06-27 NOTE — PATIENT INSTRUCTIONS
Counseling and Referral of Other Preventative  (Italic type indicates deductible and co-insurance are waived)    Patient Name: Primitivo Mitchell  Today's Date: 6/27/2019    Health Maintenance       Date Due Completion Date    Shingles Vaccine (2 of 3) 03/30/2015 2/2/2015    Influenza Vaccine 08/01/2019 9/28/2018    Lipid Panel 04/10/2020 4/10/2019    High Dose Statin 06/15/2020 6/15/2019    DEXA SCAN 07/16/2021 7/16/2018    TETANUS VACCINE 02/02/2022 2/2/2012    Colonoscopy 05/03/2024 5/3/2019 (Done)    Override on 5/3/2019: Done    Override on 3/9/2018: Done    Override on 1/5/2015: Done (Done at outside facility; per patient 1 polyp that was benign removed)        No orders of the defined types were placed in this encounter.    The following information is provided to all patients.  This information is to help you find resources for any of the problems found today that may be affecting your health:                Living healthy guide: www.[facility].[place].gov      Understanding Diabetes: www.diabetes.org      Eating healthy: www.cdc.gov/healthyweight      CDC home safety checklist: www.cdc.gov/steadi/patient.html      Agency on Aging: www.goea.[place].gov      Alcoholics anonymous (AA): www.aa.org      Physical Activity: www.roz.nih.gov/na1cjqm      Tobacco use: www.quitwithusla.org

## 2019-06-28 PROBLEM — C61 CARCINOMA OF PROSTATE: Status: RESOLVED | Noted: 2018-06-29 | Resolved: 2019-06-28

## 2019-06-28 PROBLEM — I77.1 TORTUOUS AORTA: Status: ACTIVE | Noted: 2019-06-28

## 2019-06-28 NOTE — PROGRESS NOTES
Primitivo Mitchell presented for a  Medicare AWV and comprehensive Health Risk Assessment today. The following components were reviewed and updated:    · Medical history  · Family History  · Social history  · Allergies and Current Medications  · Health Risk Assessment  · Health Maintenance  · Care Team     ** See Completed Assessments for Annual Wellness Visit within the encounter summary.**       The following assessments were completed:  · Living Situation  · CAGE  · Depression Screening  · Timed Get Up and Go  · Whisper Test  · Cognitive Function Screening  · Nutrition Screening  · ADL Screening  · PAQ Screening    Vitals:    06/27/19 1411   BP: 102/60   Pulse: 71   SpO2: 96%   Weight: 86.4 kg (190 lb 7.6 oz)   Height: 6' (1.829 m)     Body mass index is 25.83 kg/m².  Physical Exam   Constitutional: He is oriented to person, place, and time. He appears well-developed and well-nourished.   HENT:   Head: Normocephalic and atraumatic.   Nose: Nose normal.   Eyes: Conjunctivae and EOM are normal.   Cardiovascular: Normal rate, regular rhythm, normal heart sounds and intact distal pulses.   Pulmonary/Chest: Effort normal and breath sounds normal.   Musculoskeletal: Normal range of motion.   Neurological: He is alert and oriented to person, place, and time.   Skin: Skin is warm and dry.   Psychiatric: His behavior is normal. Judgment and thought content normal. His mood appears anxious.   Nursing note and vitals reviewed.        Diagnoses and health risks identified today and associated recommendations/orders:    1. Encounter for preventive health examination  Assessment performed. Health maintenance updated. Chart review completed.  Reports memory improvement since discontinuing protonix and other BP medication. Mini cog assessment normal today.  Discussed shingles vaccine.    2. 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 Chronic.continue blood pressure regimen and statin therapy. Followed by  PCP.    3. Abdominal aortic atherosclerosis  Noted on imaging. Chronic.continue blood pressure regimen and statin therapy. Followed by PCP.    4. Decreased hearing of left ear  - Ambulatory Referral to Audiology    5. Essential hypertension  Chronic. Stable. Followed by PCP.    6. Hyperlipidemia, unspecified hyperlipidemia type  Chronic. Stable. Followed by PCP.  Component      Latest Ref Rng & Units 4/10/2019   Cholesterol      120 - 199 mg/dL 122   Triglycerides      30 - 150 mg/dL 119   HDL      40 - 75 mg/dL 57   LDL Cholesterol External      63.0 - 159.0 mg/dL 41.2 (L)   Hdl/Cholesterol Ratio      20.0 - 50.0 % 46.7   Total Cholesterol/HDL Ratio      2.0 - 5.0 2.1   Non-HDL Cholesterol      mg/dL 65     7. S/P prostatectomy  Noted.    8. Erectile dysfunction following radical prostatectomy  Chronic. Followed by Urology.    9. Gastroesophageal reflux disease without esophagitis  Chronic. Stable. Followed by PCP.    10. Osteopenia, unspecified location  Chronic. Stable. Followed by PCP.    11. History of adenomatous polyp of colon  Noted  Colonoscopy done this year.    12. History of prostate cancer  Noted    13. History of radiation therapy  Noted      Provided Primitivo with a 5-10 year written screening schedule and personal prevention plan. Recommendations were developed using the USPSTF age appropriate recommendations. Education, counseling, and referrals were provided as needed. After Visit Summary printed and given to patient which includes a list of additional screenings\tests needed.    Follow up for Annual Wellness Visit in 1 year, follow up with PCP as instructed, ;sooner if problems.    KEATON Lamar

## 2019-06-30 ENCOUNTER — DOCUMENTATION ONLY (OUTPATIENT)
Dept: INTERNAL MEDICINE | Facility: CLINIC | Age: 74
End: 2019-06-30

## 2019-06-30 NOTE — PROGRESS NOTES
Received outside records from echocardiogram , chart updated as appropriate, results will be scanned into EPIC.    Briefly, echocardiogram was performed on June 12th.  Noted to have normal left ventricular size yet also with mild left ventricular hypertrophy.  Left ventricular systolic function was normal, also noted to have sigmoid septum.  Moderate aortic valve sclerosis without stenosis.    Ron Esquivel MD  Internal Medicine    Portions of this note were completed using medical dictation software. Please excuse typographical or syntax errors.

## 2019-07-05 ENCOUNTER — OFFICE VISIT (OUTPATIENT)
Dept: URGENT CARE | Facility: CLINIC | Age: 74
End: 2019-07-05
Payer: MEDICARE

## 2019-07-05 VITALS
DIASTOLIC BLOOD PRESSURE: 70 MMHG | TEMPERATURE: 98 F | WEIGHT: 190 LBS | BODY MASS INDEX: 25.73 KG/M2 | RESPIRATION RATE: 16 BRPM | SYSTOLIC BLOOD PRESSURE: 122 MMHG | OXYGEN SATURATION: 98 % | HEART RATE: 63 BPM | HEIGHT: 72 IN

## 2019-07-05 DIAGNOSIS — L20.89 OTHER ATOPIC DERMATITIS: ICD-10-CM

## 2019-07-05 DIAGNOSIS — T65.91XA ALLERGIC REACTION TO CHEMICAL SUBSTANCE, ACCIDENTAL OR UNINTENTIONAL, INITIAL ENCOUNTER: Primary | ICD-10-CM

## 2019-07-05 PROCEDURE — 99213 PR OFFICE/OUTPT VISIT, EST, LEVL III, 20-29 MIN: ICD-10-PCS | Mod: S$GLB,,, | Performed by: NURSE PRACTITIONER

## 2019-07-05 PROCEDURE — 3074F SYST BP LT 130 MM HG: CPT | Mod: CPTII,S$GLB,, | Performed by: NURSE PRACTITIONER

## 2019-07-05 PROCEDURE — 3078F DIAST BP <80 MM HG: CPT | Mod: CPTII,S$GLB,, | Performed by: NURSE PRACTITIONER

## 2019-07-05 PROCEDURE — 1101F PT FALLS ASSESS-DOCD LE1/YR: CPT | Mod: CPTII,S$GLB,, | Performed by: NURSE PRACTITIONER

## 2019-07-05 PROCEDURE — 99213 OFFICE O/P EST LOW 20 MIN: CPT | Mod: S$GLB,,, | Performed by: NURSE PRACTITIONER

## 2019-07-05 PROCEDURE — 1101F PR PT FALLS ASSESS DOC 0-1 FALLS W/OUT INJ PAST YR: ICD-10-PCS | Mod: CPTII,S$GLB,, | Performed by: NURSE PRACTITIONER

## 2019-07-05 PROCEDURE — 3078F PR MOST RECENT DIASTOLIC BLOOD PRESSURE < 80 MM HG: ICD-10-PCS | Mod: CPTII,S$GLB,, | Performed by: NURSE PRACTITIONER

## 2019-07-05 PROCEDURE — 3074F PR MOST RECENT SYSTOLIC BLOOD PRESSURE < 130 MM HG: ICD-10-PCS | Mod: CPTII,S$GLB,, | Performed by: NURSE PRACTITIONER

## 2019-07-05 RX ORDER — MUPIROCIN 20 MG/G
OINTMENT TOPICAL
Qty: 22 G | Refills: 0 | Status: SHIPPED | OUTPATIENT
Start: 2019-07-05 | End: 2019-08-01 | Stop reason: SDUPTHER

## 2019-07-05 RX ORDER — TRIAMCINOLONE ACETONIDE 1 MG/G
CREAM TOPICAL 2 TIMES DAILY
Qty: 45 G | Refills: 0 | Status: ON HOLD | OUTPATIENT
Start: 2019-07-05 | End: 2020-02-26 | Stop reason: HOSPADM

## 2019-07-05 RX ORDER — HYDROXYZINE HYDROCHLORIDE 25 MG/1
25 TABLET, FILM COATED ORAL NIGHTLY PRN
Qty: 14 TABLET | Refills: 0 | Status: SHIPPED | OUTPATIENT
Start: 2019-07-05 | End: 2019-10-14

## 2019-07-05 RX ORDER — TACROLIMUS 1 MG/G
OINTMENT TOPICAL 2 TIMES DAILY
Qty: 30 G | Refills: 0 | Status: SHIPPED | OUTPATIENT
Start: 2019-07-05 | End: 2019-07-05

## 2019-07-05 NOTE — PROGRESS NOTES
Subjective:       Patient ID: Primitivo Mitchell is a 73 y.o. male.    Vitals:  height is 6' (1.829 m) and weight is 86.2 kg (190 lb). His temperature is 97.8 °F (36.6 °C). His blood pressure is 122/70 and his pulse is 63. His respiration is 16 and oxygen saturation is 98%.     Chief Complaint: Allergic Reaction    This is a 73 y.o. male who presents today with a chief complaint of the upper left side of his face swelling by his eye after having a scratch & putting otc medications on it that he happened to be allergic to. The reactions started Tuesday after applying the otc medications.      Allergic Reaction   This is a new problem. The current episode started 5 to 7 days ago. The problem has been gradually worsening since onset. The patient was exposed to an OTC medication. The exposure occurred at home. Associated symptoms include a rash. Pertinent negatives include no coughing or eye itching. Past treatments include nothing. His past medical history is significant for medication allergies. Swelling is present on the face.       Constitution: Negative for chills and fever.   HENT: Negative for facial swelling and sore throat.    Neck: Negative for painful lymph nodes.   Eyes: Negative for eye itching and eyelid swelling.   Respiratory: Negative for cough.    Musculoskeletal: Negative for joint pain and joint swelling.   Skin: Positive for rash. Negative for color change, pale, wound, abrasion, laceration, lesion, skin thickening/induration, puncture wound, erythema, abscess, avulsion and hives.   Allergic/Immunologic: Negative for environmental allergies, immunocompromised state and hives.   Hematologic/Lymphatic: Negative for swollen lymph nodes.       Objective:      Physical Exam   Constitutional: He is oriented to person, place, and time. Vital signs are normal. He appears well-developed and well-nourished.  Non-toxic appearance. He does not have a sickly appearance. He does not appear ill.   HENT:   Head:  Normocephalic and atraumatic. Head is without abrasion, without contusion and without laceration.       Right Ear: External ear normal.   Left Ear: External ear normal.   Nose: Nose normal.   Mouth/Throat: Oropharynx is clear and moist.   Left side of cheekbone with redness, swelling    Eyes: Pupils are equal, round, and reactive to light. Conjunctivae, EOM and lids are normal.   Neck: Trachea normal, full passive range of motion without pain and phonation normal. Neck supple.   Cardiovascular: Normal rate and regular rhythm.   Pulmonary/Chest: Effort normal and breath sounds normal. No stridor. No respiratory distress.   Musculoskeletal: Normal range of motion.   Neurological: He is alert and oriented to person, place, and time.   Skin: Skin is warm, dry and intact. Capillary refill takes less than 2 seconds. Rash noted. No abrasion, no bruising, no burn, no ecchymosis, no laceration and no lesion noted. Rash is maculopapular and urticarial. No erythema.   Psychiatric: He has a normal mood and affect. His speech is normal and behavior is normal. Judgment and thought content normal. Cognition and memory are normal.   Nursing note and vitals reviewed.      Assessment:       1. Allergic reaction to chemical substance, accidental or unintentional, initial encounter    2. Other atopic dermatitis        Plan:         Allergic reaction to chemical substance, accidental or unintentional, initial encounter    Other atopic dermatitis    Other orders  -     mupirocin (BACTROBAN) 2 % ointment; Apply to affected area 3 times daily  Dispense: 22 g; Refill: 0  -     Discontinue: tacrolimus (PROTOPIC) 0.1 % ointment; Apply topically 2 (two) times daily. for 10 days  Dispense: 30 g; Refill: 0  -     hydrOXYzine HCl (ATARAX) 25 MG tablet; Take 1 tablet (25 mg total) by mouth nightly as needed for Itching.  Dispense: 14 tablet; Refill: 0  -     triamcinolone acetonide 0.1% (KENALOG) 0.1 % cream; Apply topically 2 (two) times daily.  To affected area  Dispense: 45 g; Refill: 0

## 2019-07-05 NOTE — PATIENT INSTRUCTIONS
Return to Urgent Care or go to ER if symptoms worsen or fail to improve.  Follow up with PCP as recommended for further management.         Atopic Dermatitis (Adult)  Atopic dermatitis is a dry, itchy, red rash. Its also called eczema. The rash is chronic, or ongoing. It can come and go over time. The disease is often passed down in families. It causes a problem with the skin barrier that makes the skin more sensitive to the environment and other factors. The increased skin sensitivity causes an itch, which causes scratching. Scratching can worsen the itching or also break the skin. This can put the skin at risk of infection.  The condition is most common in people with asthma, hay fever, hives, or dry or sensitive skin. The rash may be caused by extreme heat or heavy sweating. Skin irritants can cause the rash to flare up. These can include wool or silk clothing, grease, oils, some medicines, and harsh soaps and detergents. Emotional stress can also be a trigger.  Treatment is done to relieve the itching and inflammation of the skin.  Home care  Follow these tips to care for your condition:  · Keep the areas of rash clean by bathing at least every other day. Use lukewarm water to bathe. Dont use hot water, which can dry out the skin.  · Dont use soaps with strong detergents. Use mild soaps made for sensitive skin.  · Apply a cream or ointment to damp skin right after bathing.  · Avoid things that irritate your skin. Wear absorbent, soft fabrics next to the skin rather than rough or scratchy materials.  · Use mild laundry soap free of scents and perfumes. Make sure to rinse all the soap out of your clothes.  · Treat any skin infection as directed.  · Use oral diphenhydramine to help reduce itching. This is an antihistamine you can buy at drug and grocery stores. It can make you sleepy, so use lower doses during the daytime. Or you can use loratadine. This is an antihistamine that will not make you sleepy. Do not  use diphenhydramine if you have glaucoma or have trouble urinating due to an enlarged prostate.  Follow-up care  See your healthcare provider, or as advised. If your symptoms dont get better or if they get worse in the next 7 days, make an appointment with your healthcare provider.  When to seek medical advice  Call your healthcare provider right away  if any of these occur:  · Increasing area of redness or pain in the skin  · Yellow crusts or wet drainage from the rash  · Fever of 100.4°F (38°C) or higher, or as directed by your healthcare provider  Date Last Reviewed: 9/1/2016  © 0881-9029 [employer]. [address] [place] [address], [place] [address]. All rights reserved. This information is not intended as a substitute for professional medical care. Always follow your healthcare professional's instructions.

## 2019-07-08 ENCOUNTER — TELEPHONE (OUTPATIENT)
Dept: URGENT CARE | Facility: CLINIC | Age: 74
End: 2019-07-08

## 2019-07-11 ENCOUNTER — TELEPHONE (OUTPATIENT)
Dept: URGENT CARE | Facility: CLINIC | Age: 74
End: 2019-07-11

## 2019-07-11 NOTE — TELEPHONE ENCOUNTER
Patient called stating condition was better and if he could stop using Bactroban and Kenalog cream. Provider, DARIUS Hall P. was asked and advised patient it was fine to stop using creams.

## 2019-07-16 ENCOUNTER — OFFICE VISIT (OUTPATIENT)
Dept: URGENT CARE | Facility: CLINIC | Age: 74
End: 2019-07-16
Payer: MEDICARE

## 2019-07-16 VITALS
RESPIRATION RATE: 16 BRPM | WEIGHT: 190 LBS | BODY MASS INDEX: 25.73 KG/M2 | TEMPERATURE: 98 F | SYSTOLIC BLOOD PRESSURE: 123 MMHG | OXYGEN SATURATION: 97 % | HEART RATE: 75 BPM | DIASTOLIC BLOOD PRESSURE: 62 MMHG | HEIGHT: 72 IN

## 2019-07-16 DIAGNOSIS — R51.9 LEFT-SIDED FACE PAIN: Primary | ICD-10-CM

## 2019-07-16 PROCEDURE — 99214 OFFICE O/P EST MOD 30 MIN: CPT | Mod: S$GLB,,, | Performed by: NURSE PRACTITIONER

## 2019-07-16 PROCEDURE — 1101F PT FALLS ASSESS-DOCD LE1/YR: CPT | Mod: CPTII,S$GLB,, | Performed by: NURSE PRACTITIONER

## 2019-07-16 PROCEDURE — 3074F PR MOST RECENT SYSTOLIC BLOOD PRESSURE < 130 MM HG: ICD-10-PCS | Mod: CPTII,S$GLB,, | Performed by: NURSE PRACTITIONER

## 2019-07-16 PROCEDURE — 99214 PR OFFICE/OUTPT VISIT, EST, LEVL IV, 30-39 MIN: ICD-10-PCS | Mod: S$GLB,,, | Performed by: NURSE PRACTITIONER

## 2019-07-16 PROCEDURE — 3078F DIAST BP <80 MM HG: CPT | Mod: CPTII,S$GLB,, | Performed by: NURSE PRACTITIONER

## 2019-07-16 PROCEDURE — 3074F SYST BP LT 130 MM HG: CPT | Mod: CPTII,S$GLB,, | Performed by: NURSE PRACTITIONER

## 2019-07-16 PROCEDURE — 1101F PR PT FALLS ASSESS DOC 0-1 FALLS W/OUT INJ PAST YR: ICD-10-PCS | Mod: CPTII,S$GLB,, | Performed by: NURSE PRACTITIONER

## 2019-07-16 PROCEDURE — 3078F PR MOST RECENT DIASTOLIC BLOOD PRESSURE < 80 MM HG: ICD-10-PCS | Mod: CPTII,S$GLB,, | Performed by: NURSE PRACTITIONER

## 2019-07-16 NOTE — PROGRESS NOTES
Subjective:       Patient ID: Primitivo Mitchell is a 73 y.o. male.    Vitals:  height is 6' (1.829 m) and weight is 86.2 kg (190 lb). His oral temperature is 97.8 °F (36.6 °C). His blood pressure is 123/62 and his pulse is 75. His respiration is 16 and oxygen saturation is 97%.     Chief Complaint: Facial Swelling (left side of face )    Patient was seen here at urgent care 7/5/19 for a scratch to the left side of his face that he received while cutting grass. He was putting bactroban and kenalog cream on the site of the scratch and discontinued when he thought it was getting better but never completely got better.     Edema   This is a recurrent problem. Episode onset: 11 days ago. The problem occurs constantly. The problem has been gradually improving. Pertinent negatives include no arthralgias, chest pain, chills, congestion, coughing, fatigue, fever, joint swelling, myalgias, nausea, rash, sore throat or vertigo. Exacerbated by: touching area. Treatments tried: bactroban and kenalog cream. The treatment provided mild relief.       Constitution: Negative for chills, fatigue and fever.   HENT: Negative for congestion and sore throat.    Neck: Negative for painful lymph nodes.   Cardiovascular: Negative for chest pain and leg swelling.   Eyes: Negative for double vision.   Respiratory: Negative for cough and shortness of breath.    Gastrointestinal: Negative for nausea and diarrhea.   Genitourinary: Negative for dysuria, frequency and urgency.   Musculoskeletal: Positive for pain. Negative for joint pain, joint swelling, muscle cramps and muscle ache.   Skin: Negative for color change, pale, rash and erythema.   Allergic/Immunologic: Negative for seasonal allergies.   Neurological: Negative for dizziness, history of vertigo, light-headedness and passing out.   Hematologic/Lymphatic: Negative for swollen lymph nodes, easy bruising/bleeding and history of blood clots. Does not bruise/bleed easily.    Psychiatric/Behavioral: Negative for nervous/anxious, sleep disturbance and depression. The patient is not nervous/anxious.        Objective:      Physical Exam   Constitutional: He is oriented to person, place, and time. Vital signs are normal. He appears well-developed and well-nourished. He is active and cooperative.  Non-toxic appearance. He does not have a sickly appearance. He does not appear ill. No distress.   HENT:   Head: Normocephalic and atraumatic.   Nose: Nose normal.   Mouth/Throat: Oropharynx is clear and moist and mucous membranes are normal.   Eyes: Conjunctivae and lids are normal.   Neck: Trachea normal, normal range of motion, full passive range of motion without pain and phonation normal. Neck supple.   Cardiovascular: Normal rate, regular rhythm, normal heart sounds, intact distal pulses and normal pulses.   Pulses:       Radial pulses are 2+ on the right side, and 2+ on the left side.   Pulmonary/Chest: Effort normal and breath sounds normal.   Musculoskeletal: He exhibits no edema or deformity.   Neurological: He is alert and oriented to person, place, and time. He has normal strength and normal reflexes. No sensory deficit.   Skin: Skin is warm, dry and intact. Capillary refill takes less than 2 seconds. He is not diaphoretic. No erythema.        Patient recently stopped taking medication prescribed for allergic reaction. Patient wanted to be re-evaluated to make sure swelling or rash has not worsen. Patient reports very mild pain to touch. No surrounding erythema, swelling or warmth noted to lateral side of eye.    Psychiatric: He has a normal mood and affect. His speech is normal and behavior is normal. Judgment and thought content normal. Cognition and memory are normal.   Nursing note and vitals reviewed.      Assessment:       1. Left-sided face pain        Plan:       Patient was recently seen in Urgent Care for same symptoms. Patient reported symptoms are getting better. Patient  was concerned after stopping medication due to mild pain with touch. Advised patient to continue medication. Low suspicion of cellulitis at this time. No erythema, warmth or swelling noted. No infection of infection at this time. No tenderness noted with palpation.      Left-sided face pain      Patient Instructions   If you were prescribed a narcotic or controlled medication, do not drive or operate heavy equipment or machinery while taking these medications.  You must understand that you've received an Urgent Care treatment only and that you may be released before all your medical problems are known or treated. You, the patient, will arrange for follow up care as instructed.  Follow up with your PCP or specialty clinic as directed within 2-5 days if not improved or as needed.  You can call [phone] to schedule an appointment with the appropriate provider.  If your condition worsens we recommend that you receive another evaluation at the emergency room immediately or contact your primary medical clinics after hours call service to discuss your concerns.  Please return here or go to the Emergency Department for any concerns or worsening of condition.      Cellulitis  Cellulitis is an infection of the deep layers of skin. A break in the skin, such as a cut or scratch, can let bacteria under the skin. If the bacteria get to deep layers of the skin, it can be serious. If not treated, cellulitis can get into the bloodstream and lymph nodes. The infection can then spread throughout the body. This causes serious illness.  Cellulitis causes the affected skin to become red, swollen, warm, and sore. The reddened areas have a visible border. An open sore may leak fluid (pus). You may have a fever, chills, and pain.  Cellulitis is treated with antibiotics taken for 7 to 10 days. An open sore may be cleaned and covered with cool wet gauze. Symptoms should get better 1 to 2 days after treatment is started. Make sure to  take all the antibiotics for the full number of days until they are gone. Keep taking the medicine even if your symptoms go away.  Home care  Follow these tips:  · Limit the use of the part of your body with cellulitis.   · If the infection is on your leg, keep your leg raised while sitting. This will help to reduce swelling.  · Take all of the antibiotic medicine exactly as directed until it is gone. Do not miss any doses, especially during the first 7 days. Dont stop taking the medicine when your symptoms get better.  · Keep the affected area clean and dry.  · Wash your hands with soap and warm water before and after touching your skin. Anyone else who touches your skin should also wash his or her hands. Don't share towels.  Follow-up care  Follow up with your healthcare provider, or as advised. If your infection does not go away on the first antibiotic, your healthcare provider will prescribe a different one.  When to seek medical advice  Call your healthcare provider right away if any of these occur:  · Red areas that spread  · Swelling or pain that gets worse  · Fluid leaking from the skin (pus)  · Fever higher of 100.4º F (38.0º C) or higher after 2 days on antibiotics  Date Last Reviewed: 9/1/2016  © 7567-3083 [employer]. [address] [place] [address], [place] [address]. All rights reserved. This information is not intended as a substitute for professional medical care. Always follow your healthcare professional's instructions.

## 2019-07-17 NOTE — PATIENT INSTRUCTIONS
If you were prescribed a narcotic or controlled medication, do not drive or operate heavy equipment or machinery while taking these medications.  You must understand that you've received an Urgent Care treatment only and that you may be released before all your medical problems are known or treated. You, the patient, will arrange for follow up care as instructed.  Follow up with your PCP or specialty clinic as directed within 2-5 days if not improved or as needed.  You can call [phone] to schedule an appointment with the appropriate provider.  If your condition worsens we recommend that you receive another evaluation at the emergency room immediately or contact your primary medical clinics after hours call service to discuss your concerns.  Please return here or go to the Emergency Department for any concerns or worsening of condition.      Cellulitis  Cellulitis is an infection of the deep layers of skin. A break in the skin, such as a cut or scratch, can let bacteria under the skin. If the bacteria get to deep layers of the skin, it can be serious. If not treated, cellulitis can get into the bloodstream and lymph nodes. The infection can then spread throughout the body. This causes serious illness.  Cellulitis causes the affected skin to become red, swollen, warm, and sore. The reddened areas have a visible border. An open sore may leak fluid (pus). You may have a fever, chills, and pain.  Cellulitis is treated with antibiotics taken for 7 to 10 days. An open sore may be cleaned and covered with cool wet gauze. Symptoms should get better 1 to 2 days after treatment is started. Make sure to take all the antibiotics for the full number of days until they are gone. Keep taking the medicine even if your symptoms go away.  Home care  Follow these tips:  · Limit the use of the part of your body with cellulitis.   · If the infection is on your leg, keep your leg raised while sitting. This will help to reduce  swelling.  · Take all of the antibiotic medicine exactly as directed until it is gone. Do not miss any doses, especially during the first 7 days. Dont stop taking the medicine when your symptoms get better.  · Keep the affected area clean and dry.  · Wash your hands with soap and warm water before and after touching your skin. Anyone else who touches your skin should also wash his or her hands. Don't share towels.  Follow-up care  Follow up with your healthcare provider, or as advised. If your infection does not go away on the first antibiotic, your healthcare provider will prescribe a different one.  When to seek medical advice  Call your healthcare provider right away if any of these occur:  · Red areas that spread  · Swelling or pain that gets worse  · Fluid leaking from the skin (pus)  · Fever higher of 100.4º F (38.0º C) or higher after 2 days on antibiotics  Date Last Reviewed: 9/1/2016  © 6609-9551 [employer]. [address] [place] [address], [place] [address]. All rights reserved. This information is not intended as a substitute for professional medical care. Always follow your healthcare professional's instructions.

## 2019-07-19 ENCOUNTER — TELEPHONE (OUTPATIENT)
Dept: URGENT CARE | Facility: CLINIC | Age: 74
End: 2019-07-19

## 2019-07-25 DIAGNOSIS — R09.81 SINUS CONGESTION: ICD-10-CM

## 2019-07-25 NOTE — TELEPHONE ENCOUNTER
----- Message from Grabiel Heard sent at 7/25/2019  1:03 PM CDT -----  Contact: [facility] [phone]  Is this a refill or new RX:  Refill    RX name and strength: fluticasone (FLONASE) 50 mcg/actuation nasal spray     Is this a 30 day or 90 day RX:  90     Pharmacy name and phone # [facility] - [place], [place] - [address] [facility] [phone] (Phone) [phone] (Fax)

## 2019-07-26 RX ORDER — FLUTICASONE PROPIONATE 50 MCG
2 SPRAY, SUSPENSION (ML) NASAL DAILY
Qty: 48 G | Refills: 0 | Status: SHIPPED | OUTPATIENT
Start: 2019-07-26 | End: 2020-04-09 | Stop reason: SDUPTHER

## 2019-08-01 RX ORDER — MUPIROCIN 20 MG/G
OINTMENT TOPICAL
Qty: 22 G | Refills: 0 | Status: SHIPPED | OUTPATIENT
Start: 2019-08-01 | End: 2019-10-14

## 2019-08-01 NOTE — TELEPHONE ENCOUNTER
----- Message from Anabel Cardenas sent at 8/1/2019  3:59 PM CDT -----  Contact: [phone]  Type: Rx    Name of medication(s): mupirocin (BACTROBAN) 2 % ointment     Is this a refill? New rx?  refill    Who prescribed medication?  YASH Trinidad    Pharmacy Name, Phone, & Location: [facility] - [facility] [address]     Comments:   Please advise, thank you

## 2019-09-18 ENCOUNTER — IMMUNIZATION (OUTPATIENT)
Dept: PHARMACY | Facility: CLINIC | Age: 74
End: 2019-09-18
Payer: MEDICARE

## 2019-09-19 ENCOUNTER — LAB VISIT (OUTPATIENT)
Dept: LAB | Facility: HOSPITAL | Age: 74
End: 2019-09-19
Attending: RADIOLOGY
Payer: MEDICARE

## 2019-09-19 DIAGNOSIS — C61 CARCINOMA OF PROSTATE: ICD-10-CM

## 2019-09-19 LAB — COMPLEXED PSA SERPL-MCNC: <0.01 NG/ML (ref 0–4)

## 2019-09-19 PROCEDURE — 36415 COLL VENOUS BLD VENIPUNCTURE: CPT | Mod: HCNC,PO

## 2019-09-19 PROCEDURE — 84153 ASSAY OF PSA TOTAL: CPT | Mod: HCNC

## 2019-09-23 ENCOUNTER — HOSPITAL ENCOUNTER (OUTPATIENT)
Dept: RADIOLOGY | Facility: HOSPITAL | Age: 74
Discharge: HOME | End: 2019-09-23
Attending: UROLOGY
Payer: MEDICARE

## 2019-09-23 ENCOUNTER — OFFICE VISIT (OUTPATIENT)
Dept: UROLOGY | Facility: CLINIC | Age: 74
End: 2019-09-23
Payer: MEDICARE

## 2019-09-23 ENCOUNTER — TELEPHONE (OUTPATIENT)
Dept: RADIATION ONCOLOGY | Facility: CLINIC | Age: 74
End: 2019-09-23

## 2019-09-23 VITALS
HEIGHT: 72 IN | HEART RATE: 66 BPM | BODY MASS INDEX: 25.32 KG/M2 | SYSTOLIC BLOOD PRESSURE: 127 MMHG | WEIGHT: 186.94 LBS | DIASTOLIC BLOOD PRESSURE: 86 MMHG

## 2019-09-23 DIAGNOSIS — Z85.46 HISTORY OF PROSTATE CANCER: ICD-10-CM

## 2019-09-23 DIAGNOSIS — Z92.3 HISTORY OF RADIATION THERAPY: ICD-10-CM

## 2019-09-23 DIAGNOSIS — N28.1 RENAL CYST, RIGHT: ICD-10-CM

## 2019-09-23 DIAGNOSIS — Z90.79 S/P PROSTATECTOMY: Primary | ICD-10-CM

## 2019-09-23 PROCEDURE — 99214 OFFICE O/P EST MOD 30 MIN: CPT | Mod: HCNC,S$GLB,, | Performed by: NURSE PRACTITIONER

## 2019-09-23 PROCEDURE — 99999 PR PBB SHADOW E&M-EST. PATIENT-LVL IV: CPT | Mod: PBBFAC,HCNC,, | Performed by: NURSE PRACTITIONER

## 2019-09-23 PROCEDURE — 3079F PR MOST RECENT DIASTOLIC BLOOD PRESSURE 80-89 MM HG: ICD-10-PCS | Mod: HCNC,CPTII,S$GLB, | Performed by: NURSE PRACTITIONER

## 2019-09-23 PROCEDURE — 76770 US EXAM ABDO BACK WALL COMP: CPT | Mod: 26,HCNC,, | Performed by: INTERNAL MEDICINE

## 2019-09-23 PROCEDURE — 1101F PT FALLS ASSESS-DOCD LE1/YR: CPT | Mod: HCNC,CPTII,S$GLB, | Performed by: NURSE PRACTITIONER

## 2019-09-23 PROCEDURE — 1101F PR PT FALLS ASSESS DOC 0-1 FALLS W/OUT INJ PAST YR: ICD-10-PCS | Mod: HCNC,CPTII,S$GLB, | Performed by: NURSE PRACTITIONER

## 2019-09-23 PROCEDURE — 99999 PR PBB SHADOW E&M-EST. PATIENT-LVL IV: ICD-10-PCS | Mod: PBBFAC,HCNC,, | Performed by: NURSE PRACTITIONER

## 2019-09-23 PROCEDURE — 76770 US KIDNEY: ICD-10-PCS | Mod: 26,HCNC,, | Performed by: INTERNAL MEDICINE

## 2019-09-23 PROCEDURE — 3079F DIAST BP 80-89 MM HG: CPT | Mod: HCNC,CPTII,S$GLB, | Performed by: NURSE PRACTITIONER

## 2019-09-23 PROCEDURE — 99214 PR OFFICE/OUTPT VISIT, EST, LEVL IV, 30-39 MIN: ICD-10-PCS | Mod: HCNC,S$GLB,, | Performed by: NURSE PRACTITIONER

## 2019-09-23 PROCEDURE — 76770 US EXAM ABDO BACK WALL COMP: CPT | Mod: TC,HCNC

## 2019-09-23 PROCEDURE — 3074F PR MOST RECENT SYSTOLIC BLOOD PRESSURE < 130 MM HG: ICD-10-PCS | Mod: HCNC,CPTII,S$GLB, | Performed by: NURSE PRACTITIONER

## 2019-09-23 PROCEDURE — 3074F SYST BP LT 130 MM HG: CPT | Mod: HCNC,CPTII,S$GLB, | Performed by: NURSE PRACTITIONER

## 2019-09-23 NOTE — PROGRESS NOTES
CHIEF COMPLAINT:    Mr. Mitchell is a 73 y.o. male presenting for 6 month f/u for R renal complex cyst.    PRESENTING ILLNESS:    Primitivo Mitchell is a 73 y.o. male new patient to me (records of past medical, family and social history personally reviewed by me), w/ h/o prostate CA s/p RALP 5/23/16 w/ Dr. Reyes, GS 3+3, pT2c, N0, M0. S/p XRT for biochemical recurrence in 2016 w/ Dr. Ochoa.    Pt last seen in clinic 3/25/19 w/ Dr. Goodrich for R minimally complex renal cyst. Recommended 6 month f/u w/ imaging.    Today pt returns to clinic for 6 month f/u for R renal cyst. He reports feeling well since last visit. Reports minimal DENNIS, using 1 pad/d as precaution, not bothersome. Denies GH, dysuria, bone pain/weight loss. Prostate CA followed by Dr. Ochoa.    Pt unable to provide urine specimen in clinic.    REVIEW OF SYSTEMS:    Primitivo Mitchell denies headache, blurred vision, fever, nausea, vomiting, chills, abdominal pain, pelvic pain, flank pain, bleeding per rectum, cough, SOB, recent loss of consciousness, recent mental status changes, seizures, dizziness, or upper or lower extremity weakness.    PATIENT HISTORY:    Past Medical History:   Diagnosis Date    Aortic valve disorders 5/29/2013    3/21/2013: Echo. Mehreen. Mild aortic valve sclerosis. 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    Chronic bilateral low back pain with sciatica 4/25/2017    ED (erectile dysfunction)     Enlarged prostate     Floaters in visual field     Hyperlipidemia     Hypertension     Intestinal metaplasia of gastric mucosa 05/03/2019    outside Pathology     Osteoporosis     Pre-operative cardiovascular examination 5/18/2016 5/23/2016: Plan is robotic prostatectomy.   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/2019    seen on outside colonoscopy       Past Surgical History:   Procedure Laterality  Date    COLONOSCOPY      COLONOSCOPY  05/03/2019    COLONOSCOPY W/ POLYPECTOMY      ESOPHAGOGASTRODUODENOSCOPY  05/03/2019    PROSTATE SURGERY  05/23/2016    Prostatectomy for prostate cancer, done at [facility]    ROBOTIC ASSISTED LAPAROSCOPIC PROSTATECTOMY N/A 5/23/2016    Performed by Manuel Reyes MD at [facility] OR [address] [place] [address]       Family History   Problem Relation Age of Onset    Heart disease Mother     Alzheimer's disease Mother     Tremor Mother     Neuropathy Mother     Other Father         Colon problems    Ulcerative colitis Sister     Prostate cancer Brother     Anxiety disorder Son     Hepatomegaly Son     Early death Paternal Grandmother     Cancer Brother         Brain, Lung, Kidney       Social History     Socioeconomic History    Marital status:      Spouse name: Not on file    Number of children: Not on file    Years of education: Not on file    Highest education level: Not on file   Occupational History    Not on file   Social Needs    Financial resource strain: Not on file    Food insecurity:     Worry: Not on file     Inability: Not on file    Transportation needs:     Medical: Not on file     Non-medical: Not on file   Tobacco Use    Smoking status: Never Smoker    Smokeless tobacco: Never Used   Substance and Sexual Activity    Alcohol use: No    Drug use: No    Sexual activity: Yes     Partners: Female   Lifestyle    Physical activity:     Days per week: Not on file     Minutes per session: Not on file    Stress: Not on file   Relationships    Social connections:     Talks on phone: Not on file     Gets together: Not on file     Attends Yarsanism service: Not on file     Active member of club or organization: Not on file     Attends meetings of clubs or organizations: Not on file     Relationship status: Not on file   Other Topics Concern    Not on file   Social History Narrative    Not on file       Allergies:  Latex, natural rubber; Omnipaque 140  [iohexol]; Allegra-d 12 hour [fexofenadine-pseudoephedrine]; Azithromycin; Bactrim [sulfamethoxazole-trimethoprim]; Boniva [ibandronate]; Celebrex [celecoxib]; Ciprofloxacin; Claritin-d 12 hour [loratadine-pseudoephedrine]; Fexofenadine; Hydrocortisone; Ibandronate sodium; Imipramine; Iodinated contrast media; Loratadine; Neomycin; Neomycin-polymyxin-hc; Neurontin [gabapentin]; Nitrofuran analogues; Sulfa (sulfonamide antibiotics); Adhesive; and Androderm [testosterone]    Medications:    Current Outpatient Medications:     aspirin (ECOTRIN) 81 MG EC tablet, Take 1 tablet (81 mg total) by mouth once daily., Disp: 90 tablet, Rfl: 3    cholecalciferol, vitamin D3, 5,000 unit Tab, Take 5,000 Units by mouth once daily., Disp: , Rfl:     clotrimazole (ANTIFUNGAL, CLOTRIMAZOLE,) 1 % cream, Apply topically 2 (two) times daily., Disp: 113 g, Rfl: 2    fish oil-omega-3 fatty acids 300-1,000 mg capsule, Take 2 g by mouth once daily., Disp: , Rfl:     fluticasone propionate (FLONASE) 50 mcg/actuation nasal spray, 2 sprays (100 mcg total) by Each Nare route once daily., Disp: 48 g, Rfl: 0    hydrOXYzine HCl (ATARAX) 25 MG tablet, Take 1 tablet (25 mg total) by mouth nightly as needed for Itching., Disp: 14 tablet, Rfl: 0    ketoconazole (NIZORAL) 2 % cream, Apply topically once daily. Please dispense 3 mo supply (2 tubes per month), Disp: 360 g, Rfl: 3    ketotifen (ALAWAY) 0.025 % ophthalmic solution, 1 drop 2 (two) times daily., Disp: , Rfl:     losartan (COZAAR) 50 MG tablet, Take 1 tablet (50 mg total) by mouth once daily., Disp: 90 tablet, Rfl: 3    mupirocin (BACTROBAN) 2 % ointment, Apply to affected area 3 times daily, Disp: 22 g, Rfl: 0    rosuvastatin (CRESTOR) 10 MG tablet, Take 1 tablet (10 mg total) by mouth once daily., Disp: 90 tablet, Rfl: 3    sildenafil (VIAGRA) 100 MG tablet, Take 1 tablet (100 mg total) by mouth as needed for Erectile Dysfunction., Disp: 5 tablet, Rfl: 5    triamcinolone  acetonide 0.1% (KENALOG) 0.1 % cream, Apply topically 2 (two) times daily. To affected area, Disp: 45 g, Rfl: 0    PHYSICAL EXAMINATION:    The patient generally appears in good health, is appropriately interactive, and is in no apparent distress.     Eyes: anicteric sclerae, moist conjunctivae; no lid-lag; PERRLA     HENT: Atraumatic; oropharynx clear with moist mucous membranes and no mucosal ulcerations;normal hard and soft palate. No evidence of lymphadenopathy.    Neck: Trachea midline.  No thyromegaly.    Musculoskeletal: No abnormal gait.    Skin: No lesions.    Mental: Cooperative with normal affect.  Is oriented to time, place, and person.    Neuro: Grossly intact.    Chest: Normal inspiratory effort.   No accessory muscles.  No audible wheezes.  Respirations symmetric on inspiration and expiration.    Heart: Regular rhythm.    Extremities: No clubbing, cyanosis, or edema.    LABS:    Lab Results   Component Value Date    CREATININE 1.02 04/23/2019    CREATININE 1.2 04/10/2019    CREATININE 1.13 03/11/2019       Lab Results   Component Value Date    PSA 2.0 09/02/2015    PSA 2.8 11/03/2014    PSA 2.5 05/21/2014    PSA 1.37 05/21/2013    PSA 1.41 05/21/2013    PSADIAG <0.01 09/19/2019    PSADIAG <0.01 03/11/2019    PSADIAG <0.01 07/05/2018    PSADIAG <0.01 12/28/2017    PSADIAG <0.01 06/20/2017    PSADIAG 0.02 02/02/2017    PSADIAG 0.41 12/08/2016    PSADIAG 0.32 11/08/2016    PSADIAG 0.06 06/27/2016    PSATOTAL 3.4 02/02/2016    PSATOTAL 1.9 08/04/2014    PSAFREE 0.38 02/02/2016    PSAFREE 0.32 08/04/2014    PSAFREEPCT 11.18 02/02/2016    PSAFREEPCT 16.84 08/04/2014       No results found for: HGBA1C    Lab Results   Component Value Date    TOTALTESTOST 359 07/05/2018    TOTALTESTOST 382 02/23/2010      Imaging:    ABIOLA (9/23/19):  Right kidney: The right kidney measures 10.2 cm. No cortical thinning. No loss of corticomedullary distinction. Resistive index measures 0.69.  There is a 2.1 cm cyst with thin  septation, not detrimental changed compared to prior.  No renal stone. No hydronephrosis.    Left kidney: The left kidney measures 12.1 cm. No cortical thinning. No loss of corticomedullary distinction. Resistive index measures 0.66.  No mass. No renal stone. No hydronephrosis.      Impression       Single 2.1 cm cyst with thin septation within the right kidney, without a significant interval change.       IMPRESSION:    S/P prostatectomy    History of prostate cancer    History of radiation therapy    Renal cyst, right      PLAN:    I spent 25 minutes with the patient of which more than half was spent in direct consultation with the patient in regards to our treatment and plan.      Discussed and reviewed past, and most recent renal imaging findings. Discussed and reviewed renal cyst. Stable. No interval change. At this time, no further f/u required.    Education sheets provided.    Follow up if symptoms worsen or fail to improve.     F/u w/ Dr. Ochoa prostate CA as scheduled.    Pt expressed understanding and agree w/ plan.

## 2019-09-23 NOTE — PATIENT INSTRUCTIONS
Simple Kidney Cysts    Simple kidney cysts are fluid-filled sacs that form in your kidneys. These cysts usually dont affect how the kidneys function. Simple kidney cysts are very common. They rarely need treatment. Most people dont even know that they have them.  Understanding the kidneys  The kidneys are 2 bean-shaped organs located near the middle of your back. They filter large amounts of blood each day. They also help regulate the fluid and salts (electrolytes) in your blood. They release waste products through your urine. The kidneys have tiny tubules. These structures collect newly formed urine. Cysts may result when the tubules get blocked. Small sacs sometimes form on the tubules. These may detach and become simple kidney cysts.  What causes simple kidney cysts?  Researchers are still not sure what causes simple kidney cysts. You might have a single kidney cyst. Or you might have more than one. You might have them on only 1 kidney or on both of them. In most cases, a person has only 1 cyst. Over time the cyst may slowly increase in size.  Some health conditions can cause kidney cysts to grow. For example, a person with polycystic kidney disease develops a large number of kidney cysts. Too many cysts can keep the kidney from working properly.  Other health conditions that can cause simple kidney cysts include:  · Chronic kidney disease  · Dialysis for chronic kidney disease  · Medullary cystic kidney disease  · Autosomal dominant polycystic kidney disease  · Von Hippel-Lindau disease  · Tuberous sclerosis complex  If you smoke or have high blood pressure, you may have a higher risk for a simple kidney cyst.  Symptoms of simple kidney cysts  Simple kidney cysts often dont cause symptoms. In rare cases, they may cause symptoms such as:  · Blood in your urine if the cyst bursts  · Pain in your upper belly or back if the cyst bursts  · Fever and chills if the cyst is infected  · High blood pressure if the  cyst compresses the rest of the kidney  · Trouble urinating if the cyst blocks the tube that sends urine from the kidneys to the bladder (ureter)  A simple kidney cyst usually doesnt greatly impair kidney function unless it blocks the ureter. More commonly, a cyst may cause a slight drop in kidney function that doesnt cause any problems or symptoms.  Diagnosing simple kidney cysts  Simple kidney cysts are often first found with an imaging test that was done for another reason. Your health care provider will ask about your medical history and symptoms. Youll also be given a physical exam.  It is important to distinguish simple kidney cysts from complex cysts. Complex cysts are a different kind of cyst that may be cancer. A complex cyst needs to be removed. For this reason, you may need tests such as:  · Kidney ultrasound  · Kidney CT scan, if a detailed picture of the cyst is needed  · Kidney MRI, if more information is needed about the cyst  A radiologist will look at these pictures to see if your kidney cyst is simple or complex. A cyst may be rated with the Bosniak CT system. This has 5 categories based on how the cyst looks. If your cyst is a category 1, you likely wont need any more tests. A kidney cyst with a higher rating may need more tests or treatment. A category 5 cyst is most often linked with cancer.  Your health care provider will also check for other conditions that may be causing the cysts. You may need to have genetic testing. It can find other problems, such as polycystic kidney disease.  Treatment for simple kidney cysts  Many people with simple kidney cysts dont need treatment. Your health care provider may want to keep track of the cyst over time. You may need ultrasound of the kidneys several times a year.  If you have symptoms, or if the cyst is blocking the flow of urine, you may need treatment such as:  · Over-the-counter pain medicine  · A procedure to puncture the cyst with a long  needle inserted through the skin (sclerotherapy)  · Surgery to drain the cyst and remove its outer tissue  · Blood pressure medicine  · Antibiotics and drainage to treat a kidney cyst infection     When to call your health care provider  Call your health care provider right away if you have any of these:  · Blood in your urine  · Pain in your back  · Trouble urinating   Date Last Reviewed: 5/20/2015  © 3823-3389 [employer]. [address] [place] [address], [place] [address]. All rights reserved. This information is not intended as a substitute for professional medical care. Always follow your healthcare professional's instructions.

## 2019-10-14 ENCOUNTER — OFFICE VISIT (OUTPATIENT)
Dept: INTERNAL MEDICINE | Facility: CLINIC | Age: 74
End: 2019-10-14
Payer: MEDICARE

## 2019-10-14 DIAGNOSIS — R04.0 NOSEBLEED: ICD-10-CM

## 2019-10-14 DIAGNOSIS — Z00.00 ANNUAL PHYSICAL EXAM: Primary | ICD-10-CM

## 2019-10-14 DIAGNOSIS — E78.5 HYPERLIPIDEMIA, UNSPECIFIED HYPERLIPIDEMIA TYPE: ICD-10-CM

## 2019-10-14 DIAGNOSIS — N52.31 ERECTILE DYSFUNCTION AFTER RADICAL PROSTATECTOMY: ICD-10-CM

## 2019-10-14 DIAGNOSIS — D53.9 MACROCYTIC ANEMIA: ICD-10-CM

## 2019-10-14 DIAGNOSIS — I10 ESSENTIAL HYPERTENSION: ICD-10-CM

## 2019-10-14 DIAGNOSIS — N28.1 RENAL CYST, RIGHT: ICD-10-CM

## 2019-10-14 PROCEDURE — 99999 PR PBB SHADOW E&M-EST. PATIENT-LVL IV: CPT | Mod: PBBFAC,HCNC,, | Performed by: INTERNAL MEDICINE

## 2019-10-14 PROCEDURE — 3077F PR MOST RECENT SYSTOLIC BLOOD PRESSURE >= 140 MM HG: ICD-10-PCS | Mod: HCNC,CPTII,S$GLB, | Performed by: INTERNAL MEDICINE

## 2019-10-14 PROCEDURE — 99397 PR PREVENTIVE VISIT,EST,65 & OVER: ICD-10-PCS | Mod: HCNC,S$GLB,, | Performed by: INTERNAL MEDICINE

## 2019-10-14 PROCEDURE — 3079F DIAST BP 80-89 MM HG: CPT | Mod: HCNC,CPTII,S$GLB, | Performed by: INTERNAL MEDICINE

## 2019-10-14 PROCEDURE — 99397 PER PM REEVAL EST PAT 65+ YR: CPT | Mod: HCNC,S$GLB,, | Performed by: INTERNAL MEDICINE

## 2019-10-14 PROCEDURE — 99999 PR PBB SHADOW E&M-EST. PATIENT-LVL IV: ICD-10-PCS | Mod: PBBFAC,HCNC,, | Performed by: INTERNAL MEDICINE

## 2019-10-14 PROCEDURE — 3077F SYST BP >= 140 MM HG: CPT | Mod: HCNC,CPTII,S$GLB, | Performed by: INTERNAL MEDICINE

## 2019-10-14 PROCEDURE — 3079F PR MOST RECENT DIASTOLIC BLOOD PRESSURE 80-89 MM HG: ICD-10-PCS | Mod: HCNC,CPTII,S$GLB, | Performed by: INTERNAL MEDICINE

## 2019-10-14 RX ORDER — SILDENAFIL 100 MG/1
100 TABLET, FILM COATED ORAL DAILY PRN
Qty: 30 TABLET | Refills: 5 | Status: SHIPPED | OUTPATIENT
Start: 2019-10-14 | End: 2021-05-27

## 2019-10-14 NOTE — PROGRESS NOTES
Subjective:       Patient ID: Primitivo Mitchell is a 74 y.o. male.    Chief Complaint: Follow-up (6-month)    HPI    Last visit with me April 2019.  Since then seen by Cardiology, Urology. Upcoming Cardiology appointment. In May pt had outside EGD and colonoscopy. Colonoscopy demonstrated nonbleeding colonic angio ectasias and nonbleeding internal hemorrhoids.  Also notable radiation proctitis.  Recommendation for repeat in 5 years. Upper endoscopy demonstrated erythematous duodenopathy.  Also congested, erythematous, eroded, and hemorrhagic appearing mucosa in the antrum and pre-pyloric region of the stomach, biopsies were performed. Pathology report reviewed.  Demonstrates complete intestinal metaplasia (5% of mucosa).  Negative for H pylori and dysplasia.    Also the patient had echocardiogram was performed on June 12th.  Noted to have normal left ventricular size yet also with mild left ventricular hypertrophy.  Left ventricular systolic function was normal, also noted to have sigmoid septum.  Moderate aortic valve sclerosis without stenosis.    Patient reports symptoms nose bleeds occasionally since he has been using Flonase.  He takes this medication daily with good control of his sinus symptoms.  He sees an outside ENT but has not been back recently for evaluation.    Pt reports erectile dysfunction since his prostatectomy. Has not tried sildenafil in past.    Review of Systems   All other systems reviewed and are negative.      Objective:      Physical Exam   Constitutional: He is oriented to person, place, and time. No distress.   HENT:   Head: Atraumatic.   Right Ear: Tympanic membrane normal. No tenderness.   Left Ear: Tympanic membrane normal. No tenderness.   Nose: No epistaxis.  No foreign bodies.   Mouth/Throat: Oropharynx is clear and moist. No oropharyngeal exudate.   Mild excoriation on septum of R nostril without active bleeding   Eyes: Pupils are equal, round, and reactive to light. Right eye  exhibits no discharge. Left eye exhibits no discharge.   Neck: Normal range of motion. No thyromegaly present.   Cardiovascular: Normal rate, regular rhythm and normal heart sounds.   Pulmonary/Chest: Effort normal and breath sounds normal. No stridor. He has no wheezes. He has no rales.   Abdominal: Soft. There is no tenderness. There is no guarding.   Musculoskeletal: He exhibits no edema or tenderness.   Lymphadenopathy:     He has no cervical adenopathy.   Neurological: He is alert and oriented to person, place, and time.   Skin: Skin is warm and dry. No rash noted.   Psychiatric: He has a normal mood and affect. His behavior is normal.   Nursing note and vitals reviewed.      Vitals:    10/14/19 1440 10/14/19 1508   BP: (!) 140/82 (!) 148/86   BP Location: Right arm Right arm   Patient Position: Sitting Sitting   BP Method: Medium (Manual) Large (Manual)   Pulse: (!) 54    SpO2: 98%    Weight: 86.6 kg (190 lb 14.7 oz)    Height: 6' (1.829 m)      Body mass index is 25.89 kg/m².    I have personally checked the blood pressure and documented my findings.     RESULTS: Reviewed labs from last 12 months    Assessment:       1. Annual physical exam    2. Essential hypertension    3. Renal cyst, right -- no further f/u needed     4. Erectile dysfunction after radical prostatectomy    5. Nosebleed        Plan:   Primitivo was seen today for follow-up.    Diagnoses and all orders for this visit:    Annual physical exam:  Age-appropriate health screening reviewed, indicated tests ordered. Had flu shot, get Shingrix at future visits.    Essential hypertension:  Prior dx, not well controlled today. Check at home for next week, I will message for the results.  -     Basic metabolic panel; Future  -     Magnesium; Future    Renal cyst, right -- no further f/u needed     Erectile dysfunction after radical prostatectomy:  Prior dx, persists. Trial of sildenafil, counseled about side effects. If no improvement consider  tadalafil or refer to Urology.  -     sildenafil (VIAGRA) 100 MG tablet; Take 1 tablet (100 mg total) by mouth daily as needed for Erectile Dysfunction.    Nosebleed:  New problem, mild. possibly related to Flonase use, there is an area of scant blood in R nostril today. Pt sees outside Otolaryngology, he will schedule follow up with them.    Follow up in about 6 months (around 4/14/2020) for follow up visit, fasting labs 1 week prior.   Ron Esquivel MD  Internal Medicine    Portions of this note were completed using medical dictation software. Please excuse typographical or syntax errors that were missed on review.

## 2019-10-14 NOTE — PATIENT INSTRUCTIONS
Blood Pressure Measurement:    -- Please record your blood pressure daily for the next week. When checking, make sure you have been sitting for about 5 minutes, your legs are uncrossed, and the blood pressure cuff at the level of your heart. Record your blood pressure with the date and time in a log and bring it with you to every doctor's visit.  -- Goal blood pressure is top number in the 120s and the bottom close to 80. If the top number is in the 130s, please continue with healthy diet and exercise to try to bring it down. If the top number consistently is 140 or above, or the bottom number consistently 90 or above, we will need to start/adjust medication treatment.  -- Dr Esquivel will send you a message in 1 week for the blood pressure readings. We can determine if any other further steps are needed at that time.

## 2019-10-15 ENCOUNTER — TELEPHONE (OUTPATIENT)
Dept: INTERNAL MEDICINE | Facility: CLINIC | Age: 74
End: 2019-10-15

## 2019-10-15 NOTE — TELEPHONE ENCOUNTER
----- Message from Rea Siddiqui sent at 10/15/2019  8:51 AM CDT -----  Pt is coming for his 6 mth f/u in April he does have lab schedule for 4/7/2020 Lab order need to be put in an  link

## 2019-10-25 ENCOUNTER — TELEPHONE (OUTPATIENT)
Dept: INTERNAL MEDICINE | Facility: CLINIC | Age: 74
End: 2019-10-25

## 2019-10-25 VITALS
HEART RATE: 54 BPM | SYSTOLIC BLOOD PRESSURE: 125 MMHG | HEIGHT: 72 IN | WEIGHT: 190.94 LBS | OXYGEN SATURATION: 98 % | DIASTOLIC BLOOD PRESSURE: 77 MMHG | BODY MASS INDEX: 25.86 KG/M2

## 2019-10-25 NOTE — TELEPHONE ENCOUNTER
----- Message from Fidleina Rosario sent at 10/25/2019  2:53 PM CDT -----  Contact: patient [phone]  BP readings   10/14/19   153/90   Pulse 63  10/15/19   143/85   Pulse 67  10/16/19   132/77   Pulse 68  10/17/19   130/76   Pulse 70  10/18/19 no readings for this day  10/19/19   116/72   Pulse 61  10/20/19   115/71   Pulse 65  10/21/19  no readings for this date  10/22/19   120/74   Pulse 72  10/23/19   129/75   Pulse 69  10/24/19   127/81   Pulse 64

## 2019-11-19 ENCOUNTER — OFFICE VISIT (OUTPATIENT)
Dept: CARDIOLOGY | Facility: CLINIC | Age: 74
End: 2019-11-19
Attending: INTERNAL MEDICINE
Payer: MEDICARE

## 2019-11-19 VITALS
DIASTOLIC BLOOD PRESSURE: 66 MMHG | HEIGHT: 72 IN | SYSTOLIC BLOOD PRESSURE: 113 MMHG | HEART RATE: 64 BPM | BODY MASS INDEX: 25.33 KG/M2 | WEIGHT: 187 LBS

## 2019-11-19 DIAGNOSIS — I35.9 AORTIC VALVE DISEASE: ICD-10-CM

## 2019-11-19 DIAGNOSIS — Z85.46 HISTORY OF PROSTATE CANCER: ICD-10-CM

## 2019-11-19 DIAGNOSIS — I10 ESSENTIAL HYPERTENSION: ICD-10-CM

## 2019-11-19 DIAGNOSIS — I70.0 ABDOMINAL AORTIC ATHEROSCLEROSIS: ICD-10-CM

## 2019-11-19 PROCEDURE — 3078F DIAST BP <80 MM HG: CPT | Mod: CPTII,S$GLB,, | Performed by: INTERNAL MEDICINE

## 2019-11-19 PROCEDURE — 99214 OFFICE O/P EST MOD 30 MIN: CPT | Mod: S$GLB,,, | Performed by: INTERNAL MEDICINE

## 2019-11-19 PROCEDURE — 3074F SYST BP LT 130 MM HG: CPT | Mod: CPTII,S$GLB,, | Performed by: INTERNAL MEDICINE

## 2019-11-19 PROCEDURE — 99214 PR OFFICE/OUTPT VISIT, EST, LEVL IV, 30-39 MIN: ICD-10-PCS | Mod: S$GLB,,, | Performed by: INTERNAL MEDICINE

## 2019-11-19 PROCEDURE — 1159F PR MEDICATION LIST DOCUMENTED IN MEDICAL RECORD: ICD-10-PCS | Mod: S$GLB,,, | Performed by: INTERNAL MEDICINE

## 2019-11-19 PROCEDURE — 1159F MED LIST DOCD IN RCRD: CPT | Mod: S$GLB,,, | Performed by: INTERNAL MEDICINE

## 2019-11-19 PROCEDURE — 3078F PR MOST RECENT DIASTOLIC BLOOD PRESSURE < 80 MM HG: ICD-10-PCS | Mod: CPTII,S$GLB,, | Performed by: INTERNAL MEDICINE

## 2019-11-19 PROCEDURE — 3074F PR MOST RECENT SYSTOLIC BLOOD PRESSURE < 130 MM HG: ICD-10-PCS | Mod: CPTII,S$GLB,, | Performed by: INTERNAL MEDICINE

## 2019-11-19 PROCEDURE — 1101F PR PT FALLS ASSESS DOC 0-1 FALLS W/OUT INJ PAST YR: ICD-10-PCS | Mod: CPTII,S$GLB,, | Performed by: INTERNAL MEDICINE

## 2019-11-19 PROCEDURE — 1101F PT FALLS ASSESS-DOCD LE1/YR: CPT | Mod: CPTII,S$GLB,, | Performed by: INTERNAL MEDICINE

## 2019-11-19 NOTE — PROGRESS NOTES
Subjective:     Primitivo Mitchell is a 74 y.o. male with hypertension. He is overweight. He has mild aortic valve sclerosis. 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 He was seen at [facility] in 11/2014 and a Stress Echocardiogram was done. He did 9:00 minutes and there was no chest pain or significant ST depression. The Echo was read as an inferolateral wall motion abnormality and the aortic root was mildly dilated. The left ventricular function was mildly depressed. In 4/2016 he was diagnosed with prostate cancer and after thinking the options over he decided on robotic prostatectomy that was done on 5/23/2016. He had a CT scan of the abdomen that revealed aortic plaquing. In 2018 he expresses some concern for that his memory is slightly affected. There is no exertional chest pain or exertional dyspnea. No palpitations or weak spells. No bleeding. Feeling well overall.       Hypertension   This is a chronic problem. The current episode started more than 1 year ago. The problem is unchanged. The problem is controlled (usually 120-130/70-80 mmHg at home). Pertinent negatives include no anxiety, blurred vision, chest pain, headaches, malaise/fatigue, neck pain, orthopnea, palpitations, peripheral edema, PND, shortness of breath or sweats. There is no history of angina.       Review of Systems   Constitution: Negative for chills, fever, malaise/fatigue and weight loss.   HENT: Negative for nosebleeds.    Eyes: Negative for blurred vision, pain, vision loss in left eye and vision loss in right eye.   Cardiovascular: Negative for chest pain, claudication, dyspnea on exertion, irregular heartbeat, leg swelling, near-syncope, orthopnea, palpitations, paroxysmal nocturnal dyspnea and syncope.   Respiratory: Negative for cough, hemoptysis, shortness of breath, sputum production and wheezing.    Endocrine: Negative for cold intolerance and heat intolerance.   Hematologic/Lymphatic: Negative for  bleeding problem. Does not bruise/bleed easily.   Skin: Negative for color change and rash.   Musculoskeletal: Negative for falls and neck pain.   Gastrointestinal: Negative for heartburn, hematemesis, hematochezia, hemorrhoids, jaundice, melena, nausea and vomiting.   Genitourinary: Positive for bladder incontinence. Negative for dysuria and hematuria.   Neurological: Negative for dizziness, focal weakness, headaches, light-headedness, loss of balance, numbness and vertigo.   Psychiatric/Behavioral: Negative for altered mental status, depression and memory loss. The patient is not nervous/anxious.    Allergic/Immunologic: Negative for hives and persistent infections.       Current Outpatient Medications on File Prior to Visit   Medication Sig Dispense Refill    aspirin (ECOTRIN) 81 MG EC tablet Take 1 tablet (81 mg total) by mouth once daily. 90 tablet 3    cholecalciferol, vitamin D3, 5,000 unit Tab Take 5,000 Units by mouth once daily.      fish oil-omega-3 fatty acids 300-1,000 mg capsule Take 2 g by mouth once daily.      fluticasone propionate (FLONASE) 50 mcg/actuation nasal spray 2 sprays (100 mcg total) by Each Nare route once daily. 48 g 0    ketoconazole (NIZORAL) 2 % cream Apply topically once daily. Please dispense 3 mo supply (2 tubes per month) 360 g 3    ketotifen (ALAWAY) 0.025 % ophthalmic solution 1 drop 2 (two) times daily.      losartan (COZAAR) 50 MG tablet Take 1 tablet (50 mg total) by mouth once daily. 90 tablet 3    rosuvastatin (CRESTOR) 10 MG tablet Take 1 tablet (10 mg total) by mouth once daily. 90 tablet 3    triamcinolone acetonide 0.1% (KENALOG) 0.1 % cream Apply topically 2 (two) times daily. To affected area 45 g 0    sildenafil (VIAGRA) 100 MG tablet Take 1 tablet (100 mg total) by mouth daily as needed for Erectile Dysfunction. 30 tablet 5     No current facility-administered medications on file prior to visit.        /66 (BP Location: Left arm, Patient Position:  Sitting)   Pulse 64   Ht 6' (1.829 m)   Wt 84.8 kg (187 lb)   BMI 25.36 kg/m²       Objective:     Physical Exam   Constitutional: He is oriented to person, place, and time. He appears well-developed and well-nourished. He does not appear ill. No distress.   HENT:   Head: Normocephalic and atraumatic.   Nose: Nose normal.   Mouth/Throat: No oropharyngeal exudate.   Eyes: Right eye exhibits no discharge. Left eye exhibits no discharge. Right conjunctiva is not injected. Left conjunctiva is not injected. Right pupil is round. Left pupil is round. Pupils are equal.   Neck: No JVD present. Carotid bruit is not present. No thyromegaly present.   Cardiovascular: Normal rate, regular rhythm, S1 normal and S2 normal. Exam reveals no gallop.   Murmur heard.   Midsystolic murmur is present with a grade of 2/6 at the upper right sternal border.  High-pitched blowing decrescendo early diastolic murmur is present with a grade of 2/6 at the upper right sternal border and lower left sternal border.  Pulses:       Radial pulses are 2+ on the right side, and 2+ on the left side.        Dorsalis pedis pulses are 2+ on the right side, and 2+ on the left side.        Posterior tibial pulses are 2+ on the right side, and 2+ on the left side.   Pulmonary/Chest: Effort normal and breath sounds normal.   Abdominal: Soft. Normal appearance. There is no hepatosplenomegaly. There is no tenderness.   Musculoskeletal:        Right ankle: He exhibits no swelling, no ecchymosis and no deformity.        Left ankle: He exhibits no swelling, no ecchymosis and no deformity.   Lymphadenopathy:        Head (right side): No submandibular adenopathy present.        Head (left side): No submandibular adenopathy present.     He has no cervical adenopathy.   Neurological: He is alert and oriented to person, place, and time. He is not disoriented. No cranial nerve deficit.   Skin: Skin is warm, dry and intact. No rash noted. He is not diaphoretic.    Psychiatric: He has a normal mood and affect. His speech is normal and behavior is normal. Judgment and thought content normal. Cognition and memory are normal.       Assessment:      1. Aortic valve disease    2. Abdominal aortic atherosclerosis    3. Essential hypertension    4. History of prostate cancer        Plan:     1. Aortic Valve Disease   2011: Treated for presumed endocarditis.   3/21/2013: Echo: Mild aortic valve sclerosis.    5/5/2015: Echo: Normal left ventricular size and systolic function. Moderate aortic valve sclerosis - 1.8 m/s - trace AR.   6/12/2019: Echo: Normal left ventricular size and systolic function. Mild LVH with sigmoid septum. Moderate aortic valve sclerosis - 1.7 m/s. Mild AR.   Reassurance.   Followed.   6/2021: Plan next Echo. Then every few years.    2. Peripheral Artery Disease   2015: CT Scan: Aortic plaquing seen.   9/2/2015: Chol 199. HDL 60. . .   On atorvastatin 20 mg Q24.   12/14/2016: Chol 156. HDL 57. LDL 82. TG 85.   11/15/2017: Chol 146. HDL 50. LDL 74. .   On rosuvastatin 10 mg Q24.   4/10/2019: Chol 122. HDL 57. LDL 41. .   On rosuvastatin 10 mg Q24.   On aspirin 81 mg Q24.   Well controlled lipid panel.    3. Hypertension   2009: Diagnosed.   On losartan 50 mg Q24.   Keeping log at home.   Well controlled.    4. Wall Motion Abnormalities on Echo   11/2014: Stress Echo: 9:00 min. No CP. ECG negative. Echo: Inferolateral WMA with mild LV dysfunction. Aortic root mildly enlarged.   Followed.    5. Prostate Cancer   4/2016: Diagnosed.   5/23/2016: Robotic prostatectomy.   Dr. Manuel Reyes.    6. Erectile Dysfunction   On tadalafil for BPH with good effect.   May take tadalafil 20 mg PRN.    7. Primary Care   Dr. Ron Esquivel.    F/u 6 months.    Wisam Verde M.D.

## 2020-02-25 ENCOUNTER — HOSPITAL ENCOUNTER (OUTPATIENT)
Facility: HOSPITAL | Age: 75
Discharge: HOME | End: 2020-02-26
Attending: EMERGENCY MEDICINE | Admitting: EMERGENCY MEDICINE
Payer: MEDICARE

## 2020-02-25 DIAGNOSIS — N17.9 AKI (ACUTE KIDNEY INJURY): ICD-10-CM

## 2020-02-25 DIAGNOSIS — R21 RASH: Primary | ICD-10-CM

## 2020-02-25 DIAGNOSIS — I10 ESSENTIAL HYPERTENSION: ICD-10-CM

## 2020-02-25 LAB
ALBUMIN SERPL BCP-MCNC: 3.6 G/DL (ref 3.5–5.2)
ALP SERPL-CCNC: 67 U/L (ref 55–135)
ALT SERPL W/O P-5'-P-CCNC: 45 U/L (ref 10–44)
ANION GAP SERPL CALC-SCNC: 10 MMOL/L (ref 8–16)
APTT BLDCRRT: 30.4 SEC (ref 21–32)
AST SERPL-CCNC: 46 U/L (ref 10–40)
BACTERIA #/AREA URNS AUTO: NORMAL /HPF
BASOPHILS # BLD AUTO: 0.01 K/UL (ref 0–0.2)
BASOPHILS # BLD AUTO: 0.01 K/UL (ref 0–0.2)
BASOPHILS NFR BLD: 0.2 % (ref 0–1.9)
BASOPHILS NFR BLD: 0.2 % (ref 0–1.9)
BILIRUB SERPL-MCNC: 0.8 MG/DL (ref 0.1–1)
BILIRUB UR QL STRIP: NEGATIVE
BUN SERPL-MCNC: 31 MG/DL (ref 8–23)
CALCIUM SERPL-MCNC: 8.6 MG/DL (ref 8.7–10.5)
CHLORIDE SERPL-SCNC: 105 MMOL/L (ref 95–110)
CLARITY UR REFRACT.AUTO: CLEAR
CO2 SERPL-SCNC: 21 MMOL/L (ref 23–29)
COLOR UR AUTO: YELLOW
CREAT SERPL-MCNC: 1.4 MG/DL (ref 0.5–1.4)
CRP SERPL-MCNC: 52.6 MG/L (ref 0–8.2)
DIFFERENTIAL METHOD: ABNORMAL
DIFFERENTIAL METHOD: ABNORMAL
EOSINOPHIL # BLD AUTO: 0.1 K/UL (ref 0–0.5)
EOSINOPHIL # BLD AUTO: 0.4 K/UL (ref 0–0.5)
EOSINOPHIL NFR BLD: 2 % (ref 0–8)
EOSINOPHIL NFR BLD: 5.3 % (ref 0–8)
ERYTHROCYTE [DISTWIDTH] IN BLOOD BY AUTOMATED COUNT: 13.4 % (ref 11.5–14.5)
ERYTHROCYTE [DISTWIDTH] IN BLOOD BY AUTOMATED COUNT: 13.4 % (ref 11.5–14.5)
ERYTHROCYTE [SEDIMENTATION RATE] IN BLOOD BY WESTERGREN METHOD: 30 MM/HR (ref 0–23)
EST. GFR  (AFRICAN AMERICAN): 57.2 ML/MIN/1.73 M^2
EST. GFR  (NON AFRICAN AMERICAN): 49.5 ML/MIN/1.73 M^2
GLUCOSE SERPL-MCNC: 92 MG/DL (ref 70–110)
GLUCOSE UR QL STRIP: NEGATIVE
HCT VFR BLD AUTO: 42.4 % (ref 40–54)
HCT VFR BLD AUTO: 43.1 % (ref 40–54)
HGB BLD-MCNC: 13.7 G/DL (ref 14–18)
HGB BLD-MCNC: 14.2 G/DL (ref 14–18)
HGB UR QL STRIP: ABNORMAL
IMM GRANULOCYTES # BLD AUTO: 0.02 K/UL (ref 0–0.04)
IMM GRANULOCYTES # BLD AUTO: 0.03 K/UL (ref 0–0.04)
IMM GRANULOCYTES NFR BLD AUTO: 0.3 % (ref 0–0.5)
IMM GRANULOCYTES NFR BLD AUTO: 0.5 % (ref 0–0.5)
INR PPP: 0.9 (ref 0.8–1.2)
KETONES UR QL STRIP: NEGATIVE
LEUKOCYTE ESTERASE UR QL STRIP: NEGATIVE
LYMPHOCYTES # BLD AUTO: 0.7 K/UL (ref 1–4.8)
LYMPHOCYTES # BLD AUTO: 0.9 K/UL (ref 1–4.8)
LYMPHOCYTES NFR BLD: 10.5 % (ref 18–48)
LYMPHOCYTES NFR BLD: 13.5 % (ref 18–48)
MCH RBC QN AUTO: 32.3 PG (ref 27–31)
MCH RBC QN AUTO: 32.6 PG (ref 27–31)
MCHC RBC AUTO-ENTMCNC: 32.3 G/DL (ref 32–36)
MCHC RBC AUTO-ENTMCNC: 32.9 G/DL (ref 32–36)
MCV RBC AUTO: 100 FL (ref 82–98)
MCV RBC AUTO: 99 FL (ref 82–98)
MICROSCOPIC COMMENT: NORMAL
MONOCYTES # BLD AUTO: 0.2 K/UL (ref 0.3–1)
MONOCYTES # BLD AUTO: 0.4 K/UL (ref 0.3–1)
MONOCYTES NFR BLD: 2.9 % (ref 4–15)
MONOCYTES NFR BLD: 6.5 % (ref 4–15)
NEUTROPHILS # BLD AUTO: 4.9 K/UL (ref 1.8–7.7)
NEUTROPHILS # BLD AUTO: 5.6 K/UL (ref 1.8–7.7)
NEUTROPHILS NFR BLD: 74 % (ref 38–73)
NEUTROPHILS NFR BLD: 84.1 % (ref 38–73)
NITRITE UR QL STRIP: NEGATIVE
NRBC BLD-RTO: 0 /100 WBC
NRBC BLD-RTO: 0 /100 WBC
PATH REV BLD -IMP: NORMAL
PH UR STRIP: 6 [PH] (ref 5–8)
PLATELET # BLD AUTO: 159 K/UL (ref 150–350)
PLATELET # BLD AUTO: 161 K/UL (ref 150–350)
PMV BLD AUTO: 9.7 FL (ref 9.2–12.9)
PMV BLD AUTO: 9.9 FL (ref 9.2–12.9)
POTASSIUM SERPL-SCNC: 4.2 MMOL/L (ref 3.5–5.1)
PROT SERPL-MCNC: 7.2 G/DL (ref 6–8.4)
PROT UR QL STRIP: NEGATIVE
PROTHROMBIN TIME: 10 SEC (ref 9–12.5)
RBC # BLD AUTO: 4.24 M/UL (ref 4.6–6.2)
RBC # BLD AUTO: 4.35 M/UL (ref 4.6–6.2)
RBC #/AREA URNS AUTO: 0 /HPF (ref 0–4)
SODIUM SERPL-SCNC: 136 MMOL/L (ref 136–145)
SP GR UR STRIP: 1.01 (ref 1–1.03)
URN SPEC COLLECT METH UR: ABNORMAL
WBC # BLD AUTO: 6.59 K/UL (ref 3.9–12.7)
WBC # BLD AUTO: 6.6 K/UL (ref 3.9–12.7)
WBC #/AREA URNS AUTO: 1 /HPF (ref 0–5)

## 2020-02-25 PROCEDURE — 85060 BLOOD SMEAR INTERPRETATION: CPT | Mod: HCNC,,, | Performed by: PATHOLOGY

## 2020-02-25 PROCEDURE — 85730 THROMBOPLASTIN TIME PARTIAL: CPT | Mod: HCNC

## 2020-02-25 PROCEDURE — G0378 HOSPITAL OBSERVATION PER HR: HCPCS | Mod: HCNC

## 2020-02-25 PROCEDURE — 86140 C-REACTIVE PROTEIN: CPT | Mod: HCNC

## 2020-02-25 PROCEDURE — 63600175 PHARM REV CODE 636 W HCPCS: Mod: HCNC | Performed by: EMERGENCY MEDICINE

## 2020-02-25 PROCEDURE — 96374 THER/PROPH/DIAG INJ IV PUSH: CPT | Performed by: EMERGENCY MEDICINE

## 2020-02-25 PROCEDURE — 99285 PR EMERGENCY DEPT VISIT,LEVEL V: ICD-10-PCS | Mod: ,,, | Performed by: EMERGENCY MEDICINE

## 2020-02-25 PROCEDURE — 99285 EMERGENCY DEPT VISIT HI MDM: CPT | Mod: HCNC

## 2020-02-25 PROCEDURE — 81001 URINALYSIS AUTO W/SCOPE: CPT | Mod: HCNC

## 2020-02-25 PROCEDURE — 99285 EMERGENCY DEPT VISIT HI MDM: CPT | Mod: ,,, | Performed by: EMERGENCY MEDICINE

## 2020-02-25 PROCEDURE — 85025 COMPLETE CBC W/AUTO DIFF WBC: CPT | Mod: HCNC

## 2020-02-25 PROCEDURE — 85610 PROTHROMBIN TIME: CPT | Mod: HCNC

## 2020-02-25 PROCEDURE — 63600175 PHARM REV CODE 636 W HCPCS: Mod: HCNC | Performed by: PHYSICIAN ASSISTANT

## 2020-02-25 PROCEDURE — 85652 RBC SED RATE AUTOMATED: CPT | Mod: HCNC

## 2020-02-25 PROCEDURE — 80053 COMPREHEN METABOLIC PANEL: CPT | Mod: HCNC

## 2020-02-25 PROCEDURE — 36415 COLL VENOUS BLD VENIPUNCTURE: CPT | Mod: HCNC

## 2020-02-25 PROCEDURE — 99220 PR INITIAL OBSERVATION CARE,LEVL III: CPT | Mod: HCNC,,, | Performed by: PHYSICIAN ASSISTANT

## 2020-02-25 PROCEDURE — 85060 PATHOLOGIST REVIEW: ICD-10-PCS | Mod: HCNC,,, | Performed by: PATHOLOGY

## 2020-02-25 PROCEDURE — 99220 PR INITIAL OBSERVATION CARE,LEVL III: ICD-10-PCS | Mod: HCNC,,, | Performed by: PHYSICIAN ASSISTANT

## 2020-02-25 RX ORDER — TALC
9 POWDER (GRAM) TOPICAL NIGHTLY PRN
Status: DISCONTINUED | OUTPATIENT
Start: 2020-02-25 | End: 2020-02-26 | Stop reason: HOSPADM

## 2020-02-25 RX ORDER — BISACODYL 10 MG
10 SUPPOSITORY, RECTAL RECTAL DAILY PRN
Status: DISCONTINUED | OUTPATIENT
Start: 2020-02-25 | End: 2020-02-26 | Stop reason: HOSPADM

## 2020-02-25 RX ORDER — IPRATROPIUM BROMIDE AND ALBUTEROL SULFATE 2.5; .5 MG/3ML; MG/3ML
3 SOLUTION RESPIRATORY (INHALATION) EVERY 4 HOURS PRN
Status: DISCONTINUED | OUTPATIENT
Start: 2020-02-25 | End: 2020-02-26 | Stop reason: HOSPADM

## 2020-02-25 RX ORDER — ACETAMINOPHEN 325 MG/1
650 TABLET ORAL EVERY 4 HOURS PRN
Status: DISCONTINUED | OUTPATIENT
Start: 2020-02-25 | End: 2020-02-26 | Stop reason: HOSPADM

## 2020-02-25 RX ORDER — ONDANSETRON 8 MG/1
8 TABLET, ORALLY DISINTEGRATING ORAL EVERY 8 HOURS PRN
Status: DISCONTINUED | OUTPATIENT
Start: 2020-02-25 | End: 2020-02-26 | Stop reason: HOSPADM

## 2020-02-25 RX ORDER — METHYLPREDNISOLONE SOD SUCC 125 MG
125 VIAL (EA) INJECTION
Status: COMPLETED | OUTPATIENT
Start: 2020-02-25 | End: 2020-02-25

## 2020-02-25 RX ADMIN — SODIUM CHLORIDE 1000 ML: 0.9 INJECTION, SOLUTION INTRAVENOUS at 01:02

## 2020-02-25 RX ADMIN — METHYLPREDNISOLONE SODIUM SUCCINATE 125 MG: 125 INJECTION, POWDER, FOR SOLUTION INTRAMUSCULAR; INTRAVENOUS at 02:02

## 2020-02-25 NOTE — HPI
Patient is a 75yo male with a PMHx of HTN, prostate cancer s/p prostatectomy being admitted to observation for a rash. Patient reports onset of rash to his chest, back, bilateral arms, and legs yesterday after he cut grass. He describes the rash as non itchy currently. He did have some itching after mowing the lawn, but he showered and put curel anti itch lotion on and the itching resolved. He denies new medications. He denies fevers, sick contacts, chest pain, difficulty swallowing, SOB. Patient reports he just came off of a 2 week cruise on Sunday. He reports that towards the end of his cruise trip, patient had diarrhea, nausea and vomiting. The nurse on the boat gave him medication (anti diarrheal medication - no specification on paperwork) which resolved his symptoms. He reports he took the medication for a day. He denies new soaps, detergents. He reports wearing old pants while cutting the grass yesterday. Of note, he did let the laundry on the cruise wash his clothes, but that did not cause any symptoms while on the boat and he has not worn any of those clothes.    In the ED, vitals stable. Intake labs remarkable for CRP 52.6, ESR 30. He was given 125mg methylprednisone IV prior to admission.

## 2020-02-25 NOTE — ED PROVIDER NOTES
Encounter Date: 2/25/2020    SCRIBE #1 NOTE: I, Zahira Stewart, am scribing for, and in the presence of,  Hair Roberts MD. I have scribed the entire note.       History     Chief Complaint   Patient presents with    Rash     c/o rash throughout chest back legs and arms onset yesterday     Time patient was seen by the provider: 11:19 AM      The patient is a 74 y.o. male with co-morbidities including: HTN, mild aortic valve sclerosis, osteoporosis, who presents to the ED with a complaint of rash to chest, back, bilateral legs, and bilateral arms. Rash is non itchy. Denies rash to genitals. Patient reports he noticed his rash after he came back home from cutting grass. Denies new medication. Denies penile pain, throat swelling or difficulty breathing. Patient reports he just came off of a 2 weeks cruise. Towards the end of his cruise trip, patient endorses diarrhea, nausea and vomiting. Nurse on the boat gave him medication (anti diarrheal medication - no specification on paperwork) which gave him resolution of his symptoms. He reports he took the medication for a day.    The history is provided by the patient.     Review of patient's allergies indicates:   Allergen Reactions    Latex, natural rubber Rash    Omnipaque 140 [iohexol] Rash     Extensive rash over trunk font and back and legs the morning after CT dye given in late afternon the previous day. Omnipaque 350: 100cc IV & 30cc oral. Rash is severe, but listed as moderate because not shortness of breath    Allegra-d 12 hour [fexofenadine-pseudoephedrine] Other (See Comments)     Raise BP    Azithromycin     Bactrim [sulfamethoxazole-trimethoprim]     Boniva [ibandronate]     Celebrex [celecoxib]     Ciprofloxacin     Claritin-d 12 hour [loratadine-pseudoephedrine]     Fexofenadine     Hydrocortisone     Ibandronate sodium     Imipramine     Iodinated contrast media     Loratadine     Neomycin     Neomycin-polymyxin-hc     Neurontin  [gabapentin]     Nitrofuran analogues     Sulfa (sulfonamide antibiotics)     Adhesive Rash     Adhesive tape causes rash     Androderm [testosterone] Rash     Past Medical History:   Diagnosis Date    Aortic valve disorders 5/29/2013    3/21/2013: [facility]. Mild aortic valve sclerosis. 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    Chronic bilateral low back pain with sciatica 4/25/2017    ED (erectile dysfunction)     Enlarged prostate     Floaters in visual field     Hyperlipidemia     Hypertension     Intestinal metaplasia of gastric mucosa 05/03/2019    outside Pathology     Osteoporosis     Pre-operative cardiovascular examination 5/18/2016 5/23/2016: Plan is robotic prostatectomy.   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/2019    seen on outside colonoscopy     Past Surgical History:   Procedure Laterality Date    COLONOSCOPY      COLONOSCOPY  05/03/2019    COLONOSCOPY W/ POLYPECTOMY      ESOPHAGOGASTRODUODENOSCOPY  05/03/2019    PROSTATE SURGERY  05/23/2016    Prostatectomy for prostate cancer, done at [facility]     Family History   Problem Relation Age of Onset    Heart disease Mother     Alzheimer's disease Mother     Tremor Mother     Neuropathy Mother     Other Father         Colon problems    Ulcerative colitis Sister     Prostate cancer Brother     Anxiety disorder Son     Hepatomegaly Son     Early death Paternal Grandmother     Cancer Brother         Brain, Lung, Kidney     Social History     Tobacco Use    Smoking status: Never Smoker    Smokeless tobacco: Never Used   Substance Use Topics    Alcohol use: No    Drug use: No     Review of Systems   Constitutional: Negative for fever.   HENT: Negative for sore throat.    Respiratory: Negative for shortness of breath.    Cardiovascular: Negative for chest pain.   Gastrointestinal: Negative for nausea.    Genitourinary: Negative for dysuria.   Musculoskeletal: Negative for back pain.   Skin: Positive for rash.   Neurological: Negative for weakness.   Hematological: Does not bruise/bleed easily.       Physical Exam     Initial Vitals [02/25/20 1057]   BP Pulse Resp Temp SpO2   (!) 120/56 71 18 97.5 °F (36.4 °C) 98 %      MAP       --         Physical Exam    Nursing note and vitals reviewed.  Constitutional: He appears well-developed and well-nourished.   HENT:   Head: Normocephalic and atraumatic.   Mouth/Throat: Oropharynx is clear and moist.   Eyes: Conjunctivae and EOM are normal. Pupils are equal, round, and reactive to light.   Neck: Normal range of motion. Neck supple.   Cardiovascular: Normal rate and regular rhythm.   Murmur heard.   Systolic murmur is present.  Pulmonary/Chest: Breath sounds normal. No respiratory distress. He has no wheezes. He has no rhonchi. He has no rales.   Abdominal: Soft. He exhibits no distension. There is no tenderness. There is no rebound and no guarding.   Musculoskeletal: Normal range of motion. He exhibits no edema or tenderness.   Neurological: He is alert and oriented to person, place, and time. He has normal strength. No cranial nerve deficit or sensory deficit.   Skin: Skin is warm and dry. Rash noted.   Diffused petechiae and purpura to his arms and legs as well as abdomen. Does not affect the genitals or oropharynx. There is no skin breakdown or blistering, no tenderness or erythema.                  ED Course   Procedures  Labs Reviewed   CBC W/ AUTO DIFFERENTIAL - Abnormal; Notable for the following components:       Result Value    RBC 4.35 (*)     Mean Corpuscular Volume 99 (*)     Mean Corpuscular Hemoglobin 32.6 (*)     Lymph # 0.9 (*)     Gran% 74.0 (*)     Lymph% 13.5 (*)     All other components within normal limits    Narrative:     shared lav    SEDIMENTATION RATE - Abnormal; Notable for the following components:    Sed Rate 30 (*)     All other components  within normal limits    Narrative:     shared lav    URINALYSIS, REFLEX TO URINE CULTURE - Abnormal; Notable for the following components:    Occult Blood UA 1+ (*)     All other components within normal limits    Narrative:     Preferred Collection Type->Urine, Clean Catch   C-REACTIVE PROTEIN - Abnormal; Notable for the following components:    CRP 52.6 (*)     All other components within normal limits   COMPREHENSIVE METABOLIC PANEL - Abnormal; Notable for the following components:    CO2 21 (*)     BUN, Bld 31 (*)     Calcium 8.6 (*)     AST 46 (*)     ALT 45 (*)     eGFR if  57.2 (*)     eGFR if non  49.5 (*)     All other components within normal limits   PROTIME-INR    Narrative:     shared lav    APTT    Narrative:     shared lav    URINALYSIS MICROSCOPIC    Narrative:     Preferred Collection Type->Urine, Clean Catch          Imaging Results    None          Medical Decision Making:   History:   Old Medical Records: I decided to obtain old medical records.  Initial Assessment:   Patient with diffuse rash that looks like a vasculitis type rash. It is not pleuritic or erythematous. He has no new medication except for an anti diarrheal medicine. He looks systemically well. Will check basic labs and urine.   Clinical Tests:   Lab Tests: Ordered and Reviewed  ED Management:  1:20 PM  Has BERNARDINO on his labs. Discussed with internal medicine. Will place in observation, give dose of steroids and dermatology consult.   Other:   I have discussed this case with another health care provider.       <> Summary of the Discussion: Internal medicine            Scribe Attestation:   Scribe #1: I performed the above scribed service and the documentation accurately describes the services I performed. I attest to the accuracy of the note.                          Clinical Impression:       ICD-10-CM ICD-9-CM   1. Rash R21 782.1   2. BERNARDINO (acute kidney injury) N17.9 584.9             ED Disposition  Condition    Observation                           Hair Roberts MD  02/25/20 9037

## 2020-02-25 NOTE — ED TRIAGE NOTES
Patient states he cut grass yesterday with resulting red color rash to abdomen, arms, thighs. Purple discolored areas to BLE.  No OTC meds. Denies SOB, denies difficulty swallowing.

## 2020-02-25 NOTE — SUBJECTIVE & OBJECTIVE
Past Medical History:   Diagnosis Date    Aortic valve disorders 5/29/2013    3/21/2013: [facility]. Mild aortic valve sclerosis. 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    Chronic bilateral low back pain with sciatica 4/25/2017    ED (erectile dysfunction)     Enlarged prostate     Floaters in visual field     Hyperlipidemia     Hypertension     Intestinal metaplasia of gastric mucosa 05/03/2019    outside Pathology     Osteoporosis     Pre-operative cardiovascular examination 5/18/2016 5/23/2016: Plan is robotic prostatectomy.   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 Omnipaque 350: 100cc IV & 30cc oral. Rash is severe, but listed as moderate because not shortness of breath    Allegra-d 12 hour [fexofenadine-pseudoephedrine] Other (See Comments)     Raise BP    Azithromycin     Bactrim [sulfamethoxazole-trimethoprim]     Boniva [ibandronate]     Celebrex [celecoxib]     Ciprofloxacin     Claritin-d 12 hour [loratadine-pseudoephedrine]     Fexofenadine     Hydrocortisone     Ibandronate sodium     Imipramine     Iodinated contrast media     Loratadine     Neomycin     Neomycin-polymyxin-hc     Neurontin [gabapentin]     Nitrofuran analogues     Sulfa (sulfonamide antibiotics)     Adhesive Rash     Adhesive tape causes rash      Androderm [testosterone] Rash       No current facility-administered medications on file prior to encounter.      Current Outpatient Medications on File Prior to Encounter   Medication Sig    aspirin (ECOTRIN) 81 MG EC tablet Take 1 tablet (81 mg total) by mouth once daily.    losartan (COZAAR) 50 MG tablet Take 1 tablet (50 mg total) by mouth once daily.    rosuvastatin (CRESTOR) 10 MG tablet Take 1 tablet (10 mg total) by mouth once daily.    cholecalciferol, vitamin D3, 5,000 unit Tab Take 5,000 Units by mouth once daily.    fish oil-omega-3 fatty acids 300-1,000 mg capsule Take 2 g by mouth once daily.    fluticasone propionate (FLONASE) 50 mcg/actuation nasal spray 2 sprays (100 mcg total) by Each Nare route once daily.    ketoconazole (NIZORAL) 2 % cream Apply topically once daily. Please dispense 3 mo supply (2 tubes per month)    ketotifen (ALAWAY) 0.025 % ophthalmic solution 1 drop 2 (two) times daily.    sildenafil (VIAGRA) 100 MG tablet Take 1 tablet (100 mg total) by mouth daily as needed for Erectile Dysfunction.    triamcinolone acetonide 0.1% (KENALOG) 0.1 % cream Apply topically 2 (two) times daily. To affected area     Family History     Problem Relation (Age of Onset)    Alzheimer's disease Mother    Anxiety disorder Son    Cancer Brother    Early death Paternal Grandmother    Heart disease Mother    Hepatomegaly Son    Neuropathy Mother    Other Father    Prostate cancer Brother    Tremor Mother    Ulcerative colitis Sister        Tobacco Use    Smoking status: Never Smoker    Smokeless tobacco: Never Used   Substance and Sexual Activity    Alcohol use: No    Drug use: No    Sexual activity: Yes     Partners: Female     Review of Systems   Constitutional: Negative for activity change, chills and fever.   HENT: Negative for trouble swallowing.    Eyes: Negative for photophobia and visual disturbance.   Respiratory: Negative for chest tightness, shortness of breath and wheezing.     Cardiovascular: Negative for chest pain, palpitations and leg swelling.   Gastrointestinal: Positive for diarrhea, nausea and vomiting. Negative for abdominal pain and constipation.   Genitourinary: Negative for dysuria, frequency, hematuria and urgency.   Musculoskeletal: Negative for arthralgias, back pain and gait problem.   Skin: Positive for color change and rash.   Neurological: Negative for dizziness, syncope, weakness, light-headedness, numbness and headaches.   Psychiatric/Behavioral: Negative for agitation and confusion. The patient is not nervous/anxious.      Objective:     Vital Signs (Most Recent):  Temp: 98.1 °F (36.7 °C) (02/25/20 1421)  Pulse: 64 (02/25/20 1421)  Resp: 17 (02/25/20 1421)  BP: 123/67 (02/25/20 1421)  SpO2: 100 % (02/25/20 1421) Vital Signs (24h Range):  Temp:  [97.5 °F (36.4 °C)-98.1 °F (36.7 °C)] 98.1 °F (36.7 °C)  Pulse:  [61-71] 64  Resp:  [17-18] 17  SpO2:  [98 %-100 %] 100 %  BP: (112-123)/(56-67) 123/67     Weight: 83.9 kg (185 lb)  Body mass index is 25.09 kg/m².    Physical Exam   Constitutional: He is oriented to person, place, and time. He appears well-developed and well-nourished. No distress.   HENT:   Head: Normocephalic and atraumatic.   Mouth/Throat: No oropharyngeal exudate.   Eyes: Pupils are equal, round, and reactive to light. Conjunctivae and EOM are normal.   Neck: Normal range of motion. Neck supple.   Cardiovascular: Normal rate, regular rhythm, normal heart sounds and intact distal pulses.   Pulmonary/Chest: Effort normal and breath sounds normal. No respiratory distress. He has no wheezes.   Abdominal: Soft. Bowel sounds are normal. He exhibits no distension. There is no tenderness.   Musculoskeletal: Normal range of motion. He exhibits no edema or tenderness.   Lymphadenopathy:     He has no cervical adenopathy.   Neurological: He is alert and oriented to person, place, and time. No cranial nerve deficit or sensory deficit. Coordination normal.   Skin:  Skin is warm and dry. Capillary refill takes less than 2 seconds. Petechiae, purpura and rash noted.   Purpura to bilateral LE  Petechiae rash to trunk, upper extremities, thighs   Psychiatric: He has a normal mood and affect. His behavior is normal. Judgment and thought content normal.   Nursing note and vitals reviewed.        CRANIAL NERVES     CN III, IV, VI   Pupils are equal, round, and reactive to light.  Extraocular motions are normal.        Significant Labs:   BMP:   Recent Labs   Lab 02/25/20  1230   GLU 92      K 4.2      CO2 21*   BUN 31*   CREATININE 1.4   CALCIUM 8.6*     CBC:   Recent Labs   Lab 02/25/20  1138   WBC 6.60   HGB 14.2   HCT 43.1          Significant Imaging: I have reviewed all pertinent imaging results/findings within the past 24 hours.

## 2020-02-25 NOTE — H&P
[facility] Hospital Medicine  History & Physical    Patient Name: Primitivo Mitchell  MRN: [id number]  Admission Date: 2/25/2020  Attending Physician: Nicolás Garza, *   Primary Care Provider: Ron Esquivel MD    [facility] Medicine Team: [facility] MED F Mahamed Foy PA-C     Patient information was obtained from patient, past medical records and ER records.     Subjective:     Principal Problem:Rash    Chief Complaint:   Chief Complaint   Patient presents with    Rash     c/o rash throughout chest back legs and arms onset yesterday        HPI: Patient is a 75yo male with a PMHx of HTN, prostate cancer s/p prostatectomy being admitted to observation for a rash. Patient reports onset of rash to his chest, back, bilateral arms, and legs yesterday after he cut grass. He describes the rash as non itchy currently. He did have some itching after mowing the lawn, but he showered and put curel anti itch lotion on and the itching resolved. He denies new medications. He denies fevers, sick contacts, chest pain, difficulty swallowing, SOB. Patient reports he just came off of a 2 week cruise on Sunday. He reports that towards the end of his cruise trip, patient had diarrhea, nausea and vomiting. The nurse on the boat gave him medication (anti diarrheal medication - no specification on paperwork) which resolved his symptoms. He reports he took the medication for a day. He denies new soaps, detergents. He reports wearing old pants while cutting the grass yesterday. Of note, he did let the laundry on the cruise wash his clothes, but that did not cause any symptoms while on the boat and he has not worn any of those clothes.    In the ED, vitals stable. Intake labs remarkable for CRP 52.6, ESR 30. He was given 125mg methylprednisone IV prior to admission.    Past Medical History:   Diagnosis Date    Aortic valve disorders 5/29/2013    3/21/2013: [place]. Mild aortic valve sclerosis. 2011:  Treated for presumed endocarditis.    Chronic bilateral low back pain with sciatica 4/25/2017    ED (erectile dysfunction)     Enlarged prostate     Floaters in visual field     Hyperlipidemia     Hypertension     Intestinal metaplasia of gastric mucosa 05/03/2019    outside Pathology     Osteoporosis     Pre-operative cardiovascular examination 5/18/2016 5/23/2016: Plan is robotic prostatectomy.   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 Omnipaque 350: 100cc IV & 30cc oral. Rash is severe, but listed as moderate because not shortness of breath    Allegra-d 12 hour [fexofenadine-pseudoephedrine] Other (See Comments)     Raise BP    Azithromycin     Bactrim [sulfamethoxazole-trimethoprim]     Boniva [ibandronate]     Celebrex [celecoxib]     Ciprofloxacin     Claritin-d 12 hour [loratadine-pseudoephedrine]     Fexofenadine     Hydrocortisone     Ibandronate sodium     Imipramine     Iodinated contrast media     Loratadine     Neomycin     Neomycin-polymyxin-hc     Neurontin [gabapentin]     Nitrofuran analogues     Sulfa (sulfonamide antibiotics)     Adhesive Rash     Adhesive tape causes rash     Androderm [testosterone] Rash       No current facility-administered medications on file prior to encounter.      Current Outpatient  Medications on File Prior to Encounter   Medication Sig    aspirin (ECOTRIN) 81 MG EC tablet Take 1 tablet (81 mg total) by mouth once daily.    losartan (COZAAR) 50 MG tablet Take 1 tablet (50 mg total) by mouth once daily.    rosuvastatin (CRESTOR) 10 MG tablet Take 1 tablet (10 mg total) by mouth once daily.    cholecalciferol, vitamin D3, 5,000 unit Tab Take 5,000 Units by mouth once daily.    fish oil-omega-3 fatty acids 300-1,000 mg capsule Take 2 g by mouth once daily.    fluticasone propionate (FLONASE) 50 mcg/actuation nasal spray 2 sprays (100 mcg total) by Each Nare route once daily.    ketoconazole (NIZORAL) 2 % cream Apply topically once daily. Please dispense 3 mo supply (2 tubes per month)    ketotifen (ALAWAY) 0.025 % ophthalmic solution 1 drop 2 (two) times daily.    sildenafil (VIAGRA) 100 MG tablet Take 1 tablet (100 mg total) by mouth daily as needed for Erectile Dysfunction.    triamcinolone acetonide 0.1% (KENALOG) 0.1 % cream Apply topically 2 (two) times daily. To affected area     Family History     Problem Relation (Age of Onset)    Alzheimer's disease Mother    Anxiety disorder Son    Cancer Brother    Early death Paternal Grandmother    Heart disease Mother    Hepatomegaly Son    Neuropathy Mother    Other Father    Prostate cancer Brother    Tremor Mother    Ulcerative colitis Sister        Tobacco Use    Smoking status: Never Smoker    Smokeless tobacco: Never Used   Substance and Sexual Activity    Alcohol use: No    Drug use: No    Sexual activity: Yes     Partners: Female     Review of Systems   Constitutional: Negative for activity change, chills and fever.   HENT: Negative for trouble swallowing.    Eyes: Negative for photophobia and visual disturbance.   Respiratory: Negative for chest tightness, shortness of breath and wheezing.    Cardiovascular: Negative for chest pain, palpitations and leg swelling.   Gastrointestinal: Positive for diarrhea, nausea and vomiting.  Negative for abdominal pain and constipation.   Genitourinary: Negative for dysuria, frequency, hematuria and urgency.   Musculoskeletal: Negative for arthralgias, back pain and gait problem.   Skin: Positive for color change and rash.   Neurological: Negative for dizziness, syncope, weakness, light-headedness, numbness and headaches.   Psychiatric/Behavioral: Negative for agitation and confusion. The patient is not nervous/anxious.      Objective:     Vital Signs (Most Recent):  Temp: 98.1 °F (36.7 °C) (02/25/20 1421)  Pulse: 64 (02/25/20 1421)  Resp: 17 (02/25/20 1421)  BP: 123/67 (02/25/20 1421)  SpO2: 100 % (02/25/20 1421) Vital Signs (24h Range):  Temp:  [97.5 °F (36.4 °C)-98.1 °F (36.7 °C)] 98.1 °F (36.7 °C)  Pulse:  [61-71] 64  Resp:  [17-18] 17  SpO2:  [98 %-100 %] 100 %  BP: (112-123)/(56-67) 123/67     Weight: 83.9 kg (185 lb)  Body mass index is 25.09 kg/m².    Physical Exam   Constitutional: He is oriented to person, place, and time. He appears well-developed and well-nourished. No distress.   HENT:   Head: Normocephalic and atraumatic.   Mouth/Throat: No oropharyngeal exudate.   Eyes: Pupils are equal, round, and reactive to light. Conjunctivae and EOM are normal.   Neck: Normal range of motion. Neck supple.   Cardiovascular: Normal rate, regular rhythm, normal heart sounds and intact distal pulses.   Pulmonary/Chest: Effort normal and breath sounds normal. No respiratory distress. He has no wheezes.   Abdominal: Soft. Bowel sounds are normal. He exhibits no distension. There is no tenderness.   Musculoskeletal: Normal range of motion. He exhibits no edema or tenderness.   Lymphadenopathy:     He has no cervical adenopathy.   Neurological: He is alert and oriented to person, place, and time. No cranial nerve deficit or sensory deficit. Coordination normal.   Skin: Skin is warm and dry. Capillary refill takes less than 2 seconds. Petechiae, purpura and rash noted.   Purpura to bilateral LE  Petechiae  rash to trunk, upper extremities, thighs   Psychiatric: He has a normal mood and affect. His behavior is normal. Judgment and thought content normal.   Nursing note and vitals reviewed.        CRANIAL NERVES     CN III, IV, VI   Pupils are equal, round, and reactive to light.  Extraocular motions are normal.        Significant Labs:   BMP:   Recent Labs   Lab 02/25/20  1230   GLU 92      K 4.2      CO2 21*   BUN 31*   CREATININE 1.4   CALCIUM 8.6*     CBC:   Recent Labs   Lab 02/25/20  1138   WBC 6.60   HGB 14.2   HCT 43.1          Significant Imaging: I have reviewed all pertinent imaging results/findings within the past 24 hours.    Assessment/Plan:     * Rash  Patient presents with diffuse, petechiae and purpura type rash to his chest, back, arms, and legs that started yesterday after cutting the grass. The rash is non itchy. Extensive allergies noted in chart.  - pictures taken in media tab  - unclear etiology  - given 125mg methylprednisone IV  - dermatology consulted    BERNARDINO (acute kidney injury)  Baseline Cr 1.1, currently 1.4  - daily BMP  - given IVF    Essential hypertension  - continue losartan 50mg daily      VTE Risk Mitigation (From admission, onward)         Ordered     IP VTE LOW RISK PATIENT  Once      02/25/20 1351                   Mahamed Foy PA-C  Department of Hospital Medicine   [facility]

## 2020-02-25 NOTE — ED NOTES
Patient identifiers verified and correct for Mr Mitchell  C/C: rash, red and purple discolorization to body, BLE, arms SEE NN  APPEARANCE: awake and alert in NAD.  SKIN: warm, dry pinpoint red all over pink color to BUE, arms, abdomen, BLE thigh areas with purple/red colored areas to BLE]  MUSCULOSKELETAL: Patient moving all extremities spontaneously, no obvious swelling or deformities noted. Ambulates independently.  RESPIRATORY: Denies shortness of breath.Respirations unlabored. Denies fevers  CARDIAC: Denies CP, 2+ distal pulses; no peripheral edema  ABDOMEN: S/ND/NT, Denies nausea  : voids spontaneously, denies difficulty  Neurologic: AAO x 4; follows commands equal strength in all extremities; denies numbness/tingling. Denies dizziness Denies weakness

## 2020-02-25 NOTE — ED NOTES
Zoraida, charge nurse, accepted report, nurse aware of pt status and last set of vitals. MD cleared patient for admission, pt stable for transport. Nurse informed of orders completed and orders unable to be completed in the ED.

## 2020-02-25 NOTE — ASSESSMENT & PLAN NOTE
Patient presents with diffuse, petechiae and purpura type rash to his chest, back, arms, and legs that started yesterday after cutting the grass. The rash is non itchy. Extensive allergies noted in chart.  - pictures taken in media tab  - unclear etiology  - given 125mg methylprednisone IV  - dermatology consulted

## 2020-02-26 VITALS
TEMPERATURE: 98 F | WEIGHT: 182.44 LBS | HEIGHT: 72 IN | RESPIRATION RATE: 18 BRPM | BODY MASS INDEX: 24.71 KG/M2 | OXYGEN SATURATION: 98 % | DIASTOLIC BLOOD PRESSURE: 62 MMHG | HEART RATE: 72 BPM | SYSTOLIC BLOOD PRESSURE: 130 MMHG

## 2020-02-26 LAB
ALBUMIN SERPL BCP-MCNC: 3.1 G/DL (ref 3.5–5.2)
ALP SERPL-CCNC: 61 U/L (ref 55–135)
ALT SERPL W/O P-5'-P-CCNC: 36 U/L (ref 10–44)
ANION GAP SERPL CALC-SCNC: 7 MMOL/L (ref 8–16)
AST SERPL-CCNC: 31 U/L (ref 10–40)
BASOPHILS # BLD AUTO: 0.02 K/UL (ref 0–0.2)
BASOPHILS NFR BLD: 0.2 % (ref 0–1.9)
BILIRUB DIRECT SERPL-MCNC: 0.3 MG/DL (ref 0.1–0.3)
BILIRUB SERPL-MCNC: 0.6 MG/DL (ref 0.1–1)
BUN SERPL-MCNC: 21 MG/DL (ref 8–23)
CALCIUM SERPL-MCNC: 8.5 MG/DL (ref 8.7–10.5)
CHLORIDE SERPL-SCNC: 110 MMOL/L (ref 95–110)
CO2 SERPL-SCNC: 21 MMOL/L (ref 23–29)
CREAT SERPL-MCNC: 1.2 MG/DL (ref 0.5–1.4)
DIFFERENTIAL METHOD: ABNORMAL
EOSINOPHIL # BLD AUTO: 0 K/UL (ref 0–0.5)
EOSINOPHIL NFR BLD: 0 % (ref 0–8)
ERYTHROCYTE [DISTWIDTH] IN BLOOD BY AUTOMATED COUNT: 13.2 % (ref 11.5–14.5)
EST. GFR  (AFRICAN AMERICAN): >60 ML/MIN/1.73 M^2
EST. GFR  (NON AFRICAN AMERICAN): 59.6 ML/MIN/1.73 M^2
GLUCOSE SERPL-MCNC: 103 MG/DL (ref 70–110)
HCT VFR BLD AUTO: 39.6 % (ref 40–54)
HGB BLD-MCNC: 13 G/DL (ref 14–18)
IMM GRANULOCYTES # BLD AUTO: 0.04 K/UL (ref 0–0.04)
IMM GRANULOCYTES NFR BLD AUTO: 0.4 % (ref 0–0.5)
LYMPHOCYTES # BLD AUTO: 1 K/UL (ref 1–4.8)
LYMPHOCYTES NFR BLD: 10.8 % (ref 18–48)
MCH RBC QN AUTO: 32.5 PG (ref 27–31)
MCHC RBC AUTO-ENTMCNC: 32.8 G/DL (ref 32–36)
MCV RBC AUTO: 99 FL (ref 82–98)
MONOCYTES # BLD AUTO: 0.5 K/UL (ref 0.3–1)
MONOCYTES NFR BLD: 5.2 % (ref 4–15)
NEUTROPHILS # BLD AUTO: 8 K/UL (ref 1.8–7.7)
NEUTROPHILS NFR BLD: 83.4 % (ref 38–73)
NRBC BLD-RTO: 0 /100 WBC
PATH REV BLD -IMP: NORMAL
PLATELET # BLD AUTO: 178 K/UL (ref 150–350)
PMV BLD AUTO: 10.3 FL (ref 9.2–12.9)
POTASSIUM SERPL-SCNC: 3.9 MMOL/L (ref 3.5–5.1)
PROT SERPL-MCNC: 6.4 G/DL (ref 6–8.4)
RBC # BLD AUTO: 4 M/UL (ref 4.6–6.2)
SODIUM SERPL-SCNC: 138 MMOL/L (ref 136–145)
WBC # BLD AUTO: 9.58 K/UL (ref 3.9–12.7)

## 2020-02-26 PROCEDURE — 94761 N-INVAS EAR/PLS OXIMETRY MLT: CPT | Mod: HCNC

## 2020-02-26 PROCEDURE — 85025 COMPLETE CBC W/AUTO DIFF WBC: CPT | Mod: HCNC

## 2020-02-26 PROCEDURE — 80076 HEPATIC FUNCTION PANEL: CPT | Mod: HCNC

## 2020-02-26 PROCEDURE — G0378 HOSPITAL OBSERVATION PER HR: HCPCS | Mod: HCNC

## 2020-02-26 PROCEDURE — 99217 PR OBSERVATION CARE DISCHARGE: CPT | Mod: HCNC,,, | Performed by: PHYSICIAN ASSISTANT

## 2020-02-26 PROCEDURE — 99217 PR OBSERVATION CARE DISCHARGE: ICD-10-PCS | Mod: HCNC,,, | Performed by: PHYSICIAN ASSISTANT

## 2020-02-26 PROCEDURE — 80048 BASIC METABOLIC PNL TOTAL CA: CPT | Mod: HCNC

## 2020-02-26 PROCEDURE — 25000003 PHARM REV CODE 250: Mod: HCNC | Performed by: PHYSICIAN ASSISTANT

## 2020-02-26 PROCEDURE — 63600175 PHARM REV CODE 636 W HCPCS: Mod: HCNC | Performed by: PHYSICIAN ASSISTANT

## 2020-02-26 PROCEDURE — 36415 COLL VENOUS BLD VENIPUNCTURE: CPT | Mod: HCNC

## 2020-02-26 RX ORDER — TRIAMCINOLONE ACETONIDE 1 MG/G
CREAM TOPICAL
Qty: 453.6 G | Refills: 0 | Status: SHIPPED | OUTPATIENT
Start: 2020-02-26 | End: 2020-04-09 | Stop reason: ALTCHOICE

## 2020-02-26 RX ORDER — PREDNISONE 20 MG/1
60 TABLET ORAL DAILY
Status: DISCONTINUED | OUTPATIENT
Start: 2020-02-26 | End: 2020-02-26 | Stop reason: HOSPADM

## 2020-02-26 RX ORDER — PREDNISONE 10 MG/1
TABLET ORAL
Qty: 39 TABLET | Refills: 0 | Status: SHIPPED | OUTPATIENT
Start: 2020-02-26 | End: 2020-04-09 | Stop reason: ALTCHOICE

## 2020-02-26 RX ORDER — ROSUVASTATIN CALCIUM 10 MG/1
10 TABLET, COATED ORAL DAILY
Status: DISCONTINUED | OUTPATIENT
Start: 2020-02-26 | End: 2020-02-26 | Stop reason: HOSPADM

## 2020-02-26 RX ORDER — TRIAMCINOLONE ACETONIDE 1 MG/G
CREAM TOPICAL DAILY
Status: DISCONTINUED | OUTPATIENT
Start: 2020-02-26 | End: 2020-02-26 | Stop reason: HOSPADM

## 2020-02-26 RX ORDER — LOSARTAN POTASSIUM 50 MG/1
50 TABLET ORAL DAILY
Status: DISCONTINUED | OUTPATIENT
Start: 2020-02-26 | End: 2020-02-26 | Stop reason: HOSPADM

## 2020-02-26 RX ADMIN — PREDNISONE 60 MG: 20 TABLET ORAL at 02:02

## 2020-02-26 RX ADMIN — LOSARTAN POTASSIUM 50 MG: 50 TABLET ORAL at 09:02

## 2020-02-26 RX ADMIN — ROSUVASTATIN CALCIUM 10 MG: 10 TABLET, FILM COATED ORAL at 09:02

## 2020-02-26 NOTE — PLAN OF CARE
02/26/20 1323   Final Note   Assessment Type Final Discharge Note   Anticipated Discharge Disposition Home   What phone number can be called within the next 1-3 days to see how you are doing after discharge? [phone]   Discharge plans and expectations educations in teach back method with documentation complete? Yes   Right Care Referral Info   Post Acute Recommendation No Care     Future Appointments   Date Time Provider Department Center   3/17/2020 11:30 AM LAB, APPOINTMENT [place] [facility]   3/24/2020 11:00 AM Andrea Ochoa Jr., MD [facility] [address]   4/7/2020 11:00 AM LAB, SAME DAY [facility]   4/9/2020  3:30 PM Ron Esquivel MD [facility]   5/19/2020 11:30 AM Wisam Verde MD [facility]

## 2020-02-26 NOTE — SUBJECTIVE & OBJECTIVE
Past Medical History:   Diagnosis Date    Aortic valve disorders 5/29/2013    3/21/2013: [place]. Mild aortic valve sclerosis. 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    Chronic bilateral low back pain with sciatica 4/25/2017    ED (erectile dysfunction)     Enlarged prostate     Floaters in visual field     Hyperlipidemia     Hypertension     Intestinal metaplasia of gastric mucosa 05/03/2019    outside Pathology     Osteoporosis     Pre-operative cardiovascular examination 5/18/2016 5/23/2016: Plan is robotic prostatectomy.   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/2019    seen on outside colonoscopy       Past Surgical History:   Procedure Laterality Date    COLONOSCOPY      COLONOSCOPY  05/03/2019    COLONOSCOPY W/ POLYPECTOMY      ESOPHAGOGASTRODUODENOSCOPY  05/03/2019    PROSTATE SURGERY  05/23/2016    Prostatectomy for prostate cancer, done at [facility]     Family History     Problem Relation (Age of Onset)    Alzheimer's disease Mother    Anxiety disorder Son    Cancer Brother    Early death Paternal Grandmother    Heart disease Mother    Hepatomegaly Son    Neuropathy Mother    Other Father    Prostate cancer Brother    Tremor Mother    Ulcerative colitis Sister        Tobacco Use    Smoking status: Never Smoker    Smokeless tobacco: Never Used   Substance and Sexual Activity    Alcohol use: No    Drug use: No    Sexual activity: Yes     Partners: Female       Review of patient's allergies indicates:   Allergen Reactions    Latex, natural rubber Rash    Omnipaque 140 [iohexol] Rash     Extensive rash over trunk font and back and legs the morning after CT dye given in late afternon the previous day. Omnipaque 350: 100cc IV & 30cc oral. Rash is severe, but listed as moderate because not shortness of breath    Allegra-d 12 hour [fexofenadine-pseudoephedrine] Other  (See Comments)     Raise BP    Azithromycin     Bactrim [sulfamethoxazole-trimethoprim]     Boniva [ibandronate]     Celebrex [celecoxib]     Ciprofloxacin     Claritin-d 12 hour [loratadine-pseudoephedrine]     Fexofenadine     Hydrocortisone     Ibandronate sodium     Imipramine     Iodinated contrast media     Loratadine     Neomycin     Neomycin-polymyxin-hc     Neurontin [gabapentin]     Nitrofuran analogues     Sulfa (sulfonamide antibiotics)     Adhesive Rash     Adhesive tape causes rash     Androderm [testosterone] Rash       Medications:  Continuous Infusions:  Scheduled Meds:   losartan  50 mg Oral Daily    rosuvastatin  10 mg Oral Daily     PRN Meds:acetaminophen, albuterol-ipratropium, bisacodyL, melatonin, ondansetron, promethazine (PHENERGAN) IVPB    Review of Systems   Constitutional: Negative for fever, chills, weight loss, weight gain, fatigue, night sweats and malaise.   HENT: Negative for sore throat, mouth sores, trouble swallowing and headaches.    Eyes: Negative for itching, eye watering, eye irritation and visual change.   Respiratory: Negative for cough and shortness of breath.    Cardiovascular: Negative for chest pain and palpitations.   Gastrointestinal: Positive for Sensitivity to oral antibiotics. Negative for nausea, vomiting, abdominal pain and diarrhea.   Genitourinary: Negative for dysuria, hematuria and genital itching.   Musculoskeletal: Negative for myalgias, joint swelling, arthralgias and muscle weakness.   Skin: Positive for rash. Negative for itching, dry skin, sun sensitivity and recent sunburn.   Neurological: Negative for focal weakness, lightheadedness with standing, numbness, headaches and mental status change.   Hematologic/Lymphatic: Does not bruise/bleed easily ( on aspirin).     Objective:     Vital Signs (Most Recent):  Temp: 97.9 °F (36.6 °C) (02/26/20 1042)  Pulse: 72 (02/26/20 1042)  Resp: 18 (02/26/20 1042)  BP: (!) 108/53 (02/26/20  1042)  SpO2: 97 % (02/26/20 1042) Vital Signs (24h Range):  Temp:  [96.3 °F (35.7 °C)-98.1 °F (36.7 °C)] 97.9 °F (36.6 °C)  Pulse:  [61-75] 72  Resp:  [16-18] 18  SpO2:  [95 %-100 %] 97 %  BP: (108-132)/(53-67) 108/53     Weight: 82.7 kg (182 lb 6.9 oz)  Body mass index is 24.74 kg/m².    Physical Exam   Constitutional: He appears well-developed and well-nourished. No distress.   Eyes: Lids are normal.  No conjunctival no injection.   Cardiovascular: There is no local extremity swelling and no dependent edema.     Lymphadenopathy: No supraclavicular adenopathy is present.     He has no cervical adenopathy.   Neurological: He is alert and oriented to person, place, and time.   Psychiatric: He has a normal mood and affect.   Skin:   Areas Examined (abnormalities noted in diagram):   Scalp / Hair Palpated and Inspected  Head / Face Inspection Performed  Neck Inspection Performed  Chest / Axilla Inspection Performed  Abdomen Inspection Performed  Back Inspection Performed  RUE Inspected  LUE Inspection Performed  RLE Inspected  LLE Inspection Performed  Nails and Digits Inspection Performed                 Significant Labs:   ESR 30  CRP 52.6  CMP with BUN 31 and Cr 1.4 (ratio of 22); previous Cr 0.8-1.4 over past few years; AST and ALT 46/45 respectively and similar to previous levels   CBC unremarkable  UA unremarkable

## 2020-02-26 NOTE — DISCHARGE SUMMARY
[facility] Hospital Medicine  Discharge Summary      Patient Name: Primitivo Mitchell  MRN: [id number]  Admission Date: 2/25/2020  Hospital Length of Stay: 0 days  Discharge Date and Time: No discharge date for patient encounter.  Attending Physician: Nicolás Garza, *   Discharging Provider: Mahamed Foy PA-C  Primary Care Provider: Ron Esquivel MD  [facility] Medicine Team: [facility] [place] Mahamed Foy PA-C    HPI:   Patient is a 73yo male with a PMHx of HTN, prostate cancer s/p prostatectomy being admitted to observation for a rash. Patient reports onset of rash to his chest, back, bilateral arms, and legs yesterday after he cut grass. He describes the rash as non itchy currently. He did have some itching after mowing the lawn, but he showered and put curel anti itch lotion on and the itching resolved. He denies new medications. He denies fevers, sick contacts, chest pain, difficulty swallowing, SOB. Patient reports he just came off of a 2 week cruise on Sunday. He reports that towards the end of his cruise trip, patient had diarrhea, nausea and vomiting. The nurse on the boat gave him medication (anti diarrheal medication - no specification on paperwork) which resolved his symptoms. He reports he took the medication for a day. He denies new soaps, detergents. He reports wearing old pants while cutting the grass yesterday. Of note, he did let the laundry on the cruise wash his clothes, but that did not cause any symptoms while on the boat and he has not worn any of those clothes.    In the ED, vitals stable. Intake labs remarkable for CRP 52.6, ESR 30. He was given 125mg methylprednisone IV prior to admission.    * No surgery found *      Hospital Course:   Mr. Mitchell was admitted to observation for rash. Patient afebrile without leukocytosis. Given IV methylprednisone in ED. Elevated ESR/CRP on admit, however low suspicion for systemic vasculitis. 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 Discharged on topical triamcinolone cream and oral prednisone taper. Recommend PCP follow up in 1 month to recheck UA. Patient medically stable for discharge home.      Consults:   Consults (From admission, onward)        Status Ordering Provider     Inpatient consult to Dermatology  Once     Provider:  (Not yet assigned)    Completed SOLO HARTMANN JR          * Rash  Patient presents with diffuse, petechiae and purpura type rash to his chest, back, arms, and legs that started yesterday after cutting the grass. The rash is non itchy. Extensive allergies noted in chart.  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  - Derm recommended the following:   - rest, leg elevation, avoidance of vigorous exercise over the next week   - topical triamcinolone 0.1% cream daily to affected areas for 2 weeks (not to apply to face, armpits, or groin)   - prednisone taper starting at 60mg daily x 3 days, then 40mg x 3 days, then 20mg x 3 days, then 10mg x 3 days   - compression stockings to lower legs to wear daily until resolved   - recommend PCP follow up in 1 month to recheck UA    BERNARDINO (acute kidney injury)  Baseline Cr 1.1, currently 1.4  - daily BMP  - given IVF, improved to 1.2    Essential hypertension  - continue losartan 50mg daily      Final Active Diagnoses:    Diagnosis Date Noted POA    PRINCIPAL PROBLEM:  Rash [R21] 02/25/2020 Yes    BERNARDINO (acute kidney injury) [N17.9] 02/25/2020 Yes    Essential hypertension [I10] 04/16/2013 Yes      Problems Resolved During this Admission:       Discharged Condition: good    Disposition: Home or Self Care    Follow Up:  Follow-up Information     Ron Esquivel MD.    Specialty:  Internal Medicine  Why:  If symptoms worsen  Contact information:  [address]  [place] [id number]  [phone]                 Patient Instructions:      Diet Cardiac     Activity as tolerated       Significant Diagnostic Studies: Labs: All labs within the past 24 hours have been reviewed    Pending Diagnostic Studies:     None         Medications:  Reconciled Home Medications:      Medication List      START taking these medications    predniSONE 10 MG tablet  Commonly known as:  DELTASONE  Take 6 tablets (60mg) for 3 days, then 4 tablets (40mg) for 3 days, then 2 tablets (20mg) for 3 days, then 1 tablet (10mg) for 3 days to finish.        CHANGE how you take these medications    triamcinolone acetonide 0.1% 0.1 % cream  Commonly known as:  KENALOG  Apply to rash once daily.   * Do not apply to face, armpits, or groin*  What changed:    · how to take this  · when to take this  · additional instructions        CONTINUE taking these medications    [place] 0.025 % (0.035 %) ophthalmic solution  Generic drug:  ketotifen  1 drop 2 (two) times daily.     aspirin 81 MG EC tablet  Commonly known as:  ECOTRIN  Take 1 tablet (81 mg total) by mouth once daily.     cholecalciferol (vitamin D3) 125 mcg (5,000 unit) Tab  Take 5,000 Units by mouth once daily.     fish oil-omega-3 fatty acids 300-1,000 mg capsule  Take 2 g by mouth once daily.     fluticasone propionate 50 mcg/actuation nasal spray  Commonly known as:  FLONASE  2 sprays (100 mcg total) by Each Nare route once daily.     ketoconazole 2 % cream  Commonly known as:  NIZORAL  Apply topically once daily. Please dispense 3 mo supply (2 tubes per month)     losartan 50 MG tablet  Commonly known as:  COZAAR  Take 1 tablet (50 mg total) by mouth once daily.     rosuvastatin 10 MG tablet  Commonly known as:  CRESTOR  Take 1 tablet (10 mg total) by mouth once daily.     sildenafil 100 MG tablet  Commonly known as:  VIAGRA  Take 1 tablet (100 mg total) by mouth daily as needed for Erectile Dysfunction.            Indwelling Lines/Drains at time of  discharge:   Lines/Drains/Airways     None                 Time spent on the discharge of patient: 32 minutes  Patient was seen and examined on the date of discharge and determined to be suitable for discharge.         Mahamed Foy PA-C  Department of Hospital Medicine  [facility]

## 2020-02-26 NOTE — HPI
"74 year old male patient with history of prostate cancer status post prostatectomy, hypertension, osteopenia, aortic atherosclerosis and renal cyst.   He was admitted to observation unit for BERNARDINO and rash for which dermatology was consulted for evaluation.   Pt was recently on a cruise to [place] and on Sunday he reports he had nausea, vomiting, and diarrhea for which he received an antidiarrheal (nonantibiotic) medication which helped with these symptoms after two doses and then he did not take any other doses.   He reports that rash started Monday evening 2-24-20 after cutting grass outside.   Started with redness and itching to lower legs and wife described it as looking like "blood coming to the skin surface".   Patient took a shower afterward and applied otc moisturizer and reports itching seemed to improve. No other treatment tried thus far.  However, rash continued to spread up legs to thighs then abdomen, chest and arms. Reports feet are largely spared. He denies any involvement of face.   Denies any fevers, chills, weight loss, night sweats, headaches, vision changes, eye pain, mouth sores, sore throat, cough, chest pain, nausea, vomiting, abdominal pain, changes to stool or urine, joint pain or swelling.    Outpatient medications: statin, losartan, and aspirin (none new)  Also takes a multivitamin supplement that is not new.  Denies any exposures to sick individuals that he knows of on cruise.   Reports frequent exercise on [employer] machine with no similar eruptions.  Denies eating any fresh fruit or drinking unbottled beverages off cruise ship.      "

## 2020-02-26 NOTE — PLAN OF CARE
Pt arrived to unit via wheelchair from ED. Pt AAOx4. Pt denies pain or SOB upon arrival. Pt has rash to bilateral extremities, trunk and back. He reports non- itching. Pt updated on plan of care. Bed in low position with call light within reach.  Pt placed in non skid slip resistant socks. Fall precautions and plan of care reviewed.

## 2020-02-26 NOTE — NURSING
D/C instructions reviewed with the pt and the spouse, both verbalized understanding. Medications were delivered to bedside. IV is taken out. Belongings sent with the pt.

## 2020-02-26 NOTE — ASSESSMENT & PLAN NOTE
History and exam suggestive of leukocytoclastic vasculitis which is generally a reaction pattern to a trigger or can be idiopathic. Common triggers include infections (URI, gastroenteritis, etc), medications (antibiotics, NSAIDS, etc), and autoimmune/connective tissue diseases. In this patient's case it is most likely that his trigger was an infectious process experienced while on a cruise resulting in nausea, vomiting, and diarrhea with immune system activation and subsequent development of small vessel inflammation. His skin involvement is somewhat wide spread and in a distribution suggestive of a drug mediated cause, but it is unlikely due to the antidiarrheal agent as he only took two doses, but still a possibility as one of his only few new exposures or changes. He has remained afebrile with stable vital signs and other then elevated nonspecific inflammatory markers (ESR/CRP), his labs are largely unremarkable with no findings suggestive of systemic vasculitis (normal UA and CBC and mildly elevated Cr at similar levels to previous ranges). Review of systems does not support systemic vasculitis at this time and would not recommend additional ANCA, CULLEN, RF studies at this time.  -discussed etiology and treatment of LCV with patient and wife at bedside  -given lack of symptoms at this time and history of adverse reactions to antihistamines, would avoid these agents  -would recommend supportive care with rest, leg elevation, avoidance of vigorous exercise over the next week  -would start topical triamcinolone 0.1% cream (provide pt with 454g jar at bedside before discharge today) daily to affected areas for 2 weeks (not to apply to face, armpits, or groin)  -given extent of distribution, would also start a rapid prednisone taper starting at 60mg daily for 3 days, then 40mg for 3 days, then 20mg for 3 days, then 10mg for 3 days then stop  -would also add compression stockings to lower legs to wear daily until  resolved  -pt counseled on expected hyperpigmentation after process improves which will take months to resolve  -given no systemic involvement pt seems stable to go home if primary team deems appropriate  -would have pt return to ER or urgent care if he develops fever, chest pain, shortness of breath, blood to stool or urine, joint pains or other changes in clinical status

## 2020-02-26 NOTE — PLAN OF CARE
PLAN OF CARE REVIEWED WITH PT AND PT'S SPOUSE.  PT'S SPOUSE AT BEDSIDE.  PT AA+OX4.  ABLE TO MAKE NEEDS KNOWN.  DOES NOT APPEAR TO BE IN ANY DISTRESS.  NO C/O PAIN.  WILL CONTINUE TO REASSESS PAIN.  INDEPENDENT WITH ADLS.  CONT. OF B/B.  PT REMAINS FREE FROM FALLS, INJURY, AND TRAUMA.  FALL PRECAUTIONS IN PLACE.  BED IN LOWEST POSITION WITH WHEELS LOCKED.  CALL LIGHT WITHIN REACH.  WILL CONTINUE TO MONITOR.

## 2020-02-26 NOTE — ASSESSMENT & PLAN NOTE
Patient presents with diffuse, petechiae and purpura type rash to his chest, back, arms, and legs that started yesterday after cutting the grass. The rash is non itchy. Extensive allergies noted in chart.  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

## 2020-02-26 NOTE — CONSULTS
[facility]  Dermatology  Consult Note    Patient Name: Primitivo Mitchell  MRN: [id number]  Admission Date: 2/25/2020  Hospital Length of Stay: 0 days  Attending Physician: Nicolás Garza, *  Primary Care Provider: Ron Esquivel MD     Inpatient consult to Dermatology  Consult performed by: David Bain MD  Consult ordered by: Mahamed Foy Jr., PA-C  Reason for consult: rash  Assessment/Recommendations: History and exam suggestive of leukocytoclastic vasculitis which is generally a reaction pattern to a trigger or can be idiopathic. Common triggers include infections (URI, gastroenteritis, etc), medications (antibiotics, NSAIDS, etc), and autoimmune/connective tissue diseases. In this patient's case it is most likely that his trigger was an infectious process experienced while on a cruise resulting in nausea, vomiting, and diarrhea with immune system activation and subsequent development of small vessel inflammation. His skin involvement is somewhat wide spread and in a distribution suggestive of a drug mediated cause, but it is unlikely due to the antidiarrheal agent as he only took two doses, but still a possibility as one of his only few new exposures or changes. He has remained afebrile with stable vital signs and other then elevated nonspecific inflammatory markers (ESR/CRP), his labs are largely unremarkable with no findings suggestive of systemic vasculitis (normal UA and CBC and mildly elevated Cr at similar levels to previous ranges). Review of systems does not support systemic vasculitis at this time and would not recommend additional ANCA, CULLEN, RF studies at this time.  -discussed etiology and treatment of LCV with patient and wife at bedside  -given lack of symptoms at this time and history of adverse reactions to antihistamines, would avoid these agents  -would recommend supportive care with rest, leg elevation, avoidance of vigorous exercise over the next week  -would  "start topical triamcinolone 0.1% cream (provide pt with 454g jar at bedside before discharge today) daily to affected areas for 2 weeks (not to apply to face, armpits, or groin)  -given extent of distribution, would also start a rapid prednisone taper starting at 60mg daily for 3 days, then 40mg for 3 days, then 20mg for 3 days, then 10mg for 3 days then stop  -would also add compression stockings to lower legs to wear daily until resolved  -pt counseled on expected hyperpigmentation after process improves which will take months to resolve  -given no systemic involvement pt seems stable to go home if primary team deems appropriate  -would have pt return to ER or urgent care if he develops fever, chest pain, shortness of breath, blood to stool or urine, joint pains or other changes in clinical status  -would have PCP recheck UA in 1 month to ensure no changes   -pt given number to return to resident clinic if rash worsens or does not improve        Subjective:     Principal Problem:Rash    HPI:  74 year old male patient with history of prostate cancer status post prostatectomy, hypertension, osteopenia, aortic atherosclerosis and renal cyst.   He was admitted to observation unit for BERNARDINO and rash for which dermatology was consulted for evaluation.   Pt was recently on a cruise to [place] and on Sunday he reports he had nausea, vomiting, and diarrhea for which he received an antidiarrheal (nonantibiotic) medication which helped with these symptoms after two doses and then he did not take any other doses.   He reports that rash started Monday evening 2-24-20 after cutting grass outside.   Started with redness and itching to lower legs and wife described it as looking like "blood coming to the skin surface".   Patient took a shower afterward and applied otc moisturizer and reports itching seemed to improve. No other treatment tried thus far.  However, rash continued to spread up legs to thighs then abdomen, chest and " arms. Reports feet are largely spared. He denies any involvement of face.   Denies any fevers, chills, weight loss, night sweats, headaches, vision changes, eye pain, mouth sores, sore throat, cough, chest pain, nausea, vomiting, abdominal pain, changes to stool or urine, joint pain or swelling.    Outpatient medications: statin, losartan, and aspirin (none new)  Also takes a multivitamin supplement that is not new.  Denies any exposures to sick individuals that he knows of on cruise.   Reports frequent exercise on [employer] machine with no similar eruptions.  Denies eating any fresh fruit or drinking unbottled beverages off cruise ship.        Past Medical History:   Diagnosis Date    Aortic valve disorders 5/29/2013    3/21/2013: [place]. Mild aortic valve sclerosis. 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    Chronic bilateral low back pain with sciatica 4/25/2017    ED (erectile dysfunction)     Enlarged prostate     Floaters in visual field     Hyperlipidemia     Hypertension     Intestinal metaplasia of gastric mucosa 05/03/2019    outside Pathology     Osteoporosis     Pre-operative cardiovascular examination 5/18/2016 5/23/2016: Plan is robotic prostatectomy.   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/2019    seen on outside colonoscopy       Past Surgical History:   Procedure Laterality Date    COLONOSCOPY      COLONOSCOPY  05/03/2019    COLONOSCOPY W/ POLYPECTOMY      ESOPHAGOGASTRODUODENOSCOPY  05/03/2019    PROSTATE SURGERY  05/23/2016    Prostatectomy for prostate cancer, done at [facility]     Family History     Problem Relation (Age of Onset)    Alzheimer's disease Mother    Anxiety disorder Son    Cancer Brother    Early death Paternal Grandmother    Heart disease Mother    Hepatomegaly Son    Neuropathy Mother    Other Father    Prostate cancer Brother    Tremor  Mother    Ulcerative colitis Sister        Tobacco Use    Smoking status: Never Smoker    Smokeless tobacco: Never Used   Substance and Sexual Activity    Alcohol use: No    Drug use: No    Sexual activity: Yes     Partners: Female       Review of patient's allergies indicates:   Allergen Reactions    Latex, natural rubber Rash    Omnipaque 140 [iohexol] Rash     Extensive rash over trunk font and back and legs the morning after CT dye given in late afternon the previous day. Omnipaque 350: 100cc IV & 30cc oral. Rash is severe, but listed as moderate because not shortness of breath    Allegra-d 12 hour [fexofenadine-pseudoephedrine] Other (See Comments)     Raise BP    Azithromycin     Bactrim [sulfamethoxazole-trimethoprim]     Boniva [ibandronate]     Celebrex [celecoxib]     Ciprofloxacin     Claritin-d 12 hour [loratadine-pseudoephedrine]     Fexofenadine     Hydrocortisone     Ibandronate sodium     Imipramine     Iodinated contrast media     Loratadine     Neomycin     Neomycin-polymyxin-hc     Neurontin [gabapentin]     Nitrofuran analogues     Sulfa (sulfonamide antibiotics)     Adhesive Rash     Adhesive tape causes rash     Androderm [testosterone] Rash       Medications:  Continuous Infusions:  Scheduled Meds:   losartan  50 mg Oral Daily    rosuvastatin  10 mg Oral Daily     PRN Meds:acetaminophen, albuterol-ipratropium, bisacodyL, melatonin, ondansetron, promethazine (PHENERGAN) IVPB    Review of Systems   Constitutional: Negative for fever, chills, weight loss, weight gain, fatigue, night sweats and malaise.   HENT: Negative for sore throat, mouth sores, trouble swallowing and headaches.    Eyes: Negative for itching, eye watering, eye irritation and visual change.   Respiratory: Negative for cough and shortness of breath.    Cardiovascular: Negative for chest pain and palpitations.   Gastrointestinal: Positive for Sensitivity to oral antibiotics. Negative for nausea,  vomiting, abdominal pain and diarrhea.   Genitourinary: Negative for dysuria, hematuria and genital itching.   Musculoskeletal: Negative for myalgias, joint swelling, arthralgias and muscle weakness.   Skin: Positive for rash. Negative for itching, dry skin, sun sensitivity and recent sunburn.   Neurological: Negative for focal weakness, lightheadedness with standing, numbness, headaches and mental status change.   Hematologic/Lymphatic: Does not bruise/bleed easily ( on aspirin).     Objective:     Vital Signs (Most Recent):  Temp: 97.9 °F (36.6 °C) (02/26/20 1042)  Pulse: 72 (02/26/20 1042)  Resp: 18 (02/26/20 1042)  BP: (!) 108/53 (02/26/20 1042)  SpO2: 97 % (02/26/20 1042) Vital Signs (24h Range):  Temp:  [96.3 °F (35.7 °C)-98.1 °F (36.7 °C)] 97.9 °F (36.6 °C)  Pulse:  [61-75] 72  Resp:  [16-18] 18  SpO2:  [95 %-100 %] 97 %  BP: (108-132)/(53-67) 108/53     Weight: 82.7 kg (182 lb 6.9 oz)  Body mass index is 24.74 kg/m².    Physical Exam   Constitutional: He appears well-developed and well-nourished. No distress.   Eyes: Lids are normal.  No conjunctival no injection.   Cardiovascular: There is no local extremity swelling and no dependent edema.     Lymphadenopathy: No supraclavicular adenopathy is present.     He has no cervical adenopathy.   Neurological: He is alert and oriented to person, place, and time.   Psychiatric: He has a normal mood and affect.   Skin:   Areas Examined (abnormalities noted in diagram):   Scalp / Hair Palpated and Inspected  Head / Face Inspection Performed  Neck Inspection Performed  Chest / Axilla Inspection Performed  Abdomen Inspection Performed  Back Inspection Performed  RUE Inspected  LUE Inspection Performed  RLE Inspected  LLE Inspection Performed  Nails and Digits Inspection Performed                 Significant Labs:   ESR 30  CRP 52.6  CMP with BUN 31 and Cr 1.4 (ratio of 22); previous Cr 0.8-1.4 over past few years; AST and ALT 46/45 respectively and similar to previous  levels   CBC unremarkable  UA unremarkable         Assessment/Plan:     * Rash  History and exam suggestive of leukocytoclastic vasculitis which is generally a reaction pattern to a trigger or can be idiopathic. Common triggers include infections (URI, gastroenteritis, etc), medications (antibiotics, NSAIDS, etc), and autoimmune/connective tissue diseases. In this patient's case it is most likely that his trigger was an infectious process experienced while on a cruise resulting in nausea, vomiting, and diarrhea with immune system activation and subsequent development of small vessel inflammation. His skin involvement is somewhat wide spread and in a distribution suggestive of a drug mediated cause, but it is unlikely due to the antidiarrheal agent as he only took two doses, but still a possibility as one of his only few new exposures or changes. He has remained afebrile with stable vital signs and other then elevated nonspecific inflammatory markers (ESR/CRP), his labs are largely unremarkable with no findings suggestive of systemic vasculitis (normal UA and CBC and mildly elevated Cr at similar levels to previous ranges). Review of systems does not support systemic vasculitis at this time and would not recommend additional ANCA, CULLEN, RF studies at this time.  -discussed etiology and treatment of LCV with patient and wife at bedside  -given lack of symptoms at this time and history of adverse reactions to antihistamines, would avoid these agents  -would recommend supportive care with rest, leg elevation, avoidance of vigorous exercise over the next week  -would start topical triamcinolone 0.1% cream (provide pt with 454g jar at bedside before discharge today) daily to affected areas for 2 weeks (not to apply to face, armpits, or groin)  -given extent of distribution, would also start a rapid prednisone taper starting at 60mg daily for 3 days, then 40mg for 3 days, then 20mg for 3 days, then 10mg for 3 days then  stop  -would also add compression stockings to lower legs to wear daily until resolved  -pt counseled on expected hyperpigmentation after process improves which will take months to resolve  -given no systemic involvement pt seems stable to go home if primary team deems appropriate  -would have pt return to ER or urgent care if he develops fever, chest pain, shortness of breath, blood to stool or urine, joint pains or other changes in clinical status  Would have PCP recheck UA in 1 month to ensure not changes after resolution of rash.  Pt given resident clinic number to return for further evaluation if rash worsens or does not improve.    I have seen and discussed the patient with Dr. Rose who agrees with the above.    Thank you for your consult. Will sign off at this time.    David Bain MD  Dermatology  [facility]

## 2020-02-26 NOTE — HOSPITAL COURSE
Mr. Mitchell was admitted to observation for rash. Patient afebrile without leukocytosis. Given IV methylprednisone in ED. Elevated ESR/CRP on admit, however low suspicion for systemic vasculitis. 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 Discharged on topical triamcinolone cream and oral prednisone taper. Recommend PCP follow up in 1 month to recheck UA. Patient medically stable for discharge home.

## 2020-02-26 NOTE — PLAN OF CARE
CM met with patient at the bedside to discuss discharge planning assessment. Pt lives with spouse and has transportation at discharge. Pt verified PCP and Pharmacy. CM will continue to follow for discharge needs.       02/26/20 0952   Discharge Assessment   Assessment Type Discharge Planning Assessment   Confirmed/corrected address and phone number on facesheet? Yes   Assessment information obtained from? Patient   Expected Length of Stay (days) 2   Communicated expected length of stay with patient/caregiver yes   Prior to hospitilization cognitive status: Alert/Oriented   Prior to hospitalization functional status: Independent   Current cognitive status: Alert/Oriented   Current Functional Status: Independent   Lives With spouse   Able to Return to Prior Arrangements yes   Is patient able to care for self after discharge? Yes   Who are your caregiver(s) and their phone number(s)? [facility] [phone]   Patient's perception of discharge disposition home or selfcare   Readmission Within the Last 30 Days no previous admission in last 30 days   Patient currently being followed by outpatient case management? No   Patient currently receives any other outside agency services? No   Equipment Currently Used at Home none   Do you have any problems affording any of your prescribed medications? No   Is the patient taking medications as prescribed? yes   Does the patient have transportation home? Yes   Transportation Anticipated family or friend will provide   Does the patient receive services at the Coumadin Clinic? No   Discharge Plan A Home with family   Discharge Plan B Home Health   DME Needed Upon Discharge  none   Patient/Family in Agreement with Plan yes

## 2020-02-27 DIAGNOSIS — E78.5 HYPERLIPIDEMIA, UNSPECIFIED HYPERLIPIDEMIA TYPE: ICD-10-CM

## 2020-02-27 RX ORDER — ROSUVASTATIN CALCIUM 10 MG/1
10 TABLET, COATED ORAL DAILY
Qty: 90 TABLET | Refills: 3 | Status: SHIPPED | OUTPATIENT
Start: 2020-02-27 | End: 2020-05-19 | Stop reason: SDUPTHER

## 2020-02-27 NOTE — TELEPHONE ENCOUNTER
----- Message from Irena Baxter sent at 2/27/2020  3:02 PM CST -----  Contact: Pt self Mobile/Home [phone]   Requesting an RX refill or new RX.  Is this a refill or new RX:  Refill  RX name and strength: rosuvastatin (CRESTOR) 10 MG tablet  Directions (copy/paste from chart):  N/A  Is this a 30 day or 90 day RX:  90  Local pharmacy or mail order pharmacy:  [facility] - [place] [address] [address]  Pharmacy name and phone # [phone], fax# [phone]

## 2020-03-03 ENCOUNTER — PES CALL (OUTPATIENT)
Dept: ADMINISTRATIVE | Facility: CLINIC | Age: 75
End: 2020-03-03

## 2020-03-04 ENCOUNTER — PES CALL (OUTPATIENT)
Dept: ADMINISTRATIVE | Facility: CLINIC | Age: 75
End: 2020-03-04

## 2020-03-10 ENCOUNTER — TELEPHONE (OUTPATIENT)
Dept: INTERNAL MEDICINE | Facility: CLINIC | Age: 75
End: 2020-03-10

## 2020-03-17 ENCOUNTER — TELEPHONE (OUTPATIENT)
Dept: INTERNAL MEDICINE | Facility: CLINIC | Age: 75
End: 2020-03-17

## 2020-03-17 NOTE — TELEPHONE ENCOUNTER
----- Message from Patito Gonzalez sent at 3/17/2020  3:05 PM CDT -----  Contact: Self  Pt is calling to speak with Staff regarding a rash he had that has been cleared.  Pt says he went on a cruise and returned on 2/25/20 & was in observation for two days.  He was treated and the rash has since cleared.  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    He can be reached at [phone].    Thank you.

## 2020-03-25 ENCOUNTER — TELEPHONE (OUTPATIENT)
Dept: INTERNAL MEDICINE | Facility: CLINIC | Age: 75
End: 2020-03-25

## 2020-03-25 NOTE — TELEPHONE ENCOUNTER
----- Message from Anabel Teo sent at 3/25/2020 12:52 PM CDT -----  Contact: Patient [phone]  Patient stated that he found blood in his stool. Requesting a call asap.    Please call and advise.    Thank You

## 2020-03-25 NOTE — PROGRESS NOTES
I reviewed the patient's colonoscopy report from last May.  The bleeding may have been from hemorrhoids or from prior affect of radiation.  I recommend increased water along with an over-the-counter fiber supplement such as Metamucil.  If he develops any additional bleeding, or if he begins having symptoms of lightheadedness or weakness, please notify the office.

## 2020-03-25 NOTE — TELEPHONE ENCOUNTER
Pt states that he has a 1 time episode of bright red blood when he wiped his anus today after a normal BM. He denies felling a lump in the region,constipation, abdominal pain, dizziness or weakness. He states that he has never had a hemorrhoid in the past. He called his gastroenterologist and he was told that they are not seeing patients and to call his PCP? He also said that he had this problem once before and he saw his GI doctor but he does not know what he was treated for. The only thing that he did different yesterday was to take a long walk.

## 2020-04-07 ENCOUNTER — LAB VISIT (OUTPATIENT)
Dept: LAB | Facility: HOSPITAL | Age: 75
End: 2020-04-07
Attending: INTERNAL MEDICINE
Payer: MEDICARE

## 2020-04-07 DIAGNOSIS — D53.9 MACROCYTIC ANEMIA: ICD-10-CM

## 2020-04-07 DIAGNOSIS — I10 ESSENTIAL HYPERTENSION: ICD-10-CM

## 2020-04-07 DIAGNOSIS — E78.5 HYPERLIPIDEMIA, UNSPECIFIED HYPERLIPIDEMIA TYPE: ICD-10-CM

## 2020-04-07 LAB
ALBUMIN SERPL BCP-MCNC: 4 G/DL (ref 3.5–5.2)
ALP SERPL-CCNC: 77 U/L (ref 55–135)
ALT SERPL W/O P-5'-P-CCNC: 16 U/L (ref 10–44)
ANION GAP SERPL CALC-SCNC: 9 MMOL/L (ref 8–16)
AST SERPL-CCNC: 24 U/L (ref 10–40)
BILIRUB SERPL-MCNC: 0.6 MG/DL (ref 0.1–1)
BUN SERPL-MCNC: 15 MG/DL (ref 8–23)
CALCIUM SERPL-MCNC: 9.7 MG/DL (ref 8.7–10.5)
CHLORIDE SERPL-SCNC: 107 MMOL/L (ref 95–110)
CHOLEST SERPL-MCNC: 137 MG/DL (ref 120–199)
CHOLEST/HDLC SERPL: 2.4 {RATIO} (ref 2–5)
CO2 SERPL-SCNC: 26 MMOL/L (ref 23–29)
CREAT SERPL-MCNC: 1.2 MG/DL (ref 0.5–1.4)
ERYTHROCYTE [DISTWIDTH] IN BLOOD BY AUTOMATED COUNT: 13 % (ref 11.5–14.5)
EST. GFR  (AFRICAN AMERICAN): >60 ML/MIN/1.73 M^2
EST. GFR  (NON AFRICAN AMERICAN): 59.6 ML/MIN/1.73 M^2
GLUCOSE SERPL-MCNC: 87 MG/DL (ref 70–110)
HCT VFR BLD AUTO: 45.1 % (ref 40–54)
HDLC SERPL-MCNC: 58 MG/DL (ref 40–75)
HDLC SERPL: 42.3 % (ref 20–50)
HGB BLD-MCNC: 14.4 G/DL (ref 14–18)
LDLC SERPL CALC-MCNC: 63.8 MG/DL (ref 63–159)
MAGNESIUM SERPL-MCNC: 2.4 MG/DL (ref 1.6–2.6)
MCH RBC QN AUTO: 32.1 PG (ref 27–31)
MCHC RBC AUTO-ENTMCNC: 31.9 G/DL (ref 32–36)
MCV RBC AUTO: 101 FL (ref 82–98)
NONHDLC SERPL-MCNC: 79 MG/DL
PLATELET # BLD AUTO: 212 K/UL (ref 150–350)
PMV BLD AUTO: 10.2 FL (ref 9.2–12.9)
POTASSIUM SERPL-SCNC: 4.5 MMOL/L (ref 3.5–5.1)
PROT SERPL-MCNC: 7.6 G/DL (ref 6–8.4)
RBC # BLD AUTO: 4.48 M/UL (ref 4.6–6.2)
SODIUM SERPL-SCNC: 142 MMOL/L (ref 136–145)
TRIGL SERPL-MCNC: 76 MG/DL (ref 30–150)
VIT B12 SERPL-MCNC: >2000 PG/ML (ref 210–950)
WBC # BLD AUTO: 5.73 K/UL (ref 3.9–12.7)

## 2020-04-07 PROCEDURE — 80053 COMPREHEN METABOLIC PANEL: CPT | Mod: HCNC

## 2020-04-07 PROCEDURE — 80061 LIPID PANEL: CPT | Mod: HCNC

## 2020-04-07 PROCEDURE — 36415 COLL VENOUS BLD VENIPUNCTURE: CPT | Mod: HCNC

## 2020-04-07 PROCEDURE — 85027 COMPLETE CBC AUTOMATED: CPT | Mod: HCNC

## 2020-04-07 PROCEDURE — 83735 ASSAY OF MAGNESIUM: CPT | Mod: HCNC

## 2020-04-07 PROCEDURE — 82607 VITAMIN B-12: CPT | Mod: HCNC

## 2020-04-09 ENCOUNTER — OFFICE VISIT (OUTPATIENT)
Dept: INTERNAL MEDICINE | Facility: CLINIC | Age: 75
End: 2020-04-09
Payer: MEDICARE

## 2020-04-09 DIAGNOSIS — I70.0 ABDOMINAL AORTIC ATHEROSCLEROSIS: ICD-10-CM

## 2020-04-09 DIAGNOSIS — R09.81 SINUS CONGESTION: ICD-10-CM

## 2020-04-09 DIAGNOSIS — M31.0 LEUKOCYTOCLASTIC VASCULITIS: Primary | ICD-10-CM

## 2020-04-09 DIAGNOSIS — I77.1 TORTUOUS AORTA: ICD-10-CM

## 2020-04-09 PROCEDURE — 99442 PR PHYSICIAN TELEPHONE EVALUATION 11-20 MIN: ICD-10-PCS | Mod: HCNC,95,, | Performed by: INTERNAL MEDICINE

## 2020-04-09 PROCEDURE — 99442 PR PHYSICIAN TELEPHONE EVALUATION 11-20 MIN: CPT | Mod: HCNC,95,, | Performed by: INTERNAL MEDICINE

## 2020-04-09 RX ORDER — OMEPRAZOLE 40 MG/1
CAPSULE, DELAYED RELEASE ORAL
COMMUNITY
End: 2020-08-06 | Stop reason: ALTCHOICE

## 2020-04-09 RX ORDER — FLUTICASONE PROPIONATE 50 MCG
2 SPRAY, SUSPENSION (ML) NASAL DAILY
Qty: 48 G | Refills: 3 | Status: SHIPPED | OUTPATIENT
Start: 2020-04-09

## 2020-04-09 RX ORDER — TAMSULOSIN HYDROCHLORIDE 0.4 MG/1
CAPSULE ORAL
COMMUNITY
End: 2020-04-22

## 2020-04-09 NOTE — PROGRESS NOTES
Subjective:       Patient ID: Primitivo Mitchell is a 74 y.o. male.    Chief Complaint: Rash    Patient is being seen via Audio Visit.  Last visit with me October 2019.  He was seen in the hospital in February for rash and acute kidney injury, Dermatology suspected vasculitis triggered by an infectious process.    HPI    Was on a cruise, on last day developed diarrhea and vomiting. Continuous on day supposed to get off of boat. Wife took him to doctor station, saw nurse, given medication that really helped. When returned home cut grass, then developed rash and itching afterward. Given steroids oral and topical by Dermatology. After a while the rash cleared all together after about 2 weeks. Hasn't mowed lawn since. normally doesn't have allergies when cutting grass.    Patient has been staying home and wearing masks regularly. Avoiding large crowds.    Review of Systems   All other systems reviewed and are negative.      RESULTS: Reviewed labs from last 6 months    Assessment:       1. Leukocytoclastic vasculitis    2. Sinus congestion    3. Tortuous aorta-Noted on imaging 4/23/2019    4. Abdominal aortic atherosclerosis        Plan:   Primitivo was seen today for rash.    Diagnoses and all orders for this visit:    Leukocytoclastic vasculitis:  Prior diagnosis, received treatment in ER, patient reports rash and itching have all resolved. continue to monitor for any new symptoms. Likely due to GI infection but still told to be careful with cutting lawn, do no more than 20 min/day initially.  -     Comprehensive metabolic panel; Future  -     CBC Without Differential; Future    Sinus congestion:  Prior diagnosis, stable, well controlled on current management. No changes at this time, will continue to monitor.   -     fluticasone propionate (FLONASE) 50 mcg/actuation nasal spray; 2 sprays (100 mcg total) by Each Nostril route once daily.    Tortuous aorta-Noted on imaging 4/23/2019:  Prior diagnosis, the patient is  asymptomatic. Continue control of blood pressure and cholesterol to limit further atherosclerosis.     Abdominal aortic atherosclerosis:  Prior diagnosis, the patient is asymptomatic. Continue control of blood pressure and cholesterol to limit further atherosclerosis.     Total time spent with patient in telephone evaluation and management service: 20 minutes.   Follow up in about 6 months (around 10/9/2020) for follow up visit.  Ron Esquivel MD  Internal Medicine    Portions of this note were completed using medical dictation software. Please excuse typographical or syntax errors that were missed on review.      I affirm this call did not originate from a related E/M service provided within the previous 7 days, nor is leading to an E/M service or procedure within the next 24 hours. Each patient to whom he or she provides medical services by telemedicine is:  (1) informed of the relationship between the physician and patient and the respective role of any other health care provider with respect to management of the patient; and (2) notified that he or she may decline to receive medical services by telemedicine and may withdraw from such care at any time.

## 2020-04-15 ENCOUNTER — TELEPHONE (OUTPATIENT)
Dept: INTERNAL MEDICINE | Facility: CLINIC | Age: 75
End: 2020-04-15

## 2020-04-15 NOTE — TELEPHONE ENCOUNTER
----- Message from Claudia White sent at 4/15/2020  4:08 PM CDT -----  Contact: self/ [phone]  Pt states he was looking at his lab work and was concerned about his B-12 level being extremely high. Please call and advise.

## 2020-04-16 NOTE — TELEPHONE ENCOUNTER
Patient was taking a supplement once daily with B-6, ginseng and other herbs and vitamins. I let patient know there are no problems or concerns related to high b-12 levels. And he stated he will probably stop taking the supplement

## 2020-04-16 NOTE — TELEPHONE ENCOUNTER
Please notify patient that there are no complications or problems related to high B12 levels. But since it is high we can decrease his B12 supplement dose. Please ask what dose of B12 supplement he is using, and how many times per day he uses it. Also ask if he is taking any multivitamin that might have additional B12.

## 2020-04-21 ENCOUNTER — LAB VISIT (OUTPATIENT)
Dept: LAB | Facility: HOSPITAL | Age: 75
End: 2020-04-21
Attending: RADIOLOGY
Payer: MEDICARE

## 2020-04-21 DIAGNOSIS — C61 CARCINOMA OF PROSTATE: ICD-10-CM

## 2020-04-21 LAB — COMPLEXED PSA SERPL-MCNC: <0.01 NG/ML (ref 0–4)

## 2020-04-21 PROCEDURE — 84153 ASSAY OF PSA TOTAL: CPT | Mod: HCNC

## 2020-04-21 PROCEDURE — 36415 COLL VENOUS BLD VENIPUNCTURE: CPT | Mod: HCNC

## 2020-04-22 ENCOUNTER — TELEPHONE (OUTPATIENT)
Dept: RADIATION ONCOLOGY | Facility: CLINIC | Age: 75
End: 2020-04-22

## 2020-04-22 DIAGNOSIS — C61 CARCINOMA OF PROSTATE: Primary | ICD-10-CM

## 2020-04-22 NOTE — TELEPHONE ENCOUNTER
This patient was scheduled for routine follow up visit on 4/28/20.    Mr. Mitchell has a history of pathological stage II (T2c, N0, M0) adenocarcinoma of the prostate.  He initially presented in 2016, PSA had increased to 3.4 ng/ml. GEORGI revealed a 30 gm prostate with no palpable nodules. The patient underwent TRUS and biopsy of the prostate on 3/24/16. The prostate measured 29 cc. Biopsies from the Lt. apex and Rt. base returned positive for Skinny score 6 (3+3) adenocarcinoma. The patient subsequently underwent RALP in May of 2016. Pathology revealed a 46 gm prostate measuring 5.5 x 4 x 3.5 cm. There was Skinny 6 (3+3) adenocarcinoma involving 5% of the prostate. There was no perineural or lympho vascular invasion. No ELLYN or seminal vesicle involvement. Tumor was present at the apical margin. Nine Rt. pelvic nodes and 13 Lt. pelvic nodes were negative for tumor involvement. Postoperatively PSA in June returned at 0.06 ng/ml. Repeat PSA on 11/8/16 returned at 0.32 ng/ml. GEORGI was negative but repeat PSA on 12/8/16 returned at 0.41 ng/ml.  The patient was referred for salvage irradiation to the prostate bed.  We also elected to treat the patient with LHRH agonist.  He was given a 6 month  Eligard injection on 12/27/16.  He completed 68.4 Gy to the prostate bed on 4/12/17.   The patient has remained JEREMIAH since that time.  Today, the patient states he feels well.  No Urinary complaints.     PSA - < 0.01 ng/ml    Discussed PSA results.  Explained there is no evidence of tumor recurrence or progression.  He has remained JEREMIAH now 3 years status post therapy.  Plan follow up with PSA in one year.

## 2020-05-19 ENCOUNTER — OFFICE VISIT (OUTPATIENT)
Dept: CARDIOLOGY | Facility: CLINIC | Age: 75
End: 2020-05-19
Attending: INTERNAL MEDICINE
Payer: MEDICARE

## 2020-05-19 VITALS
DIASTOLIC BLOOD PRESSURE: 68 MMHG | SYSTOLIC BLOOD PRESSURE: 130 MMHG | HEIGHT: 72 IN | BODY MASS INDEX: 24.38 KG/M2 | HEART RATE: 60 BPM | WEIGHT: 180 LBS

## 2020-05-19 DIAGNOSIS — I70.0 ABDOMINAL AORTIC ATHEROSCLEROSIS: ICD-10-CM

## 2020-05-19 DIAGNOSIS — I35.9 AORTIC VALVE DISEASE: ICD-10-CM

## 2020-05-19 DIAGNOSIS — Z85.46 HISTORY OF PROSTATE CANCER: ICD-10-CM

## 2020-05-19 DIAGNOSIS — I10 ESSENTIAL HYPERTENSION: ICD-10-CM

## 2020-05-19 DIAGNOSIS — E78.5 HYPERLIPIDEMIA, UNSPECIFIED HYPERLIPIDEMIA TYPE: ICD-10-CM

## 2020-05-19 PROCEDURE — 3075F SYST BP GE 130 - 139MM HG: CPT | Mod: CPTII,S$GLB,, | Performed by: INTERNAL MEDICINE

## 2020-05-19 PROCEDURE — 3075F PR MOST RECENT SYSTOLIC BLOOD PRESS GE 130-139MM HG: ICD-10-PCS | Mod: CPTII,S$GLB,, | Performed by: INTERNAL MEDICINE

## 2020-05-19 PROCEDURE — 3078F PR MOST RECENT DIASTOLIC BLOOD PRESSURE < 80 MM HG: ICD-10-PCS | Mod: CPTII,S$GLB,, | Performed by: INTERNAL MEDICINE

## 2020-05-19 PROCEDURE — 1159F PR MEDICATION LIST DOCUMENTED IN MEDICAL RECORD: ICD-10-PCS | Mod: S$GLB,,, | Performed by: INTERNAL MEDICINE

## 2020-05-19 PROCEDURE — 1101F PT FALLS ASSESS-DOCD LE1/YR: CPT | Mod: CPTII,S$GLB,, | Performed by: INTERNAL MEDICINE

## 2020-05-19 PROCEDURE — 1101F PR PT FALLS ASSESS DOC 0-1 FALLS W/OUT INJ PAST YR: ICD-10-PCS | Mod: CPTII,S$GLB,, | Performed by: INTERNAL MEDICINE

## 2020-05-19 PROCEDURE — 99214 OFFICE O/P EST MOD 30 MIN: CPT | Mod: S$GLB,,, | Performed by: INTERNAL MEDICINE

## 2020-05-19 PROCEDURE — 99214 PR OFFICE/OUTPT VISIT, EST, LEVL IV, 30-39 MIN: ICD-10-PCS | Mod: S$GLB,,, | Performed by: INTERNAL MEDICINE

## 2020-05-19 PROCEDURE — 1159F MED LIST DOCD IN RCRD: CPT | Mod: S$GLB,,, | Performed by: INTERNAL MEDICINE

## 2020-05-19 PROCEDURE — 3078F DIAST BP <80 MM HG: CPT | Mod: CPTII,S$GLB,, | Performed by: INTERNAL MEDICINE

## 2020-05-19 RX ORDER — ASPIRIN 81 MG/1
81 TABLET ORAL DAILY
Qty: 90 TABLET | Refills: 3 | Status: SHIPPED | OUTPATIENT
Start: 2020-05-19 | End: 2021-05-19 | Stop reason: SDUPTHER

## 2020-05-19 RX ORDER — LOSARTAN POTASSIUM 50 MG/1
50 TABLET ORAL DAILY
Qty: 90 TABLET | Refills: 3 | Status: SHIPPED | OUTPATIENT
Start: 2020-05-19 | End: 2021-05-19 | Stop reason: SDUPTHER

## 2020-05-19 RX ORDER — ROSUVASTATIN CALCIUM 10 MG/1
10 TABLET, COATED ORAL DAILY
Qty: 90 TABLET | Refills: 3 | Status: SHIPPED | OUTPATIENT
Start: 2020-05-19 | End: 2021-05-19 | Stop reason: SDUPTHER

## 2020-05-19 NOTE — PROGRESS NOTES
Subjective:     Primitivo Mitchell is a 74 y.o. male with hypertension. He is overweight. He has mild aortic valve sclerosis. 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 He was seen at [facility] in 11/2014 and a Stress Echocardiogram was done. He did 9:00 minutes and there was no chest pain or significant ST depression. The Echo was read as an inferolateral wall motion abnormality and the aortic root was mildly dilated. The left ventricular function was mildly depressed. In 4/2016 he was diagnosed with prostate cancer and after thinking the options over he decided on robotic prostatectomy that was done on 5/23/2016. He had a CT scan of the abdomen that revealed aortic plaquing. In 2018 he expresses some concern for that his memory was slightly affected. There is no exertional chest pain or exertional dyspnea. No palpitations or weak spells. No bleeding. Feeling well overall.      Hypertension   This is a chronic problem. The current episode started more than 1 year ago. The problem is unchanged. The problem is controlled (usually 120-130/70-80 mmHg at home). Pertinent negatives include no anxiety, blurred vision, chest pain, headaches, malaise/fatigue, neck pain, orthopnea, palpitations, peripheral edema, PND, shortness of breath or sweats. There is no history of angina.       Review of Systems   Constitution: Negative for chills, fever, malaise/fatigue and weight loss.   HENT: Negative for nosebleeds.    Eyes: Negative for blurred vision, pain, vision loss in left eye and vision loss in right eye.   Cardiovascular: Negative for chest pain, claudication, dyspnea on exertion, irregular heartbeat, leg swelling, near-syncope, orthopnea, palpitations, paroxysmal nocturnal dyspnea and syncope.   Respiratory: Negative for cough, hemoptysis, shortness of breath, sputum production and wheezing.    Endocrine: Negative for cold intolerance and heat intolerance.   Hematologic/Lymphatic: Negative for  bleeding problem. Does not bruise/bleed easily.   Skin: Negative for color change and rash.   Musculoskeletal: Negative for falls and neck pain.   Gastrointestinal: Negative for heartburn, hematemesis, hematochezia, hemorrhoids, jaundice, melena, nausea and vomiting.   Genitourinary: Positive for bladder incontinence. Negative for dysuria and hematuria.   Neurological: Negative for dizziness, focal weakness, headaches, light-headedness, loss of balance, numbness and vertigo.   Psychiatric/Behavioral: Negative for altered mental status, depression and memory loss. The patient is not nervous/anxious.    Allergic/Immunologic: Negative for hives and persistent infections.       Current Outpatient Medications on File Prior to Visit   Medication Sig Dispense Refill    aspirin (ECOTRIN) 81 MG EC tablet Take 1 tablet (81 mg total) by mouth once daily. 90 tablet 3    cholecalciferol, vitamin D3, 5,000 unit Tab Take 5,000 Units by mouth once daily.      fish oil-omega-3 fatty acids 300-1,000 mg capsule Take 2 g by mouth once daily.      fluticasone propionate (FLONASE) 50 mcg/actuation nasal spray 2 sprays (100 mcg total) by Each Nostril route once daily. 48 g 3    ketoconazole (NIZORAL) 2 % cream Apply topically once daily. Please dispense 3 mo supply (2 tubes per month) 360 g 3    ketotifen (ALAWAY) 0.025 % ophthalmic solution 1 drop 2 (two) times daily.      losartan (COZAAR) 50 MG tablet Take 1 tablet (50 mg total) by mouth once daily. 90 tablet 3    omeprazole (PRILOSEC) 40 MG capsule omeprazole 40 mg capsule,delayed release      rosuvastatin (CRESTOR) 10 MG tablet Take 1 tablet (10 mg total) by mouth once daily. 90 tablet 3    sildenafil (VIAGRA) 100 MG tablet Take 1 tablet (100 mg total) by mouth daily as needed for Erectile Dysfunction. 30 tablet 5     No current facility-administered medications on file prior to visit.        /68   Pulse 60   Ht 6' (1.829 m)   Wt 81.6 kg (180 lb)   BMI 24.41 kg/m²        Objective:     Physical Exam   Constitutional: He is oriented to person, place, and time. He appears well-developed and well-nourished. He does not appear ill. No distress.   HENT:   Head: Normocephalic and atraumatic.   Nose: Nose normal.   Mouth/Throat: No oropharyngeal exudate.   Eyes: Right eye exhibits no discharge. Left eye exhibits no discharge. Right conjunctiva is not injected. Left conjunctiva is not injected. Right pupil is round. Left pupil is round. Pupils are equal.   Neck: No JVD present. Carotid bruit is not present. No thyromegaly present.   Cardiovascular: Normal rate, regular rhythm, S1 normal and S2 normal. Exam reveals no gallop.   Murmur heard.   Midsystolic murmur is present with a grade of 2/6 at the upper right sternal border.  High-pitched blowing decrescendo early diastolic murmur is present with a grade of 2/6 at the upper right sternal border and lower left sternal border.  Pulses:       Radial pulses are 2+ on the right side, and 2+ on the left side.        Dorsalis pedis pulses are 2+ on the right side, and 2+ on the left side.        Posterior tibial pulses are 2+ on the right side, and 2+ on the left side.   Pulmonary/Chest: Effort normal and breath sounds normal.   Abdominal: Soft. Normal appearance. There is no hepatosplenomegaly. There is no tenderness.   Musculoskeletal:        Right ankle: He exhibits no swelling, no ecchymosis and no deformity.        Left ankle: He exhibits no swelling, no ecchymosis and no deformity.   Lymphadenopathy:        Head (right side): No submandibular adenopathy present.        Head (left side): No submandibular adenopathy present.     He has no cervical adenopathy.   Neurological: He is alert and oriented to person, place, and time. He is not disoriented. No cranial nerve deficit.   Skin: Skin is warm, dry and intact. No rash noted. He is not diaphoretic.   Psychiatric: He has a normal mood and affect. His speech is normal and behavior is  normal. Judgment and thought content normal. Cognition and memory are normal.       Assessment:      1. Aortic valve disease    2. Abdominal aortic atherosclerosis    3. Essential hypertension    4. History of prostate cancer        Plan:     1. Aortic Valve Disease   2011: Treated for presumed endocarditis.   3/21/2013: Echo: Mild aortic valve sclerosis.    5/5/2015: Echo: Normal left ventricular size and systolic function. Moderate aortic valve sclerosis - 1.8 m/s - trace AR.   6/12/2019: Echo: Normal left ventricular size and systolic function. Mild LVH with sigmoid septum. Moderate aortic valve sclerosis - 1.7 m/s. Mild AR.   Reassurance.   Followed.   6/2021: Plan next Echo. Then every few years.    2. Peripheral Artery Disease   2015: CT Scan: Aortic plaquing seen.   9/2/2015: Chol 199. HDL 60. . .   On atorvastatin 20 mg Q24.   12/14/2016: Chol 156. HDL 57. LDL 82. TG 85.   11/15/2017: Chol 146. HDL 50. LDL 74. .   On rosuvastatin 10 mg Q24.   4/10/2019: Chol 122. HDL 57. LDL 41. .   On rosuvastatin 10 mg Q24.   On aspirin 81 mg Q24.   Well controlled lipid panel.    3. Hypertension   2009: Diagnosed.   On losartan 50 mg Q24.   Keeping log at home.   Well controlled.    4. Wall Motion Abnormalities on Echo   11/2014: Stress Echo: 9:00 min. No CP. ECG negative. Echo: Inferolateral WMA with mild LV dysfunction. Aortic root mildly enlarged.   Followed.    5. History of Prostate Cancer   4/2016: Diagnosed.   5/23/2016: Robotic prostatectomy.   Dr. Manuel Reyes.    6. Erectile Dysfunction   On tadalafil for BPH with good effect.   May take tadalafil 20 mg PRN.    7. Primary Care   Dr. Ron Esquivel.    F/u 6 months.    Wisam Verde M.D.      5/4/2021 5:48 PM, Addendum:    5/4/2021: Echo:  Normal left ventricular size and systolic function. EF 60%. Mildly sigmoid septum. Mild diastolic dysfunction. Moderate aortic valve sclerosis. Mildly dilated ascending aorta.    I discussed the above  test result and the implications of the findings over the phone.    Wisam Verde M.D.

## 2020-07-15 ENCOUNTER — TELEPHONE (OUTPATIENT)
Dept: INTERNAL MEDICINE | Facility: CLINIC | Age: 75
End: 2020-07-15

## 2020-07-15 ENCOUNTER — OFFICE VISIT (OUTPATIENT)
Dept: UROLOGY | Facility: CLINIC | Age: 75
End: 2020-07-15
Payer: MEDICARE

## 2020-07-15 VITALS
HEART RATE: 60 BPM | HEIGHT: 72 IN | BODY MASS INDEX: 23.59 KG/M2 | SYSTOLIC BLOOD PRESSURE: 117 MMHG | WEIGHT: 174.19 LBS | DIASTOLIC BLOOD PRESSURE: 69 MMHG

## 2020-07-15 DIAGNOSIS — E78.5 HYPERLIPIDEMIA, UNSPECIFIED HYPERLIPIDEMIA TYPE: ICD-10-CM

## 2020-07-15 DIAGNOSIS — N52.31 ERECTILE DYSFUNCTION FOLLOWING RADICAL PROSTATECTOMY: ICD-10-CM

## 2020-07-15 DIAGNOSIS — C61 PROSTATE CANCER: Primary | ICD-10-CM

## 2020-07-15 DIAGNOSIS — I10 ESSENTIAL HYPERTENSION: ICD-10-CM

## 2020-07-15 PROBLEM — Z90.79 S/P PROSTATECTOMY: Status: RESOLVED | Noted: 2017-04-25 | Resolved: 2020-07-15

## 2020-07-15 PROBLEM — Z92.3 HISTORY OF RADIATION THERAPY: Status: RESOLVED | Noted: 2017-05-08 | Resolved: 2020-07-15

## 2020-07-15 PROBLEM — Z86.0101 HISTORY OF ADENOMATOUS POLYP OF COLON: Status: RESOLVED | Noted: 2017-05-08 | Resolved: 2020-07-15

## 2020-07-15 PROBLEM — R21 EXANTHEM: Status: RESOLVED | Noted: 2017-11-20 | Resolved: 2020-07-15

## 2020-07-15 PROBLEM — N17.9 AKI (ACUTE KIDNEY INJURY): Status: RESOLVED | Noted: 2020-02-25 | Resolved: 2020-07-15

## 2020-07-15 PROBLEM — T78.40XA ALLERGIC REACTION CAUSED BY A DRUG: Status: RESOLVED | Noted: 2017-08-19 | Resolved: 2020-07-15

## 2020-07-15 PROBLEM — I77.1 TORTUOUS AORTA: Status: RESOLVED | Noted: 2019-06-28 | Resolved: 2020-07-15

## 2020-07-15 PROBLEM — I86.1 VARICOCELE: Status: RESOLVED | Noted: 2018-06-29 | Resolved: 2020-07-15

## 2020-07-15 PROBLEM — R05.9 COUGH: Status: RESOLVED | Noted: 2018-10-19 | Resolved: 2020-07-15

## 2020-07-15 PROBLEM — N40.0 BENIGN PROSTATIC HYPERPLASIA: Status: RESOLVED | Noted: 2018-06-29 | Resolved: 2020-07-15

## 2020-07-15 PROBLEM — Z86.010 HISTORY OF ADENOMATOUS POLYP OF COLON: Status: RESOLVED | Noted: 2017-05-08 | Resolved: 2020-07-15

## 2020-07-15 PROBLEM — J02.9 ACUTE PHARYNGITIS: Status: RESOLVED | Noted: 2018-10-19 | Resolved: 2020-07-15

## 2020-07-15 PROCEDURE — 3008F PR BODY MASS INDEX (BMI) DOCUMENTED: ICD-10-PCS | Mod: HCNC,CPTII,S$GLB, | Performed by: UROLOGY

## 2020-07-15 PROCEDURE — 99214 OFFICE O/P EST MOD 30 MIN: CPT | Mod: HCNC,S$GLB,, | Performed by: UROLOGY

## 2020-07-15 PROCEDURE — 1126F PR PAIN SEVERITY QUANTIFIED, NO PAIN PRESENT: ICD-10-PCS | Mod: HCNC,S$GLB,, | Performed by: UROLOGY

## 2020-07-15 PROCEDURE — 3008F BODY MASS INDEX DOCD: CPT | Mod: HCNC,CPTII,S$GLB, | Performed by: UROLOGY

## 2020-07-15 PROCEDURE — 3074F PR MOST RECENT SYSTOLIC BLOOD PRESSURE < 130 MM HG: ICD-10-PCS | Mod: HCNC,CPTII,S$GLB, | Performed by: UROLOGY

## 2020-07-15 PROCEDURE — 3078F DIAST BP <80 MM HG: CPT | Mod: HCNC,CPTII,S$GLB, | Performed by: UROLOGY

## 2020-07-15 PROCEDURE — 1159F MED LIST DOCD IN RCRD: CPT | Mod: HCNC,S$GLB,, | Performed by: UROLOGY

## 2020-07-15 PROCEDURE — 99214 PR OFFICE/OUTPT VISIT, EST, LEVL IV, 30-39 MIN: ICD-10-PCS | Mod: HCNC,S$GLB,, | Performed by: UROLOGY

## 2020-07-15 PROCEDURE — 1101F PT FALLS ASSESS-DOCD LE1/YR: CPT | Mod: HCNC,CPTII,S$GLB, | Performed by: UROLOGY

## 2020-07-15 PROCEDURE — 99999 PR PBB SHADOW E&M-EST. PATIENT-LVL IV: CPT | Mod: PBBFAC,HCNC,, | Performed by: UROLOGY

## 2020-07-15 PROCEDURE — 99999 PR PBB SHADOW E&M-EST. PATIENT-LVL IV: ICD-10-PCS | Mod: PBBFAC,HCNC,, | Performed by: UROLOGY

## 2020-07-15 PROCEDURE — 99499 UNLISTED E&M SERVICE: CPT | Mod: HCNC,S$GLB,, | Performed by: UROLOGY

## 2020-07-15 PROCEDURE — 1126F AMNT PAIN NOTED NONE PRSNT: CPT | Mod: HCNC,S$GLB,, | Performed by: UROLOGY

## 2020-07-15 PROCEDURE — 3074F SYST BP LT 130 MM HG: CPT | Mod: HCNC,CPTII,S$GLB, | Performed by: UROLOGY

## 2020-07-15 PROCEDURE — 3078F PR MOST RECENT DIASTOLIC BLOOD PRESSURE < 80 MM HG: ICD-10-PCS | Mod: HCNC,CPTII,S$GLB, | Performed by: UROLOGY

## 2020-07-15 PROCEDURE — 1101F PR PT FALLS ASSESS DOC 0-1 FALLS W/OUT INJ PAST YR: ICD-10-PCS | Mod: HCNC,CPTII,S$GLB, | Performed by: UROLOGY

## 2020-07-15 PROCEDURE — 1159F PR MEDICATION LIST DOCUMENTED IN MEDICAL RECORD: ICD-10-PCS | Mod: HCNC,S$GLB,, | Performed by: UROLOGY

## 2020-07-15 PROCEDURE — 99499 RISK ADDL DX/OHS AUDIT: ICD-10-PCS | Mod: HCNC,S$GLB,, | Performed by: UROLOGY

## 2020-07-15 RX ORDER — PAPAVERINE HYDROCHLORIDE 30 MG/ML
INJECTION INTRAMUSCULAR; INTRAVENOUS
Qty: 5 ML | Refills: 0 | Status: SHIPPED | OUTPATIENT
Start: 2020-07-15 | End: 2020-08-05 | Stop reason: ALTCHOICE

## 2020-07-15 NOTE — PROGRESS NOTES
Mr. Mitchell is a 74 y.o. male presenting with ED.    PRESENTING ILLNESS:    Primitivo Mitchell is a 74 y.o. male with prostate cancer s/p RALP on 5/23/16 by Dr. Reyes.  Is also S/p XRT for biochemical recurrence in 2016 w/ Dr. Ochoa.  Currently followed by rad/onc.    Since his RALP, he c/o ED.  Prior to surgery, he did have ED and got good results with Viagra.  Since surgery, he's tried and failed Viagra and Cialis.      He does post void dribbling  He is wearing 1 pad/day.  This isn't bothersome. No hematuria.  No dysuria.  Good FOS.      No bone pain or weight loss.    REVIEW OF SYSTEMS:    Primitivo Mitchell denies any history of headache, blurred vision, fever, nausea, vomiting, chills, flank discomfort, abdominal pain, bleeding per rectum, cough, SOB, recent loss of consciousness, recent mental status changes, seizures, dizziness, or upper or lower extremity weakness.    PRIYANK  1. 1  2. 0  3. 0  4. 0  5. 1     PATIENT HISTORY:    Past Medical History:   Diagnosis Date    Aortic valve disorders 5/29/2013    3/21/2013: Echo. Mehreen. Mild aortic valve sclerosis. 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    Chronic bilateral low back pain with sciatica 4/25/2017    ED (erectile dysfunction)     Enlarged prostate     Floaters in visual field     History of adenomatous polyp of colon 5/8/2017    Last colonoscopy occurred January 2015, with diminutive tubular adenoma removed; follow-up colonoscopy in January 2018 recommended    History of prostate cancer 4/5/2016 4/2016: Diagnosed. 5/23/2016: Robotic prostatectomy.    Hyperlipidemia     Hypertension     Intestinal metaplasia of gastric mucosa 05/03/2019    outside Pathology     Osteoporosis     Pre-operative cardiovascular examination 5/18/2016 5/23/2016: Plan is robotic prostatectomy.    Prostate cancer 4/5/2016    Radiation proctitis     noted on Cscope 5/3/19    S/P prostatectomy 4/25/2017    Stress incontinence, male 7/11/2016     Tortuous aorta-Noted on imaging 4/23/2019 6/28/2019    Vascular ectasia of colon 05/03/2019    seen on outside colonoscopy       Family History   Problem Relation Age of Onset    Heart disease Mother     Alzheimer's disease Mother     Tremor Mother     Neuropathy Mother     Other Father         Colon problems    Ulcerative colitis Sister     Prostate cancer Brother     Anxiety disorder Son     Hepatomegaly Son     Early death Paternal Grandmother     Cancer Brother         Brain, Lung, Kidney       Past Surgical History:   Procedure Laterality Date    COLONOSCOPY      COLONOSCOPY  05/03/2019    COLONOSCOPY W/ POLYPECTOMY      ESOPHAGOGASTRODUODENOSCOPY  05/03/2019    PROSTATE SURGERY  05/23/2016    Prostatectomy for prostate cancer, done at [facility]       Social History     Socioeconomic History    Marital status:      Spouse name: Not on file    Number of children: Not on file    Years of education: Not on file    Highest education level: Not on file   Occupational History    Not on file   Social Needs    Financial resource strain: Not on file    Food insecurity     Worry: Not on file     Inability: Not on file    Transportation needs     Medical: Not on file     Non-medical: Not on file   Tobacco Use    Smoking status: Never Smoker    Smokeless tobacco: Never Used   Substance and Sexual Activity    Alcohol use: No    Drug use: No    Sexual activity: Yes     Partners: Female   Lifestyle    Physical activity     Days per week: Not on file     Minutes per session: Not on file    Stress: Not on file   Relationships    Social connections     Talks on phone: Not on file     Gets together: Not on file     Attends Buddhism service: Not on file     Active member of club or organization: Not on file     Attends meetings of clubs or organizations: Not on file     Relationship status: Not on file   Other Topics Concern    Not on file   Social History Narrative    Not on file        Review of patient's allergies indicates:   Allergen Reactions    Latex, natural rubber Rash    Omnipaque 140 [iohexol] Rash     Extensive rash over trunk font and back and legs the morning after CT dye given in late afternon the previous day. Omnipaque 350: 100cc IV & 30cc oral. Rash is severe, but listed as moderate because not shortness of breath    Allegra-d 12 hour [fexofenadine-pseudoephedrine] Other (See Comments)     Raise BP    Azithromycin     Bactrim [sulfamethoxazole-trimethoprim]     Boniva [ibandronate]     Celebrex [celecoxib]     Ciprofloxacin     Claritin-d 12 hour [loratadine-pseudoephedrine]     Fexofenadine     Hydrocortisone     Ibandronate sodium     Imipramine     Iodinated contrast media     Loratadine     Neomycin     Neomycin-polymyxin-hc     Neurontin [gabapentin]     Nitrofuran analogues     Sulfa (sulfonamide antibiotics)     Adhesive Rash     Adhesive tape causes rash     Androderm [testosterone] Rash         Current Outpatient Medications:     aspirin (ECOTRIN) 81 MG EC tablet, Take 1 tablet (81 mg total) by mouth once daily., Disp: 90 tablet, Rfl: 3    cholecalciferol, vitamin D3, 5,000 unit Tab, Take 5,000 Units by mouth once daily., Disp: , Rfl:     fish oil-omega-3 fatty acids 300-1,000 mg capsule, Take 2 g by mouth once daily., Disp: , Rfl:     fluticasone propionate (FLONASE) 50 mcg/actuation nasal spray, 2 sprays (100 mcg total) by Each Nostril route once daily., Disp: 48 g, Rfl: 3    ketoconazole (NIZORAL) 2 % cream, Apply topically once daily. Please dispense 3 mo supply (2 tubes per month), Disp: 360 g, Rfl: 3    ketotifen (ALAWAY) 0.025 % ophthalmic solution, 1 drop 2 (two) times daily., Disp: , Rfl:     losartan (COZAAR) 50 MG tablet, Take 1 tablet (50 mg total) by mouth once daily., Disp: 90 tablet, Rfl: 3    omeprazole (PRILOSEC) 40 MG capsule, omeprazole 40 mg capsule,delayed release, Disp: , Rfl:     rosuvastatin (CRESTOR) 10 MG  tablet, Take 1 tablet (10 mg total) by mouth once daily., Disp: 90 tablet, Rfl: 3    sildenafil (VIAGRA) 100 MG tablet, Take 1 tablet (100 mg total) by mouth daily as needed for Erectile Dysfunction., Disp: 30 tablet, Rfl: 5      PHYSICAL EXAMINATION:    The patient generally appears in good health, is appropriately interactive, and is in no apparent distress.     Eyes: anicteric sclerae, moist conjunctivae; no lid-lag; PERRLA     HENT: Atraumatic; oropharynx clear with moist mucous membranes and no mucosal ulcerations;normal hard and soft palate. No evidence of lymphadenopathy.    Neck: Trachea midline.  No thyromegaly.    Musculoskeletal: No abnormal gait.    Skin: No lesions.    Mental: Cooperative with normal affect.  Is oriented to time, place, and person.    Neuro: Grossly intact.    Chest: Normal inspiratory effort.   No accessory muscles.  No audible wheezes.  Respirations symmetric on inspiration and expiration.    Heart: Regular rhythm.      Abdomen:  Soft, non-tender. No masses or organomegaly. Bladder is not palpable. No evidence of flank discomfort. No evidence of inguinal hernia.    Genitourinary: The penis is circumcised with no evidence of plaques or induration. The urethral meatus is normal. The testes, epididymides, and cord structures are normal in size and contour bilaterally. The scrotum is normal in size and contour.    Extremities: No clubbing, cyanosis, or edema        LABS:      Lab Results   Component Value Date    PSA 2.0 09/02/2015    PSA 2.8 11/03/2014    PSA 2.5 05/21/2014    PSADIAG <0.01 04/21/2020    PSADIAG <0.01 09/19/2019    PSADIAG <0.01 03/11/2019    PSATOTAL 3.4 02/02/2016    PSATOTAL 1.9 08/04/2014    PSAFREE 0.38 02/02/2016    PSAFREE 0.32 08/04/2014    PSAFREEPCT 11.18 02/02/2016    PSAFREEPCT 16.84 08/04/2014        IMPRESSION:    Encounter Diagnoses   Name Primary?    Prostate cancer Yes    Erectile dysfunction following radical prostatectomy     Essential hypertension      Hyperlipidemia, unspecified hyperlipidemia type      HTN, controlled  Hyperlipidemia, controlled    PLAN:    1. Discussed options for his ED.  He'd like [facility].  We discussed that given his allergies, he's not a great candidate for I[facility]. Side effects discussed.  A new Rx was given.  Will set up for teaching.  2. Is due to follow up with Dr. Pruitt for his PCa in 2021.  Had phone review of his PSA during covid.  3. RTC 6 months with an EROS.    Copy to:

## 2020-07-15 NOTE — TELEPHONE ENCOUNTER
----- Message from Esther Hernandez sent at 7/15/2020 10:33 AM CDT -----  Contact: self/[phone]  Patient called in regards needing to find out if he will need to get the Shingle shot and pneumonia, since his wife had it. Please call and advise. Thank you.

## 2020-07-15 NOTE — PATIENT INSTRUCTIONS
Penile Self-Injection Procedure  Self-injection is a good option for many men with erectile dysfunction (ED). A tiny needle is used to inject medication into the penis. This helps your penis get hard and stay that way long enough for sex. And sex and orgasm will feel as good as always. You may be nervous about doing self-injection at first. But with practice, it will get easier. Your healthcare provider will show you how to do self-injection the first time. The simple steps are outlined on this sheet.  Preparing for Injection  · Wash your hands well with soap and water.  · Prepare the medication (if needed).  · Sit or  a comfortable position in a warm, well-lit room. If you need to, sit or  front of a mirror.  · Find an injection site on one side of your penis, in a place with no visible veins. (Dont inject into the top, bottom, or head of the penis.)  · Clean the injection site with an alcohol swab. Grasp the head of your penis firmly with your thumb and forefinger (dont just pinch the skin). Stretch the penis so the skin on the shaft is taut.  Injecting the Medication  · Rest your penis against your inner thigh and pull it gently toward your knee. Dont twist or rotate it. This way youll be sure to inject the medication into the spot you chose and cleaned before.  · Hold the syringe between your thumb and fingers, like youre holding a pen. Rest your forearm on your thigh for support.  · Insert the needle at a 90-degree angle (perpendicular) to the shaft. Do this quickly to reduce discomfort. (The needle should go in easily. If it doesnt, stop right away.)  · Move your thumb to the plunger. Press down to inject the medication, counting to 5.  · Remove the needle and dispose of it safely.     The injection site can be in any part of the shaded area.     Insert the needle straight into the penis.    Gaining an Erection  · Apply pressure to the injection site for a few minutes. This prevents  swelling and bruising and helps spread the medication.   · Stand up. This may help your erection develop. Foreplay often helps, too.  · Your penis should become firm within 10-20 minutes. The erection will last long enough for sex, and maybe longer.  Call Your Doctor If You Have:   · An erection that lasts longer than 3-4  hours  · Bleeding or bruising  · Severe pain  · Scarring or curvature of the penis       © 3468-0259 [facility], [address] [place] [address], [place] [address]. All rights reserved. This information is not intended as a substitute for professional medical care. Always follow your healthcare professional's instructions.

## 2020-07-16 ENCOUNTER — PES CALL (OUTPATIENT)
Dept: ADMINISTRATIVE | Facility: CLINIC | Age: 75
End: 2020-07-16

## 2020-07-28 ENCOUNTER — OFFICE VISIT (OUTPATIENT)
Dept: UROLOGY | Facility: CLINIC | Age: 75
End: 2020-07-28
Payer: MEDICARE

## 2020-07-28 DIAGNOSIS — N52.31 ERECTILE DYSFUNCTION FOLLOWING RADICAL PROSTATECTOMY: Primary | ICD-10-CM

## 2020-07-28 PROCEDURE — 99499 UNLISTED E&M SERVICE: CPT | Mod: HCNC,S$GLB,, | Performed by: NURSE PRACTITIONER

## 2020-07-28 PROCEDURE — 99499 NO LOS: ICD-10-PCS | Mod: HCNC,S$GLB,, | Performed by: NURSE PRACTITIONER

## 2020-07-28 NOTE — PROGRESS NOTES
Subjective:       Patient ID: Primitivo Mitchell is a 74 y.o. male.    Chief Complaint: Follow-up    Primitivo Mitchell is a 74 y.o. male new patient to me (records of past medical, family and social history personally reviewed by me), w/ h/o prostate CA s/p RALP 5/23/16 w/ Dr. Reyes, GS 3+3, pT2c, N0, M0.   S/p XRT for biochemical recurrence in 2016 w/ Dr. Ochoa.     Pt was seen in clinic 3/25/19 w/ Dr. Goodrich for R minimally complex renal cyst. Recommended 6 month f/u w/ imaging.     09/23/2019 pt returns to clinic for 6 month f/u for R renal cyst.  US of kidney single 2.1 cm cyst with thin septation within the right kidney, without a significant interval change.    He reports feeling well since last visit. Reports minimal DENNIS, using 1 pad/d as precaution, not bothersome.   Denies GH, dysuria, bone pain/weight loss. Prostate CA followed by Dr. Ochoa.       07/15/2020 seen by Dr. Caldwell since his RALP, he c/o ED.    Prior to surgery, he did have ED and got good results with Viagra.  Since surgery, he's tried and failed Viagra and Cialis.   He is here today for ICI education and 1st injection.    He did NOT bring in his own medication                    PSA                  <0.01               04/21/2020                                Review of Systems    Objective:      Physical Exam    Assessment:       1. Erectile dysfunction following radical prostatectomy        Plan:         rescheduled his injection teaching

## 2020-08-05 ENCOUNTER — OFFICE VISIT (OUTPATIENT)
Dept: UROLOGY | Facility: CLINIC | Age: 75
End: 2020-08-05
Payer: MEDICARE

## 2020-08-05 VITALS
DIASTOLIC BLOOD PRESSURE: 73 MMHG | HEIGHT: 72 IN | BODY MASS INDEX: 23.59 KG/M2 | WEIGHT: 174.19 LBS | SYSTOLIC BLOOD PRESSURE: 124 MMHG | HEART RATE: 61 BPM

## 2020-08-05 DIAGNOSIS — Z92.3 HISTORY OF EXTERNAL BEAM RADIATION THERAPY: ICD-10-CM

## 2020-08-05 DIAGNOSIS — Z90.79 HISTORY OF ROBOT-ASSISTED LAPAROSCOPIC RADICAL PROSTATECTOMY: ICD-10-CM

## 2020-08-05 DIAGNOSIS — N52.31 ERECTILE DYSFUNCTION AFTER RADICAL PROSTATECTOMY: ICD-10-CM

## 2020-08-05 DIAGNOSIS — C61 PROSTATE CANCER: Primary | ICD-10-CM

## 2020-08-05 PROCEDURE — 3078F DIAST BP <80 MM HG: CPT | Mod: HCNC,CPTII,S$GLB, | Performed by: NURSE PRACTITIONER

## 2020-08-05 PROCEDURE — 1126F AMNT PAIN NOTED NONE PRSNT: CPT | Mod: HCNC,S$GLB,, | Performed by: NURSE PRACTITIONER

## 2020-08-05 PROCEDURE — 3008F PR BODY MASS INDEX (BMI) DOCUMENTED: ICD-10-PCS | Mod: HCNC,CPTII,S$GLB, | Performed by: NURSE PRACTITIONER

## 2020-08-05 PROCEDURE — 1101F PT FALLS ASSESS-DOCD LE1/YR: CPT | Mod: HCNC,CPTII,S$GLB, | Performed by: NURSE PRACTITIONER

## 2020-08-05 PROCEDURE — 3078F PR MOST RECENT DIASTOLIC BLOOD PRESSURE < 80 MM HG: ICD-10-PCS | Mod: HCNC,CPTII,S$GLB, | Performed by: NURSE PRACTITIONER

## 2020-08-05 PROCEDURE — 1159F MED LIST DOCD IN RCRD: CPT | Mod: HCNC,S$GLB,, | Performed by: NURSE PRACTITIONER

## 2020-08-05 PROCEDURE — 3074F SYST BP LT 130 MM HG: CPT | Mod: HCNC,CPTII,S$GLB, | Performed by: NURSE PRACTITIONER

## 2020-08-05 PROCEDURE — 99215 PR OFFICE/OUTPT VISIT, EST, LEVL V, 40-54 MIN: ICD-10-PCS | Mod: HCNC,S$GLB,, | Performed by: NURSE PRACTITIONER

## 2020-08-05 PROCEDURE — 99999 PR PBB SHADOW E&M-EST. PATIENT-LVL IV: CPT | Mod: PBBFAC,HCNC,, | Performed by: NURSE PRACTITIONER

## 2020-08-05 PROCEDURE — 3074F PR MOST RECENT SYSTOLIC BLOOD PRESSURE < 130 MM HG: ICD-10-PCS | Mod: HCNC,CPTII,S$GLB, | Performed by: NURSE PRACTITIONER

## 2020-08-05 PROCEDURE — 1126F PR PAIN SEVERITY QUANTIFIED, NO PAIN PRESENT: ICD-10-PCS | Mod: HCNC,S$GLB,, | Performed by: NURSE PRACTITIONER

## 2020-08-05 PROCEDURE — 1159F PR MEDICATION LIST DOCUMENTED IN MEDICAL RECORD: ICD-10-PCS | Mod: HCNC,S$GLB,, | Performed by: NURSE PRACTITIONER

## 2020-08-05 PROCEDURE — 1101F PR PT FALLS ASSESS DOC 0-1 FALLS W/OUT INJ PAST YR: ICD-10-PCS | Mod: HCNC,CPTII,S$GLB, | Performed by: NURSE PRACTITIONER

## 2020-08-05 PROCEDURE — 99215 OFFICE O/P EST HI 40 MIN: CPT | Mod: HCNC,S$GLB,, | Performed by: NURSE PRACTITIONER

## 2020-08-05 PROCEDURE — 3008F BODY MASS INDEX DOCD: CPT | Mod: HCNC,CPTII,S$GLB, | Performed by: NURSE PRACTITIONER

## 2020-08-05 PROCEDURE — 99999 PR PBB SHADOW E&M-EST. PATIENT-LVL IV: ICD-10-PCS | Mod: PBBFAC,HCNC,, | Performed by: NURSE PRACTITIONER

## 2020-08-05 RX ORDER — PAPAVERINE HYDROCHLORIDE 30 MG/ML
INJECTION INTRAMUSCULAR; INTRAVENOUS
Qty: 10 ML | Refills: 11 | Status: SHIPPED | OUTPATIENT
Start: 2020-08-05 | End: 2020-08-05 | Stop reason: SDUPTHER

## 2020-08-05 RX ORDER — PAPAVERINE HYDROCHLORIDE 30 MG/ML
INJECTION INTRAMUSCULAR; INTRAVENOUS
Qty: 10 ML | Refills: 11 | Status: SHIPPED | OUTPATIENT
Start: 2020-08-05 | End: 2020-10-22

## 2020-08-05 NOTE — PATIENT INSTRUCTIONS
1. Wipe off top of medication vial with an alcohol prep.   2. With the vial held firmly on a flat surface, insert the syringe into the vial.  3. Now carefully  the vial with the needle in place and turn upside down.  4. You can then push in syringe (like you are giving a shot) to get all the air out of the syringe.  5. You can then pull the plunger of the syringe back down while keeping the needle inserted in the vial to get your desired results.   6. If having difficulty seeing the medication, rapidly pull back the syringe and then you will see the medication fill up. You may never get all the tiny air bubbles out. It is ok.     The injection is best given when standing up and the penis is positioned perpendicular to the body. The urethral opening should be facing away from the body. Injection is always given on the lateral or side of the penis. Never give the injection to the top of the penis to avoid possible trauma to arteries and veins. Never give the injection to the bottom of the penis to avoid trauma to the urethra. He should alternate the injection sites to avoid the build up of scar tissue due to multiple injections.    He was instructed to keep the dosage at 25 units. If noticing a decrease in reaction he can increase the dosage by 5 units     You may have a semi erection for hours; this is medication wearing off. You just don't want to have a full/pentrating erection for longer than 4 hours.      Understanding Erectile Dysfunction    Erectile dysfunction (ED) is a problem getting an erection firm enough or keeping it long enough for intercourse. The problem can happen to any man at any age. But health problems that can lead to ED become more common as a man ages. Up to half of men over age 40 experience ED at some point.  Causes of ED  ED can have many causes. Most are physical. Some are emotional issues. Often, a combination of causes is involved. Causes of ED may include:  · Medical conditions  such as diabetes or depression  · Smoking tobacco or marijuana  · Drinking too much alcohol  · Side effects of medications  · Injury to nerves or blood vessels  · Emotional issues such as stress or relationship problems  ED can be treated  Prescription medications for ED are available. They help many men who try them. Depending upon the cause of the ED, though, medications may not be enough. In these cases, other treatment options are available. These include erectile aids and surgery. Your health care provider can tell you more about the treatment that is right for you. And new treatments for ED are being studied. No matter what the treatment you decide on, stay in touch with your doctor. If your symptoms persist, he or she may be able to adjust your current treatment or try something new.  Date Last Reviewed: 1/1/2017 © 2000-2017 [employer]. [address] [place] [address], [place] [address]. All rights reserved. This information is not intended as a substitute for professional medical care. Always follow your healthcare professional's instructions.        Penile Self-Injection Procedure  Self-injection is a good option if you have erectile dysfunction (ED). You insert a tiny needle into your penis and inject a medicine. This helps your penis get hard and stay that way long enough for sex. And sex and orgasm will feel as good as always. You may be nervous about doing self-injection at first. But with practice, it will get easier. Your healthcare provider will show you how to do self-injection the first time.  Talk to your doctor about any medicines you take and any medical problems you have.      Preparing for injection  · Wash your hands well with soap and water.  · Prepare the medicine (if needed).  · Sit or  a comfortable position in a warm, well-lit room. If you need to, sit or  front of a mirror.  · Find an injection site on one side of your penis, in a place with no visible veins.  (Dont inject into the top, bottom, or head of the penis.)  · Clean the injection site with an alcohol swab. Grasp the head of your penis firmly with your thumb and forefinger (dont just pinch the skin). Stretch the penis so the skin on the shaft is taut.  Injecting the medicine  · Rest your penis against your inner thigh and pull it gently toward your knee. Dont twist or rotate it. This way youll be sure to inject the medicine into the spot you chose and cleaned before.  · Hold the syringe between your thumb and fingers, like youre holding a pen. Rest your forearm on your thigh for support.  · Insert the needle at a 90° angle (perpendicular) to the shaft. Do this quickly to reduce discomfort. (The needle should go in easily. If it doesnt, stop right away.)  · Move your thumb to the plunger. Press down to inject the medicine, counting to 5.  · Remove the needle and dispose of it safely.  Gaining an erection  · Apply pressure to the injection site for a few minutes. This prevents swelling and bruising and helps spread the medicine.   · Stand up. This may help your erection develop. Foreplay often helps, too.  · Your penis should become firm within 10 to 20 minutes. The erection will last long enough for sex, and maybe longer.  When to seek medical care  An erection that lasts longer than 3 to 4  hours  · Bleeding or bruising  · Severe pain  · Scarring or curvature of the penis   Date Last Reviewed: 1/7/2017  © 0878-3557 [employer]. [address] [place] [address], [place] [address]. All rights reserved. This information is not intended as a substitute for professional medical care. Always follow your healthcare professional's instructions.        Penile Self-Injection: Notes and Precautions     Man and healthcare provider sitting across from one another, talking.   Penile self-injection is a simple technique that may improve your sex life. Some men even find that self-injection leads to an increase in  natural erections. If you have questions or concerns about self-injection or erectile dysfunction (ED), talk with your healthcare provider. The information on this sheet will help you get the best results.  Notes about penile self-injection  · You may feel a mild burning during injection. This is OK. But if you feel pressure or severe pain, stop the injection. There may be a problem with the injection site.  · Only inject the medicine on the side of your penis. It may not work if injected elsewhere.  · To prevent scarring, inject in a different spot each time.  · Dont use this treatment if you have a bleeding disorder or any risk of infection.  · Get medical help right away if your erection lasts longer than 3 to 4 hours.  Work with your healthcare provider  Ask how often you can safely repeat injections, as well as any other questions you have. You and your healthcare provider will talk about follow-up exams and how to get supplies. If the medicine doesnt work or stops working over time, tell your healthcare provider.     When to call your healthcare provider  Call your healthcare provider right away if any of these occur:  · An erection that lasts longer than 3 to 4  hours  · Bleeding or bruising  · Severe pain  · Scarring or curvature of the penis   Date Last Reviewed: 1/1/2017  © 8642-9644 [employer]. [address] [place] [address], [place] [address]. All rights reserved. This information is not intended as a substitute for professional medical care. Always follow your healthcare professional's instructions.

## 2020-08-05 NOTE — PROGRESS NOTES
Subjective:       Patient ID: Primitivo Mitchell is a 74 y.o. male.    Chief Complaint: Prostate Cancer (PEP instruction)    Primitivo Mitchell is a 74 y.o. male new patient to me (records of past medical, family and social history personally reviewed by me), w/ h/o prostate CA s/p RALP 5/23/16 w/ Dr. Reyes, GS 3+3, pT2c, N0, M0.   S/p XRT for biochemical recurrence in 2016 w/ Dr. Ochoa.     Pt was seen in clinic 3/25/19 w/ Dr. Goodrich for R minimally complex renal cyst. Recommended 6 month f/u w/ imaging.     09/23/2019 pt returns to clinic for 6 month f/u for R renal cyst.  US of kidney single 2.1 cm cyst with thin septation within the right kidney, without a significant interval change.    He reports feeling well since last visit. Reports minimal DENNIS, using 1 pad/d as precaution, not bothersome.   Denies GH, dysuria, bone pain/weight loss. Prostate CA followed by Dr. Ochoa.       07/15/2020 seen by Dr. Caldwell since his RALP, he c/o ED.    Prior to surgery, he did have ED and got good results with Viagra.  Since surgery, he's tried and failed Viagra and Cialis.     He is here today for ICI education and 1st injection.  He brought in his own medication for [place].                      PSA                  <0.01               04/21/2020                            Past Medical History:  5/29/2013: Aortic valve disorders      Comment:  3/21/2013: [place]. Mild aortic valve sclerosis.               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  4/25/2017: Chronic bilateral low back pain with sciatica  No date: ED (erectile dysfunction)  No date: Enlarged prostate  No date: Floaters in visual field  5/8/2017: History of adenomatous polyp of colon      Comment:  Last colonoscopy occurred January 2015, with diminutive                tubular adenoma removed; follow-up colonoscopy in January 2018 recommended  4/5/2016: History of prostate cancer      Comment:  4/2016: Diagnosed. 5/23/2016: Robotic  prostatectomy.  No date: Hyperlipidemia  No date: Hypertension  05/03/2019: Intestinal metaplasia of gastric mucosa      Comment:  outside Pathology   No date: Osteoporosis  5/18/2016: Pre-operative cardiovascular examination      Comment:  5/23/2016: Plan is robotic prostatectomy.  4/5/2016: Prostate cancer  No date: Radiation proctitis      Comment:  noted on Cscope 5/3/19  4/25/2017: S/P prostatectomy  7/11/2016: Stress incontinence, male  6/28/2019: Tortuous aorta-Noted on imaging 4/23/2019 05/03/2019: Vascular ectasia of colon      Comment:  seen on outside colonoscopy    Past Surgical History:  No date: COLONOSCOPY  05/03/2019: COLONOSCOPY  No date: COLONOSCOPY W/ POLYPECTOMY  05/03/2019: ESOPHAGOGASTRODUODENOSCOPY  05/23/2016: PROSTATE SURGERY      Comment:  Prostatectomy for prostate cancer, done at [facility]    Review of patient's family history indicates:  Problem: Heart disease      Relation: Mother          Age of Onset: (Not Specified)  Problem: Alzheimer's disease      Relation: Mother          Age of Onset: (Not Specified)  Problem: Tremor      Relation: Mother          Age of Onset: (Not Specified)  Problem: Neuropathy      Relation: Mother          Age of Onset: (Not Specified)  Problem: Other      Relation: Father          Age of Onset: (Not Specified)          Comment: Colon problems  Problem: Ulcerative colitis      Relation: Sister          Age of Onset: (Not Specified)  Problem: Prostate cancer      Relation: Brother          Age of Onset: (Not Specified)  Problem: Anxiety disorder      Relation: Son          Age of Onset: (Not Specified)  Problem: Hepatomegaly      Relation: Son          Age of Onset: (Not Specified)  Problem: Early death      Relation: Paternal Grandmother          Age of Onset: (Not Specified)  Problem: Cancer      Relation: Brother          Age of Onset: (Not Specified)          Comment: Brain, Lung, Kidney      Social History    Socioeconomic History      Marital status:        Spouse name: Not on file      Number of children: Not on file      Years of education: Not on file      Highest education level: Not on file    Occupational History      Not on file    Social Needs      Financial resource strain: Not on file      Food insecurity        Worry: Not on file        Inability: Not on file      Transportation needs        Medical: Not on file        Non-medical: Not on file    Tobacco Use      Smoking status: Never Smoker      Smokeless tobacco: Never Used    Substance and Sexual Activity      Alcohol use: No      Drug use: No      Sexual activity: Yes        Partners: Female    Lifestyle      Physical activity        Days per week: Not on file        Minutes per session: Not on file      Stress: Not on file    Relationships      Social connections        Talks on phone: Not on file        Gets together: Not on file        Attends Rest[employer] service: Not on file        Active member of club or organization: Not on file        Attends meetings of clubs or organizations: Not on file        Relationship status: Not on file    Other Topics      Concerns:        Not on file    Social History Narrative      Not on file      Allergies:  Latex, natural rubber; Omnipaque 140 (iohexol); Allegra-d 12 hour (fexofenadine-pseudoephedrine); Azithromycin; Bactrim (sulfamethoxazole-trimethoprim); Boniva (ibandronate); Celebrex (celecoxib); Ciprofloxacin; Claritin-d 12 hour (loratadine-pseudoephedrine); Fexofenadine; Hydrocortisone; Ibandronate sodium; Imipramine; Iodinated contrast media; Loratadine; Neomycin; Neomycin-polymyxin-hc; Neurontin (gabapentin); Nitrofuran analogues; Sulfa (sulfonamide antibiotics); Adhesive; and Androderm (testosterone)    Medications:  Current Outpatient Medications:   aspirin (ECOTRIN) 81 MG EC tablet, Take 1 tablet (81 mg total) by mouth once daily., Disp: 90 tablet, Rfl: 3  cholecalciferol, vitamin D3, 5,000 unit Tab, Take 5,000 Units by mouth once daily.,  Disp: , Rfl:   fish oil-omega-3 fatty acids 300-1,000 mg capsule, Take 2 g by mouth once daily., Disp: , Rfl:   fluticasone propionate (FLONASE) 50 mcg/actuation nasal spray, 2 sprays (100 mcg total) by Each Nostril route once daily., Disp: 48 g, Rfl: 3  ketoconazole (NIZORAL) 2 % cream, Apply topically once daily. Please dispense 3 mo supply (2 tubes per month), Disp: 360 g, Rfl: 3  ketotifen (ALAWAY) 0.025 % ophthalmic solution, 1 drop 2 (two) times daily., Disp: , Rfl:   losartan (COZAAR) 50 MG tablet, Take 1 tablet (50 mg total) by mouth once daily., Disp: 90 tablet, Rfl: 3  omeprazole (PRILOSEC) 40 MG capsule, omeprazole 40 mg capsule,delayed release, Disp: , Rfl:   papaverine 30 mg/mL injection, Add Phentolamine 1 mg/cc Add PGE1 10 mcg/cc  SIG: Bring to office for test dose in physician office, Disp: 5 mL, Rfl: 0  rosuvastatin (CRESTOR) 10 MG tablet, Take 1 tablet (10 mg total) by mouth once daily., Disp: 90 tablet, Rfl: 3  sildenafil (VIAGRA) 100 MG tablet, Take 1 tablet (100 mg total) by mouth daily as needed for Erectile Dysfunction., Disp: 30 tablet, Rfl: 5        Review of Systems   Constitutional: Negative for activity change, appetite change, chills and fever.   HENT: Negative for facial swelling and trouble swallowing.    Eyes: Negative for visual disturbance.   Respiratory: Negative for chest tightness and shortness of breath.    Cardiovascular: Negative for chest pain and palpitations.   Gastrointestinal: Negative.  Negative for abdominal pain, constipation, diarrhea, nausea and vomiting.   Genitourinary: Negative for difficulty urinating, dysuria, flank pain, hematuria, penile pain, penile swelling, scrotal swelling and testicular pain.        Ok with urination  Does have some post void dribbling but knows what to do with handling it.       Musculoskeletal: Negative for back pain, gait problem, myalgias and neck stiffness.   Skin: Negative for rash.   Neurological: Negative for dizziness  and speech difficulty.   Hematological: Does not bruise/bleed easily.   Psychiatric/Behavioral: Negative for behavioral problems.       Objective:      Physical Exam   Nursing note and vitals reviewed.  Constitutional: He is oriented to person, place, and time. He appears well-developed. He is cooperative.  Non-toxic appearance.   HENT:   Head: Normocephalic and atraumatic.   Right Ear: External ear normal.   Left Ear: External ear normal.   Nose: Nose normal.   Eyes: Conjunctivae and lids are normal. No scleral icterus.   Neck: Trachea normal, normal range of motion and full passive range of motion without pain. Neck supple. No JVD present. No tracheal deviation present.   Cardiovascular: Normal rate, S1 normal and S2 normal.    Pulmonary/Chest: Effort normal. No respiratory distress. He exhibits no tenderness.   Abdominal: Soft. Normal appearance. There is no abdominal tenderness.   Genitourinary:    Testes and penis normal.   Right testis shows no mass, no swelling and no tenderness. Left testis shows no mass, no swelling and no tenderness. Circumcised. No hypospadias, penile erythema or penile tenderness. No discharge found.       Musculoskeletal: Normal range of motion.   Lymphadenopathy: No inguinal adenopathy noted on the right or left side.   Neurological: He is alert and oriented to person, place, and time.   Skin: Skin is warm and dry.     Psychiatric: His behavior is normal. Judgment and thought content normal.       Assessment:       1. Prostate cancer    2. History of robot-assisted laparoscopic radical prostatectomy    3. Erectile dysfunction after radical prostatectomy    4. History of external beam radiation therapy        Plan:         I spent 60 minutes with the patient of which more than half was spent in direct consultation with the patient in regards to our treatment and plan.    Education and recommendations of today's plan of care including home remedies.  We discussed ED and the contributing  factors. We reviewed his personal factors that contribute to ED. Patient was educated on ED treatments. We discussed PDE-5 inhibitors, ROBINSON, Muse, and IPP.    He is here today for the Intracorporal injection/ICI education and first injection.  Educational materials were supplied.    ICI is an injectable medication that consists of three different medications to produce an erection. It is known as a Trimix Compound or PEP. The medication is a compound medication and is only mixed up at certain pharmacies known as a compound pharmacy. The medication has to be stored in the refrigerator. Improper storage decreases the effectiveness of the medication.     He was educated that it is an injection. With any injection there is risk for possible pain at the injection site as well as risk for an infection. Clean technique must be followed to help prevent infection. Proper needle disposable is necessary. He voices understanding.    The injection is best given when standing up and the penis is positioned perpendicular to the body. The urethral opening should be facing away from the body. Injection is always given on the lateral or side of the penis. Never give the injection to the top of the penis to avoid possible trauma to arteries and veins. Never give the injection to the bottom of the penis to avoid trauma to the urethra. He should alternate the injection sites to avoid the build up of scar tissue due to multiple injections.    This medication can increase the risk of erections lasting longer than 4 hours. This is known as a priapism and is a medical emergency. This requires immediate medical attention. This is main reason we give the first dose in the office today. We start at low dose and see how well the patients reacts. We can increase the medication if needed depending on the reaction the patient receives today. We discussed the fine line between enough medication for penetration and too much leading to a priapism. He  voices understanding.    He witnessed me draw up 20 units and he injected him in the left lateral aspect of the penis. Within 10 minutes, the achieved a good erection for penetration.  .   He was instructed to keep the dosage at 25 units. If noticing a decrease in reaction he can increase the dosage by 5 units.  He may also refill the Rx.     If have any questions, please give me a call. Educational materials were given to patient and all questions were answered. He voiced understanding and left the office without a priapism.   He was very pleased

## 2020-08-06 ENCOUNTER — IMMUNIZATION (OUTPATIENT)
Dept: PHARMACY | Facility: CLINIC | Age: 75
End: 2020-08-06
Payer: MEDICARE

## 2020-08-06 ENCOUNTER — OFFICE VISIT (OUTPATIENT)
Dept: INTERNAL MEDICINE | Facility: CLINIC | Age: 75
End: 2020-08-06
Payer: MEDICARE

## 2020-08-06 VITALS
OXYGEN SATURATION: 97 % | SYSTOLIC BLOOD PRESSURE: 108 MMHG | HEART RATE: 54 BPM | DIASTOLIC BLOOD PRESSURE: 64 MMHG | WEIGHT: 181.69 LBS | BODY MASS INDEX: 24.61 KG/M2 | HEIGHT: 72 IN

## 2020-08-06 DIAGNOSIS — K21.9 GASTROESOPHAGEAL REFLUX DISEASE WITHOUT ESOPHAGITIS: ICD-10-CM

## 2020-08-06 DIAGNOSIS — I70.0 ABDOMINAL AORTIC ATHEROSCLEROSIS: ICD-10-CM

## 2020-08-06 DIAGNOSIS — I51.89 LEFT VENTRICULAR DIASTOLIC DYSFUNCTION WITH PRESERVED SYSTOLIC FUNCTION: ICD-10-CM

## 2020-08-06 DIAGNOSIS — Z00.00 ENCOUNTER FOR PREVENTIVE HEALTH EXAMINATION: Primary | ICD-10-CM

## 2020-08-06 DIAGNOSIS — I10 ESSENTIAL HYPERTENSION: ICD-10-CM

## 2020-08-06 DIAGNOSIS — I35.9 AORTIC VALVE DISEASE: ICD-10-CM

## 2020-08-06 DIAGNOSIS — C61 PROSTATE CANCER: ICD-10-CM

## 2020-08-06 DIAGNOSIS — I77.1 TORTUOUS AORTA: ICD-10-CM

## 2020-08-06 DIAGNOSIS — E78.5 HYPERLIPIDEMIA, UNSPECIFIED HYPERLIPIDEMIA TYPE: ICD-10-CM

## 2020-08-06 DIAGNOSIS — M85.80 OSTEOPENIA, UNSPECIFIED LOCATION: ICD-10-CM

## 2020-08-06 PROCEDURE — 99999 PR PBB SHADOW E&M-EST. PATIENT-LVL IV: ICD-10-PCS | Mod: PBBFAC,HCNC,, | Performed by: NURSE PRACTITIONER

## 2020-08-06 PROCEDURE — 99999 PR PBB SHADOW E&M-EST. PATIENT-LVL IV: CPT | Mod: PBBFAC,HCNC,, | Performed by: NURSE PRACTITIONER

## 2020-08-06 PROCEDURE — G0439 PPPS, SUBSEQ VISIT: HCPCS | Mod: HCNC,S$GLB,, | Performed by: NURSE PRACTITIONER

## 2020-08-06 PROCEDURE — 3078F PR MOST RECENT DIASTOLIC BLOOD PRESSURE < 80 MM HG: ICD-10-PCS | Mod: HCNC,CPTII,S$GLB, | Performed by: NURSE PRACTITIONER

## 2020-08-06 PROCEDURE — 3078F DIAST BP <80 MM HG: CPT | Mod: HCNC,CPTII,S$GLB, | Performed by: NURSE PRACTITIONER

## 2020-08-06 PROCEDURE — 3074F PR MOST RECENT SYSTOLIC BLOOD PRESSURE < 130 MM HG: ICD-10-PCS | Mod: HCNC,CPTII,S$GLB, | Performed by: NURSE PRACTITIONER

## 2020-08-06 PROCEDURE — 99499 UNLISTED E&M SERVICE: CPT | Mod: HCNC,S$GLB,, | Performed by: NURSE PRACTITIONER

## 2020-08-06 PROCEDURE — G0439 PR MEDICARE ANNUAL WELLNESS SUBSEQUENT VISIT: ICD-10-PCS | Mod: HCNC,S$GLB,, | Performed by: NURSE PRACTITIONER

## 2020-08-06 PROCEDURE — 99499 RISK ADDL DX/OHS AUDIT: ICD-10-PCS | Mod: HCNC,S$GLB,, | Performed by: NURSE PRACTITIONER

## 2020-08-06 PROCEDURE — 3074F SYST BP LT 130 MM HG: CPT | Mod: HCNC,CPTII,S$GLB, | Performed by: NURSE PRACTITIONER

## 2020-08-06 NOTE — PATIENT INSTRUCTIONS
Annual Wellness Visit in 1 year, F/U with PCP as instructed, ;sooner if problems    One thing that I've found to be reassuring for patients is have a pulse oximeter at home. It is a device use to check your oxygen levels while at home. This can be purchased online on various web sites.  These devices are helpful in determining how well your lungs are functioning. An oxygen saturation of 95% and above is excellent, 92% or below requires urgent attention.  Also, should you become symptomatic at any point we can set up a virtual visit for you to be seen by one of the providers.  I hope his helps  Counseling and Referral of Other Preventative  (Italic type indicates deductible and co-insurance are waived)    Patient Name: Primitivo Mitchell  Today's Date: 8/6/2020    Health Maintenance       Date Due Completion Date    Shingles Vaccine (2 of 3) 03/30/2015 2/2/2015    Influenza Vaccine (1) 09/01/2020 9/18/2019    Lipid Panel 04/07/2021 4/7/2020    DEXA SCAN 07/16/2021 7/16/2018    High Dose Statin 08/06/2021 8/6/2020    TETANUS VACCINE 02/02/2022 2/2/2012    Colorectal Cancer Screening 05/03/2024 5/3/2019    Override on 5/3/2019: Done    Override on 3/9/2018: Done    Override on 1/5/2015: Done (Done at outside facility; per patient 1 polyp that was benign removed)        No orders of the defined types were placed in this encounter.    The following information is provided to all patients.  This information is to help you find resources for any of the problems found today that may be affecting your health:                Living healthy guide: www.[facility].[place].gov      Understanding Diabetes: www.diabetes.org      Eating healthy: www.cdc.gov/healthyweight      CDC home safety checklist: www.cdc.gov/steadi/patient.html      Agency on Aging: www.goea.[place].gov      Alcoholics anonymous (AA): www.aa.org      Physical Activity: www.roz.nih.gov/tu9bzer      Tobacco use: www.quitwithusla.org

## 2020-08-06 NOTE — PROGRESS NOTES
Primitivo Mitchell presented for a  Medicare AWV and comprehensive Health Risk Assessment today. The following components were reviewed and updated:    · Medical history  · Family History  · Social history  · Allergies and Current Medications  · Health Risk Assessment  · Health Maintenance  · Care Team     ** See Completed Assessments for Annual Wellness Visit within the encounter summary.**         The following assessments were completed:  · Living Situation  · CAGE  · Depression Screening  · Timed Get Up and Go  · Whisper Test  Cognitive Function Screening    ·   · Nutrition Screening  · ADL Screening  · PAQ Screening        Vitals:    08/06/20 1354   BP: 108/64   Pulse: (!) 54   SpO2: 97%   Weight: 82.4 kg (181 lb 10.5 oz)   Height: 6' (1.829 m)     Body mass index is 24.64 kg/m².  Physical Exam  Vitals signs and nursing note reviewed.   Constitutional:       General: He is not in acute distress.     Appearance: He is well-developed. He is not ill-appearing, toxic-appearing or diaphoretic.   HENT:      Head: Normocephalic and atraumatic.      Nose: Nose normal.   Eyes:      Conjunctiva/sclera: Conjunctivae normal.   Cardiovascular:      Rate and Rhythm: Normal rate and regular rhythm.      Heart sounds: Normal heart sounds.   Pulmonary:      Effort: Pulmonary effort is normal.      Breath sounds: Normal breath sounds.   Musculoskeletal: Normal range of motion.      Right lower leg: No edema.      Left lower leg: No edema.   Skin:     General: Skin is warm and dry.   Neurological:      Mental Status: He is alert and oriented to person, place, and time.   Psychiatric:         Behavior: Behavior normal.         Thought Content: Thought content normal.         Judgment: Judgment normal.               Diagnoses and health risks identified today and associated recommendations/orders:    1. Encounter for preventive health examination  Assessment performed. Health maintenance updated. Chart review completed.    2.  Abdominal aortic atherosclerosis  Noted on imaging. Chronic. Stable with statin therapy and blood pressure control. Followed by PCP    3. Aortic valve disease  Noted.  Continue regimen as instructed.  Followed by Cardiology.    4. 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 Chronic. Stable with statin therapy and blood pressure control. Followed by PCP    5. Prostate cancer  S/P RALP 2016 and XRT for biochemical recurrence 2016  Followed by Urology    6. Essential hypertension  Chronic. Stable. Followed by PCP    7. Hyperlipidemia, unspecified hyperlipidemia type  Chronic. Stable. Followed by PCP    8. Left ventricular diastolic dysfunction with preserved systolic function  Chronic. Stable. Followed by PCP    9. Gastroesophageal reflux disease without esophagitis  Chronic. Stable. Followed by PCP    10. Osteopenia, unspecified location  Chronic. Stable. Followed by PCP      Provided Primitivo with a 5-10 year written screening schedule and personal prevention plan. Recommendations were developed using the USPSTF age appropriate recommendations. Education, counseling, and referrals were provided as needed. After Visit Summary printed and given to patient which includes a list of additional screenings\tests needed.    Follow up for Annual Wellness Visit in 1 year, F/U with PCP as instructed, ;sooner if problems.    KEATON Lamar      I offered to discuss end of life issues, including information on how to make advance directives that the patient could use to name someone who would make medical decisions on their behalf if they became too ill to make themselves.    ___Patient declined  _X_Patient is interested, I provided paper work and offered to discuss.

## 2020-09-04 ENCOUNTER — TELEPHONE (OUTPATIENT)
Dept: INTERNAL MEDICINE | Facility: CLINIC | Age: 75
End: 2020-09-04

## 2020-09-04 DIAGNOSIS — Z01.00 EYE EXAM, ROUTINE: Primary | ICD-10-CM

## 2020-09-23 ENCOUNTER — IMMUNIZATION (OUTPATIENT)
Dept: INTERNAL MEDICINE | Facility: CLINIC | Age: 75
End: 2020-09-23
Payer: MEDICARE

## 2020-09-23 PROCEDURE — G0008 ADMIN INFLUENZA VIRUS VAC: HCPCS | Mod: HCNC,S$GLB,, | Performed by: INTERNAL MEDICINE

## 2020-09-23 PROCEDURE — 90694 VACC AIIV4 NO PRSRV 0.5ML IM: CPT | Mod: HCNC,S$GLB,, | Performed by: INTERNAL MEDICINE

## 2020-09-23 PROCEDURE — 90694 FLU VACCINE - QUADRIVALENT - ADJUVANTED: ICD-10-PCS | Mod: HCNC,S$GLB,, | Performed by: INTERNAL MEDICINE

## 2020-09-23 PROCEDURE — G0008 PR ADMIN INFLUENZA VIRUS VAC: ICD-10-PCS | Mod: HCNC,S$GLB,, | Performed by: INTERNAL MEDICINE

## 2020-09-29 ENCOUNTER — PATIENT MESSAGE (OUTPATIENT)
Dept: OTHER | Facility: OTHER | Age: 75
End: 2020-09-29

## 2020-10-22 ENCOUNTER — IMMUNIZATION (OUTPATIENT)
Dept: PHARMACY | Facility: CLINIC | Age: 75
End: 2020-10-22
Payer: MEDICARE

## 2020-10-22 ENCOUNTER — OFFICE VISIT (OUTPATIENT)
Dept: INTERNAL MEDICINE | Facility: CLINIC | Age: 75
End: 2020-10-22
Payer: MEDICARE

## 2020-10-22 VITALS
DIASTOLIC BLOOD PRESSURE: 78 MMHG | HEIGHT: 72 IN | WEIGHT: 183 LBS | OXYGEN SATURATION: 98 % | HEART RATE: 63 BPM | SYSTOLIC BLOOD PRESSURE: 122 MMHG | BODY MASS INDEX: 24.79 KG/M2

## 2020-10-22 DIAGNOSIS — Z00.00 ANNUAL PHYSICAL EXAM: Primary | ICD-10-CM

## 2020-10-22 DIAGNOSIS — E78.5 HYPERLIPIDEMIA, UNSPECIFIED HYPERLIPIDEMIA TYPE: ICD-10-CM

## 2020-10-22 DIAGNOSIS — I10 ESSENTIAL HYPERTENSION: ICD-10-CM

## 2020-10-22 DIAGNOSIS — C61 PROSTATE CANCER: ICD-10-CM

## 2020-10-22 PROCEDURE — 3074F SYST BP LT 130 MM HG: CPT | Mod: HCNC,CPTII,S$GLB, | Performed by: INTERNAL MEDICINE

## 2020-10-22 PROCEDURE — 99999 PR PBB SHADOW E&M-EST. PATIENT-LVL IV: CPT | Mod: PBBFAC,HCNC,, | Performed by: INTERNAL MEDICINE

## 2020-10-22 PROCEDURE — 3078F PR MOST RECENT DIASTOLIC BLOOD PRESSURE < 80 MM HG: ICD-10-PCS | Mod: HCNC,CPTII,S$GLB, | Performed by: INTERNAL MEDICINE

## 2020-10-22 PROCEDURE — 99499 UNLISTED E&M SERVICE: CPT | Mod: S$GLB,,, | Performed by: INTERNAL MEDICINE

## 2020-10-22 PROCEDURE — 3074F PR MOST RECENT SYSTOLIC BLOOD PRESSURE < 130 MM HG: ICD-10-PCS | Mod: HCNC,CPTII,S$GLB, | Performed by: INTERNAL MEDICINE

## 2020-10-22 PROCEDURE — 99999 PR PBB SHADOW E&M-EST. PATIENT-LVL IV: ICD-10-PCS | Mod: PBBFAC,HCNC,, | Performed by: INTERNAL MEDICINE

## 2020-10-22 PROCEDURE — 99397 PER PM REEVAL EST PAT 65+ YR: CPT | Mod: HCNC,S$GLB,, | Performed by: INTERNAL MEDICINE

## 2020-10-22 PROCEDURE — 3078F DIAST BP <80 MM HG: CPT | Mod: HCNC,CPTII,S$GLB, | Performed by: INTERNAL MEDICINE

## 2020-10-22 PROCEDURE — 99499 RISK ADDL DX/OHS AUDIT: ICD-10-PCS | Mod: S$GLB,,, | Performed by: INTERNAL MEDICINE

## 2020-10-22 PROCEDURE — 99397 PR PREVENTIVE VISIT,EST,65 & OVER: ICD-10-PCS | Mod: HCNC,S$GLB,, | Performed by: INTERNAL MEDICINE

## 2020-10-22 NOTE — PROGRESS NOTES
Subjective:       Patient ID: Primitivo Mitchell is a 75 y.o. male.    Chief Complaint: Annual Exam      Last visit with me 4/9/2020.  Since then seen by Cardiology and Urology.  Has upcoming cardiology appointment.    HPI    Patient doing well, usual state of health without significant changes. Continues to take regular medications.    Reviewed PMH, PSH, SH, FH, allergies, and medications.     Review of Systems   All other systems reviewed and are negative.      Objective:      Physical Exam  Vitals signs and nursing note reviewed.   Constitutional:       General: He is not in acute distress.     Appearance: He is not diaphoretic.   HENT:      Head: Normocephalic and atraumatic.      Right Ear: External ear normal.      Left Ear: External ear normal.   Eyes:      General:         Right eye: No discharge.         Left eye: No discharge.      Pupils: Pupils are equal, round, and reactive to light.   Neck:      Musculoskeletal: Normal range of motion.      Thyroid: No thyromegaly.   Cardiovascular:      Rate and Rhythm: Normal rate and regular rhythm.      Heart sounds: Normal heart sounds.   Pulmonary:      Effort: Pulmonary effort is normal.      Breath sounds: Normal breath sounds. No stridor. No wheezing or rales.   Abdominal:      Palpations: Abdomen is soft.      Tenderness: There is no abdominal tenderness. There is no guarding.   Musculoskeletal:         General: No tenderness.      Right lower leg: No edema.      Left lower leg: No edema.   Lymphadenopathy:      Cervical: No cervical adenopathy.   Skin:     General: Skin is warm and dry.      Findings: No rash.   Neurological:      General: No focal deficit present.      Mental Status: He is alert.      Motor: No weakness.   Psychiatric:         Mood and Affect: Mood normal.         Behavior: Behavior normal.         Vitals:    10/22/20 1108   BP: 122/78   BP Location: Right arm   Patient Position: Sitting   BP Method: Medium (Manual)   Pulse: 63   SpO2:  98%   Weight: 83 kg (183 lb)   Height: 6' (1.829 m)     Body mass index is 24.82 kg/m².    RESULTS: Reviewed labs from last 12 months    Assessment:       1. Annual physical exam    2. Essential hypertension    3. Hyperlipidemia, unspecified hyperlipidemia type    4. Prostate cancer        Plan:   Primitivo was seen today for annual exam.    Diagnoses and all orders for this visit:    Annual physical exam:  Age-appropriate health screening reviewed, indicated tests ordered.   -     Comprehensive Metabolic Panel; Future  -     Lipid Panel; Future  -     CBC Without Differential; Future  -     Prostate Specific Antigen, Diagnostic; Future    Essential hypertension:  Prior diagnosis, stable, well controlled on current management. No changes at this time, will continue to monitor.   -     CBC Without Differential; Future    Hyperlipidemia, unspecified hyperlipidemia type:  Prior diagnosis, stable, well controlled on current management. No changes at this time, will continue to monitor.   -     Comprehensive Metabolic Panel; Future  -     Lipid Panel; Future    Prostate cancer:  Prior diagnosis, s/p RALP and XRT, continue to monitor PSA levels yearly.  -     Prostate Specific Antigen, Diagnostic; Future      Follow up in about 6 months (around 4/22/2021) for follow up visit, fasting labs 1 week prior.  Ron Esquivel MD, [facility]  [facility]    Portions of this note were completed using medical dictation software. Please excuse typographical or syntax errors that were missed on review.

## 2020-11-17 ENCOUNTER — OFFICE VISIT (OUTPATIENT)
Dept: CARDIOLOGY | Facility: CLINIC | Age: 75
End: 2020-11-17
Attending: INTERNAL MEDICINE
Payer: MEDICARE

## 2020-11-17 VITALS
HEART RATE: 68 BPM | SYSTOLIC BLOOD PRESSURE: 118 MMHG | HEIGHT: 72 IN | DIASTOLIC BLOOD PRESSURE: 73 MMHG | BODY MASS INDEX: 24.85 KG/M2 | WEIGHT: 183.44 LBS

## 2020-11-17 DIAGNOSIS — I35.9 AORTIC VALVE DISEASE: ICD-10-CM

## 2020-11-17 DIAGNOSIS — I70.0 ATHEROSCLEROSIS OF AORTA: ICD-10-CM

## 2020-11-17 DIAGNOSIS — I10 ESSENTIAL HYPERTENSION: ICD-10-CM

## 2020-11-17 PROCEDURE — 99214 OFFICE O/P EST MOD 30 MIN: CPT | Mod: 25,HCNC,S$GLB, | Performed by: INTERNAL MEDICINE

## 2020-11-17 PROCEDURE — 93000 EKG 12-LEAD: ICD-10-PCS | Mod: HCNC,S$GLB,, | Performed by: INTERNAL MEDICINE

## 2020-11-17 PROCEDURE — 99999 PR PBB SHADOW E&M-EST. PATIENT-LVL III: ICD-10-PCS | Mod: PBBFAC,HCNC,, | Performed by: INTERNAL MEDICINE

## 2020-11-17 PROCEDURE — 99214 PR OFFICE/OUTPT VISIT, EST, LEVL IV, 30-39 MIN: ICD-10-PCS | Mod: 25,HCNC,S$GLB, | Performed by: INTERNAL MEDICINE

## 2020-11-17 PROCEDURE — 1159F MED LIST DOCD IN RCRD: CPT | Mod: HCNC,S$GLB,, | Performed by: INTERNAL MEDICINE

## 2020-11-17 PROCEDURE — 3078F DIAST BP <80 MM HG: CPT | Mod: HCNC,CPTII,S$GLB, | Performed by: INTERNAL MEDICINE

## 2020-11-17 PROCEDURE — 3074F PR MOST RECENT SYSTOLIC BLOOD PRESSURE < 130 MM HG: ICD-10-PCS | Mod: HCNC,CPTII,S$GLB, | Performed by: INTERNAL MEDICINE

## 2020-11-17 PROCEDURE — 93005 ELECTROCARDIOGRAM TRACING: CPT | Mod: HCNC

## 2020-11-17 PROCEDURE — 3008F BODY MASS INDEX DOCD: CPT | Mod: HCNC,CPTII,S$GLB, | Performed by: INTERNAL MEDICINE

## 2020-11-17 PROCEDURE — 99999 PR PBB SHADOW E&M-EST. PATIENT-LVL III: CPT | Mod: PBBFAC,HCNC,, | Performed by: INTERNAL MEDICINE

## 2020-11-17 PROCEDURE — 3074F SYST BP LT 130 MM HG: CPT | Mod: HCNC,CPTII,S$GLB, | Performed by: INTERNAL MEDICINE

## 2020-11-17 PROCEDURE — 99499 RISK ADDL DX/OHS AUDIT: ICD-10-PCS | Mod: S$GLB,,, | Performed by: INTERNAL MEDICINE

## 2020-11-17 PROCEDURE — 1159F PR MEDICATION LIST DOCUMENTED IN MEDICAL RECORD: ICD-10-PCS | Mod: HCNC,S$GLB,, | Performed by: INTERNAL MEDICINE

## 2020-11-17 PROCEDURE — 3078F PR MOST RECENT DIASTOLIC BLOOD PRESSURE < 80 MM HG: ICD-10-PCS | Mod: HCNC,CPTII,S$GLB, | Performed by: INTERNAL MEDICINE

## 2020-11-17 PROCEDURE — 93000 ELECTROCARDIOGRAM COMPLETE: CPT | Mod: HCNC,S$GLB,, | Performed by: INTERNAL MEDICINE

## 2020-11-17 PROCEDURE — 99499 UNLISTED E&M SERVICE: CPT | Mod: S$GLB,,, | Performed by: INTERNAL MEDICINE

## 2020-11-17 PROCEDURE — 3008F PR BODY MASS INDEX (BMI) DOCUMENTED: ICD-10-PCS | Mod: HCNC,CPTII,S$GLB, | Performed by: INTERNAL MEDICINE

## 2020-11-17 NOTE — PROGRESS NOTES
Subjective:     Primitivo Mitchell is a 75 y.o. male with hypertension. He has a healthy weight but he used to be overweight. He has mild aortic valve sclerosis. 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 He was seen at [facility] [place], [place] in 11/2014 and a Stress Echocardiogram was done. He did 9:00 minutes and there was no chest pain or significant ST depression. The Echo was read as an inferolateral wall motion abnormality and the aortic root was mildly dilated. The left ventricular function was mildly depressed. In 4/2016 he was diagnosed with prostate cancer and after thinking the options over he decided on robotic prostatectomy that was done on 5/23/2016. He had a CT scan of the abdomen that revealed aortic plaquing. In 2018 he expresses some concern for that his memory was slightly affected. There is no exertional chest pain or exertional dyspnea. No palpitations or weak spells. No bleeding. Feeling well overall.      Hypertension  This is a chronic problem. The current episode started more than 1 year ago. The problem is unchanged. The problem is controlled (usually 120-130/70-80 mmHg at home). Pertinent negatives include no anxiety, blurred vision, chest pain, headaches, malaise/fatigue, neck pain, orthopnea, palpitations, peripheral edema, PND, shortness of breath or sweats. There is no history of angina.       Review of Systems   Constitution: Negative for chills, fever, malaise/fatigue and weight loss.   HENT: Negative for nosebleeds.    Eyes: Negative for blurred vision, pain, vision loss in left eye and vision loss in right eye.   Cardiovascular: Negative for chest pain, claudication, dyspnea on exertion, irregular heartbeat, leg swelling, near-syncope, orthopnea, palpitations, paroxysmal nocturnal dyspnea and syncope.   Respiratory: Negative for cough, hemoptysis, shortness of breath, sputum production and wheezing.    Endocrine: Negative for cold intolerance and heat intolerance.    Hematologic/Lymphatic: Negative for bleeding problem. Does not bruise/bleed easily.   Skin: Negative for color change and rash.   Musculoskeletal: Negative for falls and neck pain.   Gastrointestinal: Negative for heartburn, hematemesis, hematochezia, hemorrhoids, jaundice, melena, nausea and vomiting.   Genitourinary: Positive for bladder incontinence. Negative for dysuria and hematuria.   Neurological: Negative for dizziness, focal weakness, headaches, light-headedness and vertigo.   Psychiatric/Behavioral: Negative for altered mental status, depression and memory loss. The patient is not nervous/anxious.    Allergic/Immunologic: Negative for hives and persistent infections.       Current Outpatient Medications on File Prior to Visit   Medication Sig Dispense Refill    aspirin (ECOTRIN) 81 MG EC tablet Take 1 tablet (81 mg total) by mouth once daily. 90 tablet 3    cholecalciferol, vitamin D3, 5,000 unit Tab Take 5,000 Units by mouth once daily.      fluticasone propionate (FLONASE) 50 mcg/actuation nasal spray 2 sprays (100 mcg total) by Each Nostril route once daily. 48 g 3    ketotifen (ALAWAY) 0.025 % ophthalmic solution 1 drop 2 (two) times daily.      losartan (COZAAR) 50 MG tablet Take 1 tablet (50 mg total) by mouth once daily. 90 tablet 3    rosuvastatin (CRESTOR) 10 MG tablet Take 1 tablet (10 mg total) by mouth once daily. 90 tablet 3    fish oil-omega-3 fatty acids 300-1,000 mg capsule Take 2 g by mouth once daily.      sildenafil (VIAGRA) 100 MG tablet Take 1 tablet (100 mg total) by mouth daily as needed for Erectile Dysfunction. 30 tablet 5     No current facility-administered medications on file prior to visit.        /73 (BP Location: Left arm, Patient Position: Sitting, BP Method: Large (Automatic))   Pulse 68   Ht 6' (1.829 m)   Wt 83.2 kg (183 lb 6.8 oz)   BMI 24.88 kg/m²       Objective:     Physical Exam   Constitutional: He is oriented to person, place, and time. He  appears well-developed and well-nourished. He does not appear ill. No distress.   HENT:   Head: Normocephalic and atraumatic.   Nose: Nose normal.   Mouth/Throat: No oropharyngeal exudate.   Eyes: Right eye exhibits no discharge. Left eye exhibits no discharge. Right conjunctiva is not injected. Left conjunctiva is not injected. Right pupil is round. Left pupil is round. Pupils are equal.   Neck: No JVD present. Carotid bruit is not present. No thyromegaly present.   Cardiovascular: Normal rate, regular rhythm, S1 normal and S2 normal. Exam reveals no gallop.   Murmur heard.   Midsystolic murmur is present with a grade of 2/6 at the upper right sternal border.  High-pitched blowing decrescendo early diastolic murmur is present with a grade of 2/6 at the upper right sternal border and lower left sternal border.  Pulses:       Radial pulses are 2+ on the right side and 2+ on the left side.        Dorsalis pedis pulses are 2+ on the right side and 2+ on the left side.        Posterior tibial pulses are 2+ on the right side and 2+ on the left side.   Pulmonary/Chest: Effort normal and breath sounds normal.   Abdominal: Soft. Normal appearance. There is no hepatosplenomegaly. There is no abdominal tenderness.   Musculoskeletal:      Right ankle: He exhibits no swelling, no ecchymosis and no deformity.      Left ankle: He exhibits no swelling, no ecchymosis and no deformity.   Lymphadenopathy:        Head (right side): No submandibular adenopathy present.        Head (left side): No submandibular adenopathy present.     He has no cervical adenopathy.   Neurological: He is alert and oriented to person, place, and time. He is not disoriented. No cranial nerve deficit.   Skin: Skin is warm, dry and intact. No rash noted. He is not diaphoretic.   Psychiatric: He has a normal mood and affect. His speech is normal and behavior is normal. Judgment and thought content normal. Cognition and memory are normal.       Assessment:       1. Aortic valve disease    2. Abdominal aortic atherosclerosis    3. Essential hypertension        Plan:     1. Aortic Valve Disease   2011: Treated for presumed endocarditis.   3/21/2013: Echo: Mild aortic valve sclerosis.    5/5/2015: Echo: Normal left ventricular size and systolic function. Moderate aortic valve sclerosis - 1.8 m/s - trace AR.   6/12/2019: Echo: Normal left ventricular size and systolic function. Mild LVH with sigmoid septum. Moderate aortic valve sclerosis - 1.7 m/s. Mild AR.   Reassurance.   Followed.   6/2021: Plan next Echo. Then every few years.    2. Atherosclerosis of Aorta   2015: CT Scan: Aortic plaquing seen.   9/2/2015: Chol 199. HDL 60. . .   On atorvastatin 20 mg Q24.   12/14/2016: Chol 156. HDL 57. LDL 82. TG 85.   11/15/2017: Chol 146. HDL 50. LDL 74. .   On rosuvastatin 10 mg Q24.   4/10/2019: Chol 122. HDL 57. LDL 41. .   4/7/2020: Chol 137. HDL 58. LDL 64. TG 76.   On rosuvastatin 10 mg Q24.   On aspirin 81 mg Q24.   Very favorable lipid panel.    3. Hypertension   2009: Diagnosed.   On losartan 50 mg Q24.   Keeping log at home.   Well controlled.    4. Wall Motion Abnormalities on Echo   11/2014: Stress Echo: 9:00 min. No CP. ECG negative. Echo: Inferolateral WMA with mild LV dysfunction. Aortic root mildly enlarged.   Followed.    5. History of Prostate Cancer   4/2016: Diagnosed.   5/23/2016: Robotic prostatectomy.   Dr. Manuel Reyes.    6. Erectile Dysfunction   On tadalafil for BPH with good effect.   May take tadalafil 20 mg PRN.    7. Primary Care   Dr. Ron Esquivel.    F/u 6 months.    Wisam Verde M.D.

## 2020-11-30 ENCOUNTER — TELEPHONE (OUTPATIENT)
Dept: CARDIOLOGY | Facility: CLINIC | Age: 75
End: 2020-11-30

## 2020-11-30 NOTE — TELEPHONE ENCOUNTER
Spoke with patient told him he can use the equate ASA.      ----- Message from Rosalie Toscano sent at 11/30/2020  3:46 PM CST -----  Regarding: Patient call back  Who called:MARKY BATISTA [[id number]]    What is the request in detail: Patient is requesting a call back. He states he would like to know if he can take the Equate Asprin as his prescribed Asprin did not come in the mail He would like to further discuss.   Please advise.    Can the clinic reply by [facility]? No    Best call back number: [phone]    Additional Information: N/A

## 2020-12-11 ENCOUNTER — PATIENT MESSAGE (OUTPATIENT)
Dept: OTHER | Facility: OTHER | Age: 75
End: 2020-12-11

## 2021-01-27 ENCOUNTER — TELEPHONE (OUTPATIENT)
Dept: INTERNAL MEDICINE | Facility: CLINIC | Age: 76
End: 2021-01-27

## 2021-01-29 ENCOUNTER — TELEPHONE (OUTPATIENT)
Dept: INTERNAL MEDICINE | Facility: CLINIC | Age: 76
End: 2021-01-29

## 2021-02-04 ENCOUNTER — TELEPHONE (OUTPATIENT)
Dept: INTERNAL MEDICINE | Facility: CLINIC | Age: 76
End: 2021-02-04

## 2021-03-27 ENCOUNTER — OFFICE VISIT (OUTPATIENT)
Dept: URGENT CARE | Facility: CLINIC | Age: 76
End: 2021-03-27
Payer: MEDICARE

## 2021-03-27 VITALS
HEART RATE: 61 BPM | BODY MASS INDEX: 24.24 KG/M2 | SYSTOLIC BLOOD PRESSURE: 112 MMHG | HEIGHT: 72 IN | DIASTOLIC BLOOD PRESSURE: 62 MMHG | TEMPERATURE: 98 F | OXYGEN SATURATION: 99 % | RESPIRATION RATE: 17 BRPM | WEIGHT: 179 LBS

## 2021-03-27 DIAGNOSIS — R10.11 RUQ PAIN: ICD-10-CM

## 2021-03-27 DIAGNOSIS — R10.9 ABDOMINAL PAIN, UNSPECIFIED ABDOMINAL LOCATION: Primary | ICD-10-CM

## 2021-03-27 LAB
BILIRUB UR QL STRIP: NEGATIVE
GLUCOSE UR QL STRIP: NEGATIVE
KETONES UR QL STRIP: NEGATIVE
LEUKOCYTE ESTERASE UR QL STRIP: NEGATIVE
PH, POC UA: 6.5 (ref 5–8)
POC BLOOD, URINE: NEGATIVE
POC NITRATES, URINE: NEGATIVE
PROT UR QL STRIP: NEGATIVE
SP GR UR STRIP: 1.02 (ref 1–1.03)
UROBILINOGEN UR STRIP-ACNC: NORMAL (ref 0.3–2.2)

## 2021-03-27 PROCEDURE — 81003 URINALYSIS AUTO W/O SCOPE: CPT | Mod: QW,S$GLB,, | Performed by: NURSE PRACTITIONER

## 2021-03-27 PROCEDURE — 3008F PR BODY MASS INDEX (BMI) DOCUMENTED: ICD-10-PCS | Mod: CPTII,S$GLB,, | Performed by: NURSE PRACTITIONER

## 2021-03-27 PROCEDURE — 99213 OFFICE O/P EST LOW 20 MIN: CPT | Mod: 25,S$GLB,, | Performed by: NURSE PRACTITIONER

## 2021-03-27 PROCEDURE — 99213 PR OFFICE/OUTPT VISIT, EST, LEVL III, 20-29 MIN: ICD-10-PCS | Mod: 25,S$GLB,, | Performed by: NURSE PRACTITIONER

## 2021-03-27 PROCEDURE — 1126F AMNT PAIN NOTED NONE PRSNT: CPT | Mod: S$GLB,,, | Performed by: NURSE PRACTITIONER

## 2021-03-27 PROCEDURE — 81003 POCT URINALYSIS, DIPSTICK, AUTOMATED, W/O SCOPE: ICD-10-PCS | Mod: QW,S$GLB,, | Performed by: NURSE PRACTITIONER

## 2021-03-27 PROCEDURE — 3008F BODY MASS INDEX DOCD: CPT | Mod: CPTII,S$GLB,, | Performed by: NURSE PRACTITIONER

## 2021-03-27 PROCEDURE — 1126F PR PAIN SEVERITY QUANTIFIED, NO PAIN PRESENT: ICD-10-PCS | Mod: S$GLB,,, | Performed by: NURSE PRACTITIONER

## 2021-03-29 ENCOUNTER — TELEPHONE (OUTPATIENT)
Dept: INTERNAL MEDICINE | Facility: CLINIC | Age: 76
End: 2021-03-29

## 2021-03-30 ENCOUNTER — DOCUMENTATION ONLY (OUTPATIENT)
Dept: INTERNAL MEDICINE | Facility: CLINIC | Age: 76
End: 2021-03-30

## 2021-03-30 ENCOUNTER — TELEPHONE (OUTPATIENT)
Dept: INTERNAL MEDICINE | Facility: CLINIC | Age: 76
End: 2021-03-30

## 2021-03-30 DIAGNOSIS — Z12.11 COLON CANCER SCREENING: ICD-10-CM

## 2021-03-30 DIAGNOSIS — D64.9 NORMOCYTIC ANEMIA: Primary | ICD-10-CM

## 2021-04-05 ENCOUNTER — TELEPHONE (OUTPATIENT)
Dept: INTERNAL MEDICINE | Facility: CLINIC | Age: 76
End: 2021-04-05

## 2021-04-09 ENCOUNTER — TELEPHONE (OUTPATIENT)
Dept: INTERNAL MEDICINE | Facility: CLINIC | Age: 76
End: 2021-04-09

## 2021-04-19 ENCOUNTER — TELEPHONE (OUTPATIENT)
Dept: INTERNAL MEDICINE | Facility: CLINIC | Age: 76
End: 2021-04-19

## 2021-04-21 ENCOUNTER — TELEPHONE (OUTPATIENT)
Dept: PRIMARY CARE CLINIC | Facility: CLINIC | Age: 76
End: 2021-04-21

## 2021-04-21 ENCOUNTER — OFFICE VISIT (OUTPATIENT)
Dept: INTERNAL MEDICINE | Facility: CLINIC | Age: 76
End: 2021-04-21
Payer: MEDICARE

## 2021-04-21 VITALS
SYSTOLIC BLOOD PRESSURE: 104 MMHG | WEIGHT: 180 LBS | HEART RATE: 63 BPM | DIASTOLIC BLOOD PRESSURE: 60 MMHG | HEIGHT: 72 IN | BODY MASS INDEX: 24.38 KG/M2 | OXYGEN SATURATION: 99 %

## 2021-04-21 DIAGNOSIS — R41.89 OTHER SYMPTOMS AND SIGNS INVOLVING COGNITIVE FUNCTIONS AND AWARENESS: ICD-10-CM

## 2021-04-21 DIAGNOSIS — M31.0 LEUKOCYTOCLASTIC VASCULITIS: ICD-10-CM

## 2021-04-21 DIAGNOSIS — R10.13 DYSPEPSIA: Primary | ICD-10-CM

## 2021-04-21 DIAGNOSIS — I70.0 ATHEROSCLEROSIS OF AORTA: ICD-10-CM

## 2021-04-21 PROCEDURE — 99999 PR PBB SHADOW E&M-EST. PATIENT-LVL V: ICD-10-PCS | Mod: PBBFAC,,, | Performed by: INTERNAL MEDICINE

## 2021-04-21 PROCEDURE — 3008F PR BODY MASS INDEX (BMI) DOCUMENTED: ICD-10-PCS | Mod: CPTII,S$GLB,, | Performed by: INTERNAL MEDICINE

## 2021-04-21 PROCEDURE — 1159F MED LIST DOCD IN RCRD: CPT | Mod: S$GLB,,, | Performed by: INTERNAL MEDICINE

## 2021-04-21 PROCEDURE — 3008F BODY MASS INDEX DOCD: CPT | Mod: CPTII,S$GLB,, | Performed by: INTERNAL MEDICINE

## 2021-04-21 PROCEDURE — 3288F PR FALLS RISK ASSESSMENT DOCUMENTED: ICD-10-PCS | Mod: CPTII,S$GLB,, | Performed by: INTERNAL MEDICINE

## 2021-04-21 PROCEDURE — 1126F PR PAIN SEVERITY QUANTIFIED, NO PAIN PRESENT: ICD-10-PCS | Mod: S$GLB,,, | Performed by: INTERNAL MEDICINE

## 2021-04-21 PROCEDURE — 1101F PR PT FALLS ASSESS DOC 0-1 FALLS W/OUT INJ PAST YR: ICD-10-PCS | Mod: CPTII,S$GLB,, | Performed by: INTERNAL MEDICINE

## 2021-04-21 PROCEDURE — 1159F PR MEDICATION LIST DOCUMENTED IN MEDICAL RECORD: ICD-10-PCS | Mod: S$GLB,,, | Performed by: INTERNAL MEDICINE

## 2021-04-21 PROCEDURE — 99214 OFFICE O/P EST MOD 30 MIN: CPT | Mod: S$GLB,,, | Performed by: INTERNAL MEDICINE

## 2021-04-21 PROCEDURE — 3288F FALL RISK ASSESSMENT DOCD: CPT | Mod: CPTII,S$GLB,, | Performed by: INTERNAL MEDICINE

## 2021-04-21 PROCEDURE — 1101F PT FALLS ASSESS-DOCD LE1/YR: CPT | Mod: CPTII,S$GLB,, | Performed by: INTERNAL MEDICINE

## 2021-04-21 PROCEDURE — 1126F AMNT PAIN NOTED NONE PRSNT: CPT | Mod: S$GLB,,, | Performed by: INTERNAL MEDICINE

## 2021-04-21 PROCEDURE — 99999 PR PBB SHADOW E&M-EST. PATIENT-LVL V: CPT | Mod: PBBFAC,,, | Performed by: INTERNAL MEDICINE

## 2021-04-21 PROCEDURE — 99214 PR OFFICE/OUTPT VISIT, EST, LEVL IV, 30-39 MIN: ICD-10-PCS | Mod: S$GLB,,, | Performed by: INTERNAL MEDICINE

## 2021-04-22 ENCOUNTER — TELEPHONE (OUTPATIENT)
Dept: PRIMARY CARE CLINIC | Facility: CLINIC | Age: 76
End: 2021-04-22

## 2021-05-04 ENCOUNTER — HOSPITAL ENCOUNTER (OUTPATIENT)
Dept: CARDIOLOGY | Facility: OTHER | Age: 76
Discharge: HOME | End: 2021-05-04
Attending: INTERNAL MEDICINE
Payer: MEDICARE

## 2021-05-04 VITALS
SYSTOLIC BLOOD PRESSURE: 104 MMHG | WEIGHT: 180 LBS | HEIGHT: 72 IN | BODY MASS INDEX: 24.38 KG/M2 | HEART RATE: 63 BPM | DIASTOLIC BLOOD PRESSURE: 60 MMHG

## 2021-05-04 DIAGNOSIS — I35.9 AORTIC VALVE DISEASE: ICD-10-CM

## 2021-05-04 LAB
ASCENDING AORTA: 3.61 CM
AV INDEX (PROSTH): 0.49
AV MEAN GRADIENT: 9 MMHG
AV PEAK GRADIENT: 16 MMHG
AV REGURGITATION PRESSURE HALF TIME: 702 MS
AV VALVE AREA: 1.88 CM2
AV VELOCITY RATIO: 0.48
BSA FOR ECHO PROCEDURE: 2.04 M2
CV ECHO LV RWT: 0.46 CM
DOP CALC AO PEAK VEL: 2.02 M/S
DOP CALC AO VTI: 46.02 CM
DOP CALC LVOT AREA: 3.9 CM2
DOP CALC LVOT DIAMETER: 2.22 CM
DOP CALC LVOT PEAK VEL: 0.96 M/S
DOP CALC LVOT STROKE VOLUME: 86.62 CM3
DOP CALCLVOT PEAK VEL VTI: 22.39 CM
E WAVE DECELERATION TIME: 179.5 MSEC
E/A RATIO: 0.69
E/E' RATIO: 6.27 M/S
ECHO LV POSTERIOR WALL: 1.15 CM (ref 0.6–1.1)
EJECTION FRACTION: 60 %
FRACTIONAL SHORTENING: 53 % (ref 28–44)
INTERVENTRICULAR SEPTUM: 1.18 CM (ref 0.6–1.1)
IVRT: 100.35 MSEC
LA MAJOR: 4.98 CM
LA MINOR: 5.69 CM
LA WIDTH: 4.04 CM
LEFT ATRIUM SIZE: 2.94 CM
LEFT ATRIUM VOLUME INDEX MOD: 33.8 ML/M2
LEFT ATRIUM VOLUME INDEX: 26.3 ML/M2
LEFT ATRIUM VOLUME MOD: 69 CM3
LEFT ATRIUM VOLUME: 53.62 CM3
LEFT INTERNAL DIMENSION IN SYSTOLE: 2.31 CM (ref 2.1–4)
LEFT VENTRICLE DIASTOLIC VOLUME INDEX: 56.81 ML/M2
LEFT VENTRICLE DIASTOLIC VOLUME: 115.89 ML
LEFT VENTRICLE MASS INDEX: 109 G/M2
LEFT VENTRICLE SYSTOLIC VOLUME INDEX: 9 ML/M2
LEFT VENTRICLE SYSTOLIC VOLUME: 18.37 ML
LEFT VENTRICULAR INTERNAL DIMENSION IN DIASTOLE: 4.96 CM (ref 3.5–6)
LEFT VENTRICULAR MASS: 221.41 G
LV LATERAL E/E' RATIO: 5.88 M/S
LV SEPTAL E/E' RATIO: 6.71 M/S
MV A" WAVE DURATION": 9.69 MSEC
MV PEAK A VEL: 0.68 M/S
MV PEAK E VEL: 0.47 M/S
MV STENOSIS PRESSURE HALF TIME: 52.06 MS
MV VALVE AREA P 1/2 METHOD: 4.23 CM2
PISA MRMAX VEL: 0.05 M/S
PISA TR MAX VEL: 2.44 M/S
PULM VEIN S/D RATIO: 1.5
PV PEAK D VEL: 0.38 M/S
PV PEAK S VEL: 0.57 M/S
PV PEAK VELOCITY: 0.88 CM/S
RA MAJOR: 4.57 CM
RA PRESSURE: 3 MMHG
RIGHT VENTRICULAR END-DIASTOLIC DIMENSION: 3.04 CM
RV TISSUE DOPPLER FREE WALL SYSTOLIC VELOCITY 1 (APICAL 4 CHAMBER VIEW): 12.82 CM/S
SINUS: 4 CM
STJ: 3.47 CM
TDI LATERAL: 0.08 M/S
TDI SEPTAL: 0.07 M/S
TDI: 0.08 M/S
TR MAX PG: 24 MMHG
TRICUSPID ANNULAR PLANE SYSTOLIC EXCURSION: 2.71 CM
TV REST PULMONARY ARTERY PRESSURE: 27 MMHG

## 2021-05-04 PROCEDURE — 93306 TTE W/DOPPLER COMPLETE: CPT

## 2021-05-04 PROCEDURE — 93306 ECHO (CUPID ONLY): ICD-10-PCS | Mod: 26,,, | Performed by: INTERNAL MEDICINE

## 2021-05-04 PROCEDURE — 93306 TTE W/DOPPLER COMPLETE: CPT | Mod: 26,,, | Performed by: INTERNAL MEDICINE

## 2021-05-05 ENCOUNTER — TELEPHONE (OUTPATIENT)
Dept: CARDIOLOGY | Facility: CLINIC | Age: 76
End: 2021-05-05

## 2021-05-05 ENCOUNTER — OFFICE VISIT (OUTPATIENT)
Dept: RADIATION ONCOLOGY | Facility: CLINIC | Age: 76
End: 2021-05-05
Attending: RADIOLOGY
Payer: MEDICARE

## 2021-05-05 DIAGNOSIS — C61 PROSTATE CANCER: Primary | ICD-10-CM

## 2021-05-05 PROCEDURE — 99499 UNLISTED E&M SERVICE: CPT | Mod: 95,,, | Performed by: RADIOLOGY

## 2021-05-05 PROCEDURE — 99499 NO LOS: ICD-10-PCS | Mod: 95,,, | Performed by: RADIOLOGY

## 2021-05-19 ENCOUNTER — OFFICE VISIT (OUTPATIENT)
Dept: CARDIOLOGY | Facility: CLINIC | Age: 76
End: 2021-05-19
Attending: INTERNAL MEDICINE
Payer: MEDICARE

## 2021-05-19 VITALS
SYSTOLIC BLOOD PRESSURE: 122 MMHG | HEIGHT: 72 IN | HEART RATE: 60 BPM | BODY MASS INDEX: 24.71 KG/M2 | WEIGHT: 182.44 LBS | DIASTOLIC BLOOD PRESSURE: 78 MMHG

## 2021-05-19 DIAGNOSIS — I35.9 AORTIC VALVE DISEASE: ICD-10-CM

## 2021-05-19 DIAGNOSIS — I10 ESSENTIAL HYPERTENSION: ICD-10-CM

## 2021-05-19 DIAGNOSIS — I70.0 ATHEROSCLEROSIS OF AORTA: ICD-10-CM

## 2021-05-19 DIAGNOSIS — Z85.46 HISTORY OF PROSTATE CANCER: ICD-10-CM

## 2021-05-19 DIAGNOSIS — I70.0 ABDOMINAL AORTIC ATHEROSCLEROSIS: ICD-10-CM

## 2021-05-19 PROCEDURE — 1126F AMNT PAIN NOTED NONE PRSNT: CPT | Mod: S$GLB,,, | Performed by: INTERNAL MEDICINE

## 2021-05-19 PROCEDURE — 1159F MED LIST DOCD IN RCRD: CPT | Mod: S$GLB,,, | Performed by: INTERNAL MEDICINE

## 2021-05-19 PROCEDURE — 1159F PR MEDICATION LIST DOCUMENTED IN MEDICAL RECORD: ICD-10-PCS | Mod: S$GLB,,, | Performed by: INTERNAL MEDICINE

## 2021-05-19 PROCEDURE — 99214 OFFICE O/P EST MOD 30 MIN: CPT | Mod: S$GLB,,, | Performed by: INTERNAL MEDICINE

## 2021-05-19 PROCEDURE — 3008F BODY MASS INDEX DOCD: CPT | Mod: CPTII,S$GLB,, | Performed by: INTERNAL MEDICINE

## 2021-05-19 PROCEDURE — 99999 PR PBB SHADOW E&M-EST. PATIENT-LVL III: ICD-10-PCS | Mod: PBBFAC,,, | Performed by: INTERNAL MEDICINE

## 2021-05-19 PROCEDURE — 1101F PT FALLS ASSESS-DOCD LE1/YR: CPT | Mod: CPTII,S$GLB,, | Performed by: INTERNAL MEDICINE

## 2021-05-19 PROCEDURE — 3288F PR FALLS RISK ASSESSMENT DOCUMENTED: ICD-10-PCS | Mod: CPTII,S$GLB,, | Performed by: INTERNAL MEDICINE

## 2021-05-19 PROCEDURE — 1126F PR PAIN SEVERITY QUANTIFIED, NO PAIN PRESENT: ICD-10-PCS | Mod: S$GLB,,, | Performed by: INTERNAL MEDICINE

## 2021-05-19 PROCEDURE — 3008F PR BODY MASS INDEX (BMI) DOCUMENTED: ICD-10-PCS | Mod: CPTII,S$GLB,, | Performed by: INTERNAL MEDICINE

## 2021-05-19 PROCEDURE — 99499 RISK ADDL DX/OHS AUDIT: ICD-10-PCS | Mod: S$GLB,,, | Performed by: INTERNAL MEDICINE

## 2021-05-19 PROCEDURE — 99999 PR PBB SHADOW E&M-EST. PATIENT-LVL III: CPT | Mod: PBBFAC,,, | Performed by: INTERNAL MEDICINE

## 2021-05-19 PROCEDURE — 99214 PR OFFICE/OUTPT VISIT, EST, LEVL IV, 30-39 MIN: ICD-10-PCS | Mod: S$GLB,,, | Performed by: INTERNAL MEDICINE

## 2021-05-19 PROCEDURE — 3288F FALL RISK ASSESSMENT DOCD: CPT | Mod: CPTII,S$GLB,, | Performed by: INTERNAL MEDICINE

## 2021-05-19 PROCEDURE — 1101F PR PT FALLS ASSESS DOC 0-1 FALLS W/OUT INJ PAST YR: ICD-10-PCS | Mod: CPTII,S$GLB,, | Performed by: INTERNAL MEDICINE

## 2021-05-19 PROCEDURE — 99499 UNLISTED E&M SERVICE: CPT | Mod: S$GLB,,, | Performed by: INTERNAL MEDICINE

## 2021-05-19 RX ORDER — ASPIRIN 81 MG/1
81 TABLET ORAL DAILY
Qty: 90 TABLET | Refills: 3 | Status: SHIPPED | OUTPATIENT
Start: 2021-05-19 | End: 2021-10-15

## 2021-05-19 RX ORDER — ROSUVASTATIN CALCIUM 10 MG/1
10 TABLET, COATED ORAL DAILY
Qty: 90 TABLET | Refills: 3 | Status: SHIPPED | OUTPATIENT
Start: 2021-05-19 | End: 2021-05-26 | Stop reason: SDUPTHER

## 2021-05-19 RX ORDER — LOSARTAN POTASSIUM 50 MG/1
50 TABLET ORAL DAILY
Qty: 90 TABLET | Refills: 3 | Status: SHIPPED | OUTPATIENT
Start: 2021-05-19 | End: 2021-05-26 | Stop reason: SDUPTHER

## 2021-05-26 DIAGNOSIS — I10 ESSENTIAL HYPERTENSION: ICD-10-CM

## 2021-05-26 DIAGNOSIS — I70.0 ATHEROSCLEROSIS OF AORTA: ICD-10-CM

## 2021-05-26 RX ORDER — ROSUVASTATIN CALCIUM 10 MG/1
10 TABLET, COATED ORAL DAILY
Qty: 90 TABLET | Refills: 3 | Status: SHIPPED | OUTPATIENT
Start: 2021-05-26 | End: 2021-11-23 | Stop reason: SDUPTHER

## 2021-05-26 RX ORDER — LOSARTAN POTASSIUM 50 MG/1
50 TABLET ORAL DAILY
Qty: 90 TABLET | Refills: 3 | Status: SHIPPED | OUTPATIENT
Start: 2021-05-26 | End: 2021-11-23 | Stop reason: SDUPTHER

## 2021-05-27 ENCOUNTER — OFFICE VISIT (OUTPATIENT)
Dept: PRIMARY CARE CLINIC | Facility: CLINIC | Age: 76
End: 2021-05-27
Payer: MEDICARE

## 2021-05-27 VITALS
BODY MASS INDEX: 24.73 KG/M2 | OXYGEN SATURATION: 97 % | SYSTOLIC BLOOD PRESSURE: 118 MMHG | WEIGHT: 182.56 LBS | DIASTOLIC BLOOD PRESSURE: 70 MMHG | HEART RATE: 61 BPM | HEIGHT: 72 IN | RESPIRATION RATE: 14 BRPM

## 2021-05-27 DIAGNOSIS — R41.89 OTHER SYMPTOMS AND SIGNS INVOLVING COGNITIVE FUNCTIONS AND AWARENESS: ICD-10-CM

## 2021-05-27 DIAGNOSIS — I35.9 AORTIC VALVE DISEASE: ICD-10-CM

## 2021-05-27 DIAGNOSIS — Z85.46 HISTORY OF PROSTATE CANCER: ICD-10-CM

## 2021-05-27 DIAGNOSIS — I10 ESSENTIAL HYPERTENSION: Primary | ICD-10-CM

## 2021-05-27 DIAGNOSIS — I70.0 ATHEROSCLEROSIS OF AORTA: ICD-10-CM

## 2021-05-27 PROCEDURE — 1126F PR PAIN SEVERITY QUANTIFIED, NO PAIN PRESENT: ICD-10-PCS | Mod: S$GLB,,, | Performed by: NURSE PRACTITIONER

## 2021-05-27 PROCEDURE — 3288F FALL RISK ASSESSMENT DOCD: CPT | Mod: CPTII,S$GLB,, | Performed by: NURSE PRACTITIONER

## 2021-05-27 PROCEDURE — 1101F PR PT FALLS ASSESS DOC 0-1 FALLS W/OUT INJ PAST YR: ICD-10-PCS | Mod: CPTII,S$GLB,, | Performed by: NURSE PRACTITIONER

## 2021-05-27 PROCEDURE — 3008F PR BODY MASS INDEX (BMI) DOCUMENTED: ICD-10-PCS | Mod: CPTII,S$GLB,, | Performed by: NURSE PRACTITIONER

## 2021-05-27 PROCEDURE — 3074F SYST BP LT 130 MM HG: CPT | Mod: CPTII,S$GLB,, | Performed by: NURSE PRACTITIONER

## 2021-05-27 PROCEDURE — 3078F DIAST BP <80 MM HG: CPT | Mod: CPTII,S$GLB,, | Performed by: NURSE PRACTITIONER

## 2021-05-27 PROCEDURE — 99214 OFFICE O/P EST MOD 30 MIN: CPT | Mod: S$GLB,,, | Performed by: NURSE PRACTITIONER

## 2021-05-27 PROCEDURE — 99499 UNLISTED E&M SERVICE: CPT | Mod: S$GLB,,, | Performed by: NURSE PRACTITIONER

## 2021-05-27 PROCEDURE — 3288F PR FALLS RISK ASSESSMENT DOCUMENTED: ICD-10-PCS | Mod: CPTII,S$GLB,, | Performed by: NURSE PRACTITIONER

## 2021-05-27 PROCEDURE — 1126F AMNT PAIN NOTED NONE PRSNT: CPT | Mod: S$GLB,,, | Performed by: NURSE PRACTITIONER

## 2021-05-27 PROCEDURE — 1101F PT FALLS ASSESS-DOCD LE1/YR: CPT | Mod: CPTII,S$GLB,, | Performed by: NURSE PRACTITIONER

## 2021-05-27 PROCEDURE — 3008F BODY MASS INDEX DOCD: CPT | Mod: CPTII,S$GLB,, | Performed by: NURSE PRACTITIONER

## 2021-05-27 PROCEDURE — 3078F PR MOST RECENT DIASTOLIC BLOOD PRESSURE < 80 MM HG: ICD-10-PCS | Mod: CPTII,S$GLB,, | Performed by: NURSE PRACTITIONER

## 2021-05-27 PROCEDURE — 1159F PR MEDICATION LIST DOCUMENTED IN MEDICAL RECORD: ICD-10-PCS | Mod: S$GLB,,, | Performed by: NURSE PRACTITIONER

## 2021-05-27 PROCEDURE — 99499 RISK ADDL DX/OHS AUDIT: ICD-10-PCS | Mod: S$GLB,,, | Performed by: NURSE PRACTITIONER

## 2021-05-27 PROCEDURE — 1159F MED LIST DOCD IN RCRD: CPT | Mod: S$GLB,,, | Performed by: NURSE PRACTITIONER

## 2021-05-27 PROCEDURE — 3074F PR MOST RECENT SYSTOLIC BLOOD PRESSURE < 130 MM HG: ICD-10-PCS | Mod: CPTII,S$GLB,, | Performed by: NURSE PRACTITIONER

## 2021-05-27 PROCEDURE — 99214 PR OFFICE/OUTPT VISIT, EST, LEVL IV, 30-39 MIN: ICD-10-PCS | Mod: S$GLB,,, | Performed by: NURSE PRACTITIONER

## 2021-06-11 ENCOUNTER — PES CALL (OUTPATIENT)
Dept: ADMINISTRATIVE | Facility: CLINIC | Age: 76
End: 2021-06-11

## 2021-06-23 ENCOUNTER — TELEPHONE (OUTPATIENT)
Dept: INTERNAL MEDICINE | Facility: CLINIC | Age: 76
End: 2021-06-23

## 2021-06-24 ENCOUNTER — OFFICE VISIT (OUTPATIENT)
Dept: INTERNAL MEDICINE | Facility: CLINIC | Age: 76
End: 2021-06-24
Payer: MEDICARE

## 2021-06-24 VITALS
BODY MASS INDEX: 24.61 KG/M2 | HEART RATE: 54 BPM | SYSTOLIC BLOOD PRESSURE: 124 MMHG | DIASTOLIC BLOOD PRESSURE: 88 MMHG | HEIGHT: 72 IN | OXYGEN SATURATION: 97 % | WEIGHT: 181.69 LBS

## 2021-06-24 DIAGNOSIS — I51.89 LEFT VENTRICULAR DIASTOLIC DYSFUNCTION WITH PRESERVED SYSTOLIC FUNCTION: ICD-10-CM

## 2021-06-24 DIAGNOSIS — N39.3 STRESS INCONTINENCE, MALE: ICD-10-CM

## 2021-06-24 DIAGNOSIS — I35.9 AORTIC VALVE DISEASE: ICD-10-CM

## 2021-06-24 DIAGNOSIS — M54.16 LUMBAR RADICULOPATHY: ICD-10-CM

## 2021-06-24 DIAGNOSIS — G62.9 NEUROPATHY: ICD-10-CM

## 2021-06-24 DIAGNOSIS — I77.1 TORTUOUS AORTA: ICD-10-CM

## 2021-06-24 DIAGNOSIS — I70.0 ATHEROSCLEROSIS OF AORTA: ICD-10-CM

## 2021-06-24 DIAGNOSIS — I10 ESSENTIAL HYPERTENSION: ICD-10-CM

## 2021-06-24 DIAGNOSIS — G89.29 CHRONIC BILATERAL LOW BACK PAIN WITH SCIATICA, SCIATICA LATERALITY UNSPECIFIED: ICD-10-CM

## 2021-06-24 DIAGNOSIS — M85.80 OSTEOPENIA, UNSPECIFIED LOCATION: ICD-10-CM

## 2021-06-24 DIAGNOSIS — M31.0 LEUKOCYTOCLASTIC VASCULITIS: ICD-10-CM

## 2021-06-24 DIAGNOSIS — M54.40 CHRONIC BILATERAL LOW BACK PAIN WITH SCIATICA, SCIATICA LATERALITY UNSPECIFIED: ICD-10-CM

## 2021-06-24 DIAGNOSIS — Z00.00 ENCOUNTER FOR PREVENTIVE HEALTH EXAMINATION: Primary | ICD-10-CM

## 2021-06-24 DIAGNOSIS — K21.9 GASTROESOPHAGEAL REFLUX DISEASE, UNSPECIFIED WHETHER ESOPHAGITIS PRESENT: ICD-10-CM

## 2021-06-24 DIAGNOSIS — Z85.46 HISTORY OF PROSTATE CANCER: ICD-10-CM

## 2021-06-24 PROCEDURE — 99999 PR PBB SHADOW E&M-EST. PATIENT-LVL IV: ICD-10-PCS | Mod: PBBFAC,,, | Performed by: NURSE PRACTITIONER

## 2021-06-24 PROCEDURE — 1101F PR PT FALLS ASSESS DOC 0-1 FALLS W/OUT INJ PAST YR: ICD-10-PCS | Mod: CPTII,S$GLB,, | Performed by: NURSE PRACTITIONER

## 2021-06-24 PROCEDURE — 1101F PT FALLS ASSESS-DOCD LE1/YR: CPT | Mod: CPTII,S$GLB,, | Performed by: NURSE PRACTITIONER

## 2021-06-24 PROCEDURE — 99999 PR PBB SHADOW E&M-EST. PATIENT-LVL IV: CPT | Mod: PBBFAC,,, | Performed by: NURSE PRACTITIONER

## 2021-06-24 PROCEDURE — 1124F PR ADV CARE PLAN DISCUSSED, UNABLE/UNWILL DOC PLAN OR SURROGATE: ICD-10-PCS | Mod: S$GLB,,, | Performed by: NURSE PRACTITIONER

## 2021-06-24 PROCEDURE — 1126F PR PAIN SEVERITY QUANTIFIED, NO PAIN PRESENT: ICD-10-PCS | Mod: S$GLB,,, | Performed by: NURSE PRACTITIONER

## 2021-06-24 PROCEDURE — G0439 PPPS, SUBSEQ VISIT: HCPCS | Mod: S$GLB,,, | Performed by: NURSE PRACTITIONER

## 2021-06-24 PROCEDURE — 1124F ACP DISCUSS-NO DSCNMKR DOCD: CPT | Mod: S$GLB,,, | Performed by: NURSE PRACTITIONER

## 2021-06-24 PROCEDURE — 3008F BODY MASS INDEX DOCD: CPT | Mod: CPTII,S$GLB,, | Performed by: NURSE PRACTITIONER

## 2021-06-24 PROCEDURE — 3288F PR FALLS RISK ASSESSMENT DOCUMENTED: ICD-10-PCS | Mod: CPTII,S$GLB,, | Performed by: NURSE PRACTITIONER

## 2021-06-24 PROCEDURE — G0439 PR MEDICARE ANNUAL WELLNESS SUBSEQUENT VISIT: ICD-10-PCS | Mod: S$GLB,,, | Performed by: NURSE PRACTITIONER

## 2021-06-24 PROCEDURE — 1126F AMNT PAIN NOTED NONE PRSNT: CPT | Mod: S$GLB,,, | Performed by: NURSE PRACTITIONER

## 2021-06-24 PROCEDURE — 3008F PR BODY MASS INDEX (BMI) DOCUMENTED: ICD-10-PCS | Mod: CPTII,S$GLB,, | Performed by: NURSE PRACTITIONER

## 2021-06-24 PROCEDURE — 3288F FALL RISK ASSESSMENT DOCD: CPT | Mod: CPTII,S$GLB,, | Performed by: NURSE PRACTITIONER

## 2021-08-03 ENCOUNTER — TELEPHONE (OUTPATIENT)
Dept: PRIMARY CARE CLINIC | Facility: CLINIC | Age: 76
End: 2021-08-03

## 2021-08-09 ENCOUNTER — OFFICE VISIT (OUTPATIENT)
Dept: PRIMARY CARE CLINIC | Facility: CLINIC | Age: 76
End: 2021-08-09
Payer: MEDICARE

## 2021-08-09 ENCOUNTER — LAB VISIT (OUTPATIENT)
Dept: LAB | Facility: HOSPITAL | Age: 76
End: 2021-08-09
Attending: INTERNAL MEDICINE
Payer: MEDICARE

## 2021-08-09 VITALS
WEIGHT: 183.63 LBS | TEMPERATURE: 98 F | HEART RATE: 71 BPM | HEIGHT: 72 IN | BODY MASS INDEX: 24.87 KG/M2 | SYSTOLIC BLOOD PRESSURE: 110 MMHG | OXYGEN SATURATION: 98 % | DIASTOLIC BLOOD PRESSURE: 64 MMHG

## 2021-08-09 DIAGNOSIS — M85.80 OSTEOPENIA, UNSPECIFIED LOCATION: Primary | ICD-10-CM

## 2021-08-09 DIAGNOSIS — M81.0 AGE-RELATED OSTEOPOROSIS WITHOUT CURRENT PATHOLOGICAL FRACTURE: ICD-10-CM

## 2021-08-09 DIAGNOSIS — Z66 DNR (DO NOT RESUSCITATE): ICD-10-CM

## 2021-08-09 DIAGNOSIS — M85.80 OSTEOPENIA, UNSPECIFIED LOCATION: ICD-10-CM

## 2021-08-09 DIAGNOSIS — D69.2 SENILE PURPURA: ICD-10-CM

## 2021-08-09 DIAGNOSIS — I50.32 CHRONIC DIASTOLIC HEART FAILURE: ICD-10-CM

## 2021-08-09 DIAGNOSIS — Z85.46 HISTORY OF PROSTATE CANCER: ICD-10-CM

## 2021-08-09 LAB — 25(OH)D3+25(OH)D2 SERPL-MCNC: 39 NG/ML (ref 30–96)

## 2021-08-09 PROCEDURE — 1126F PR PAIN SEVERITY QUANTIFIED, NO PAIN PRESENT: ICD-10-PCS | Mod: CPTII,S$GLB,, | Performed by: INTERNAL MEDICINE

## 2021-08-09 PROCEDURE — 99497 ADVNCD CARE PLAN 30 MIN: CPT | Mod: S$GLB,,, | Performed by: INTERNAL MEDICINE

## 2021-08-09 PROCEDURE — 99215 OFFICE O/P EST HI 40 MIN: CPT | Mod: S$GLB,,, | Performed by: INTERNAL MEDICINE

## 2021-08-09 PROCEDURE — 99215 PR OFFICE/OUTPT VISIT, EST, LEVL V, 40-54 MIN: ICD-10-PCS | Mod: S$GLB,,, | Performed by: INTERNAL MEDICINE

## 2021-08-09 PROCEDURE — 3078F PR MOST RECENT DIASTOLIC BLOOD PRESSURE < 80 MM HG: ICD-10-PCS | Mod: CPTII,S$GLB,, | Performed by: INTERNAL MEDICINE

## 2021-08-09 PROCEDURE — 99497 PR ADVNCD CARE PLAN 30 MIN: ICD-10-PCS | Mod: S$GLB,,, | Performed by: INTERNAL MEDICINE

## 2021-08-09 PROCEDURE — 3078F DIAST BP <80 MM HG: CPT | Mod: CPTII,S$GLB,, | Performed by: INTERNAL MEDICINE

## 2021-08-09 PROCEDURE — 3288F FALL RISK ASSESSMENT DOCD: CPT | Mod: CPTII,S$GLB,, | Performed by: INTERNAL MEDICINE

## 2021-08-09 PROCEDURE — 1101F PT FALLS ASSESS-DOCD LE1/YR: CPT | Mod: CPTII,S$GLB,, | Performed by: INTERNAL MEDICINE

## 2021-08-09 PROCEDURE — 1101F PR PT FALLS ASSESS DOC 0-1 FALLS W/OUT INJ PAST YR: ICD-10-PCS | Mod: CPTII,S$GLB,, | Performed by: INTERNAL MEDICINE

## 2021-08-09 PROCEDURE — 3008F BODY MASS INDEX DOCD: CPT | Mod: CPTII,S$GLB,, | Performed by: INTERNAL MEDICINE

## 2021-08-09 PROCEDURE — 82306 VITAMIN D 25 HYDROXY: CPT | Performed by: INTERNAL MEDICINE

## 2021-08-09 PROCEDURE — 3074F PR MOST RECENT SYSTOLIC BLOOD PRESSURE < 130 MM HG: ICD-10-PCS | Mod: CPTII,S$GLB,, | Performed by: INTERNAL MEDICINE

## 2021-08-09 PROCEDURE — 36415 COLL VENOUS BLD VENIPUNCTURE: CPT | Performed by: INTERNAL MEDICINE

## 2021-08-09 PROCEDURE — 1126F AMNT PAIN NOTED NONE PRSNT: CPT | Mod: CPTII,S$GLB,, | Performed by: INTERNAL MEDICINE

## 2021-08-09 PROCEDURE — 3008F PR BODY MASS INDEX (BMI) DOCUMENTED: ICD-10-PCS | Mod: CPTII,S$GLB,, | Performed by: INTERNAL MEDICINE

## 2021-08-09 PROCEDURE — 3288F PR FALLS RISK ASSESSMENT DOCUMENTED: ICD-10-PCS | Mod: CPTII,S$GLB,, | Performed by: INTERNAL MEDICINE

## 2021-08-09 PROCEDURE — 3074F SYST BP LT 130 MM HG: CPT | Mod: CPTII,S$GLB,, | Performed by: INTERNAL MEDICINE

## 2021-08-10 ENCOUNTER — TELEPHONE (OUTPATIENT)
Dept: PRIMARY CARE CLINIC | Facility: CLINIC | Age: 76
End: 2021-08-10

## 2021-08-11 ENCOUNTER — HOSPITAL ENCOUNTER (OUTPATIENT)
Dept: RADIOLOGY | Facility: CLINIC | Age: 76
Discharge: HOME | End: 2021-08-11
Attending: INTERNAL MEDICINE
Payer: MEDICARE

## 2021-08-11 DIAGNOSIS — M81.0 AGE-RELATED OSTEOPOROSIS WITHOUT CURRENT PATHOLOGICAL FRACTURE: ICD-10-CM

## 2021-08-11 DIAGNOSIS — M85.80 OSTEOPENIA, UNSPECIFIED LOCATION: ICD-10-CM

## 2021-08-11 PROCEDURE — 77080 DEXA BONE DENSITY SPINE HIP: ICD-10-PCS | Mod: 26,,, | Performed by: INTERNAL MEDICINE

## 2021-08-11 PROCEDURE — 77080 DXA BONE DENSITY AXIAL: CPT | Mod: 26,,, | Performed by: INTERNAL MEDICINE

## 2021-08-11 PROCEDURE — 77080 DXA BONE DENSITY AXIAL: CPT | Mod: TC

## 2021-09-03 ENCOUNTER — TELEPHONE (OUTPATIENT)
Dept: PODIATRY | Facility: CLINIC | Age: 76
End: 2021-09-03

## 2021-09-24 ENCOUNTER — OFFICE VISIT (OUTPATIENT)
Dept: PODIATRY | Facility: CLINIC | Age: 76
End: 2021-09-24
Payer: MEDICARE

## 2021-09-24 VITALS
SYSTOLIC BLOOD PRESSURE: 122 MMHG | HEART RATE: 65 BPM | DIASTOLIC BLOOD PRESSURE: 69 MMHG | BODY MASS INDEX: 24.85 KG/M2 | HEIGHT: 72 IN | WEIGHT: 183.44 LBS

## 2021-09-24 DIAGNOSIS — L84 CORN OR CALLUS: ICD-10-CM

## 2021-09-24 DIAGNOSIS — B35.1 ONYCHOMYCOSIS DUE TO DERMATOPHYTE: Primary | ICD-10-CM

## 2021-09-24 PROCEDURE — 4010F PR ACE/ARB THEARPY RXD/TAKEN: ICD-10-PCS | Mod: CPTII,S$GLB,, | Performed by: PODIATRIST

## 2021-09-24 PROCEDURE — 4010F ACE/ARB THERAPY RXD/TAKEN: CPT | Mod: CPTII,S$GLB,, | Performed by: PODIATRIST

## 2021-09-24 PROCEDURE — 99999 PR PBB SHADOW E&M-EST. PATIENT-LVL III: CPT | Mod: PBBFAC,,, | Performed by: PODIATRIST

## 2021-09-24 PROCEDURE — 3008F PR BODY MASS INDEX (BMI) DOCUMENTED: ICD-10-PCS | Mod: CPTII,S$GLB,, | Performed by: PODIATRIST

## 2021-09-24 PROCEDURE — 1157F PR ADVANCE CARE PLAN OR EQUIV PRESENT IN MEDICAL RECORD: ICD-10-PCS | Mod: CPTII,S$GLB,, | Performed by: PODIATRIST

## 2021-09-24 PROCEDURE — 3074F SYST BP LT 130 MM HG: CPT | Mod: CPTII,S$GLB,, | Performed by: PODIATRIST

## 2021-09-24 PROCEDURE — 1126F AMNT PAIN NOTED NONE PRSNT: CPT | Mod: CPTII,S$GLB,, | Performed by: PODIATRIST

## 2021-09-24 PROCEDURE — 3074F PR MOST RECENT SYSTOLIC BLOOD PRESSURE < 130 MM HG: ICD-10-PCS | Mod: CPTII,S$GLB,, | Performed by: PODIATRIST

## 2021-09-24 PROCEDURE — 99203 PR OFFICE/OUTPT VISIT, NEW, LEVL III, 30-44 MIN: ICD-10-PCS | Mod: S$GLB,,, | Performed by: PODIATRIST

## 2021-09-24 PROCEDURE — 1126F PR PAIN SEVERITY QUANTIFIED, NO PAIN PRESENT: ICD-10-PCS | Mod: CPTII,S$GLB,, | Performed by: PODIATRIST

## 2021-09-24 PROCEDURE — 3008F BODY MASS INDEX DOCD: CPT | Mod: CPTII,S$GLB,, | Performed by: PODIATRIST

## 2021-09-24 PROCEDURE — 3078F PR MOST RECENT DIASTOLIC BLOOD PRESSURE < 80 MM HG: ICD-10-PCS | Mod: CPTII,S$GLB,, | Performed by: PODIATRIST

## 2021-09-24 PROCEDURE — 3078F DIAST BP <80 MM HG: CPT | Mod: CPTII,S$GLB,, | Performed by: PODIATRIST

## 2021-09-24 PROCEDURE — 1160F RVW MEDS BY RX/DR IN RCRD: CPT | Mod: CPTII,S$GLB,, | Performed by: PODIATRIST

## 2021-09-24 PROCEDURE — 1157F ADVNC CARE PLAN IN RCRD: CPT | Mod: CPTII,S$GLB,, | Performed by: PODIATRIST

## 2021-09-24 PROCEDURE — 1160F PR REVIEW ALL MEDS BY PRESCRIBER/CLIN PHARMACIST DOCUMENTED: ICD-10-PCS | Mod: CPTII,S$GLB,, | Performed by: PODIATRIST

## 2021-09-24 PROCEDURE — 99999 PR PBB SHADOW E&M-EST. PATIENT-LVL III: ICD-10-PCS | Mod: PBBFAC,,, | Performed by: PODIATRIST

## 2021-09-24 PROCEDURE — 99203 OFFICE O/P NEW LOW 30 MIN: CPT | Mod: S$GLB,,, | Performed by: PODIATRIST

## 2021-09-24 PROCEDURE — 1159F PR MEDICATION LIST DOCUMENTED IN MEDICAL RECORD: ICD-10-PCS | Mod: CPTII,S$GLB,, | Performed by: PODIATRIST

## 2021-09-24 PROCEDURE — 1159F MED LIST DOCD IN RCRD: CPT | Mod: CPTII,S$GLB,, | Performed by: PODIATRIST

## 2021-10-15 ENCOUNTER — OFFICE VISIT (OUTPATIENT)
Dept: PRIMARY CARE CLINIC | Facility: CLINIC | Age: 76
End: 2021-10-15
Payer: MEDICARE

## 2021-10-15 ENCOUNTER — TELEPHONE (OUTPATIENT)
Dept: INTERNAL MEDICINE | Facility: CLINIC | Age: 76
End: 2021-10-15

## 2021-10-15 DIAGNOSIS — M85.80 OSTEOPENIA, UNSPECIFIED LOCATION: ICD-10-CM

## 2021-10-15 DIAGNOSIS — I70.0 ATHEROSCLEROSIS OF AORTA: ICD-10-CM

## 2021-10-15 PROCEDURE — 99442 PR PHYSICIAN TELEPHONE EVALUATION 11-20 MIN: ICD-10-PCS | Mod: HCNC,95,, | Performed by: INTERNAL MEDICINE

## 2021-10-15 PROCEDURE — 1157F PR ADVANCE CARE PLAN OR EQUIV PRESENT IN MEDICAL RECORD: ICD-10-PCS | Mod: HCNC,CPTII,95, | Performed by: INTERNAL MEDICINE

## 2021-10-15 PROCEDURE — 1157F ADVNC CARE PLAN IN RCRD: CPT | Mod: HCNC,CPTII,95, | Performed by: INTERNAL MEDICINE

## 2021-10-15 PROCEDURE — 99442 PR PHYSICIAN TELEPHONE EVALUATION 11-20 MIN: CPT | Mod: HCNC,95,, | Performed by: INTERNAL MEDICINE

## 2021-10-15 PROCEDURE — 4010F PR ACE/ARB THEARPY RXD/TAKEN: ICD-10-PCS | Mod: HCNC,CPTII,95, | Performed by: INTERNAL MEDICINE

## 2021-10-15 PROCEDURE — 4010F ACE/ARB THERAPY RXD/TAKEN: CPT | Mod: HCNC,CPTII,95, | Performed by: INTERNAL MEDICINE

## 2021-10-15 RX ORDER — ACETAMINOPHEN 500 MG
5000 TABLET ORAL DAILY
Qty: 90 TABLET | Refills: 3 | Status: SHIPPED | OUTPATIENT
Start: 2021-10-15 | End: 2022-09-29 | Stop reason: SDUPTHER

## 2021-10-27 ENCOUNTER — TELEPHONE (OUTPATIENT)
Dept: PRIMARY CARE CLINIC | Facility: CLINIC | Age: 76
End: 2021-10-27
Payer: MEDICARE

## 2021-11-09 ENCOUNTER — OFFICE VISIT (OUTPATIENT)
Dept: PRIMARY CARE CLINIC | Facility: CLINIC | Age: 76
End: 2021-11-09
Payer: MEDICARE

## 2021-11-09 VITALS
WEIGHT: 180.75 LBS | DIASTOLIC BLOOD PRESSURE: 59 MMHG | HEIGHT: 72 IN | BODY MASS INDEX: 24.48 KG/M2 | HEART RATE: 66 BPM | TEMPERATURE: 98 F | SYSTOLIC BLOOD PRESSURE: 126 MMHG | OXYGEN SATURATION: 98 %

## 2021-11-09 DIAGNOSIS — M85.80 OSTEOPENIA, UNSPECIFIED LOCATION: ICD-10-CM

## 2021-11-09 DIAGNOSIS — Z86.010 PERSONAL HISTORY OF COLONIC POLYPS: ICD-10-CM

## 2021-11-09 DIAGNOSIS — I99.8 ANGIECTASIA: ICD-10-CM

## 2021-11-09 DIAGNOSIS — I10 PRIMARY HYPERTENSION: ICD-10-CM

## 2021-11-09 PROBLEM — Z86.0100 PERSONAL HISTORY OF COLONIC POLYPS: Status: ACTIVE | Noted: 2017-05-08

## 2021-11-09 PROCEDURE — 1157F ADVNC CARE PLAN IN RCRD: CPT | Mod: HCNC,CPTII,S$GLB, | Performed by: INTERNAL MEDICINE

## 2021-11-09 PROCEDURE — 4010F PR ACE/ARB THEARPY RXD/TAKEN: ICD-10-PCS | Mod: HCNC,CPTII,S$GLB, | Performed by: INTERNAL MEDICINE

## 2021-11-09 PROCEDURE — 4010F ACE/ARB THERAPY RXD/TAKEN: CPT | Mod: HCNC,CPTII,S$GLB, | Performed by: INTERNAL MEDICINE

## 2021-11-09 PROCEDURE — 99215 OFFICE O/P EST HI 40 MIN: CPT | Mod: HCNC,S$GLB,, | Performed by: INTERNAL MEDICINE

## 2021-11-09 PROCEDURE — 1126F AMNT PAIN NOTED NONE PRSNT: CPT | Mod: HCNC,CPTII,S$GLB, | Performed by: INTERNAL MEDICINE

## 2021-11-09 PROCEDURE — 3078F PR MOST RECENT DIASTOLIC BLOOD PRESSURE < 80 MM HG: ICD-10-PCS | Mod: HCNC,CPTII,S$GLB, | Performed by: INTERNAL MEDICINE

## 2021-11-09 PROCEDURE — 1101F PR PT FALLS ASSESS DOC 0-1 FALLS W/OUT INJ PAST YR: ICD-10-PCS | Mod: HCNC,CPTII,S$GLB, | Performed by: INTERNAL MEDICINE

## 2021-11-09 PROCEDURE — 3288F PR FALLS RISK ASSESSMENT DOCUMENTED: ICD-10-PCS | Mod: HCNC,CPTII,S$GLB, | Performed by: INTERNAL MEDICINE

## 2021-11-09 PROCEDURE — 3078F DIAST BP <80 MM HG: CPT | Mod: HCNC,CPTII,S$GLB, | Performed by: INTERNAL MEDICINE

## 2021-11-09 PROCEDURE — 1159F MED LIST DOCD IN RCRD: CPT | Mod: HCNC,CPTII,S$GLB, | Performed by: INTERNAL MEDICINE

## 2021-11-09 PROCEDURE — 1159F PR MEDICATION LIST DOCUMENTED IN MEDICAL RECORD: ICD-10-PCS | Mod: HCNC,CPTII,S$GLB, | Performed by: INTERNAL MEDICINE

## 2021-11-09 PROCEDURE — 99499 RISK ADDL DX/OHS AUDIT: ICD-10-PCS | Mod: HCNC,S$GLB,, | Performed by: INTERNAL MEDICINE

## 2021-11-09 PROCEDURE — 1157F PR ADVANCE CARE PLAN OR EQUIV PRESENT IN MEDICAL RECORD: ICD-10-PCS | Mod: HCNC,CPTII,S$GLB, | Performed by: INTERNAL MEDICINE

## 2021-11-09 PROCEDURE — 1101F PT FALLS ASSESS-DOCD LE1/YR: CPT | Mod: HCNC,CPTII,S$GLB, | Performed by: INTERNAL MEDICINE

## 2021-11-09 PROCEDURE — 1126F PR PAIN SEVERITY QUANTIFIED, NO PAIN PRESENT: ICD-10-PCS | Mod: HCNC,CPTII,S$GLB, | Performed by: INTERNAL MEDICINE

## 2021-11-09 PROCEDURE — 3074F PR MOST RECENT SYSTOLIC BLOOD PRESSURE < 130 MM HG: ICD-10-PCS | Mod: HCNC,CPTII,S$GLB, | Performed by: INTERNAL MEDICINE

## 2021-11-09 PROCEDURE — 3288F FALL RISK ASSESSMENT DOCD: CPT | Mod: HCNC,CPTII,S$GLB, | Performed by: INTERNAL MEDICINE

## 2021-11-09 PROCEDURE — 3074F SYST BP LT 130 MM HG: CPT | Mod: HCNC,CPTII,S$GLB, | Performed by: INTERNAL MEDICINE

## 2021-11-09 PROCEDURE — 99215 PR OFFICE/OUTPT VISIT, EST, LEVL V, 40-54 MIN: ICD-10-PCS | Mod: HCNC,S$GLB,, | Performed by: INTERNAL MEDICINE

## 2021-11-09 PROCEDURE — 99499 UNLISTED E&M SERVICE: CPT | Mod: HCNC,S$GLB,, | Performed by: INTERNAL MEDICINE

## 2021-11-09 RX ORDER — ASPIRIN 81 MG/1
81 TABLET ORAL
COMMUNITY
End: 2021-11-23 | Stop reason: SDUPTHER

## 2021-11-23 ENCOUNTER — OFFICE VISIT (OUTPATIENT)
Dept: CARDIOLOGY | Facility: CLINIC | Age: 76
End: 2021-11-23
Attending: INTERNAL MEDICINE
Payer: MEDICARE

## 2021-11-23 VITALS
SYSTOLIC BLOOD PRESSURE: 121 MMHG | DIASTOLIC BLOOD PRESSURE: 66 MMHG | BODY MASS INDEX: 24.18 KG/M2 | WEIGHT: 178.56 LBS | HEIGHT: 72 IN | HEART RATE: 58 BPM

## 2021-11-23 DIAGNOSIS — I70.0 ATHEROSCLEROSIS OF AORTA: ICD-10-CM

## 2021-11-23 DIAGNOSIS — Z85.46 HISTORY OF PROSTATE CANCER: ICD-10-CM

## 2021-11-23 DIAGNOSIS — I10 PRIMARY HYPERTENSION: ICD-10-CM

## 2021-11-23 DIAGNOSIS — I35.9 AORTIC VALVE DISEASE: ICD-10-CM

## 2021-11-23 DIAGNOSIS — I10 ESSENTIAL HYPERTENSION: ICD-10-CM

## 2021-11-23 PROCEDURE — 99214 OFFICE O/P EST MOD 30 MIN: CPT | Mod: HCNC,S$GLB,, | Performed by: INTERNAL MEDICINE

## 2021-11-23 PROCEDURE — 4010F PR ACE/ARB THEARPY RXD/TAKEN: ICD-10-PCS | Mod: HCNC,CPTII,S$GLB, | Performed by: INTERNAL MEDICINE

## 2021-11-23 PROCEDURE — 99214 PR OFFICE/OUTPT VISIT, EST, LEVL IV, 30-39 MIN: ICD-10-PCS | Mod: HCNC,S$GLB,, | Performed by: INTERNAL MEDICINE

## 2021-11-23 PROCEDURE — 99499 UNLISTED E&M SERVICE: CPT | Mod: HCNC,S$GLB,, | Performed by: INTERNAL MEDICINE

## 2021-11-23 PROCEDURE — 99999 PR PBB SHADOW E&M-EST. PATIENT-LVL III: CPT | Mod: PBBFAC,HCNC,, | Performed by: INTERNAL MEDICINE

## 2021-11-23 PROCEDURE — 4010F ACE/ARB THERAPY RXD/TAKEN: CPT | Mod: HCNC,CPTII,S$GLB, | Performed by: INTERNAL MEDICINE

## 2021-11-23 PROCEDURE — 99999 PR PBB SHADOW E&M-EST. PATIENT-LVL III: ICD-10-PCS | Mod: PBBFAC,HCNC,, | Performed by: INTERNAL MEDICINE

## 2021-11-23 PROCEDURE — 1157F ADVNC CARE PLAN IN RCRD: CPT | Mod: HCNC,CPTII,S$GLB, | Performed by: INTERNAL MEDICINE

## 2021-11-23 PROCEDURE — 99499 RISK ADDL DX/OHS AUDIT: ICD-10-PCS | Mod: HCNC,S$GLB,, | Performed by: INTERNAL MEDICINE

## 2021-11-23 PROCEDURE — 1157F PR ADVANCE CARE PLAN OR EQUIV PRESENT IN MEDICAL RECORD: ICD-10-PCS | Mod: HCNC,CPTII,S$GLB, | Performed by: INTERNAL MEDICINE

## 2021-11-23 RX ORDER — LOSARTAN POTASSIUM 50 MG/1
50 TABLET ORAL DAILY
Qty: 90 TABLET | Refills: 3 | Status: SHIPPED | OUTPATIENT
Start: 2021-11-23 | End: 2022-05-17 | Stop reason: SDUPTHER

## 2021-11-23 RX ORDER — ASPIRIN 81 MG/1
81 TABLET ORAL DAILY
Qty: 90 TABLET | Refills: 3 | Status: SHIPPED | OUTPATIENT
Start: 2021-11-23 | End: 2022-05-17 | Stop reason: SDUPTHER

## 2021-11-23 RX ORDER — ROSUVASTATIN CALCIUM 10 MG/1
10 TABLET, COATED ORAL DAILY
Qty: 90 TABLET | Refills: 3 | Status: SHIPPED | OUTPATIENT
Start: 2021-11-23 | End: 2022-05-17 | Stop reason: SDUPTHER

## 2021-12-22 ENCOUNTER — OFFICE VISIT (OUTPATIENT)
Dept: PODIATRY | Facility: CLINIC | Age: 76
End: 2021-12-22
Payer: MEDICARE

## 2021-12-22 VITALS
HEART RATE: 64 BPM | SYSTOLIC BLOOD PRESSURE: 117 MMHG | BODY MASS INDEX: 24.22 KG/M2 | WEIGHT: 178.56 LBS | DIASTOLIC BLOOD PRESSURE: 74 MMHG

## 2021-12-22 DIAGNOSIS — L84 CORN OR CALLUS: ICD-10-CM

## 2021-12-22 DIAGNOSIS — B35.1 ONYCHOMYCOSIS DUE TO DERMATOPHYTE: Primary | ICD-10-CM

## 2021-12-22 PROCEDURE — 1157F PR ADVANCE CARE PLAN OR EQUIV PRESENT IN MEDICAL RECORD: ICD-10-PCS | Mod: HCNC,CPTII,, | Performed by: PODIATRIST

## 2021-12-22 PROCEDURE — 17999 PR NON-COVERED FOOT CARE: ICD-10-PCS | Mod: CSM,HCNC,S$GLB, | Performed by: PODIATRIST

## 2021-12-22 PROCEDURE — 1159F PR MEDICATION LIST DOCUMENTED IN MEDICAL RECORD: ICD-10-PCS | Mod: HCNC,CPTII,, | Performed by: PODIATRIST

## 2021-12-22 PROCEDURE — 99499 UNLISTED E&M SERVICE: CPT | Mod: HCNC,,, | Performed by: PODIATRIST

## 2021-12-22 PROCEDURE — 4010F ACE/ARB THERAPY RXD/TAKEN: CPT | Mod: HCNC,CPTII,, | Performed by: PODIATRIST

## 2021-12-22 PROCEDURE — 3078F PR MOST RECENT DIASTOLIC BLOOD PRESSURE < 80 MM HG: ICD-10-PCS | Mod: HCNC,CPTII,, | Performed by: PODIATRIST

## 2021-12-22 PROCEDURE — 3074F SYST BP LT 130 MM HG: CPT | Mod: HCNC,CPTII,, | Performed by: PODIATRIST

## 2021-12-22 PROCEDURE — 1126F AMNT PAIN NOTED NONE PRSNT: CPT | Mod: HCNC,CPTII,, | Performed by: PODIATRIST

## 2021-12-22 PROCEDURE — 3078F DIAST BP <80 MM HG: CPT | Mod: HCNC,CPTII,, | Performed by: PODIATRIST

## 2021-12-22 PROCEDURE — 99999 PR PBB SHADOW E&M-EST. PATIENT-LVL III: ICD-10-PCS | Mod: PBBFAC,HCNC,, | Performed by: PODIATRIST

## 2021-12-22 PROCEDURE — 99499 NO LOS: ICD-10-PCS | Mod: HCNC,,, | Performed by: PODIATRIST

## 2021-12-22 PROCEDURE — 1159F MED LIST DOCD IN RCRD: CPT | Mod: HCNC,CPTII,, | Performed by: PODIATRIST

## 2021-12-22 PROCEDURE — 1157F ADVNC CARE PLAN IN RCRD: CPT | Mod: HCNC,CPTII,, | Performed by: PODIATRIST

## 2021-12-22 PROCEDURE — 1160F PR REVIEW ALL MEDS BY PRESCRIBER/CLIN PHARMACIST DOCUMENTED: ICD-10-PCS | Mod: HCNC,CPTII,, | Performed by: PODIATRIST

## 2021-12-22 PROCEDURE — 17999 UNLISTD PX SKN MUC MEMB SUBQ: CPT | Mod: CSM,HCNC,S$GLB, | Performed by: PODIATRIST

## 2021-12-22 PROCEDURE — 99999 PR PBB SHADOW E&M-EST. PATIENT-LVL III: CPT | Mod: PBBFAC,HCNC,, | Performed by: PODIATRIST

## 2021-12-22 PROCEDURE — 1160F RVW MEDS BY RX/DR IN RCRD: CPT | Mod: HCNC,CPTII,, | Performed by: PODIATRIST

## 2021-12-22 PROCEDURE — 3074F PR MOST RECENT SYSTOLIC BLOOD PRESSURE < 130 MM HG: ICD-10-PCS | Mod: HCNC,CPTII,, | Performed by: PODIATRIST

## 2021-12-22 PROCEDURE — 1126F PR PAIN SEVERITY QUANTIFIED, NO PAIN PRESENT: ICD-10-PCS | Mod: HCNC,CPTII,, | Performed by: PODIATRIST

## 2021-12-22 PROCEDURE — 4010F PR ACE/ARB THEARPY RXD/TAKEN: ICD-10-PCS | Mod: HCNC,CPTII,, | Performed by: PODIATRIST

## 2022-03-02 ENCOUNTER — OFFICE VISIT (OUTPATIENT)
Dept: PODIATRY | Facility: CLINIC | Age: 77
End: 2022-03-02
Payer: MEDICARE

## 2022-03-02 VITALS
SYSTOLIC BLOOD PRESSURE: 128 MMHG | BODY MASS INDEX: 24.22 KG/M2 | DIASTOLIC BLOOD PRESSURE: 75 MMHG | WEIGHT: 178.56 LBS | HEART RATE: 60 BPM

## 2022-03-02 DIAGNOSIS — L84 CORN OR CALLUS: ICD-10-CM

## 2022-03-02 DIAGNOSIS — B35.1 ONYCHOMYCOSIS DUE TO DERMATOPHYTE: Primary | ICD-10-CM

## 2022-03-02 PROCEDURE — 1157F ADVNC CARE PLAN IN RCRD: CPT | Mod: HCNC,CPTII,, | Performed by: PODIATRIST

## 2022-03-02 PROCEDURE — 1157F PR ADVANCE CARE PLAN OR EQUIV PRESENT IN MEDICAL RECORD: ICD-10-PCS | Mod: HCNC,CPTII,, | Performed by: PODIATRIST

## 2022-03-02 PROCEDURE — 3078F DIAST BP <80 MM HG: CPT | Mod: HCNC,CPTII,, | Performed by: PODIATRIST

## 2022-03-02 PROCEDURE — 3074F SYST BP LT 130 MM HG: CPT | Mod: HCNC,CPTII,, | Performed by: PODIATRIST

## 2022-03-02 PROCEDURE — 99499 NO LOS: ICD-10-PCS | Mod: HCNC,,, | Performed by: PODIATRIST

## 2022-03-02 PROCEDURE — 1160F PR REVIEW ALL MEDS BY PRESCRIBER/CLIN PHARMACIST DOCUMENTED: ICD-10-PCS | Mod: HCNC,CPTII,, | Performed by: PODIATRIST

## 2022-03-02 PROCEDURE — 1159F PR MEDICATION LIST DOCUMENTED IN MEDICAL RECORD: ICD-10-PCS | Mod: HCNC,CPTII,, | Performed by: PODIATRIST

## 2022-03-02 PROCEDURE — 3074F PR MOST RECENT SYSTOLIC BLOOD PRESSURE < 130 MM HG: ICD-10-PCS | Mod: HCNC,CPTII,, | Performed by: PODIATRIST

## 2022-03-02 PROCEDURE — 17999 PR NON-COVERED FOOT CARE: ICD-10-PCS | Mod: CSM,HCNC,S$GLB, | Performed by: PODIATRIST

## 2022-03-02 PROCEDURE — 17999 UNLISTD PX SKN MUC MEMB SUBQ: CPT | Mod: CSM,HCNC,S$GLB, | Performed by: PODIATRIST

## 2022-03-02 PROCEDURE — 99499 UNLISTED E&M SERVICE: CPT | Mod: HCNC,,, | Performed by: PODIATRIST

## 2022-03-02 PROCEDURE — 99999 PR PBB SHADOW E&M-EST. PATIENT-LVL III: CPT | Mod: PBBFAC,HCNC,, | Performed by: PODIATRIST

## 2022-03-02 PROCEDURE — 1160F RVW MEDS BY RX/DR IN RCRD: CPT | Mod: HCNC,CPTII,, | Performed by: PODIATRIST

## 2022-03-02 PROCEDURE — 1159F MED LIST DOCD IN RCRD: CPT | Mod: HCNC,CPTII,, | Performed by: PODIATRIST

## 2022-03-02 PROCEDURE — 1125F AMNT PAIN NOTED PAIN PRSNT: CPT | Mod: HCNC,CPTII,, | Performed by: PODIATRIST

## 2022-03-02 PROCEDURE — 3078F PR MOST RECENT DIASTOLIC BLOOD PRESSURE < 80 MM HG: ICD-10-PCS | Mod: HCNC,CPTII,, | Performed by: PODIATRIST

## 2022-03-02 PROCEDURE — 99999 PR PBB SHADOW E&M-EST. PATIENT-LVL III: ICD-10-PCS | Mod: PBBFAC,HCNC,, | Performed by: PODIATRIST

## 2022-03-02 PROCEDURE — 1125F PR PAIN SEVERITY QUANTIFIED, PAIN PRESENT: ICD-10-PCS | Mod: HCNC,CPTII,, | Performed by: PODIATRIST

## 2022-03-02 NOTE — PROGRESS NOTES
Patient presents to the clinic for non-covered routine foot care. Patient is not a high risk foot care patient. Patient understands this is not typically a covered service and patient is aware of responsibility of payment. Pedal pulses are palpable. No know risk factors requiring routine foot care. Nails are elongated and thickened on feet. Diagnosis is onychauxis. Nails were reduced and trimmed bilaterally. Patient tolerated well.     Primitivo was seen today for nail care.    Diagnoses and all orders for this visit:    Onychomycosis due to dermatophyte    Corn or callus      RTC 10 weeks proc b, sooner PRN

## 2022-05-11 ENCOUNTER — OFFICE VISIT (OUTPATIENT)
Dept: PODIATRY | Facility: CLINIC | Age: 77
End: 2022-05-11
Payer: MEDICARE

## 2022-05-11 VITALS
DIASTOLIC BLOOD PRESSURE: 63 MMHG | WEIGHT: 184.31 LBS | HEIGHT: 72 IN | BODY MASS INDEX: 24.96 KG/M2 | HEART RATE: 59 BPM | SYSTOLIC BLOOD PRESSURE: 102 MMHG

## 2022-05-11 DIAGNOSIS — B35.1 ONYCHOMYCOSIS DUE TO DERMATOPHYTE: Primary | ICD-10-CM

## 2022-05-11 DIAGNOSIS — L84 CORN OR CALLUS: ICD-10-CM

## 2022-05-11 PROCEDURE — 99999 PR PBB SHADOW E&M-EST. PATIENT-LVL III: ICD-10-PCS | Mod: PBBFAC,,, | Performed by: PODIATRIST

## 2022-05-11 PROCEDURE — 99499 NO LOS: ICD-10-PCS | Mod: ,,, | Performed by: PODIATRIST

## 2022-05-11 PROCEDURE — 3078F DIAST BP <80 MM HG: CPT | Mod: CPTII,,, | Performed by: PODIATRIST

## 2022-05-11 PROCEDURE — 1157F ADVNC CARE PLAN IN RCRD: CPT | Mod: CPTII,,, | Performed by: PODIATRIST

## 2022-05-11 PROCEDURE — 1160F PR REVIEW ALL MEDS BY PRESCRIBER/CLIN PHARMACIST DOCUMENTED: ICD-10-PCS | Mod: CPTII,,, | Performed by: PODIATRIST

## 2022-05-11 PROCEDURE — 1157F PR ADVANCE CARE PLAN OR EQUIV PRESENT IN MEDICAL RECORD: ICD-10-PCS | Mod: CPTII,,, | Performed by: PODIATRIST

## 2022-05-11 PROCEDURE — 1160F RVW MEDS BY RX/DR IN RCRD: CPT | Mod: CPTII,,, | Performed by: PODIATRIST

## 2022-05-11 PROCEDURE — 3074F PR MOST RECENT SYSTOLIC BLOOD PRESSURE < 130 MM HG: ICD-10-PCS | Mod: CPTII,,, | Performed by: PODIATRIST

## 2022-05-11 PROCEDURE — 3074F SYST BP LT 130 MM HG: CPT | Mod: CPTII,,, | Performed by: PODIATRIST

## 2022-05-11 PROCEDURE — 1159F PR MEDICATION LIST DOCUMENTED IN MEDICAL RECORD: ICD-10-PCS | Mod: CPTII,,, | Performed by: PODIATRIST

## 2022-05-11 PROCEDURE — 99999 PR PBB SHADOW E&M-EST. PATIENT-LVL III: CPT | Mod: PBBFAC,,, | Performed by: PODIATRIST

## 2022-05-11 PROCEDURE — 1126F PR PAIN SEVERITY QUANTIFIED, NO PAIN PRESENT: ICD-10-PCS | Mod: CPTII,,, | Performed by: PODIATRIST

## 2022-05-11 PROCEDURE — 1126F AMNT PAIN NOTED NONE PRSNT: CPT | Mod: CPTII,,, | Performed by: PODIATRIST

## 2022-05-11 PROCEDURE — 17999 PR NON-COVERED FOOT CARE: ICD-10-PCS | Mod: CSM,S$GLB,, | Performed by: PODIATRIST

## 2022-05-11 PROCEDURE — 1159F MED LIST DOCD IN RCRD: CPT | Mod: CPTII,,, | Performed by: PODIATRIST

## 2022-05-11 PROCEDURE — 3078F PR MOST RECENT DIASTOLIC BLOOD PRESSURE < 80 MM HG: ICD-10-PCS | Mod: CPTII,,, | Performed by: PODIATRIST

## 2022-05-11 PROCEDURE — 17999 UNLISTD PX SKN MUC MEMB SUBQ: CPT | Mod: CSM,S$GLB,, | Performed by: PODIATRIST

## 2022-05-11 PROCEDURE — 99499 UNLISTED E&M SERVICE: CPT | Mod: ,,, | Performed by: PODIATRIST

## 2022-05-17 ENCOUNTER — OFFICE VISIT (OUTPATIENT)
Dept: CARDIOLOGY | Facility: CLINIC | Age: 77
End: 2022-05-17
Attending: INTERNAL MEDICINE
Payer: MEDICARE

## 2022-05-17 VITALS
BODY MASS INDEX: 24.94 KG/M2 | WEIGHT: 184.13 LBS | DIASTOLIC BLOOD PRESSURE: 78 MMHG | OXYGEN SATURATION: 97 % | SYSTOLIC BLOOD PRESSURE: 132 MMHG | HEIGHT: 72 IN | HEART RATE: 59 BPM

## 2022-05-17 DIAGNOSIS — Z85.46 HISTORY OF PROSTATE CANCER: ICD-10-CM

## 2022-05-17 DIAGNOSIS — I35.9 AORTIC VALVE DISEASE: ICD-10-CM

## 2022-05-17 DIAGNOSIS — I70.0 ATHEROSCLEROSIS OF AORTA: ICD-10-CM

## 2022-05-17 DIAGNOSIS — I10 PRIMARY HYPERTENSION: ICD-10-CM

## 2022-05-17 PROCEDURE — 99999 PR PBB SHADOW E&M-EST. PATIENT-LVL III: ICD-10-PCS | Mod: PBBFAC,,, | Performed by: INTERNAL MEDICINE

## 2022-05-17 PROCEDURE — 99499 RISK ADDL DX/OHS AUDIT: ICD-10-PCS | Mod: ,,, | Performed by: INTERNAL MEDICINE

## 2022-05-17 PROCEDURE — 1159F MED LIST DOCD IN RCRD: CPT | Mod: CPTII,S$GLB,, | Performed by: INTERNAL MEDICINE

## 2022-05-17 PROCEDURE — 1157F ADVNC CARE PLAN IN RCRD: CPT | Mod: CPTII,S$GLB,, | Performed by: INTERNAL MEDICINE

## 2022-05-17 PROCEDURE — 3075F PR MOST RECENT SYSTOLIC BLOOD PRESS GE 130-139MM HG: ICD-10-PCS | Mod: CPTII,S$GLB,, | Performed by: INTERNAL MEDICINE

## 2022-05-17 PROCEDURE — 1157F PR ADVANCE CARE PLAN OR EQUIV PRESENT IN MEDICAL RECORD: ICD-10-PCS | Mod: CPTII,S$GLB,, | Performed by: INTERNAL MEDICINE

## 2022-05-17 PROCEDURE — 1159F PR MEDICATION LIST DOCUMENTED IN MEDICAL RECORD: ICD-10-PCS | Mod: CPTII,S$GLB,, | Performed by: INTERNAL MEDICINE

## 2022-05-17 PROCEDURE — 3078F PR MOST RECENT DIASTOLIC BLOOD PRESSURE < 80 MM HG: ICD-10-PCS | Mod: CPTII,S$GLB,, | Performed by: INTERNAL MEDICINE

## 2022-05-17 PROCEDURE — 1160F PR REVIEW ALL MEDS BY PRESCRIBER/CLIN PHARMACIST DOCUMENTED: ICD-10-PCS | Mod: CPTII,S$GLB,, | Performed by: INTERNAL MEDICINE

## 2022-05-17 PROCEDURE — 99499 UNLISTED E&M SERVICE: CPT | Mod: ,,, | Performed by: INTERNAL MEDICINE

## 2022-05-17 PROCEDURE — 99214 OFFICE O/P EST MOD 30 MIN: CPT | Mod: S$GLB,,, | Performed by: INTERNAL MEDICINE

## 2022-05-17 PROCEDURE — 3075F SYST BP GE 130 - 139MM HG: CPT | Mod: CPTII,S$GLB,, | Performed by: INTERNAL MEDICINE

## 2022-05-17 PROCEDURE — 1160F RVW MEDS BY RX/DR IN RCRD: CPT | Mod: CPTII,S$GLB,, | Performed by: INTERNAL MEDICINE

## 2022-05-17 PROCEDURE — 3078F DIAST BP <80 MM HG: CPT | Mod: CPTII,S$GLB,, | Performed by: INTERNAL MEDICINE

## 2022-05-17 PROCEDURE — 99214 PR OFFICE/OUTPT VISIT, EST, LEVL IV, 30-39 MIN: ICD-10-PCS | Mod: S$GLB,,, | Performed by: INTERNAL MEDICINE

## 2022-05-17 PROCEDURE — 99999 PR PBB SHADOW E&M-EST. PATIENT-LVL III: CPT | Mod: PBBFAC,,, | Performed by: INTERNAL MEDICINE

## 2022-05-17 RX ORDER — ASPIRIN 81 MG/1
81 TABLET ORAL DAILY
Qty: 90 TABLET | Refills: 3 | COMMUNITY
Start: 2022-05-17 | End: 2024-03-26 | Stop reason: SDUPTHER

## 2022-05-17 RX ORDER — LOSARTAN POTASSIUM 50 MG/1
50 TABLET ORAL DAILY
Qty: 90 TABLET | Refills: 3 | Status: SHIPPED | OUTPATIENT
Start: 2022-05-17 | End: 2023-03-22

## 2022-05-17 RX ORDER — ROSUVASTATIN CALCIUM 10 MG/1
10 TABLET, COATED ORAL DAILY
Qty: 90 TABLET | Refills: 3 | Status: SHIPPED | OUTPATIENT
Start: 2022-05-17 | End: 2023-03-22

## 2022-05-17 NOTE — PROGRESS NOTES
Subjective:     Primitivo Mitchell III is a 76 y.o. male with hypertension. He has a healthy weight but he used to be overweight. He has mild aortic valve sclerosis. 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 He was seen at [facility] in 11/2014 and a stress echocardiogram was done. He did 9:00 minutes and there was no chest pain or significant ST depression. The echocardiogram was read as an inferolateral wall motion abnormality and the aortic root was mildly dilated. The left ventricular function was mildly depressed. In 4/2016 he was diagnosed with prostate cancer and after thinking the options over he decided on robotic prostatectomy that was done on 5/23/2016. He had a CT scan of the abdomen that revealed aortic plaquing. In 2018 he expresses some concern for that his memory was slightly affected. He denies any any exertional chest pain or exertional dyspnea. No palpitations or weak spells. No bleeding. No issues with any of his prescribed medications. Feeling well overall.      Hypertension  This is a chronic problem. The current episode started more than 1 year ago. The problem is unchanged. The problem is controlled (usually 120-130/70-80 mmHg at home). Pertinent negatives include no anxiety, blurred vision, chest pain, headaches, malaise/fatigue, neck pain, orthopnea, palpitations, peripheral edema, PND, shortness of breath or sweats. There is no history of angina.       Review of Systems   Constitutional: Negative for chills, fever, malaise/fatigue and weight loss.   HENT: Negative for nosebleeds.    Eyes: Negative for blurred vision, pain, vision loss in left eye and vision loss in right eye.   Cardiovascular: Negative for chest pain, claudication, dyspnea on exertion, irregular heartbeat, leg swelling, near-syncope, orthopnea, palpitations, paroxysmal nocturnal dyspnea and syncope.   Respiratory: Negative for cough, hemoptysis, shortness of breath, sputum  production and wheezing.    Endocrine: Negative for cold intolerance and heat intolerance.   Hematologic/Lymphatic: Negative for bleeding problem. Does not bruise/bleed easily.   Skin: Negative for color change and rash.   Musculoskeletal: Negative for falls and neck pain.   Gastrointestinal: Negative for heartburn, hematemesis, hematochezia, hemorrhoids, jaundice, melena, nausea and vomiting.   Genitourinary: Positive for bladder incontinence. Negative for dysuria and hematuria.   Neurological: Negative for dizziness, focal weakness, headaches, light-headedness and vertigo.   Psychiatric/Behavioral: Negative for altered mental status, depression and memory loss. The patient is not nervous/anxious.    Allergic/Immunologic: Negative for hives and persistent infections.       Current Outpatient Medications on File Prior to Visit   Medication Sig Dispense Refill    aspirin (ECOTRIN) 81 MG EC tablet Take 1 tablet (81 mg total) by mouth once daily. 90 tablet 3    cholecalciferol, vitamin D3, 125 mcg (5,000 unit) Tab Take 1 tablet (5,000 Units total) by mouth once daily. 90 tablet 3    fluticasone propionate (FLONASE) 50 mcg/actuation nasal spray 2 sprays (100 mcg total) by Each Nostril route once daily. 48 g 3    losartan (COZAAR) 50 MG tablet Take 1 tablet (50 mg total) by mouth once daily. 90 tablet 3    ondansetron (ZOFRAN) 4 MG tablet Take 1 tablet (4 mg total) by mouth every 6 (six) hours. 12 tablet 0    rosuvastatin (CRESTOR) 10 MG tablet Take 1 tablet (10 mg total) by mouth once daily. 90 tablet 3    ketotifen (ZADITOR) 0.025 % (0.035 %) ophthalmic solution 1 drop 2 (two) times daily.       No current facility-administered medications on file prior to visit.       /78 (BP Location: Left arm, Patient Position: Sitting)   Pulse (!) 59   Ht 6' (1.829 m)   Wt 83.5 kg (184 lb 2.1 oz)   SpO2 97%   BMI 24.97 kg/m²       Objective:     Physical Exam  Constitutional:       General: He is not in acute  distress.     Appearance: Normal appearance. He is well-developed. He is not ill-appearing or diaphoretic.   HENT:      Head: Normocephalic and atraumatic.      Nose: Nose normal.      Mouth/Throat:      Pharynx: No oropharyngeal exudate.   Eyes:      General:         Right eye: No discharge.         Left eye: No discharge.      Conjunctiva/sclera:      Right eye: Right conjunctiva is not injected.      Left eye: Left conjunctiva is not injected.      Pupils: Pupils are equal.      Right eye: Pupil is round.      Left eye: Pupil is round.   Neck:      Thyroid: No thyromegaly.      Vascular: No carotid bruit or JVD.   Cardiovascular:      Rate and Rhythm: Normal rate and regular rhythm.      Pulses:           Radial pulses are 2+ on the right side and 2+ on the left side.        Dorsalis pedis pulses are 2+ on the right side and 2+ on the left side.        Posterior tibial pulses are 2+ on the right side and 2+ on the left side.      Heart sounds: S1 normal and S2 normal. Murmur heard.    Midsystolic murmur is present with a grade of 2/6 at the upper right sternal border.  High-pitched blowing decrescendo early diastolic murmur is present with a grade of 2/4 at the upper right sternal border and lower left sternal border.    No gallop.   Pulmonary:      Effort: Pulmonary effort is normal.      Breath sounds: Normal breath sounds.   Abdominal:      Palpations: Abdomen is soft.      Tenderness: There is no abdominal tenderness.   Musculoskeletal:      Right ankle: No swelling, deformity or ecchymosis.      Left ankle: No swelling, deformity or ecchymosis.   Lymphadenopathy:      Head:      Right side of head: No submandibular adenopathy.      Left side of head: No submandibular adenopathy.      Cervical: No cervical adenopathy.   Skin:     General: Skin is warm and dry.      Findings: No rash.   Neurological:      General: No focal deficit present.      Mental Status: He is alert and oriented to person, place, and  time. He is not disoriented.      Cranial Nerves: No cranial nerve deficit.   Psychiatric:         Attention and Perception: Attention and perception normal.         Mood and Affect: Mood and affect normal.         Speech: Speech normal.         Behavior: Behavior normal.         Thought Content: Thought content normal.         Cognition and Memory: Cognition and memory normal.         Judgment: Judgment normal.         Assessment:      1. Aortic valve disease    2. Atherosclerosis of aorta    3. Primary hypertension    4. History of prostate cancer        Plan:     1. Aortic Valve Disease   2011: Treated for presumed endocarditis.   3/21/2013: Echo: Mild aortic valve sclerosis.    5/5/2015: Echo: Normal left ventricular size and systolic function. Moderate aortic valve sclerosis - 1.8 m/s - trace AR.   6/12/2019: Echo: Normal left ventricular size and systolic function. Mild LVH with sigmoid septum. Moderate aortic valve sclerosis - 1.7 m/s. Mild AR.   5/4/2021: Echo:  Normal left ventricular size and systolic function. EF 60%. Mildly sigmoid septum. Mild diastolic dysfunction. Moderate aortic valve sclerosis. Mildly dilated ascending aorta.   Reassurance.   Followed.   6/2023: Plan next Echo. Then every few years.    2. Atherosclerosis of Aorta   2015: CT Scan: Aortic plaquing seen.   9/2/2015: Chol 199. HDL 60. . .   On atorvastatin 20 mg Q24.   12/14/2016: Chol 156. HDL 57. LDL 82. TG 85.   11/15/2017: Chol 146. HDL 50. LDL 74. .   On rosuvastatin 10 mg Q24.   4/10/2019: Chol 122. HDL 57. LDL 41. .   4/7/2020: Chol 137. HDL 58. LDL 64. TG 76.   4/14/2021: Chol 119. HDL 53. LDL 53. TG 64.   On rosuvastatin 10 mg Q24.   On aspirin 81 mg Q24.   Very favorable lipid panel.    3. Hypertension   2009: Diagnosed.   On losartan 50 mg Q24.   Keeping log at home.   Well controlled.    4. Wall Motion Abnormalities on Echo   11/2014: Stress Echo: 9:00 min. No CP. ECG negative. Echo: Inferolateral  WMA with mild LV dysfunction. Aortic root mildly enlarged.   Followed.    5. History of Prostate Cancer   4/2016: Diagnosed.   5/23/2016: Robotic prostatectomy.   Dr. Manuel Reyes.    6. Erectile Dysfunction   On tadalafil for BPH with good effect.   May take tadalafil 20 mg PRN.    7. Primary Care   Dr. Karis Castillo.    F/u 6 months.    Wisam Verde M.D.

## 2022-06-16 ENCOUNTER — LAB VISIT (OUTPATIENT)
Dept: LAB | Facility: HOSPITAL | Age: 77
End: 2022-06-16
Attending: RADIOLOGY
Payer: MEDICARE

## 2022-06-16 DIAGNOSIS — C61 PROSTATE CANCER: ICD-10-CM

## 2022-06-16 LAB — COMPLEXED PSA SERPL-MCNC: <0.01 NG/ML (ref 0–4)

## 2022-06-16 PROCEDURE — 84153 ASSAY OF PSA TOTAL: CPT | Performed by: RADIOLOGY

## 2022-06-16 PROCEDURE — 36415 COLL VENOUS BLD VENIPUNCTURE: CPT | Performed by: RADIOLOGY

## 2022-06-23 ENCOUNTER — OFFICE VISIT (OUTPATIENT)
Dept: RADIATION ONCOLOGY | Facility: CLINIC | Age: 77
End: 2022-06-23
Payer: MEDICARE

## 2022-06-23 VITALS
HEIGHT: 72 IN | BODY MASS INDEX: 24.34 KG/M2 | SYSTOLIC BLOOD PRESSURE: 118 MMHG | WEIGHT: 179.69 LBS | RESPIRATION RATE: 18 BRPM | DIASTOLIC BLOOD PRESSURE: 65 MMHG | TEMPERATURE: 97 F | HEART RATE: 67 BPM | OXYGEN SATURATION: 97 %

## 2022-06-23 DIAGNOSIS — C61 CARCINOMA OF PROSTATE: Primary | ICD-10-CM

## 2022-06-23 PROCEDURE — 99212 OFFICE O/P EST SF 10 MIN: CPT | Mod: S$GLB,,, | Performed by: RADIOLOGY

## 2022-06-23 PROCEDURE — 1160F PR REVIEW ALL MEDS BY PRESCRIBER/CLIN PHARMACIST DOCUMENTED: ICD-10-PCS | Mod: CPTII,S$GLB,, | Performed by: RADIOLOGY

## 2022-06-23 PROCEDURE — 99212 PR OFFICE/OUTPT VISIT, EST, LEVL II, 10-19 MIN: ICD-10-PCS | Mod: S$GLB,,, | Performed by: RADIOLOGY

## 2022-06-23 PROCEDURE — 1126F AMNT PAIN NOTED NONE PRSNT: CPT | Mod: CPTII,S$GLB,, | Performed by: RADIOLOGY

## 2022-06-23 PROCEDURE — 1157F ADVNC CARE PLAN IN RCRD: CPT | Mod: CPTII,S$GLB,, | Performed by: RADIOLOGY

## 2022-06-23 PROCEDURE — 3074F SYST BP LT 130 MM HG: CPT | Mod: CPTII,S$GLB,, | Performed by: RADIOLOGY

## 2022-06-23 PROCEDURE — 3288F PR FALLS RISK ASSESSMENT DOCUMENTED: ICD-10-PCS | Mod: CPTII,S$GLB,, | Performed by: RADIOLOGY

## 2022-06-23 PROCEDURE — 99999 PR PBB SHADOW E&M-EST. PATIENT-LVL IV: ICD-10-PCS | Mod: PBBFAC,,, | Performed by: RADIOLOGY

## 2022-06-23 PROCEDURE — 1160F RVW MEDS BY RX/DR IN RCRD: CPT | Mod: CPTII,S$GLB,, | Performed by: RADIOLOGY

## 2022-06-23 PROCEDURE — 1101F PR PT FALLS ASSESS DOC 0-1 FALLS W/OUT INJ PAST YR: ICD-10-PCS | Mod: CPTII,S$GLB,, | Performed by: RADIOLOGY

## 2022-06-23 PROCEDURE — 3078F PR MOST RECENT DIASTOLIC BLOOD PRESSURE < 80 MM HG: ICD-10-PCS | Mod: CPTII,S$GLB,, | Performed by: RADIOLOGY

## 2022-06-23 PROCEDURE — 3074F PR MOST RECENT SYSTOLIC BLOOD PRESSURE < 130 MM HG: ICD-10-PCS | Mod: CPTII,S$GLB,, | Performed by: RADIOLOGY

## 2022-06-23 PROCEDURE — 1126F PR PAIN SEVERITY QUANTIFIED, NO PAIN PRESENT: ICD-10-PCS | Mod: CPTII,S$GLB,, | Performed by: RADIOLOGY

## 2022-06-23 PROCEDURE — 1159F MED LIST DOCD IN RCRD: CPT | Mod: CPTII,S$GLB,, | Performed by: RADIOLOGY

## 2022-06-23 PROCEDURE — 1157F PR ADVANCE CARE PLAN OR EQUIV PRESENT IN MEDICAL RECORD: ICD-10-PCS | Mod: CPTII,S$GLB,, | Performed by: RADIOLOGY

## 2022-06-23 PROCEDURE — 3078F DIAST BP <80 MM HG: CPT | Mod: CPTII,S$GLB,, | Performed by: RADIOLOGY

## 2022-06-23 PROCEDURE — 3288F FALL RISK ASSESSMENT DOCD: CPT | Mod: CPTII,S$GLB,, | Performed by: RADIOLOGY

## 2022-06-23 PROCEDURE — 1159F PR MEDICATION LIST DOCUMENTED IN MEDICAL RECORD: ICD-10-PCS | Mod: CPTII,S$GLB,, | Performed by: RADIOLOGY

## 2022-06-23 PROCEDURE — 1101F PT FALLS ASSESS-DOCD LE1/YR: CPT | Mod: CPTII,S$GLB,, | Performed by: RADIOLOGY

## 2022-06-23 PROCEDURE — 99999 PR PBB SHADOW E&M-EST. PATIENT-LVL IV: CPT | Mod: PBBFAC,,, | Performed by: RADIOLOGY

## 2022-06-23 NOTE — PROGRESS NOTES
Subjective:       Patient ID: Primitivo Mitchell III is a 76 y.o. male.    Chief Complaint: Prostate Cancer (Follow up )    This patient presents for annual follow up visit.     Mr. Mitchell has a history of pathological stage II (T2c, N0, M0) adenocarcinoma of the prostate.  He initially presented in 2016, PSA had increased to 3.4 ng/ml.  Biopsies from the Lt. apex and Rt. base returned positive for Skinny score 6 (3+3) adenocarcinoma. The patient subsequently underwent RALP in May of 2016. Pathology revealed Skinny 6 (3+3) adenocarcinoma involving 5% of the prostate. There was no perineural or lympho vascular invasion. No ELLYN or seminal vesicle involvement. Tumor was present at the apical margin. Nine Rt. pelvic nodes and 13 Lt. pelvic nodes were negative for tumor involvement. Postoperatively PSA in June returned at 0.06 ng/ml. Repeat PSA on 11/8/16 returned at 0.32 ng/ml. PSA on 12/8/16 returned at 0.41 ng/ml.  The patient was referred for salvage irradiation to the prostate bed.  We also elected to treat the patient with LHRH agonist.  He was given a 6 month  Eligard injection on 12/27/16.  He completed 68.4 Gy to the prostate bed on 4/12/17.   He has remained JEREMIAH since that time.  Today the patient states he feels well.  No complaints.     Review of Systems   Constitutional: Negative for activity change, appetite change, chills and fatigue.   Gastrointestinal: Negative for abdominal pain, change in bowel habit, constipation, diarrhea and change in bowel habit.   Genitourinary: Negative for bladder incontinence, difficulty urinating, dysuria, frequency and hematuria.        Objective:      Physical Exam  Constitutional:       General: He is not in acute distress.     Appearance: Normal appearance.   Pulmonary:      Effort: Pulmonary effort is normal. No respiratory distress.   Abdominal:      General: Abdomen is flat. There is no distension.   Neurological:      Mental Status: He is alert and oriented to  person, place, and time.   Psychiatric:         Mood and Affect: Mood normal.         Judgment: Judgment normal.         PSA - < 0.01 ng/ml   Assessment:         Prostate cancer   Plan:       Doing well, remain JEREMIAH now 5 years status post treatment.   Explained given the length of time, it would be unlikely for him to experience a recurrence of his prostate cancer.  Will plan to discharge the patient from our clinic.  Recommend PSA annually with PCP.  We will be happy to re-evaluate if PSA increases in the future. .

## 2022-06-23 NOTE — PATIENT INSTRUCTIONS
Latest Reference Range & Units 06/20/17 11:03 12/28/17 09:00 07/05/18 11:57 03/11/19 09:57 09/19/19 11:46 04/21/20 13:18 04/14/21 10:04 06/16/22 13:05   PSA Diagnostic 0.00 - 4.00 ng/mL <0.01 <0.01 <0.01 <0.01 <0.01 <0.01 <0.01  <0.01 <0.01

## 2022-06-24 ENCOUNTER — TELEPHONE (OUTPATIENT)
Dept: PRIMARY CARE CLINIC | Facility: CLINIC | Age: 77
End: 2022-06-24
Payer: MEDICARE

## 2022-06-24 NOTE — TELEPHONE ENCOUNTER
Called patient.  Let him know that his PSA will need to be checked every year and that  This has been added to his health record.  Patient verbalized understanding and appreciationC

## 2022-06-24 NOTE — TELEPHONE ENCOUNTER
----- Message from Andrea Ochoa Jr., MD sent at 6/23/2022  1:53 PM CDT -----  patient doing well  no evidence of recurrence.

## 2022-06-24 NOTE — TELEPHONE ENCOUNTER
Please let patient know that we will need to check his PSA every year. I've added that to his health maintenance.    Thanks!  KJ    Doing well, remain JEREMIAH now 5 years status post treatment.   Explained given the length of time, it would be unlikely for him to experience a recurrence of his prostate cancer.  Will plan to discharge the patient from our clinic.  Recommend PSA annually with PCP.  We will be happy to re-evaluate if PSA increases in the future. .

## 2022-07-07 ENCOUNTER — PATIENT MESSAGE (OUTPATIENT)
Dept: PODIATRY | Facility: CLINIC | Age: 77
End: 2022-07-07
Payer: MEDICARE

## 2022-07-22 ENCOUNTER — TELEPHONE (OUTPATIENT)
Dept: PRIMARY CARE CLINIC | Facility: CLINIC | Age: 77
End: 2022-07-22
Payer: MEDICARE

## 2022-07-22 NOTE — TELEPHONE ENCOUNTER
Called and spoke with patient.  Stated that he had gone to the emergency  Room in [place] about 3 weeks ago. but he has been doing fine.  Has had no more problems.  Verbalized appreciation for phone call.

## 2022-08-03 ENCOUNTER — OFFICE VISIT (OUTPATIENT)
Dept: PODIATRY | Facility: CLINIC | Age: 77
End: 2022-08-03
Payer: MEDICARE

## 2022-08-03 VITALS
HEART RATE: 62 BPM | BODY MASS INDEX: 25.53 KG/M2 | DIASTOLIC BLOOD PRESSURE: 70 MMHG | WEIGHT: 188.5 LBS | HEIGHT: 72 IN | SYSTOLIC BLOOD PRESSURE: 128 MMHG

## 2022-08-03 DIAGNOSIS — L84 CORN OR CALLUS: ICD-10-CM

## 2022-08-03 DIAGNOSIS — B35.1 ONYCHOMYCOSIS DUE TO DERMATOPHYTE: Primary | ICD-10-CM

## 2022-08-03 PROCEDURE — 1157F ADVNC CARE PLAN IN RCRD: CPT | Mod: CPTII,,, | Performed by: PODIATRIST

## 2022-08-03 PROCEDURE — 1101F PR PT FALLS ASSESS DOC 0-1 FALLS W/OUT INJ PAST YR: ICD-10-PCS | Mod: CPTII,,, | Performed by: PODIATRIST

## 2022-08-03 PROCEDURE — 1160F PR REVIEW ALL MEDS BY PRESCRIBER/CLIN PHARMACIST DOCUMENTED: ICD-10-PCS | Mod: CPTII,,, | Performed by: PODIATRIST

## 2022-08-03 PROCEDURE — 1160F RVW MEDS BY RX/DR IN RCRD: CPT | Mod: CPTII,,, | Performed by: PODIATRIST

## 2022-08-03 PROCEDURE — 1157F PR ADVANCE CARE PLAN OR EQUIV PRESENT IN MEDICAL RECORD: ICD-10-PCS | Mod: CPTII,,, | Performed by: PODIATRIST

## 2022-08-03 PROCEDURE — 3078F PR MOST RECENT DIASTOLIC BLOOD PRESSURE < 80 MM HG: ICD-10-PCS | Mod: CPTII,,, | Performed by: PODIATRIST

## 2022-08-03 PROCEDURE — 3288F PR FALLS RISK ASSESSMENT DOCUMENTED: ICD-10-PCS | Mod: CPTII,,, | Performed by: PODIATRIST

## 2022-08-03 PROCEDURE — 1126F PR PAIN SEVERITY QUANTIFIED, NO PAIN PRESENT: ICD-10-PCS | Mod: CPTII,,, | Performed by: PODIATRIST

## 2022-08-03 PROCEDURE — 99999 PR PBB SHADOW E&M-EST. PATIENT-LVL III: CPT | Mod: PBBFAC,,, | Performed by: PODIATRIST

## 2022-08-03 PROCEDURE — 99499 NO LOS: ICD-10-PCS | Mod: ,,, | Performed by: PODIATRIST

## 2022-08-03 PROCEDURE — 3288F FALL RISK ASSESSMENT DOCD: CPT | Mod: CPTII,,, | Performed by: PODIATRIST

## 2022-08-03 PROCEDURE — 3078F DIAST BP <80 MM HG: CPT | Mod: CPTII,,, | Performed by: PODIATRIST

## 2022-08-03 PROCEDURE — 3074F SYST BP LT 130 MM HG: CPT | Mod: CPTII,,, | Performed by: PODIATRIST

## 2022-08-03 PROCEDURE — 1159F PR MEDICATION LIST DOCUMENTED IN MEDICAL RECORD: ICD-10-PCS | Mod: CPTII,,, | Performed by: PODIATRIST

## 2022-08-03 PROCEDURE — 1101F PT FALLS ASSESS-DOCD LE1/YR: CPT | Mod: CPTII,,, | Performed by: PODIATRIST

## 2022-08-03 PROCEDURE — 99499 UNLISTED E&M SERVICE: CPT | Mod: ,,, | Performed by: PODIATRIST

## 2022-08-03 PROCEDURE — 17999 UNLISTD PX SKN MUC MEMB SUBQ: CPT | Mod: CSM,S$GLB,, | Performed by: PODIATRIST

## 2022-08-03 PROCEDURE — 17999 PR NON-COVERED FOOT CARE: ICD-10-PCS | Mod: CSM,S$GLB,, | Performed by: PODIATRIST

## 2022-08-03 PROCEDURE — 1126F AMNT PAIN NOTED NONE PRSNT: CPT | Mod: CPTII,,, | Performed by: PODIATRIST

## 2022-08-03 PROCEDURE — 3074F PR MOST RECENT SYSTOLIC BLOOD PRESSURE < 130 MM HG: ICD-10-PCS | Mod: CPTII,,, | Performed by: PODIATRIST

## 2022-08-03 PROCEDURE — 1159F MED LIST DOCD IN RCRD: CPT | Mod: CPTII,,, | Performed by: PODIATRIST

## 2022-08-03 PROCEDURE — 99999 PR PBB SHADOW E&M-EST. PATIENT-LVL III: ICD-10-PCS | Mod: PBBFAC,,, | Performed by: PODIATRIST

## 2022-08-03 NOTE — PROGRESS NOTES
Patient presents to the clinic for non-covered routine foot care. Patient is not a high risk foot care patient. Patient understands this is not typically a covered service and patient is aware of responsibility of payment. Pedal pulses are palpable. No know risk factors requiring routine foot care. Nails are elongated and thickened on feet. Diagnosis is onychomycosis and corn/callus. Nails were reduced and trimmed bilaterally. Patient tolerated well.     Primitivo was seen today for nail care and callouses.    Diagnoses and all orders for this visit:    Onychomycosis due to dermatophyte    Corn or callus      RTC 10 weeks proc b, sooner PRN

## 2022-08-15 ENCOUNTER — OFFICE VISIT (OUTPATIENT)
Dept: FAMILY MEDICINE | Facility: CLINIC | Age: 77
End: 2022-08-15
Payer: MEDICARE

## 2022-08-15 VITALS
HEIGHT: 72 IN | WEIGHT: 183 LBS | OXYGEN SATURATION: 98 % | BODY MASS INDEX: 24.79 KG/M2 | HEART RATE: 71 BPM | DIASTOLIC BLOOD PRESSURE: 68 MMHG | SYSTOLIC BLOOD PRESSURE: 120 MMHG

## 2022-08-15 DIAGNOSIS — K59.09 CHRONIC CONSTIPATION: ICD-10-CM

## 2022-08-15 DIAGNOSIS — Z85.46 HISTORY OF PROSTATE CANCER: ICD-10-CM

## 2022-08-15 DIAGNOSIS — K21.9 GASTROESOPHAGEAL REFLUX DISEASE, UNSPECIFIED WHETHER ESOPHAGITIS PRESENT: ICD-10-CM

## 2022-08-15 DIAGNOSIS — M85.80 OSTEOPENIA, UNSPECIFIED LOCATION: ICD-10-CM

## 2022-08-15 DIAGNOSIS — M54.40 CHRONIC BILATERAL LOW BACK PAIN WITH SCIATICA, SCIATICA LATERALITY UNSPECIFIED: ICD-10-CM

## 2022-08-15 DIAGNOSIS — I70.0 ATHEROSCLEROSIS OF AORTA: ICD-10-CM

## 2022-08-15 DIAGNOSIS — G62.9 NEUROPATHY: ICD-10-CM

## 2022-08-15 DIAGNOSIS — N39.3 STRESS INCONTINENCE, MALE: ICD-10-CM

## 2022-08-15 DIAGNOSIS — M54.16 LUMBAR RADICULOPATHY: ICD-10-CM

## 2022-08-15 DIAGNOSIS — I50.32 CHRONIC DIASTOLIC HEART FAILURE: ICD-10-CM

## 2022-08-15 DIAGNOSIS — I99.8 ANGIECTASIA: ICD-10-CM

## 2022-08-15 DIAGNOSIS — Z00.00 ENCOUNTER FOR PREVENTIVE HEALTH EXAMINATION: Primary | ICD-10-CM

## 2022-08-15 DIAGNOSIS — M31.0 LEUKOCYTOCLASTIC VASCULITIS: ICD-10-CM

## 2022-08-15 DIAGNOSIS — I10 PRIMARY HYPERTENSION: ICD-10-CM

## 2022-08-15 DIAGNOSIS — D69.2 SENILE PURPURA: ICD-10-CM

## 2022-08-15 DIAGNOSIS — G89.29 CHRONIC BILATERAL LOW BACK PAIN WITH SCIATICA, SCIATICA LATERALITY UNSPECIFIED: ICD-10-CM

## 2022-08-15 DIAGNOSIS — I35.9 AORTIC VALVE DISEASE: ICD-10-CM

## 2022-08-15 DIAGNOSIS — I77.1 TORTUOUS AORTA: ICD-10-CM

## 2022-08-15 DIAGNOSIS — I51.89 LEFT VENTRICULAR DIASTOLIC DYSFUNCTION WITH PRESERVED SYSTOLIC FUNCTION: ICD-10-CM

## 2022-08-15 DIAGNOSIS — N52.31 ERECTILE DYSFUNCTION FOLLOWING RADICAL PROSTATECTOMY: ICD-10-CM

## 2022-08-15 PROBLEM — R21 RASH: Status: RESOLVED | Noted: 2020-02-25 | Resolved: 2022-08-15

## 2022-08-15 PROCEDURE — 1101F PT FALLS ASSESS-DOCD LE1/YR: CPT | Mod: CPTII,S$GLB,,

## 2022-08-15 PROCEDURE — 1157F PR ADVANCE CARE PLAN OR EQUIV PRESENT IN MEDICAL RECORD: ICD-10-PCS | Mod: CPTII,S$GLB,,

## 2022-08-15 PROCEDURE — 1126F PR PAIN SEVERITY QUANTIFIED, NO PAIN PRESENT: ICD-10-PCS | Mod: CPTII,S$GLB,,

## 2022-08-15 PROCEDURE — 3074F PR MOST RECENT SYSTOLIC BLOOD PRESSURE < 130 MM HG: ICD-10-PCS | Mod: CPTII,S$GLB,,

## 2022-08-15 PROCEDURE — 99999 PR PBB SHADOW E&M-EST. PATIENT-LVL IV: ICD-10-PCS | Mod: PBBFAC,,,

## 2022-08-15 PROCEDURE — 1159F PR MEDICATION LIST DOCUMENTED IN MEDICAL RECORD: ICD-10-PCS | Mod: CPTII,S$GLB,,

## 2022-08-15 PROCEDURE — 3288F FALL RISK ASSESSMENT DOCD: CPT | Mod: CPTII,S$GLB,,

## 2022-08-15 PROCEDURE — G0439 PR MEDICARE ANNUAL WELLNESS SUBSEQUENT VISIT: ICD-10-PCS | Mod: S$GLB,,,

## 2022-08-15 PROCEDURE — 3078F PR MOST RECENT DIASTOLIC BLOOD PRESSURE < 80 MM HG: ICD-10-PCS | Mod: CPTII,S$GLB,,

## 2022-08-15 PROCEDURE — 99999 PR PBB SHADOW E&M-EST. PATIENT-LVL IV: CPT | Mod: PBBFAC,,,

## 2022-08-15 PROCEDURE — 1160F RVW MEDS BY RX/DR IN RCRD: CPT | Mod: CPTII,S$GLB,,

## 2022-08-15 PROCEDURE — 3288F PR FALLS RISK ASSESSMENT DOCUMENTED: ICD-10-PCS | Mod: CPTII,S$GLB,,

## 2022-08-15 PROCEDURE — 1126F AMNT PAIN NOTED NONE PRSNT: CPT | Mod: CPTII,S$GLB,,

## 2022-08-15 PROCEDURE — 1159F MED LIST DOCD IN RCRD: CPT | Mod: CPTII,S$GLB,,

## 2022-08-15 PROCEDURE — 3078F DIAST BP <80 MM HG: CPT | Mod: CPTII,S$GLB,,

## 2022-08-15 PROCEDURE — G0439 PPPS, SUBSEQ VISIT: HCPCS | Mod: S$GLB,,,

## 2022-08-15 PROCEDURE — 1160F PR REVIEW ALL MEDS BY PRESCRIBER/CLIN PHARMACIST DOCUMENTED: ICD-10-PCS | Mod: CPTII,S$GLB,,

## 2022-08-15 PROCEDURE — 1101F PR PT FALLS ASSESS DOC 0-1 FALLS W/OUT INJ PAST YR: ICD-10-PCS | Mod: CPTII,S$GLB,,

## 2022-08-15 PROCEDURE — 1157F ADVNC CARE PLAN IN RCRD: CPT | Mod: CPTII,S$GLB,,

## 2022-08-15 PROCEDURE — 3074F SYST BP LT 130 MM HG: CPT | Mod: CPTII,S$GLB,,

## 2022-08-15 NOTE — PATIENT INSTRUCTIONS
Counseling and Referral of Other Preventative  (Italic type indicates deductible and co-insurance are waived)    Patient Name: Primitivo Mitchell III  Today's Date: 8/15/2022    Health Maintenance       Date Due Completion Date    TETANUS VACCINE 09/30/2022 (Originally 2/2/2022) 2/2/2012    COVID-19 Vaccine (5 - Booster for Moderna series) 10/28/2022 (Originally 8/14/2022) 4/14/2022    Lipid Panel 10/29/2022 (Originally 4/14/2022) 4/14/2021    Influenza Vaccine (1) 09/01/2022 10/7/2021    PROSTATE-SPECIFIC ANTIGEN 06/16/2023 6/16/2022    DEXA Scan 08/11/2023 8/11/2021    High Dose Statin 08/15/2023 8/15/2022    Colorectal Cancer Screening 05/03/2024 5/3/2019 (Done)    Override on 5/3/2019: Done    Override on 3/9/2018: Done    Override on 1/5/2015: Done (Done at outside facility; per patient 1 polyp that was benign removed)        No orders of the defined types were placed in this encounter.    The following information is provided to all patients.  This information is to help you find resources for any of the problems found today that may be affecting your health:                Living healthy guide: www.[facility].[place].gov      Understanding Diabetes: www.diabetes.org      Eating healthy: www.cdc.gov/healthyweight      CDC home safety checklist: www.cdc.gov/steadi/patient.html      Agency on Aging: www.goea.[place].[facility]      Alcoholics anonymous (AA): www.aa.org      Physical Activity: www.roz.nih.gov/ie5beeg      Tobacco use: www.quitwithusla.org

## 2022-08-15 NOTE — PROGRESS NOTES
Primitivo Mitchell III presented for a  Medicare AWV and comprehensive Health Risk Assessment today. The following components were reviewed and updated:    · Medical history  · Family History  · Social history  · Allergies and Current Medications  · Health Risk Assessment  · Health Maintenance  · Care Team         ** See Completed Assessments for Annual Wellness Visit within the encounter summary.**         The following assessments were completed:  · Living Situation  · CAGE  · Depression Screening  · Timed Get Up and Go  · Whisper Test  · Cognitive Function Screening      · Nutrition Screening  · ADL Screening  · PAQ Screening        Vitals:    08/15/22 1517   BP: 120/68   BP Location: Left arm   Patient Position: Sitting   BP Method: Large (Manual)   Pulse: 71   SpO2: 98%   Weight: 83 kg (182 lb 15.7 oz)   Height: 6' (1.829 m)     Body mass index is 24.82 kg/m².  Physical Exam  Vitals reviewed.   Constitutional:       Appearance: Normal appearance. He is well-developed.   HENT:      Head: Normocephalic.   Cardiovascular:      Rate and Rhythm: Normal rate and regular rhythm.      Pulses: Normal pulses.      Heart sounds: Normal heart sounds. No murmur heard.  Pulmonary:      Effort: Pulmonary effort is normal. No respiratory distress.      Breath sounds: No wheezing, rhonchi or rales.   Skin:     General: Skin is warm.   Neurological:      General: No focal deficit present.      Mental Status: He is alert and oriented to person, place, and time.   Psychiatric:         Mood and Affect: Mood normal.         Behavior: Behavior normal. Behavior is cooperative.         Thought Content: Thought content normal.         Judgment: Judgment normal.              Diagnoses and health risks identified today and associated recommendations/orders:    1. Encounter for preventive health examination  2. Senile purpura  3. Atherosclerosis of aorta  4. Chronic diastolic heart failure  5. Tortuous aorta-Noted on imaging  4/23/2019  6. Leukocytoclastic vasculitis  7. Primary hypertension  8. Aortic valve disease  9. 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 Followed by Cardiology.    11. Stress incontinence, male  12. Erectile dysfunction following radical prostatectomy  13. History of prostate cancer  Chronic; stable. Followed by Urology.    14. Gastroesophageal reflux disease, unspecified whether esophagitis present  15. Chronic constipation  Chronic; stable. Followed by Gastroenterology.    16. Chronic bilateral low back pain with sciatica, sciatica laterality unspecified  17. Neuropathy  18. Lumbar radiculopathy  19. Osteopenia, unspecified location  Chronic; stable. Followed by PCP.    Provided Primitivo with a 5-10 year written screening schedule and personal prevention plan. Recommendations were developed using the USPSTF age appropriate recommendations. Education, counseling, and referrals were provided as needed. After Visit Summary printed and given to patient which includes a list of additional screenings\tests needed.    Follow up in about 1 year (around 8/15/2023) for your next annual wellness visit.    Cathryn Muir NP  Advance Care Planning       I offered to discuss advanced care planning, including how to pick a person who would make decisions for you if you were unable to make them for yourself, called a health care power of , and what kind of decisions you might make such as use of life sustaining treatments such as ventilators and tube feeding when faced with a life limiting illness recorded on a living will that they will need to know. (How you want to be cared for as you near the end of your natural life)     X  Patient has advanced directives on file, which we reviewed, and they do not wish to make changes.

## 2022-10-05 ENCOUNTER — OFFICE VISIT (OUTPATIENT)
Dept: PODIATRY | Facility: CLINIC | Age: 77
End: 2022-10-05
Payer: MEDICARE

## 2022-10-05 DIAGNOSIS — L84 CORN OR CALLUS: ICD-10-CM

## 2022-10-05 DIAGNOSIS — B35.1 ONYCHOMYCOSIS DUE TO DERMATOPHYTE: Primary | ICD-10-CM

## 2022-10-05 PROCEDURE — 1159F MED LIST DOCD IN RCRD: CPT | Mod: CPTII,,, | Performed by: PODIATRIST

## 2022-10-05 PROCEDURE — 1157F ADVNC CARE PLAN IN RCRD: CPT | Mod: CPTII,,, | Performed by: PODIATRIST

## 2022-10-05 PROCEDURE — 1157F PR ADVANCE CARE PLAN OR EQUIV PRESENT IN MEDICAL RECORD: ICD-10-PCS | Mod: CPTII,,, | Performed by: PODIATRIST

## 2022-10-05 PROCEDURE — 99999 PR PBB SHADOW E&M-EST. PATIENT-LVL I: CPT | Mod: PBBFAC,,, | Performed by: PODIATRIST

## 2022-10-05 PROCEDURE — 99499 UNLISTED E&M SERVICE: CPT | Mod: ,,, | Performed by: PODIATRIST

## 2022-10-05 PROCEDURE — 99499 NO LOS: ICD-10-PCS | Mod: ,,, | Performed by: PODIATRIST

## 2022-10-05 PROCEDURE — 99999 PR PBB SHADOW E&M-EST. PATIENT-LVL I: ICD-10-PCS | Mod: PBBFAC,,, | Performed by: PODIATRIST

## 2022-10-05 PROCEDURE — 17999 PR NON-COVERED FOOT CARE: ICD-10-PCS | Mod: CSM,S$GLB,, | Performed by: PODIATRIST

## 2022-10-05 PROCEDURE — 1160F PR REVIEW ALL MEDS BY PRESCRIBER/CLIN PHARMACIST DOCUMENTED: ICD-10-PCS | Mod: CPTII,,, | Performed by: PODIATRIST

## 2022-10-05 PROCEDURE — 1159F PR MEDICATION LIST DOCUMENTED IN MEDICAL RECORD: ICD-10-PCS | Mod: CPTII,,, | Performed by: PODIATRIST

## 2022-10-05 PROCEDURE — 17999 UNLISTD PX SKN MUC MEMB SUBQ: CPT | Mod: CSM,S$GLB,, | Performed by: PODIATRIST

## 2022-10-05 PROCEDURE — 1160F RVW MEDS BY RX/DR IN RCRD: CPT | Mod: CPTII,,, | Performed by: PODIATRIST

## 2022-10-05 NOTE — PROGRESS NOTES
Patient presents to the clinic for non-covered routine foot care. Patient is not a high risk foot care patient. Patient understands this is not typically a covered service and patient is aware of responsibility of payment. Pedal pulses are palpable. No know risk factors requiring routine foot care. Nails are elongated and thickened on feet. Diagnosis is onychomycosis and corn/callus. Nails were reduced and trimmed bilaterally. Patient tolerated well.     Diagnoses and all orders for this visit:    Onychomycosis due to dermatophyte    Corn or callus      RTC 10 weeks proc b, sooner PRN

## 2022-11-22 ENCOUNTER — OFFICE VISIT (OUTPATIENT)
Dept: CARDIOLOGY | Facility: CLINIC | Age: 77
End: 2022-11-22
Attending: INTERNAL MEDICINE
Payer: MEDICARE

## 2022-11-22 VITALS
HEIGHT: 72 IN | DIASTOLIC BLOOD PRESSURE: 82 MMHG | OXYGEN SATURATION: 99 % | SYSTOLIC BLOOD PRESSURE: 124 MMHG | BODY MASS INDEX: 24.74 KG/M2 | WEIGHT: 182.63 LBS | HEART RATE: 64 BPM

## 2022-11-22 DIAGNOSIS — I35.9 AORTIC VALVE DISEASE: ICD-10-CM

## 2022-11-22 DIAGNOSIS — I10 PRIMARY HYPERTENSION: ICD-10-CM

## 2022-11-22 DIAGNOSIS — I70.0 ATHEROSCLEROSIS OF AORTA: ICD-10-CM

## 2022-11-22 DIAGNOSIS — Z85.46 HISTORY OF PROSTATE CANCER: ICD-10-CM

## 2022-11-22 PROCEDURE — 93000 ELECTROCARDIOGRAM COMPLETE: CPT | Mod: S$GLB,,, | Performed by: INTERNAL MEDICINE

## 2022-11-22 PROCEDURE — 1159F MED LIST DOCD IN RCRD: CPT | Mod: CPTII,S$GLB,, | Performed by: INTERNAL MEDICINE

## 2022-11-22 PROCEDURE — 1160F PR REVIEW ALL MEDS BY PRESCRIBER/CLIN PHARMACIST DOCUMENTED: ICD-10-PCS | Mod: CPTII,S$GLB,, | Performed by: INTERNAL MEDICINE

## 2022-11-22 PROCEDURE — 93000 PR ELECTROCARDIOGRAM, COMPLETE: ICD-10-PCS | Mod: S$GLB,,, | Performed by: INTERNAL MEDICINE

## 2022-11-22 PROCEDURE — 1101F PT FALLS ASSESS-DOCD LE1/YR: CPT | Mod: CPTII,S$GLB,, | Performed by: INTERNAL MEDICINE

## 2022-11-22 PROCEDURE — 1157F PR ADVANCE CARE PLAN OR EQUIV PRESENT IN MEDICAL RECORD: ICD-10-PCS | Mod: CPTII,S$GLB,, | Performed by: INTERNAL MEDICINE

## 2022-11-22 PROCEDURE — 1157F ADVNC CARE PLAN IN RCRD: CPT | Mod: CPTII,S$GLB,, | Performed by: INTERNAL MEDICINE

## 2022-11-22 PROCEDURE — 1101F PR PT FALLS ASSESS DOC 0-1 FALLS W/OUT INJ PAST YR: ICD-10-PCS | Mod: CPTII,S$GLB,, | Performed by: INTERNAL MEDICINE

## 2022-11-22 PROCEDURE — 1159F PR MEDICATION LIST DOCUMENTED IN MEDICAL RECORD: ICD-10-PCS | Mod: CPTII,S$GLB,, | Performed by: INTERNAL MEDICINE

## 2022-11-22 PROCEDURE — 93005 ELECTROCARDIOGRAM TRACING: CPT

## 2022-11-22 PROCEDURE — 3288F FALL RISK ASSESSMENT DOCD: CPT | Mod: CPTII,S$GLB,, | Performed by: INTERNAL MEDICINE

## 2022-11-22 PROCEDURE — 1160F RVW MEDS BY RX/DR IN RCRD: CPT | Mod: CPTII,S$GLB,, | Performed by: INTERNAL MEDICINE

## 2022-11-22 PROCEDURE — 3288F PR FALLS RISK ASSESSMENT DOCUMENTED: ICD-10-PCS | Mod: CPTII,S$GLB,, | Performed by: INTERNAL MEDICINE

## 2022-11-22 PROCEDURE — 99214 PR OFFICE/OUTPT VISIT, EST, LEVL IV, 30-39 MIN: ICD-10-PCS | Mod: 25,S$GLB,, | Performed by: INTERNAL MEDICINE

## 2022-11-22 PROCEDURE — 93010 EKG 12-LEAD: ICD-10-PCS | Mod: S$GLB,,, | Performed by: INTERNAL MEDICINE

## 2022-11-22 PROCEDURE — 99499 UNLISTED E&M SERVICE: CPT | Mod: HCNC,S$GLB,, | Performed by: INTERNAL MEDICINE

## 2022-11-22 PROCEDURE — 3074F SYST BP LT 130 MM HG: CPT | Mod: CPTII,S$GLB,, | Performed by: INTERNAL MEDICINE

## 2022-11-22 PROCEDURE — 1126F AMNT PAIN NOTED NONE PRSNT: CPT | Mod: CPTII,S$GLB,, | Performed by: INTERNAL MEDICINE

## 2022-11-22 PROCEDURE — 99999 PR PBB SHADOW E&M-EST. PATIENT-LVL III: CPT | Mod: PBBFAC,,, | Performed by: INTERNAL MEDICINE

## 2022-11-22 PROCEDURE — 99999 PR PBB SHADOW E&M-EST. PATIENT-LVL III: ICD-10-PCS | Mod: PBBFAC,,, | Performed by: INTERNAL MEDICINE

## 2022-11-22 PROCEDURE — 3079F DIAST BP 80-89 MM HG: CPT | Mod: CPTII,S$GLB,, | Performed by: INTERNAL MEDICINE

## 2022-11-22 PROCEDURE — 3074F PR MOST RECENT SYSTOLIC BLOOD PRESSURE < 130 MM HG: ICD-10-PCS | Mod: CPTII,S$GLB,, | Performed by: INTERNAL MEDICINE

## 2022-11-22 PROCEDURE — 99214 OFFICE O/P EST MOD 30 MIN: CPT | Mod: 25,S$GLB,, | Performed by: INTERNAL MEDICINE

## 2022-11-22 PROCEDURE — 1126F PR PAIN SEVERITY QUANTIFIED, NO PAIN PRESENT: ICD-10-PCS | Mod: CPTII,S$GLB,, | Performed by: INTERNAL MEDICINE

## 2022-11-22 PROCEDURE — 93010 ELECTROCARDIOGRAM REPORT: CPT | Mod: S$GLB,,, | Performed by: INTERNAL MEDICINE

## 2022-11-22 PROCEDURE — 3079F PR MOST RECENT DIASTOLIC BLOOD PRESSURE 80-89 MM HG: ICD-10-PCS | Mod: CPTII,S$GLB,, | Performed by: INTERNAL MEDICINE

## 2022-11-22 NOTE — PROGRESS NOTES
Subjective:     Primitivo Mitchell III is a 77 y.o. male with hypertension. He has a healthy weight but he used to be overweight. He has mild aortic valve sclerosis. 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 He was seen at [facility] in 11/2014 and a stress echocardiogram was done. He did 9:00 minutes and there was no chest pain or significant ST depression. The echocardiogram was read as an inferolateral wall motion abnormality and the aortic root was mildly dilated. The left ventricular function was mildly depressed. In 4/2016 he was diagnosed with prostate cancer and after thinking the options over he decided on robotic prostatectomy that was done on 5/23/2016. He had a CT scan of the abdomen that revealed aortic plaquing. In 2018 he expresses some concern for that his memory was slightly affected. He denies any any exertional chest pain or exertional dyspnea. No palpitations or weak spells. No bleeding. No issues with any of his prescribed medications. Feeling well overall.      Hypertension  This is a chronic problem. The current episode started more than 1 year ago. The problem is unchanged. The problem is controlled (usually 120-130/70-80 mmHg at home). Pertinent negatives include no anxiety, blurred vision, chest pain, headaches, malaise/fatigue, neck pain, orthopnea, palpitations, peripheral edema, PND, shortness of breath or sweats. There is no history of angina.     Review of Systems   Constitutional: Negative for chills, fever, malaise/fatigue and weight loss.   HENT:  Negative for nosebleeds.    Eyes:  Negative for blurred vision, pain, vision loss in left eye and vision loss in right eye.   Cardiovascular:  Negative for chest pain, claudication, dyspnea on exertion, irregular heartbeat, leg swelling, near-syncope, orthopnea, palpitations, paroxysmal nocturnal dyspnea and syncope.   Respiratory:  Negative for cough, hemoptysis, shortness of breath, sputum  production and wheezing.    Endocrine: Negative for cold intolerance and heat intolerance.   Hematologic/Lymphatic: Negative for bleeding problem. Does not bruise/bleed easily.   Skin:  Negative for color change and rash.   Musculoskeletal:  Negative for falls and neck pain.   Gastrointestinal:  Negative for heartburn, hematemesis, hematochezia, hemorrhoids, jaundice, melena, nausea and vomiting.   Genitourinary:  Positive for bladder incontinence. Negative for dysuria and hematuria.   Neurological:  Negative for dizziness, focal weakness, headaches, light-headedness and vertigo.   Psychiatric/Behavioral:  Negative for altered mental status, depression and memory loss. The patient is not nervous/anxious.    Allergic/Immunologic: Negative for hives and persistent infections.     Current Outpatient Medications on File Prior to Visit   Medication Sig Dispense Refill    aspirin (ECOTRIN) 81 MG EC tablet Take 1 tablet (81 mg total) by mouth once daily. 90 tablet 3    cholecalciferol, vitamin D3, 125 mcg (5,000 unit) Tab Take 1 tablet (5,000 Units total) by mouth once daily. 90 tablet 3    fluticasone propionate (FLONASE) 50 mcg/actuation nasal spray 2 sprays (100 mcg total) by Each Nostril route once daily. 48 g 3    losartan (COZAAR) 50 MG tablet Take 1 tablet (50 mg total) by mouth once daily. 90 tablet 3    ondansetron (ZOFRAN) 4 MG tablet Take 1 tablet (4 mg total) by mouth every 6 (six) hours. 12 tablet 0    rosuvastatin (CRESTOR) 10 MG tablet Take 1 tablet (10 mg total) by mouth once daily. 90 tablet 3    ketotifen (ZADITOR) 0.025 % (0.035 %) ophthalmic solution 1 drop 2 (two) times daily.       No current facility-administered medications on file prior to visit.       /82 (BP Location: Left arm, Patient Position: Sitting, BP Method: Medium (Manual))   Pulse 64   Ht 6' (1.829 m)   Wt 82.9 kg (182 lb 10.4 oz)   SpO2 99%   BMI 24.77 kg/m²       Objective:     Physical Exam  Constitutional:       General:  He is not in acute distress.     Appearance: Normal appearance. He is well-developed. He is not ill-appearing or diaphoretic.   HENT:      Head: Normocephalic and atraumatic.      Nose: Nose normal.      Mouth/Throat:      Pharynx: No oropharyngeal exudate.   Eyes:      General:         Right eye: No discharge.         Left eye: No discharge.      Conjunctiva/sclera:      Right eye: Right conjunctiva is not injected.      Left eye: Left conjunctiva is not injected.      Pupils: Pupils are equal.      Right eye: Pupil is round.      Left eye: Pupil is round.   Neck:      Thyroid: No thyromegaly.      Vascular: No carotid bruit or JVD.   Cardiovascular:      Rate and Rhythm: Normal rate and regular rhythm.      Pulses:           Radial pulses are 2+ on the right side and 2+ on the left side.        Dorsalis pedis pulses are 2+ on the right side and 2+ on the left side.        Posterior tibial pulses are 2+ on the right side and 2+ on the left side.      Heart sounds: S1 normal and S2 normal. Murmur heard.   Midsystolic murmur is present with a grade of 2/6 at the upper right sternal border.   High-pitched blowing decrescendo early diastolic murmur is present with a grade of 2/4 at the upper right sternal border and lower left sternal border.     No gallop.   Pulmonary:      Effort: Pulmonary effort is normal.      Breath sounds: Normal breath sounds.   Abdominal:      Palpations: Abdomen is soft.      Tenderness: There is no abdominal tenderness.   Musculoskeletal:      Right ankle: No swelling, deformity or ecchymosis.      Left ankle: No swelling, deformity or ecchymosis.   Lymphadenopathy:      Head:      Right side of head: No submandibular adenopathy.      Left side of head: No submandibular adenopathy.      Cervical: No cervical adenopathy.   Skin:     General: Skin is warm and dry.      Findings: No rash.   Neurological:      General: No focal deficit present.      Mental Status: He is alert and oriented to  person, place, and time. He is not disoriented.      Cranial Nerves: No cranial nerve deficit.   Psychiatric:         Attention and Perception: Attention and perception normal.         Mood and Affect: Mood and affect normal.         Speech: Speech normal.         Behavior: Behavior normal.         Thought Content: Thought content normal.         Cognition and Memory: Cognition and memory normal.         Judgment: Judgment normal.       Assessment:      1. Aortic valve disease    2. Atherosclerosis of aorta    3. Primary hypertension    4. History of prostate cancer        Plan:     1. Aortic Valve Disease   2011: Treated for presumed endocarditis.   3/21/2013: Echo: Mild aortic valve sclerosis.    5/5/2015: Echo: Normal left ventricular size and systolic function. Moderate aortic valve sclerosis - 1.8 m/s - trace AR.   6/12/2019: Echo: Normal left ventricular size and systolic function. Mild LVH with sigmoid septum. Moderate aortic valve sclerosis - 1.7 m/s. Mild AR.   5/4/2021: Echo:  Normal left ventricular size and systolic function. EF 60%. Mildly sigmoid septum. Mild diastolic dysfunction. Moderate aortic valve sclerosis. Mildly dilated ascending aorta.   Reassurance.   Followed.   6/2023: Plan next Echo. Then every few years.    2. Atherosclerosis of Aorta   2015: CT Scan: Aortic plaquing seen.   9/2/2015: Chol 199. HDL 60. . .   On atorvastatin 20 mg Q24.   12/14/2016: Chol 156. HDL 57. LDL 82. TG 85.   11/15/2017: Chol 146. HDL 50. LDL 74. .   On rosuvastatin 10 mg Q24.   4/10/2019: Chol 122. HDL 57. LDL 41. .   4/7/2020: Chol 137. HDL 58. LDL 64. TG 76.   4/14/2021: Chol 119. HDL 53. LDL 53. TG 64.   On rosuvastatin 10 mg Q24.   On aspirin 81 mg Q24.   Very favorable lipid panel.    3. Hypertension   2009: Diagnosed.   On losartan 50 mg Q24.   Keeping log at home.   Well controlled.    4. Wall Motion Abnormalities on Echo   11/2014: Stress Echo: 9:00 min. No CP. ECG negative. Echo:  Inferolateral WMA with mild LV dysfunction. Aortic root mildly enlarged.   Followed.    5. History of Prostate Cancer   4/2016: Diagnosed.   5/23/2016: Robotic prostatectomy.   Dr. Manuel Reyes.    6. Erectile Dysfunction   On tadalafil for BPH with good effect.   May take tadalafil 20 mg PRN.    7. Primary Care   Dr. Karsi Castillo.    F/u 6 months.    Wisam Verde M.D.

## 2022-12-06 ENCOUNTER — TELEPHONE (OUTPATIENT)
Dept: PODIATRY | Facility: CLINIC | Age: 77
End: 2022-12-06
Payer: MEDICARE

## 2022-12-06 ENCOUNTER — PATIENT MESSAGE (OUTPATIENT)
Dept: PODIATRY | Facility: CLINIC | Age: 77
End: 2022-12-06
Payer: MEDICARE

## 2022-12-16 ENCOUNTER — TELEPHONE (OUTPATIENT)
Dept: PODIATRY | Facility: CLINIC | Age: 77
End: 2022-12-16
Payer: MEDICARE

## 2023-02-09 DIAGNOSIS — Z00.00 ENCOUNTER FOR MEDICARE ANNUAL WELLNESS EXAM: ICD-10-CM

## 2023-02-17 ENCOUNTER — OFFICE VISIT (OUTPATIENT)
Dept: PODIATRY | Facility: CLINIC | Age: 78
End: 2023-02-17
Payer: MEDICARE

## 2023-02-17 VITALS
WEIGHT: 182 LBS | BODY MASS INDEX: 24.65 KG/M2 | HEART RATE: 63 BPM | RESPIRATION RATE: 18 BRPM | SYSTOLIC BLOOD PRESSURE: 119 MMHG | HEIGHT: 72 IN | DIASTOLIC BLOOD PRESSURE: 74 MMHG

## 2023-02-17 DIAGNOSIS — B35.1 ONYCHOMYCOSIS DUE TO DERMATOPHYTE: Primary | ICD-10-CM

## 2023-02-17 DIAGNOSIS — L84 CORN OR CALLUS: ICD-10-CM

## 2023-02-17 PROCEDURE — 1157F PR ADVANCE CARE PLAN OR EQUIV PRESENT IN MEDICAL RECORD: ICD-10-PCS | Mod: HCNC,CPTII,, | Performed by: PODIATRIST

## 2023-02-17 PROCEDURE — 99499 UNLISTED E&M SERVICE: CPT | Mod: HCNC,,, | Performed by: PODIATRIST

## 2023-02-17 PROCEDURE — 99999 PR PBB SHADOW E&M-EST. PATIENT-LVL III: ICD-10-PCS | Mod: PBBFAC,HCNC,, | Performed by: PODIATRIST

## 2023-02-17 PROCEDURE — 1157F ADVNC CARE PLAN IN RCRD: CPT | Mod: HCNC,CPTII,, | Performed by: PODIATRIST

## 2023-02-17 PROCEDURE — 1159F PR MEDICATION LIST DOCUMENTED IN MEDICAL RECORD: ICD-10-PCS | Mod: HCNC,CPTII,, | Performed by: PODIATRIST

## 2023-02-17 PROCEDURE — 1126F AMNT PAIN NOTED NONE PRSNT: CPT | Mod: HCNC,CPTII,, | Performed by: PODIATRIST

## 2023-02-17 PROCEDURE — 1126F PR PAIN SEVERITY QUANTIFIED, NO PAIN PRESENT: ICD-10-PCS | Mod: HCNC,CPTII,, | Performed by: PODIATRIST

## 2023-02-17 PROCEDURE — 17999 PR NON-COVERED FOOT CARE: ICD-10-PCS | Mod: CSM,,, | Performed by: PODIATRIST

## 2023-02-17 PROCEDURE — 1159F MED LIST DOCD IN RCRD: CPT | Mod: HCNC,CPTII,, | Performed by: PODIATRIST

## 2023-02-17 PROCEDURE — 99499 NO LOS: ICD-10-PCS | Mod: HCNC,,, | Performed by: PODIATRIST

## 2023-02-17 PROCEDURE — 1160F PR REVIEW ALL MEDS BY PRESCRIBER/CLIN PHARMACIST DOCUMENTED: ICD-10-PCS | Mod: HCNC,CPTII,, | Performed by: PODIATRIST

## 2023-02-17 PROCEDURE — 99999 PR PBB SHADOW E&M-EST. PATIENT-LVL III: CPT | Mod: PBBFAC,HCNC,, | Performed by: PODIATRIST

## 2023-02-17 PROCEDURE — 1160F RVW MEDS BY RX/DR IN RCRD: CPT | Mod: HCNC,CPTII,, | Performed by: PODIATRIST

## 2023-02-17 PROCEDURE — 17999 UNLISTD PX SKN MUC MEMB SUBQ: CPT | Mod: CSM,,, | Performed by: PODIATRIST

## 2023-02-17 NOTE — PROGRESS NOTES
Patient presents to the clinic for non-covered routine foot care. Patient is not a high risk foot care patient. Patient understands this is not typically a covered service and patient is aware of responsibility of payment. Pedal pulses are palpable. No know risk factors requiring routine foot care. Nails are elongated and thickened on feet. Diagnosis is onychomycosis and corn/callus. Nails were reduced and trimmed bilaterally. Patient tolerated well.     Primitivo was seen today for nail problem, nail care and callouses.    Diagnoses and all orders for this visit:    Onychomycosis due to dermatophyte    Corn or callus          RTC 10 weeks proc b, sooner PRN

## 2023-03-16 ENCOUNTER — IMMUNIZATION (OUTPATIENT)
Dept: PHARMACY | Facility: CLINIC | Age: 78
End: 2023-03-16
Payer: MEDICARE

## 2023-03-16 DIAGNOSIS — Z23 NEED FOR VACCINATION: Primary | ICD-10-CM

## 2023-03-22 DIAGNOSIS — I70.0 ATHEROSCLEROSIS OF AORTA: ICD-10-CM

## 2023-03-22 DIAGNOSIS — I10 PRIMARY HYPERTENSION: ICD-10-CM

## 2023-03-22 RX ORDER — ROSUVASTATIN CALCIUM 10 MG/1
TABLET, COATED ORAL
Qty: 90 TABLET | Refills: 3 | Status: SHIPPED | OUTPATIENT
Start: 2023-03-22 | End: 2024-03-26 | Stop reason: DRUGHIGH

## 2023-03-22 RX ORDER — LOSARTAN POTASSIUM 50 MG/1
TABLET ORAL
Qty: 90 TABLET | Refills: 3 | Status: SHIPPED | OUTPATIENT
Start: 2023-03-22 | End: 2024-03-26 | Stop reason: SDUPTHER

## 2023-04-28 ENCOUNTER — HOSPITAL ENCOUNTER (EMERGENCY)
Facility: HOSPITAL | Age: 78
Discharge: HOME | End: 2023-04-28
Attending: STUDENT IN AN ORGANIZED HEALTH CARE EDUCATION/TRAINING PROGRAM
Payer: MEDICARE

## 2023-04-28 VITALS
HEART RATE: 65 BPM | OXYGEN SATURATION: 99 % | WEIGHT: 182 LBS | TEMPERATURE: 98 F | RESPIRATION RATE: 16 BRPM | SYSTOLIC BLOOD PRESSURE: 167 MMHG | BODY MASS INDEX: 24.68 KG/M2 | DIASTOLIC BLOOD PRESSURE: 78 MMHG

## 2023-04-28 DIAGNOSIS — M79.604 RIGHT LEG PAIN: ICD-10-CM

## 2023-04-28 LAB
BUN SERPL-MCNC: 22 MG/DL (ref 6–30)
CHLORIDE SERPL-SCNC: 107 MMOL/L (ref 95–110)
CK SERPL-CCNC: 365 U/L (ref 20–200)
CREAT SERPL-MCNC: 1.1 MG/DL (ref 0.5–1.4)
GLUCOSE SERPL-MCNC: 85 MG/DL (ref 70–110)
HCT VFR BLD CALC: 41 %PCV (ref 36–54)
HCV AB SERPL QL IA: NORMAL
HIV 1+2 AB+HIV1 P24 AG SERPL QL IA: NORMAL
POC IONIZED CALCIUM: 1.01 MMOL/L (ref 1.06–1.42)
POC TCO2 (MEASURED): 21 MMOL/L (ref 23–29)
POTASSIUM BLD-SCNC: 4.2 MMOL/L (ref 3.5–5.1)
SAMPLE: ABNORMAL
SODIUM BLD-SCNC: 137 MMOL/L (ref 136–145)

## 2023-04-28 PROCEDURE — 99284 PR EMERGENCY DEPT VISIT,LEVEL IV: ICD-10-PCS | Mod: HCNC,,, | Performed by: STUDENT IN AN ORGANIZED HEALTH CARE EDUCATION/TRAINING PROGRAM

## 2023-04-28 PROCEDURE — 99284 EMERGENCY DEPT VISIT MOD MDM: CPT | Mod: HCNC,,, | Performed by: STUDENT IN AN ORGANIZED HEALTH CARE EDUCATION/TRAINING PROGRAM

## 2023-04-28 PROCEDURE — 80047 BASIC METABLC PNL IONIZED CA: CPT | Mod: HCNC

## 2023-04-28 PROCEDURE — 99285 EMERGENCY DEPT VISIT HI MDM: CPT | Mod: 25,HCNC

## 2023-04-28 PROCEDURE — 87389 HIV-1 AG W/HIV-1&-2 AB AG IA: CPT | Mod: HCNC | Performed by: PHYSICIAN ASSISTANT

## 2023-04-28 PROCEDURE — 82550 ASSAY OF CK (CPK): CPT | Mod: HCNC | Performed by: STUDENT IN AN ORGANIZED HEALTH CARE EDUCATION/TRAINING PROGRAM

## 2023-04-28 PROCEDURE — 86803 HEPATITIS C AB TEST: CPT | Mod: HCNC | Performed by: PHYSICIAN ASSISTANT

## 2023-04-28 NOTE — ED NOTES
Patient identifiers verified and correct for Mr Mitchell  C/C: Pain to right outer calf SEE NN  APPEARANCE: awake and alert in NAD. PAIN  8/10  SKIN: warm, dry and intact. No breakdown or bruising.  MUSCULOSKELETAL: Patient moving all extremities spontaneously, no obvious swelling or deformities noted. Ambulates independently.  RESPIRATORY: Denies shortness of breath.Respirations unlabored.   CARDIAC: Denies CP, 2+ distal pulses; no peripheral edema  ABDOMEN: S/ND/NT, Denies nausea  : voids spontaneously, denies difficulty  Neurologic: AAO x 4; follows commands equal strength in all extremities; denies numbness/tingling. Denies dizziness  Denies new wekaness

## 2023-04-28 NOTE — ED PROVIDER NOTES
"SCRIBE #1 NOTE: I, Peidad Ca, am scribing for, and in the presence of,  Shubham Almaguer DO. I have scribed the following portions of the note - Other sections scribed: HPI, ROS, PE.     Source of History  Patient     Chief Complaint    Leg Pain (Right leg pain X2 days, denies injury. )      History of Present Illness    Primitivo Mitchell III is a 77 y.o. male presenting with intermittent right lateral leg pain x 3 weeks. Patient states he has been getting a "weird sensation" to the lateral side of his right leg starting from the knee radiating down. Patient states his wife was in the hospital so he decided to come in and get it checked out. Denies recent falls and/or injuries. Pt denies pain with exertion stating he takes a walk everyday. Denies numbness and/or tingling to legs bilaterally. Upon examination, pain is not present. Patient is not on blood thinners.     Review of Systems    As per HPI and below:  Constitutional symptoms:  No weakness  Skin symptoms:  No rash, no bruising, no hematoma    Cardiovascular symptoms:  No chest pain, no lower extremity swelling  Musculoskeletal symptoms:  No back pain, No joint pains or aches, no joint swelling, no joint redness, no joint warmth. Normal ROM, Intact sensation.  Neurologic symptoms:  No headache, no weakness, no numbness, no tingling. Sensation intact.  Hematologic symptoms:  No bleeding disorder  Psychiatric symptoms:  No substance abuse       Past History    As per HPI and below:  Past Medical History:   Diagnosis Date    Aortic valve disorders 5/29/2013    3/21/2013: Echo. Mehreen. Mild aortic valve sclerosis. 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    Chronic bilateral low back pain with sciatica 4/25/2017    Chronic diastolic heart failure 8/9/2021    ED (erectile dysfunction)     Enlarged prostate     Floaters in visual field     History of adenomatous polyp of colon 5/8/2017    Last colonoscopy occurred January 2015, with diminutive tubular adenoma " removed; follow-up colonoscopy in January 2018 recommended    History of prostate cancer 4/5/2016 4/2016: Diagnosed. 5/23/2016: Robotic prostatectomy.    Hyperlipidemia     Hypertension     Intestinal metaplasia of gastric mucosa 05/03/2019    outside Pathology     Osteoporosis     Pre-operative cardiovascular examination 5/18/2016 5/23/2016: Plan is robotic prostatectomy.    Prostate cancer 4/5/2016    Radiation proctitis     noted on Cscope 5/3/19    Rash 2/25/2020    S/P prostatectomy 4/25/2017    Stress incontinence, male 7/11/2016    Tortuous aorta-Noted on imaging 4/23/2019 6/28/2019    Vascular ectasia of colon 05/03/2019    seen on outside colonoscopy       Past Surgical History:   Procedure Laterality Date    COLONOSCOPY      COLONOSCOPY  05/03/2019    COLONOSCOPY W/ POLYPECTOMY      ESOPHAGOGASTRODUODENOSCOPY  05/03/2019    PROSTATE SURGERY  05/23/2016    Prostatectomy for prostate cancer, done at [facility]       Social History     Socioeconomic History    Marital status:     Number of children: 1    Highest education level: Bachelor's degree (e.g., BA, AB, BS)   Occupational History    Occupation: Office     Comment: [employer]   Tobacco Use    Smoking status: Never    Smokeless tobacco: Never   Substance and Sexual Activity    Alcohol use: No    Drug use: No    Sexual activity: Yes     Partners: Female   Social History Narrative    Lives with his wife.    No pets.    Retired.    Currently fills day with TV.     Social Determinants of Health     Financial Resource Strain: Low Risk     Difficulty of Paying Living Expenses: Not hard at all   Food Insecurity: No Food Insecurity    Worried About Running Out of Food in the Last Year: Never true    Ran Out of Food in the Last Year: Never true   Transportation Needs: No Transportation Needs    Lack of Transportation (Medical): No    Lack of Transportation (Non-Medical): No   Physical Activity: Inactive    Days of Exercise per Week: 0 days     Minutes of Exercise per Session: 0 min   Stress: No Stress Concern Present    Feeling of Stress : Not at all   Social Connections: Moderately Integrated    Frequency of Communication with Friends and Family: More than three times a week    Frequency of Social Gatherings with Friends and Family: More than three times a week    Attends Confucianist Services: More than 4 times per year    Active Member of Clubs or Organizations: No    Attends Club or Organization Meetings: Never    Marital Status:    Housing Stability: Low Risk     Unable to Pay for Housing in the Last Year: No    Number of Places Lived in the Last Year: 1    Unstable Housing in the Last Year: No       Family History   Problem Relation Age of Onset    Heart disease Mother     Alzheimer's disease Mother     Tremor Mother     Neuropathy Mother     Other Father         Colon problems    Ulcerative colitis Sister     Prostate cancer Brother     Anxiety disorder Son     Hepatomegaly Son     Early death Paternal Grandmother     Cancer Brother         Brain, Lung, Kidney       Review of patient's allergies indicates:   Allergen Reactions    Latex, natural rubber Rash    Omnipaque 140 [iohexol] Rash     Extensive rash over trunk font and back and legs the morning after CT dye given in late afternon the previous day. Omnipaque 350: 100cc IV & 30cc oral. Rash is severe, but listed as moderate because not shortness of breath    Allegra-d 12 hour [fexofenadine-pseudoephedrine] Other (See Comments)     Raise BP    Azithromycin     Bactrim [sulfamethoxazole-trimethoprim]     Boniva [ibandronate]     Celebrex [celecoxib]     Ciprofloxacin     Claritin-d 12 hour [loratadine-pseudoephedrine]     Fexofenadine     Hydrocortisone     Ibandronate sodium     Imipramine     Iodinated contrast media     Loratadine     Neomycin     Neomycin-polymyxin-hc     Neurontin [gabapentin]     Nitrofuran analogues     Sulfa (sulfonamide antibiotics)     Adhesive Rash     Adhesive  tape causes rash     Androderm [testosterone] Rash       No current facility-administered medications on file prior to encounter.     Current Outpatient Medications on File Prior to Encounter   Medication Sig Dispense Refill    aspirin (ECOTRIN) 81 MG EC tablet Take 1 tablet (81 mg total) by mouth once daily. 90 tablet 3    losartan (COZAAR) 50 MG tablet TAKE 1 TABLET ONE TIME DAILY 90 tablet 3    rosuvastatin (CRESTOR) 10 MG tablet TAKE 1 TABLET ONE TIME DAILY 90 tablet 3    cholecalciferol, vitamin D3, 125 mcg (5,000 unit) Tab Take 1 tablet (5,000 Units total) by mouth once daily. 90 tablet 3    fluticasone propionate (FLONASE) 50 mcg/actuation nasal spray 2 sprays (100 mcg total) by Each Nostril route once daily. (Patient not taking: Reported on 2/17/2023) 48 g 3    ketotifen (ZADITOR) 0.025 % (0.035 %) ophthalmic solution 1 drop 2 (two) times daily.      ondansetron (ZOFRAN) 4 MG tablet Take 1 tablet (4 mg total) by mouth every 6 (six) hours. 12 tablet 0       Physical Exam    Reviewed nursing notes.  Vitals:    04/28/23 1626 04/28/23 1952   BP: 134/81 (!) 167/78   BP Location: Right arm Left arm   Patient Position: Sitting Sitting   Pulse: 63 65   Resp: 18 16   Temp: 98.1 °F (36.7 °C) 97.7 °F (36.5 °C)   TempSrc: Oral Axillary   SpO2: 98% 99%   Weight: 82.6 kg (182 lb)      General:  Alert, no acute distress.    Skin:  Warm, dry, intact.  No rash.  Head:  Normocephalic, atraumatic.    Neck:  Supple.   HEENT:  Pupils are equal and round, appropriate for room, extraocular movements are intact.  Normal phonation.  Moist mucous membranes.  Cardiovascular:  Regular rate and rhythm, Normal peripheral perfusion, No edema.    Respiratory:  Lungs are clear to auscultation, respirations are non-labored, breath sounds are equal.    Gastrointestinal:  Soft, Nontender, Non distended.   Back:  Nontender. Normal gait.  Ambulatory.  Musculoskeletal:  Normal range of motion observed. Slight swelling to calf on lateral aspect,  no overlying skin changes  Neurological:  Alert and oriented to person, place, time, and situation.  No focal deficits observed. Normal sensation  Psychiatric:  Cooperative, appropriate mood & affect.        Initial MDM and Data Review    77 y.o. male presenting for evaluation of right lateral leg pain with mild lateral calf swelling as noted above, normal ROM and NV intact    Differential includes: rhabdo with muscle swelling (no compartment syndrome likely), msk sprain/strain, dvt, mass (bony origin or muscle origin?), radiculopathy?    Work-up includes: xr tib/fib, ck/istat, doppler LE    Interventions include: pain control prn    The patient has significant medical comorbidities that influence decision making for this acute process, such as: radiculopathy , CHF, GERD, HTN    I decided to obtain the patient's medical records and review relevant documentation from hospital records.  Pertinent information is noted.  Last labs on file are from July 2022, Normal renal function.     Medications - No data to display    Results and ED Course    Labs Reviewed   CK - Abnormal; Notable for the following components:       Result Value     (*)     All other components within normal limits   ISTAT PROCEDURE - Abnormal; Notable for the following components:    POC TCO2 (MEASURED) 21 (*)     POC Ionized Calcium 1.01 (*)     All other components within normal limits   HIV 1 / 2 ANTIBODY    Narrative:     Release to patient->Immediate   HEPATITIS C ANTIBODY    Narrative:     Release to patient->Immediate       Imaging Results              US Lower Extremity Veins Right (Final result)  Result time 04/28/23 22:15:15      Final result by Luca Baumann MD (04/28/23 22:15:15)                   Impression:      No evidence of deep venous thrombosis in the right lower extremity.      Electronically signed by: Luca Baumann MD  Date:    04/28/2023  Time:    22:15               Narrative:    EXAMINATION:  US LOWER EXTREMITY  VEINS RIGHT    CLINICAL HISTORY:  Pain in right leg.    TECHNIQUE:  Duplex and color flow Doppler evaluation and graded compression of the right lower extremity veins was performed.    COMPARISON:  None.    FINDINGS:  Right thigh veins: The common femoral, femoral, popliteal, upper greater saphenous, and deep femoral veins are patent and free of thrombus. The veins are normally compressible and have normal phasic flow and augmentation response.    Right calf veins: The visualized calf veins are patent.    Contralateral CFV: The contralateral (left) common femoral vein is patent and free of thrombus.    Miscellaneous: No organized fluid collection.                                       X-Ray Tibia Fibula 2 View Right (Final result)  Result time 04/28/23 19:55:30      Final result by Marilyn Cai MD (04/28/23 19:55:30)                   Impression:      No acute fracture or dislocation.      Electronically signed by: Marilyn Cai  Date:    04/28/2023  Time:    19:55               Narrative:    EXAMINATION:  XR TIBIA FIBULA 2 VIEW RIGHT    CLINICAL HISTORY:  Pain in right leg    TECHNIQUE:  AP and lateral views of the right tibia and fibula were performed.    COMPARISON:  None.    FINDINGS:  No acute fracture, dislocation, or traumatic malalignment.  Joint spaces are maintained.  Vascular calcifications are seen.                                      ED Course as of 04/29/23 1023   Fri Apr 28, 2023 2013 CPK(!): 365  Mildly elevated but not consistent with rhabdo [AC]   2013 X-Ray Tibia Fibula 2 View Right  No acute findings [AC]   2017 POC Creatinine: 1.1 [AC]   2017 Given nondiagnostic findings of plain films, my plan was to order Doppler imaging of the right lower extremity. [AC]      ED Course User Index  [AC] Shubham Almaguer, DO           Relevant imaging interpreted by myself  No bony lesion    Impression and Plan    77 y.o. male with findings of msk pain without source though mildly elevated ck  based on the work up in the emergency department as above.    Important lab/imaging findings include: no bony lesion, no dvt, istat nml  Cpk 365    All tests, treatment options and disposition options were discussed with the patient.  The decision was made to discharge the patient to home. Symptomatic and supportive care advised.  Discussed repeat ck with pcp next week.     No need to dc statin, but question correlation with mildly elevated ck - advised to have this conversation with his pcp upon trending ck outpatient    The patient was discharged in stable condition and all further questions/concerns by patient and/or family were addressed.    The patient will follow up with their PCP as discussed in the next several days or return if any further concerns or change in symptoms necessitating re-evaluation.    Scribe attestation    I, Shubham Almaguer DO, attest that I personally performed the services described in this documentation as scribed.  This was scribed in my presence, and it is both accurate and complete.             Final diagnoses:  [M79.604] Right leg pain        ED Disposition Condition    Discharge Stable          ED Prescriptions    None       Follow-up Information       Follow up With Specialties Details Why Contact Info    Karis Castillo MD Internal Medicine Schedule an appointment as soon as possible for a visit in 1 week  [address] [facility] [id number]  [phone]                 Shubham Almaguer DO  04/29/23 1026

## 2023-04-28 NOTE — ED NOTES
Patient reports pain to right outer lower calf x 1 month, Denies new injury, No OTC meds   ,DirectAddress_Unknown

## 2023-04-29 NOTE — DISCHARGE INSTRUCTIONS
You were evaluated in the emergency department today for leg pain.  Although there were no findings of concern to necessitate admission to the hospital or warrant immediate surgical intervention, disease exists on a spectrum and your disease process may progress.  If this is the case, please watch your symptoms and return to the emergency department if you feel worse and are unable to discuss care with your primary care doctor in follow up in the next several days.  Specific information regarding your complaint has been provided.  Thank you for choosing [facility]!    There was no broken bone.  There is no blood clot.  You can take acetaminophen for pain control.  You can use ice pack as needed.  Return to the emergency department if you have worsening pain, leg swelling, or any other concerning symptoms.      Your creatinine kinase, a lab values at looks at the breakdown of certain components of muscle, was very slightly elevated.  It is unclear if this is related to your use of atorvastatin.  It is not elevated enough to warrant admission or to stop this medication, however, you need to discuss this with your primary care doctor, and they can follow this level as an outpatient.  This may need to be repeated sometime within the next week or 2 weeks.  Please contact her for a visit outpatient.

## 2023-05-01 ENCOUNTER — HOSPITAL ENCOUNTER (EMERGENCY)
Facility: HOSPITAL | Age: 78
Discharge: HOME | End: 2023-05-01
Attending: EMERGENCY MEDICINE
Payer: MEDICARE

## 2023-05-01 VITALS
HEART RATE: 71 BPM | SYSTOLIC BLOOD PRESSURE: 122 MMHG | OXYGEN SATURATION: 98 % | HEIGHT: 72 IN | DIASTOLIC BLOOD PRESSURE: 74 MMHG | WEIGHT: 175 LBS | TEMPERATURE: 98 F | BODY MASS INDEX: 23.7 KG/M2 | RESPIRATION RATE: 16 BRPM

## 2023-05-01 DIAGNOSIS — M79.604 LEG PAIN, LATERAL, RIGHT: ICD-10-CM

## 2023-05-01 PROCEDURE — 25000003 PHARM REV CODE 250: Mod: HCNC | Performed by: EMERGENCY MEDICINE

## 2023-05-01 PROCEDURE — 99283 PR EMERGENCY DEPT VISIT,LEVEL III: ICD-10-PCS | Mod: HCNC,,, | Performed by: EMERGENCY MEDICINE

## 2023-05-01 PROCEDURE — 99282 EMERGENCY DEPT VISIT SF MDM: CPT | Mod: HCNC

## 2023-05-01 PROCEDURE — 99283 EMERGENCY DEPT VISIT LOW MDM: CPT | Mod: HCNC,,, | Performed by: EMERGENCY MEDICINE

## 2023-05-01 RX ORDER — ACETAMINOPHEN 325 MG/1
650 TABLET ORAL
Status: COMPLETED | OUTPATIENT
Start: 2023-05-01 | End: 2023-05-01

## 2023-05-01 RX ADMIN — ACETAMINOPHEN 650 MG: 325 TABLET ORAL at 03:05

## 2023-05-01 NOTE — ED PROVIDER NOTES
Chief Complaint   Leg Pain (R lower leg pain for few days denies injury)      History Of Present Illness   Primitivo Mitchell III is a 77 y.o. male with history of hypertension, history of prostate cancer status post prostatectomy, aortic valve sclerosis here with acute onset right-sided lateral lower leg pain that is atraumatic, worse whenever he walks, improved whenever he elevates his leg.  He denies calf pain, tenderness, or history of prior DVT/PE.  He denies any paresthesias.  Pain has been going on for on and off about a week, though he has had it in the past as well.  His wife is in the emergency department as a patient and he was here, so he thought he would get checked out as well.    Of note, the patient had a very similar visit on 4/28/22 also when his wife was a patient. When asked about this, he recalls the visit but did not recall what was done. Feels reassured when I discussed the negative xr and negative dvt us.     History obtained from: patient    Review of patient's allergies indicates:   Allergen Reactions    Latex, natural rubber Rash    Omnipaque 140 [iohexol] Rash     Extensive rash over trunk font and back and legs the morning after CT dye given in late afternon the previous day. Omnipaque 350: 100cc IV & 30cc oral. Rash is severe, but listed as moderate because not shortness of breath    Allegra-d 12 hour [fexofenadine-pseudoephedrine] Other (See Comments)     Raise BP    Azithromycin     Bactrim [sulfamethoxazole-trimethoprim]     Boniva [ibandronate]     Celebrex [celecoxib]     Ciprofloxacin     Claritin-d 12 hour [loratadine-pseudoephedrine]     Fexofenadine     Hydrocortisone     Ibandronate sodium     Imipramine     Iodinated contrast media     Loratadine     Neomycin     Neomycin-polymyxin-hc     Neurontin [gabapentin]     Nitrofuran analogues     Sulfa (sulfonamide antibiotics)     Adhesive Rash     Adhesive tape causes rash     Androderm [testosterone] Rash       No current  facility-administered medications on file prior to encounter.     Current Outpatient Medications on File Prior to Encounter   Medication Sig Dispense Refill    aspirin (ECOTRIN) 81 MG EC tablet Take 1 tablet (81 mg total) by mouth once daily. 90 tablet 3    cholecalciferol, vitamin D3, 125 mcg (5,000 unit) Tab Take 1 tablet (5,000 Units total) by mouth once daily. 90 tablet 3    fluticasone propionate (FLONASE) 50 mcg/actuation nasal spray 2 sprays (100 mcg total) by Each Nostril route once daily. (Patient not taking: Reported on 2/17/2023) 48 g 3    ketotifen (ZADITOR) 0.025 % (0.035 %) ophthalmic solution 1 drop 2 (two) times daily.      losartan (COZAAR) 50 MG tablet TAKE 1 TABLET ONE TIME DAILY 90 tablet 3    ondansetron (ZOFRAN) 4 MG tablet Take 1 tablet (4 mg total) by mouth every 6 (six) hours. 12 tablet 0    rosuvastatin (CRESTOR) 10 MG tablet TAKE 1 TABLET ONE TIME DAILY 90 tablet 3       Past History   As per HPI and below:  Past Medical History:   Diagnosis Date    Aortic valve disorders 5/29/2013    3/21/2013: Echo. Mehreen. Mild aortic valve sclerosis. 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    Chronic bilateral low back pain with sciatica 4/25/2017    Chronic diastolic heart failure 8/9/2021    ED (erectile dysfunction)     Enlarged prostate     Floaters in visual field     History of adenomatous polyp of colon 5/8/2017    Last colonoscopy occurred January 2015, with diminutive tubular adenoma removed; follow-up colonoscopy in January 2018 recommended    History of prostate cancer 4/5/2016 4/2016: Diagnosed. 5/23/2016: Robotic prostatectomy.    Hyperlipidemia     Hypertension     Intestinal metaplasia of gastric mucosa 05/03/2019    outside Pathology     Osteoporosis     Pre-operative cardiovascular examination 5/18/2016 5/23/2016: Plan is robotic prostatectomy.    Prostate cancer 4/5/2016    Radiation proctitis     noted on Cscope 5/3/19    Rash 2/25/2020    S/P prostatectomy 4/25/2017     Stress incontinence, male 7/11/2016    Tortuous aorta-Noted on imaging 4/23/2019 6/28/2019    Vascular ectasia of colon 05/03/2019    seen on outside colonoscopy     Past Surgical History:   Procedure Laterality Date    COLONOSCOPY      COLONOSCOPY  05/03/2019    COLONOSCOPY W/ POLYPECTOMY      ESOPHAGOGASTRODUODENOSCOPY  05/03/2019    PROSTATE SURGERY  05/23/2016    Prostatectomy for prostate cancer, done at [facility]       Social History     Socioeconomic History    Marital status:     Number of children: 1    Highest education level: Bachelor's degree (e.g., BA, AB, BS)   Occupational History    Occupation: Office     Comment: [employer]   Tobacco Use    Smoking status: Never    Smokeless tobacco: Never   Substance and Sexual Activity    Alcohol use: No    Drug use: No    Sexual activity: Yes     Partners: Female   Social History Narrative    Lives with his wife.    No pets.    Retired.    Currently fills day with TV.     Social Determinants of Health     Financial Resource Strain: Low Risk     Difficulty of Paying Living Expenses: Not hard at all   Food Insecurity: No Food Insecurity    Worried About Running Out of Food in the Last Year: Never true    Ran Out of Food in the Last Year: Never true   Transportation Needs: No Transportation Needs    Lack of Transportation (Medical): No    Lack of Transportation (Non-Medical): No   Physical Activity: Inactive    Days of Exercise per Week: 0 days    Minutes of Exercise per Session: 0 min   Stress: No Stress Concern Present    Feeling of Stress : Not at all   Social Connections: Moderately Integrated    Frequency of Communication with Friends and Family: More than three times a week    Frequency of Social Gatherings with Friends and Family: More than three times a week    Attends Sikhism Services: More than 4 times per year    Active Member of Clubs or Organizations: No    Attends Club or Organization Meetings: Never    Marital Status:     Housing Stability: Low Risk     Unable to Pay for Housing in the Last Year: No    Number of Places Lived in the Last Year: 1    Unstable Housing in the Last Year: No       Family History   Problem Relation Age of Onset    Heart disease Mother     Alzheimer's disease Mother     Tremor Mother     Neuropathy Mother     Other Father         Colon problems    Ulcerative colitis Sister     Prostate cancer Brother     Anxiety disorder Son     Hepatomegaly Son     Early death Paternal Grandmother     Cancer Brother         Brain, Lung, Kidney       Physical Exam     Vitals:    05/01/23 1446 05/01/23 1530   BP: 117/67 122/74   Pulse: 63 71   Resp: 16 16   Temp: 97.9 °F (36.6 °C)    TempSrc: Oral    SpO2: 98% 98%   Weight: 79.4 kg (175 lb)    Height: 6' (1.829 m)      Gen: AxOx4, NAD, appears stated age, well appearing  Eye: EOMI, no scleral icterus, no periorbital edema or ecchymosis  Head: Normocephalic, atraumatic, no lesions, scalp grossly normal  ENT: neck supple, no stridor, no masses, no abnormal phonation or drooling  CVS: warm and well perfused, cap refill <2 seconds, no extremity pallor  PULM: normal work of breathing, no stridor, equal chest rise, no peripheral cyanosis  ABD: scaphoid, nondistended  Ext: no rash, no focal tenderness to the right lower extremity in its entirety, including where the patient points to area of maximal pain, there is no tibial plateau tenderness.  No abnormal joint exam, no rash or overlying skin change, no calf tenderness, no palpable cords.  2+ dorsalis pedis pulse.  no deformities, moving all joints with normal ROM, pain not reproduced by ambulation.  Neuro: SILVA, gait intact, face grossly symmetric  Psych: well groomed, mood and affect congruent, makes good eye contact, cooperative        Initial Impression and MDM   77-year-old man presents with musculoskeletal pain.  I think there is some component of cognitive deficit/memory disorder as the patient did not recall much of his  prior visit, and was significantly reassured by the negative workup.  He did have an elevated CK at that visit, but I do not think his presentation is consistent with acute rhabdomyolysis.  He does not have compartment syndrome or palpable myositis.  I do not think further workup is indicated.  I offered him a Tylenol here and he was discharged.        Medications Given     Medications   acetaminophen tablet 650 mg (650 mg Oral Given 5/1/23 0316)       Results and Course   Labs Reviewed - No data to display    Imaging Results    None                         Final diagnoses:  [M79.604] Leg pain, lateral, right        ED Disposition Condition    Discharge Stable          ED Prescriptions    None       Follow-up Information       Follow up With Specialties Details Why Contact Info    Karis Castillo MD Internal Medicine Schedule an appointment as soon as possible for a visit   [address]  [place] [address]  [phone]      [facility] - Emergency Dept Emergency Medicine  If symptoms worsen [address] [facility] [id number] [phone]               Laurie Macario MD  05/02/23 1052

## 2023-05-01 NOTE — FIRST PROVIDER EVALUATION
Medical screening examination initiated.  I have conducted a focused provider triage encounter, findings are as follows:    Brief history of present illness:  Atraumatic RLE calf pain x3 days. No swelling. Able to ambulate. Pain worse on lateral aspect of calf.     There were no vitals filed for this visit.    Pertinent physical exam:  VSS. Well appearing. RLE warm and well perfused. Soft compartments. No ttp right calf.    Brief workup plan:  Defer to provider.     Preliminary workup initiated; this workup will be continued and followed by the physician or advanced practice provider that is assigned to the patient when roomed.

## 2023-05-01 NOTE — ED TRIAGE NOTES
Pt to the ed from home with a CC of right leg pain x 1 week. Pt denies any trauma to the area/ or recent falls. Pt denies hx or blood clots, cp, sob.

## 2023-05-16 ENCOUNTER — PES CALL (OUTPATIENT)
Dept: ADMINISTRATIVE | Facility: CLINIC | Age: 78
End: 2023-05-16
Payer: MEDICARE

## 2023-05-18 ENCOUNTER — TELEPHONE (OUTPATIENT)
Dept: PRIMARY CARE CLINIC | Facility: CLINIC | Age: 78
End: 2023-05-18
Payer: MEDICARE

## 2023-05-19 ENCOUNTER — TELEPHONE (OUTPATIENT)
Dept: PODIATRY | Facility: CLINIC | Age: 78
End: 2023-05-19
Payer: MEDICARE

## 2023-07-03 ENCOUNTER — OFFICE VISIT (OUTPATIENT)
Dept: PODIATRY | Facility: CLINIC | Age: 78
End: 2023-07-03
Payer: MEDICARE

## 2023-07-03 VITALS
WEIGHT: 175 LBS | DIASTOLIC BLOOD PRESSURE: 76 MMHG | BODY MASS INDEX: 23.7 KG/M2 | RESPIRATION RATE: 18 BRPM | OXYGEN SATURATION: 96 % | HEART RATE: 60 BPM | HEIGHT: 72 IN | TEMPERATURE: 98 F | SYSTOLIC BLOOD PRESSURE: 134 MMHG

## 2023-07-03 DIAGNOSIS — L84 CORNS/CALLOSITIES: ICD-10-CM

## 2023-07-03 DIAGNOSIS — B35.1 ONYCHOMYCOSIS DUE TO DERMATOPHYTE: Primary | ICD-10-CM

## 2023-07-03 PROCEDURE — 17999 PR NON-COVERED FOOT CARE: ICD-10-PCS | Mod: CSM,,, | Performed by: PODIATRIST

## 2023-07-03 PROCEDURE — 17999 UNLISTD PX SKN MUC MEMB SUBQ: CPT | Mod: CSM,,, | Performed by: PODIATRIST

## 2023-07-03 PROCEDURE — 99499 UNLISTED E&M SERVICE: CPT | Mod: ,,, | Performed by: PODIATRIST

## 2023-07-03 PROCEDURE — 99999 PR PBB SHADOW E&M-EST. PATIENT-LVL IV: CPT | Mod: PBBFAC,,, | Performed by: PODIATRIST

## 2023-07-03 PROCEDURE — 99499 NO LOS: ICD-10-PCS | Mod: ,,, | Performed by: PODIATRIST

## 2023-07-03 PROCEDURE — 99999 PR PBB SHADOW E&M-EST. PATIENT-LVL IV: ICD-10-PCS | Mod: PBBFAC,,, | Performed by: PODIATRIST

## 2023-07-03 NOTE — PROGRESS NOTES
Patient presents to the clinic for non-covered routine foot care. Patient is not a high risk foot care patient. Patient understands this is not typically a covered service and patient is aware of responsibility of payment. Pedal pulses are palpable. No know risk factors requiring routine foot care. Nails are elongated and thickened on feet. Diagnosis is onychomycosis and corn/callus. Nails were reduced and trimmed bilaterally. Patient tolerated well.     Primitivo was seen today for foot problem.    Diagnoses and all orders for this visit:    Onychomycosis due to dermatophyte    Corns/callosities            RTC 10 weeks proc b, sooner PRN

## 2023-07-20 ENCOUNTER — OFFICE VISIT (OUTPATIENT)
Dept: PODIATRY | Facility: CLINIC | Age: 78
End: 2023-07-20
Payer: MEDICARE

## 2023-07-20 VITALS
HEIGHT: 72 IN | WEIGHT: 172.81 LBS | HEART RATE: 67 BPM | SYSTOLIC BLOOD PRESSURE: 127 MMHG | BODY MASS INDEX: 23.41 KG/M2 | DIASTOLIC BLOOD PRESSURE: 66 MMHG

## 2023-07-20 DIAGNOSIS — L84 CORNS/CALLOSITIES: ICD-10-CM

## 2023-07-20 DIAGNOSIS — B35.1 ONYCHOMYCOSIS DUE TO DERMATOPHYTE: Primary | ICD-10-CM

## 2023-07-20 PROCEDURE — 99499 UNLISTED E&M SERVICE: CPT | Mod: HCNC,,, | Performed by: PODIATRIST

## 2023-07-20 PROCEDURE — 99999 PR PBB SHADOW E&M-EST. PATIENT-LVL III: ICD-10-PCS | Mod: PBBFAC,HCNC,, | Performed by: PODIATRIST

## 2023-07-20 PROCEDURE — 17999 PR NON-COVERED FOOT CARE: ICD-10-PCS | Mod: CSM,HCNC,S$GLB, | Performed by: PODIATRIST

## 2023-07-20 PROCEDURE — 17999 UNLISTD PX SKN MUC MEMB SUBQ: CPT | Mod: CSM,HCNC,S$GLB, | Performed by: PODIATRIST

## 2023-07-20 PROCEDURE — 99999 PR PBB SHADOW E&M-EST. PATIENT-LVL III: CPT | Mod: PBBFAC,HCNC,, | Performed by: PODIATRIST

## 2023-07-20 PROCEDURE — 99499 NO LOS: ICD-10-PCS | Mod: HCNC,,, | Performed by: PODIATRIST

## 2023-07-20 RX ORDER — CEFUROXIME AXETIL 250 MG/1
TABLET ORAL
COMMUNITY
Start: 2023-05-30 | End: 2023-08-18 | Stop reason: ALTCHOICE

## 2023-07-20 NOTE — PROGRESS NOTES
Patient presents to the clinic for non-covered routine foot care. Patient is not a high risk foot care patient. Patient understands this is not typically a covered service and patient is aware of responsibility of payment. Pedal pulses are palpable. No know risk factors requiring routine foot care. Nails are elongated and thickened on feet. Diagnosis is onychomycosis and corn/callus. Nails were reduced and trimmed bilaterally. Patient tolerated well.     Primitivo was seen today for nail care.    Diagnoses and all orders for this visit:    Onychomycosis due to dermatophyte    Corns/callosities      RTC 9 weeks proc b, sooner PRN

## 2023-08-18 ENCOUNTER — OFFICE VISIT (OUTPATIENT)
Dept: PRIMARY CARE CLINIC | Facility: CLINIC | Age: 78
End: 2023-08-18
Payer: MEDICARE

## 2023-08-18 ENCOUNTER — OFFICE VISIT (OUTPATIENT)
Dept: INTERNAL MEDICINE | Facility: CLINIC | Age: 78
End: 2023-08-18
Payer: MEDICARE

## 2023-08-18 ENCOUNTER — IMMUNIZATION (OUTPATIENT)
Dept: PHARMACY | Facility: CLINIC | Age: 78
End: 2023-08-18
Payer: MEDICARE

## 2023-08-18 VITALS
WEIGHT: 175.5 LBS | OXYGEN SATURATION: 99 % | SYSTOLIC BLOOD PRESSURE: 126 MMHG | DIASTOLIC BLOOD PRESSURE: 82 MMHG | HEIGHT: 72 IN | HEART RATE: 61 BPM | TEMPERATURE: 98 F | BODY MASS INDEX: 23.77 KG/M2

## 2023-08-18 VITALS
WEIGHT: 176.13 LBS | HEART RATE: 62 BPM | OXYGEN SATURATION: 98 % | SYSTOLIC BLOOD PRESSURE: 120 MMHG | BODY MASS INDEX: 23.86 KG/M2 | HEIGHT: 72 IN | DIASTOLIC BLOOD PRESSURE: 74 MMHG

## 2023-08-18 DIAGNOSIS — Z85.46 HISTORY OF PROSTATE CANCER: ICD-10-CM

## 2023-08-18 DIAGNOSIS — I70.0 ATHEROSCLEROSIS OF AORTA: ICD-10-CM

## 2023-08-18 DIAGNOSIS — I50.32 CHRONIC DIASTOLIC HEART FAILURE: ICD-10-CM

## 2023-08-18 DIAGNOSIS — M31.0 LEUKOCYTOCLASTIC VASCULITIS: Primary | ICD-10-CM

## 2023-08-18 DIAGNOSIS — Z12.5 ENCOUNTER FOR SCREENING FOR MALIGNANT NEOPLASM OF PROSTATE: ICD-10-CM

## 2023-08-18 DIAGNOSIS — M54.16 LUMBAR RADICULOPATHY: ICD-10-CM

## 2023-08-18 DIAGNOSIS — I10 PRIMARY HYPERTENSION: ICD-10-CM

## 2023-08-18 DIAGNOSIS — G89.29 CHRONIC BILATERAL LOW BACK PAIN WITH SCIATICA, SCIATICA LATERALITY UNSPECIFIED: ICD-10-CM

## 2023-08-18 DIAGNOSIS — M31.0 LEUKOCYTOCLASTIC VASCULITIS: ICD-10-CM

## 2023-08-18 DIAGNOSIS — M54.40 CHRONIC BILATERAL LOW BACK PAIN WITH SCIATICA, SCIATICA LATERALITY UNSPECIFIED: ICD-10-CM

## 2023-08-18 DIAGNOSIS — I77.1 TORTUOUS AORTA: ICD-10-CM

## 2023-08-18 DIAGNOSIS — I35.9 AORTIC VALVE DISEASE: ICD-10-CM

## 2023-08-18 DIAGNOSIS — K59.09 CHRONIC CONSTIPATION: ICD-10-CM

## 2023-08-18 DIAGNOSIS — Z86.010 PERSONAL HISTORY OF COLONIC POLYPS: ICD-10-CM

## 2023-08-18 DIAGNOSIS — D69.2 SENILE PURPURA: ICD-10-CM

## 2023-08-18 DIAGNOSIS — M85.80 OSTEOPENIA, UNSPECIFIED LOCATION: ICD-10-CM

## 2023-08-18 DIAGNOSIS — R79.9 ABNORMAL FINDING OF BLOOD CHEMISTRY, UNSPECIFIED: ICD-10-CM

## 2023-08-18 DIAGNOSIS — G62.9 NEUROPATHY: ICD-10-CM

## 2023-08-18 DIAGNOSIS — K21.9 GASTROESOPHAGEAL REFLUX DISEASE, UNSPECIFIED WHETHER ESOPHAGITIS PRESENT: ICD-10-CM

## 2023-08-18 DIAGNOSIS — Z23 NEED FOR VACCINATION: Primary | ICD-10-CM

## 2023-08-18 DIAGNOSIS — Z00.00 ENCOUNTER FOR PREVENTIVE HEALTH EXAMINATION: Primary | ICD-10-CM

## 2023-08-18 DIAGNOSIS — Z00.00 ENCOUNTER FOR MEDICARE ANNUAL WELLNESS EXAM: ICD-10-CM

## 2023-08-18 PROBLEM — M54.41 CHRONIC BILATERAL LOW BACK PAIN WITH SCIATICA: Status: RESOLVED | Noted: 2017-04-25 | Resolved: 2023-08-18

## 2023-08-18 PROBLEM — M54.42 CHRONIC BILATERAL LOW BACK PAIN WITH SCIATICA: Status: RESOLVED | Noted: 2017-04-25 | Resolved: 2023-08-18

## 2023-08-18 PROCEDURE — 1157F PR ADVANCE CARE PLAN OR EQUIV PRESENT IN MEDICAL RECORD: ICD-10-PCS | Mod: HCNC,CPTII,S$GLB, | Performed by: INTERNAL MEDICINE

## 2023-08-18 PROCEDURE — 99999 PR PBB SHADOW E&M-EST. PATIENT-LVL V: ICD-10-PCS | Mod: PBBFAC,HCNC,, | Performed by: NURSE PRACTITIONER

## 2023-08-18 PROCEDURE — 99211 PR OFFICE/OUTPT VISIT, EST, LEVL I: ICD-10-PCS | Mod: HCNC,S$GLB,, | Performed by: INTERNAL MEDICINE

## 2023-08-18 PROCEDURE — 1101F PR PT FALLS ASSESS DOC 0-1 FALLS W/OUT INJ PAST YR: ICD-10-PCS | Mod: HCNC,CPTII,S$GLB, | Performed by: NURSE PRACTITIONER

## 2023-08-18 PROCEDURE — 3288F PR FALLS RISK ASSESSMENT DOCUMENTED: ICD-10-PCS | Mod: HCNC,CPTII,S$GLB, | Performed by: NURSE PRACTITIONER

## 2023-08-18 PROCEDURE — 3079F DIAST BP 80-89 MM HG: CPT | Mod: HCNC,CPTII,S$GLB, | Performed by: INTERNAL MEDICINE

## 2023-08-18 PROCEDURE — 1101F PT FALLS ASSESS-DOCD LE1/YR: CPT | Mod: HCNC,CPTII,S$GLB, | Performed by: INTERNAL MEDICINE

## 2023-08-18 PROCEDURE — 1126F PR PAIN SEVERITY QUANTIFIED, NO PAIN PRESENT: ICD-10-PCS | Mod: HCNC,CPTII,S$GLB, | Performed by: INTERNAL MEDICINE

## 2023-08-18 PROCEDURE — 3079F PR MOST RECENT DIASTOLIC BLOOD PRESSURE 80-89 MM HG: ICD-10-PCS | Mod: HCNC,CPTII,S$GLB, | Performed by: INTERNAL MEDICINE

## 2023-08-18 PROCEDURE — 1157F ADVNC CARE PLAN IN RCRD: CPT | Mod: HCNC,CPTII,S$GLB, | Performed by: NURSE PRACTITIONER

## 2023-08-18 PROCEDURE — 1101F PT FALLS ASSESS-DOCD LE1/YR: CPT | Mod: HCNC,CPTII,S$GLB, | Performed by: NURSE PRACTITIONER

## 2023-08-18 PROCEDURE — 1157F PR ADVANCE CARE PLAN OR EQUIV PRESENT IN MEDICAL RECORD: ICD-10-PCS | Mod: HCNC,CPTII,S$GLB, | Performed by: NURSE PRACTITIONER

## 2023-08-18 PROCEDURE — 3074F PR MOST RECENT SYSTOLIC BLOOD PRESSURE < 130 MM HG: ICD-10-PCS | Mod: HCNC,CPTII,S$GLB, | Performed by: INTERNAL MEDICINE

## 2023-08-18 PROCEDURE — 99211 OFF/OP EST MAY X REQ PHY/QHP: CPT | Mod: HCNC,S$GLB,, | Performed by: INTERNAL MEDICINE

## 2023-08-18 PROCEDURE — 3074F SYST BP LT 130 MM HG: CPT | Mod: HCNC,CPTII,S$GLB, | Performed by: INTERNAL MEDICINE

## 2023-08-18 PROCEDURE — 1170F PR FUNCTIONAL STATUS ASSESSED: ICD-10-PCS | Mod: HCNC,CPTII,S$GLB, | Performed by: NURSE PRACTITIONER

## 2023-08-18 PROCEDURE — 1126F AMNT PAIN NOTED NONE PRSNT: CPT | Mod: HCNC,CPTII,S$GLB, | Performed by: NURSE PRACTITIONER

## 2023-08-18 PROCEDURE — 1170F FXNL STATUS ASSESSED: CPT | Mod: HCNC,CPTII,S$GLB, | Performed by: NURSE PRACTITIONER

## 2023-08-18 PROCEDURE — G0439 PPPS, SUBSEQ VISIT: HCPCS | Mod: HCNC,S$GLB,, | Performed by: NURSE PRACTITIONER

## 2023-08-18 PROCEDURE — G0439 PR MEDICARE ANNUAL WELLNESS SUBSEQUENT VISIT: ICD-10-PCS | Mod: HCNC,S$GLB,, | Performed by: NURSE PRACTITIONER

## 2023-08-18 PROCEDURE — 1126F PR PAIN SEVERITY QUANTIFIED, NO PAIN PRESENT: ICD-10-PCS | Mod: HCNC,CPTII,S$GLB, | Performed by: NURSE PRACTITIONER

## 2023-08-18 PROCEDURE — 1159F PR MEDICATION LIST DOCUMENTED IN MEDICAL RECORD: ICD-10-PCS | Mod: HCNC,CPTII,S$GLB, | Performed by: INTERNAL MEDICINE

## 2023-08-18 PROCEDURE — 3288F FALL RISK ASSESSMENT DOCD: CPT | Mod: HCNC,CPTII,S$GLB, | Performed by: INTERNAL MEDICINE

## 2023-08-18 PROCEDURE — 1157F ADVNC CARE PLAN IN RCRD: CPT | Mod: HCNC,CPTII,S$GLB, | Performed by: INTERNAL MEDICINE

## 2023-08-18 PROCEDURE — 1159F MED LIST DOCD IN RCRD: CPT | Mod: HCNC,CPTII,S$GLB, | Performed by: INTERNAL MEDICINE

## 2023-08-18 PROCEDURE — 3288F FALL RISK ASSESSMENT DOCD: CPT | Mod: HCNC,CPTII,S$GLB, | Performed by: NURSE PRACTITIONER

## 2023-08-18 PROCEDURE — 3078F PR MOST RECENT DIASTOLIC BLOOD PRESSURE < 80 MM HG: ICD-10-PCS | Mod: HCNC,CPTII,S$GLB, | Performed by: NURSE PRACTITIONER

## 2023-08-18 PROCEDURE — 3074F SYST BP LT 130 MM HG: CPT | Mod: HCNC,CPTII,S$GLB, | Performed by: NURSE PRACTITIONER

## 2023-08-18 PROCEDURE — 1126F AMNT PAIN NOTED NONE PRSNT: CPT | Mod: HCNC,CPTII,S$GLB, | Performed by: INTERNAL MEDICINE

## 2023-08-18 PROCEDURE — 3288F PR FALLS RISK ASSESSMENT DOCUMENTED: ICD-10-PCS | Mod: HCNC,CPTII,S$GLB, | Performed by: INTERNAL MEDICINE

## 2023-08-18 PROCEDURE — 3074F PR MOST RECENT SYSTOLIC BLOOD PRESSURE < 130 MM HG: ICD-10-PCS | Mod: HCNC,CPTII,S$GLB, | Performed by: NURSE PRACTITIONER

## 2023-08-18 PROCEDURE — 1101F PR PT FALLS ASSESS DOC 0-1 FALLS W/OUT INJ PAST YR: ICD-10-PCS | Mod: HCNC,CPTII,S$GLB, | Performed by: INTERNAL MEDICINE

## 2023-08-18 PROCEDURE — 3078F DIAST BP <80 MM HG: CPT | Mod: HCNC,CPTII,S$GLB, | Performed by: NURSE PRACTITIONER

## 2023-08-18 PROCEDURE — 99999 PR PBB SHADOW E&M-EST. PATIENT-LVL V: CPT | Mod: PBBFAC,HCNC,, | Performed by: NURSE PRACTITIONER

## 2023-08-18 NOTE — PATIENT INSTRUCTIONS
1. Follow up with Karis Hutchinson MD today at 1:30pm.    2. Reschedule cardiology appointment.    3. Tetanus boosters available to medicare patients at no out of pocket cost. Can receive at any pharmacy if interested.    4. New covid booster coming out around Sept and flu shot.    5. Discuss bone density scan with Karis Hutchinson MD today.    Counseling and Referral of Other Preventative  (Italic type indicates deductible and co-insurance are waived)    Patient Name: Primitivo Mitchell III  Today's Date: 8/18/2023    Health Maintenance       Date Due Completion Date    TETANUS VACCINE 02/02/2022 2/2/2012    Lipid Panel 04/14/2022 4/14/2021    COVID-19 Vaccine (6 - Moderna risk series) 05/11/2023 3/16/2023    PROSTATE-SPECIFIC ANTIGEN 06/16/2023 6/16/2022    DEXA Scan 08/11/2023 8/11/2021    Influenza Vaccine (1) 09/01/2023 2/17/2023        No orders of the defined types were placed in this encounter.      The following information is provided to all patients.  This information is to help you find resources for any of the problems found today that may be affecting your health:                Living healthy guide: www.[facility].[place].gov      Understanding Diabetes: www.diabetes.org      Eating healthy: www.cdc.gov/healthyweight      [facility] home safety checklist: www.cdc.gov/steadi/patient.html      Agency on Aging: www.goea.[place].gov      Alcoholics anonymous (AA): www.aa.org      Physical Activity: www.roz.nih.gov/om6tuyq      Tobacco use: www.quitwithusla.org

## 2023-08-18 NOTE — ASSESSMENT & PLAN NOTE
Seen on DEXA in 2021, needs Vit D supplementation  · Start Vit D3 5000U  · Advised weight bearing exercise

## 2023-08-18 NOTE — PATIENT INSTRUCTIONS
TODAY:  - Cardiology appt with Dr Antonio  - labs today (lipid and PSA diagnostic)  - continue walking every day  - COVID vaccine today  - keep taking Vit D

## 2023-08-18 NOTE — PROGRESS NOTES
Primitivo Mitchell III presented for a  Medicare AWV and comprehensive Health Risk Assessment today. The following components were reviewed and updated:    Medical history  Family History  Social history  Allergies and Current Medications  Health Risk Assessment  Health Maintenance  Care Team         ** See Completed Assessments for Annual Wellness Visit within the encounter summary.**         The following assessments were completed:  Living Situation  CAGE  Depression Screening  Timed Get Up and Go  Whisper Test  Cognitive Function Screening - abnormal, patient states memory is at baseline. Defer further eval at this time.    Nutrition Screening  ADL Screening  PAQ Screening  Review for Opioid Screening: Pt does not have Rx for Opioids.  Review for Substance Use Disorders: Patient does not use substances.        Vitals:    08/18/23 1021   BP: 120/74   BP Location: Left arm   Pulse: 62   SpO2: 98%   Weight: 79.9 kg (176 lb 2.4 oz)   Height: 6' (1.829 m)     Body mass index is 23.89 kg/m².    Physical Exam  Vitals reviewed.   Constitutional:       Appearance: Normal appearance.   HENT:      Head: Normocephalic.   Cardiovascular:      Rate and Rhythm: Normal rate.   Pulmonary:      Effort: Pulmonary effort is normal.   Abdominal:      General: Bowel sounds are normal.   Musculoskeletal:         General: Normal range of motion.      Cervical back: Normal range of motion.      Right lower leg: No edema.      Left lower leg: No edema.   Skin:     General: Skin is warm and dry.      Capillary Refill: Capillary refill takes less than 2 seconds.   Neurological:      Mental Status: He is alert and oriented to person, place, and time.   Psychiatric:         Behavior: Behavior normal.         Thought Content: Thought content normal.         Judgment: Judgment normal.               Diagnoses and health risks identified today and associated recommendations/orders:    1. Encounter for preventive health examination  Assessments  completed.   recommendations reviewed. Discussed tetanus booster, covid booster, and flu shot. Labs per PCP. Will discuss bone density scan with PCP.  F/u with PCP today as scheduled.    2. Atherosclerosis of aorta  Chronic, stable on current regimen. Followed by cardiology. On statin. Due for cardiology f/u.    3. Tortuous aorta-Noted on imaging 4/23/2019  Chronic, stable on current regimen. Followed by cardiology. BP stable. Due for cardiology f/u.    4. Chronic diastolic heart failure  Chronic, stable on current regimen. Followed by cardiology.    5. Senile purpura  Chronic, stable on current regimen. Followed by PCP.    6. Leukocytoclastic vasculitis  Chronic, stable on current regimen. Followed by PCP.    7. Primary hypertension  Chronic, stable on current regimen. Followed by PCP / cardiology.    8. Aortic valve disease  Chronic, stable on current regimen. Followed by PCP / cardiology. Missed ECHO appt.    9. Lumbar radiculopathy  Chronic, stable on current regimen. Followed by PCP.    10. Neuropathy  Chronic, stable on current regimen. Followed by PCP.    11. Chronic bilateral low back pain with sciatica, sciatica laterality unspecified  Chronic, stable on current regimen. Followed by PCP.    12. Osteopenia, unspecified location  Chronic, stable on current regimen. Followed by PCP. Due for dexa.    13. Gastroesophageal reflux disease, unspecified whether esophagitis present  Chronic, stable on current regimen. Followed by PCP.    14. Personal history of colonic polyps  Chronic, stable on current regimen. Followed by PCP. Colonoscopy UTD.    15. Chronic constipation  Chronic, stable on current regimen. Followed by PCP.    16. History of prostate cancer  Chronic, stable on current regimen. Followed by PCP. Due for PSA.    17. Encounter for Medicare annual wellness exam  Medicare HRA completed.  - Ambulatory Referral/Consult to Enhanced Annual Wellness Visit (eAWV)      Provided Primitivo with a 5-10 year  written screening schedule and personal prevention plan. Recommendations were developed using the USPSTF age appropriate recommendations. Education, counseling, and referrals were provided as needed. After Visit Summary printed and given to patient which includes a list of additional screenings\tests needed.    Follow up in about 1 year (around 8/18/2024) for Medicare AWV and with PCP today.      Mary Jason NP    I offered to discuss advanced care planning, including how to pick a person who would make decisions for you if you were unable to make them for yourself, called a health care power of , and what kind of decisions you might make such as use of life sustaining treatments such as ventilators and tube feeding when faced with a life limiting illness recorded on a living will that they will need to know. (How you want to be cared for as you near the end of your natural life)     X  Patient is unable to engage in a discussion regarding advanced directives due to severe physical and/or cognitive impairment.

## 2023-08-18 NOTE — ASSESSMENT & PLAN NOTE
Diagnosed in 2020 by Derm, related to systemic infection. treated with topical steroids.   · monitor

## 2023-08-18 NOTE — PROGRESS NOTES
Primary Care Provider Appointment - [facility]  SHARED NOTE: Eleonora Camilo (MS4), Dr Castillo (Attending)    Subjective:      Patient ID: Primitivo Mitchell III is a 77 y.o. male with HTN, HLD,    Chief Complaint: Follow-up    Prior to this visit, patient's last encounter with PCP was Visit date not found. Was referred to PCP by eAWV provider today.    HTN  - Currently taking: losartan 50mg,   - How often taking BP at home: 1/week, average is: 120/80  - Today, BP is: 126/82    HLD  - Currently taking: rosuvastatin 10mg  - Last lipid panel was on 4/14/21  The 10-year ASCVD risk score (Sandrita PAUL, et al., 2019) is: 19.2%    Values used to calculate the score:      Age: 77 years      Sex: Male      Is Non- : Yes      Diabetic: No      Tobacco smoker: No      Systolic Blood Pressure: 126 mmHg      Is BP treated: Yes      HDL Cholesterol: 58 mg/dL      Total Cholesterol: 137 mg/dL     Osteopenia  - DEXA in 2021  - due for another      Cardiology  - Missed appt w/ Dr. Verde on 6/21, need to reschedule  - Last seen on for HTN 11/2022, valve disease, atherosclerosis, and wall motion abnormalities  - was followed for aortic valve disease, but lost to follow-up    Care Gaps:  - tetanus,. Covid vaccines  - DEXA  - lipid panel    Medications: Does not have pill packs      4Ms for Medical Decision-Making in Older Adults    Last Completed EAWV: 8/18/2023    MOBILITY:  Get Up and Go:  No flowsheet data found.  Activities of Daily Living:  Activities of Daily Living 8/18/2023   Ambulation Independent   Dressing Independent   Transfers Independent   Toileting Continent of bladder;Continent of bowel   Feeding Independent   Cleaning home/Chores Independent   Telephone use Independent   Shopping Independent   Paying bills Independent   Taking meds Independent     Whisper Test:  Whisper Test 8/18/2023   Whisper Test Normal     Disability Status:  Disability Status 8/18/2023   Are you deaf or do you have  serious difficulty hearing? No   Are you blind or do you have serious difficulty seeing, even when wearing glasses? No   Because of a physical, mental, or emotional condition, do you have serious difficulty concentrating, remembering, or making decisions? No   Do you have serious difficulty walking or climbing stairs? No   Do you have difficulty dressing or bathing? No   Because of a physical, mental, or emotional condition, do you have difficulty doing errands alone such as visiting a doctor's office or shopping? No     Nutrition Screening:  Nutrition Screening 8/18/2023   Has food intake declined over the past three months due to loss of appetite, digestive problems, chewing or swallowing difficulties? No decrease in food intake   Involuntary weight loss during the last 3 months? No weight loss   Mobility? Goes out   Has the patient suffered psychological stress or acute disease in the past three months? No   Neuropsychological problems? No psychological problems   Body Mass Index (BMI)?  BMI 23 or greater   Screening Score 14   Interpretation Normal nutritional status    Screening Score: 0-7 Malnourished, 8-11 At Risk, 12-14 Normal    MENTATION:   Depression Patient Health Questionnaire:  Depression Patient Health Questionnaire 8/18/2023   Over the last two weeks how often have you been bothered by little interest or pleasure in doing things Not at all   Over the last two weeks how often have you been bothered by feeling down, depressed or hopeless Not at all   PHQ-2 Total Score 0   PHQ-4 Total Score 0   PHQ-9 Total Score -     Has Dementia Dx: No    Cognitive Function Screening:  Cognitive Function Screening 8/18/2023   Clock Drawing Test 1   Mini-Cog 3 Minute Recall 0   Cognitive Function Screening 1     Cognitive Function Screening Total - Less than 4 = Abnormal,  Greater than or equal to 4 = Normal    MEDICATIONS:  High Risk Medications:  Total Active Medications: 0  This patient does not have an active  medication from one of the medication groupers.    WHAT MATTERS MOST:  Advance Care Planning   ACP Status:   Patient has had an ACP conversation  Living Will: Yes  Power of : Yes  LaPOST: Yes    What is most important right now is to focus on remaining as independent as possible    Accordingly, we have decided that the best plan to meet the patient's goals includes continuing with treatment      What matters most to patient today is: staying healthy           PHQ-4 Score: 0     Social History     Socioeconomic History    Marital status:     Number of children: 1    Highest education level: Bachelor's degree (e.g., BA, AB, BS)   Occupational History    Occupation: Office     Comment: [employer]   Tobacco Use    Smoking status: Never    Smokeless tobacco: Never   Substance and Sexual Activity    Alcohol use: No    Drug use: No    Sexual activity: Yes     Partners: Female   Social History Narrative    Lives with his wife.    No pets.    Retired.    Currently fills day with TV.     Social Determinants of Health     Financial Resource Strain: Low Risk  (8/18/2023)    Overall Financial Resource Strain (CARDIA)     Difficulty of Paying Living Expenses: Not hard at all   Food Insecurity: No Food Insecurity (8/18/2023)    Hunger Vital Sign     Worried About Running Out of Food in the Last Year: Never true     Ran Out of Food in the Last Year: Never true   Transportation Needs: No Transportation Needs (8/18/2023)    PRAPARE - Transportation     Lack of Transportation (Medical): No     Lack of Transportation (Non-Medical): No   Physical Activity: Inactive (8/18/2023)    Exercise Vital Sign     Days of Exercise per Week: 0 days     Minutes of Exercise per Session: 0 min   Stress: No Stress Concern Present (8/18/2023)    [place] - Occupational Stress Questionnaire     Feeling of Stress : Not at all   Social Connections: Moderately Integrated (8/18/2023)    Social  Connection and Isolation Panel [NHANES]     Frequency of Communication with Friends and Family: More than three times a week     Frequency of Social Gatherings with Friends and Family: More than three times a week     Attends Yarsani Services: More than 4 times per year     Active Member of Clubs or Organizations: No     Attends Club or Organization Meetings: Never     Marital Status:    Housing Stability: Low Risk  (8/18/2023)    Housing Stability Vital Sign     Unable to Pay for Housing in the Last Year: No     Number of Places Lived in the Last Year: 1     Unstable Housing in the Last Year: No       Review of Systems   Constitutional:  Negative for activity change, appetite change and unexpected weight change.   Respiratory:  Negative for shortness of breath.    Cardiovascular:  Negative for chest pain and leg swelling.   Gastrointestinal:  Negative for abdominal pain, constipation and diarrhea.   Neurological:  Negative for weakness and headaches.       Objective:   /82 (BP Location: Right arm, Patient Position: Sitting, BP Method: Medium (Manual))   Pulse 61   Temp 97.8 °F (36.6 °C) (Oral)   Ht 6' (1.829 m)   Wt 79.6 kg (175 lb 7.8 oz)   SpO2 99%   BMI 23.80 kg/m²     Physical Exam  Constitutional:       General: He is not in acute distress.     Appearance: Normal appearance. He is normal weight. He is not ill-appearing.   Cardiovascular:      Rate and Rhythm: Bradycardia present.   Pulmonary:      Effort: Pulmonary effort is normal.   Neurological:      Mental Status: He is alert.   Psychiatric:         Mood and Affect: Mood normal.       Lab Results   Component Value Date    WBC 5.24 08/18/2023    HGB 14.7 08/18/2023    HCT 44.9 08/18/2023     08/18/2023    CHOL 137 08/18/2023    TRIG 143 08/18/2023    HDL 58 08/18/2023    ALT 23 08/18/2023    AST 19 08/18/2023     08/18/2023    K 3.9 08/18/2023     08/18/2023    CREATININE 1.1 08/18/2023    BUN 17 08/18/2023    CO2 25  08/18/2023    TSH 0.804 03/11/2019    PSA 2.0 09/02/2015    INR 0.9 02/25/2020       Current Outpatient Medications on File Prior to Visit   Medication Sig Dispense Refill    aspirin (ECOTRIN) 81 MG EC tablet Take 1 tablet (81 mg total) by mouth once daily. 90 tablet 3    cholecalciferol, vitamin D3, 125 mcg (5,000 unit) Tab Take 1 tablet (5,000 Units total) by mouth once daily. 90 tablet 3    fluticasone propionate (FLONASE) 50 mcg/actuation nasal spray 2 sprays (100 mcg total) by Each Nostril route once daily. 48 g 3    losartan (COZAAR) 50 MG tablet TAKE 1 TABLET ONE TIME DAILY 90 tablet 3    rosuvastatin (CRESTOR) 10 MG tablet TAKE 1 TABLET ONE TIME DAILY 90 tablet 3    ketotifen (ZADITOR) 0.025 % (0.035 %) ophthalmic solution 1 drop 2 (two) times daily.      ondansetron (ZOFRAN) 4 MG tablet Take 1 tablet (4 mg total) by mouth every 6 (six) hours. (Patient not taking: Reported on 8/18/2023) 12 tablet 0     No current facility-administered medications on file prior to visit.         Assessment:   77 y.o. male with multiple co-morbid illnesses here to follow-up with PCP and continue work-up of chronic issues    Plan:     Problem List Items Addressed This Visit          Cardiac/Vascular    Aortic valve disease      2011: Treated for presumed endocarditis.  3/21/2013: Echo: Mild aortic valve sclerosis.   5/5/2015: Echo: Normal left ventricular size and systolic function. Moderate aortic valve sclerosis - 1.8 m/s - trace AR.  6/12/2019: Echo: Normal left ventricular size and systolic function. Mild LVH with sigmoid septum. Moderate aortic valve sclerosis - 1.7 m/s. Mild AR.  5/4/2021: Echo:  Normal left ventricular size and systolic function. EF 60%. Mildly sigmoid septum. Mild diastolic dysfunction. Moderate aortic valve sclerosis. Mildly dilated ascending aorta.     PCP to get him re-established with cardiology  He missed his last appt         Relevant Orders    Ambulatory referral/consult to Cardiology    CBC Auto  Differential (Completed)    Comprehensive Metabolic Panel (Completed)    Lipid Panel (Completed)       Immunology/Multi System    Leukocytoclastic vasculitis - Primary     Diagnosed in 2020 by Derm, related to systemic infection. treated with topical steroids.   monitor            Oncology    History of prostate cancer    Relevant Orders    PROSTATE SPECIFIC ANTIGEN, DIAGNOSTIC (Completed)       Orthopedic    Osteopenia     Seen on DEXA in 2021, needs Vit D supplementation  Start Vit D3 5000U  Advised weight bearing exercise          Other Visit Diagnoses       Encounter for screening for malignant neoplasm of prostate        Abnormal finding of blood chemistry, unspecified        Relevant Orders    Lipid Panel (Completed)            Health Maintenance         Date Due Completion Date    TETANUS VACCINE 02/02/2022 2/2/2012    DEXA Scan 08/11/2023 8/11/2021    Influenza Vaccine (1) 09/01/2023 2/17/2023    COVID-19 Vaccine (7 - Moderna risk series) 10/13/2023 8/18/2023    PROSTATE-SPECIFIC ANTIGEN 08/18/2024 8/18/2023    Lipid Panel 08/18/2024 8/18/2023            Future Appointments   Date Time Provider Department Center   8/30/2023  3:15 PM [facility]   9/13/2023  9:30 AM Wisam Verde MD [id number]   9/27/2023  3:15 PM Max Yepez DPM [facility]         Follow up ROUTINE F2F. Total clinical care time was 20 min, issues addressed include: prostate CA screening, aortic valve disease      Karis Castillo MD/MPH  [facility]  [phone] spectralink

## 2023-08-18 NOTE — ASSESSMENT & PLAN NOTE
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  3/21/2013: Echo: Mild aortic valve sclerosis.   5/5/2015: Echo: Normal left ventricular size and systolic function. Moderate aortic valve sclerosis - 1.8 m/s - trace AR.  6/12/2019: Echo: Normal left ventricular size and systolic function. Mild LVH with sigmoid septum. Moderate aortic valve sclerosis - 1.7 m/s. Mild AR.  5/4/2021: Echo:  Normal left ventricular size and systolic function. EF 60%. Mildly sigmoid septum. Mild diastolic dysfunction. Moderate aortic valve sclerosis. Mildly dilated ascending aorta.     · PCP to get him re-established with cardiology  · He missed his last appt

## 2023-09-07 DIAGNOSIS — M85.80 OSTEOPENIA, UNSPECIFIED LOCATION: ICD-10-CM

## 2023-09-14 RX ORDER — CHOLECALCIFEROL (VITAMIN D3) 125 MCG
5000 CAPSULE ORAL
Qty: 90 CAPSULE | Refills: 3 | Status: SHIPPED | OUTPATIENT
Start: 2023-09-14 | End: 2024-03-26 | Stop reason: SDUPTHER

## 2023-09-25 ENCOUNTER — TELEPHONE (OUTPATIENT)
Dept: PODIATRY | Facility: CLINIC | Age: 78
End: 2023-09-25
Payer: MEDICARE

## 2023-09-25 NOTE — TELEPHONE ENCOUNTER
Spoke with pt. He will give the message to the wife when she is feeling better so she can r/s their appts. Pt verbalized understanding.

## 2023-12-11 NOTE — PROGRESS NOTES
Advance Directives:   Living Will: Yes        Copy on chart: Yes    LaPOST: Yes    Medical Power of : Yes    Goals of Care: What is most important right now is to focus on remaining as independent as possible. Accordingly, we have decided that the best plan to meet the patient's goals includes continuing with treatment.

## 2024-01-18 ENCOUNTER — TELEPHONE (OUTPATIENT)
Dept: PRIMARY CARE CLINIC | Facility: CLINIC | Age: 79
End: 2024-01-18
Payer: MEDICARE

## 2024-01-18 DIAGNOSIS — R41.3 MEMORY IMPAIRMENT: Primary | ICD-10-CM

## 2024-01-18 NOTE — TELEPHONE ENCOUNTER
"Called and spoke to patient's sister Luciana Mitchell.     She reports that patient has been more forgetful over the last 5 years, however over the last 4-5 months she states that the patient has significantly declined. She reports stressors included wife's recent transition to hospice care as a cancer patient approximately 3 months ago along with Fall 2023 hospital admission of wife in [place] that resulted in doctors determining that patient needed more care support for her and her  such that discharge of patient's wife was "delayed until multiple caregivers were hired".    Patient's sister that since then, patient calling his sister "for everything" and she has had to make numerous trips between her home in [place] to [place], [place] to help. Recently, patient's sister notes she has had to coordinate the caregiver's paychecks but she has had to personally oversee every step of that process on a weekly basis, however due to the freezing weather conditions and multiple road closures recently, the possibility of delays in pay stressed out all parties.    Patient's sister also reminding patient to bathe and change his clothes when she sees him as she will notice him wearing the same clothes repeatedly. Wife's caregivers also telling patient's sister that the patient ends up eating multiple plates of food, as if he "had forgotten dinner happened already".      Sister states that their mother had dementia for 18 years and was cared for by caregivers at home, so she is worried something similar is happening, want to know if patient can be evaluated by Neurology or PCP.       "

## 2024-01-18 NOTE — TELEPHONE ENCOUNTER
----- Message from Charity Allan sent at 1/18/2024  3:26 PM CST -----  Needs advice from nurse:      Who Called:sister-Luciana Mitchell  Regarding:needs referral for neurology for pt's dementia or Alzheimer's  Would the patient [facility] a [facility] back [facility]?  Best Call Back number:  Additional Info:

## 2024-02-01 ENCOUNTER — OFFICE VISIT (OUTPATIENT)
Dept: PODIATRY | Facility: CLINIC | Age: 79
End: 2024-02-01
Payer: MEDICARE

## 2024-02-01 VITALS
WEIGHT: 173.06 LBS | SYSTOLIC BLOOD PRESSURE: 143 MMHG | HEART RATE: 76 BPM | BODY MASS INDEX: 23.44 KG/M2 | DIASTOLIC BLOOD PRESSURE: 75 MMHG | HEIGHT: 72 IN

## 2024-02-01 DIAGNOSIS — B35.1 ONYCHOMYCOSIS DUE TO DERMATOPHYTE: Primary | ICD-10-CM

## 2024-02-01 DIAGNOSIS — L84 CORNS/CALLOSITIES: ICD-10-CM

## 2024-02-01 PROCEDURE — 99999 PR PBB SHADOW E&M-EST. PATIENT-LVL III: CPT | Mod: PBBFAC,HCNC,, | Performed by: PODIATRIST

## 2024-02-01 PROCEDURE — 1157F ADVNC CARE PLAN IN RCRD: CPT | Mod: HCNC,CPTII,, | Performed by: PODIATRIST

## 2024-02-01 PROCEDURE — 1125F AMNT PAIN NOTED PAIN PRSNT: CPT | Mod: HCNC,CPTII,, | Performed by: PODIATRIST

## 2024-02-01 PROCEDURE — 1160F RVW MEDS BY RX/DR IN RCRD: CPT | Mod: HCNC,CPTII,, | Performed by: PODIATRIST

## 2024-02-01 PROCEDURE — 3078F DIAST BP <80 MM HG: CPT | Mod: HCNC,CPTII,, | Performed by: PODIATRIST

## 2024-02-01 PROCEDURE — 17999 UNLISTD PX SKN MUC MEMB SUBQ: CPT | Mod: CSM,HCNC,S$GLB, | Performed by: PODIATRIST

## 2024-02-01 PROCEDURE — 3077F SYST BP >= 140 MM HG: CPT | Mod: HCNC,CPTII,, | Performed by: PODIATRIST

## 2024-02-01 PROCEDURE — 1159F MED LIST DOCD IN RCRD: CPT | Mod: HCNC,CPTII,, | Performed by: PODIATRIST

## 2024-02-01 PROCEDURE — 99214 OFFICE O/P EST MOD 30 MIN: CPT | Mod: HCNC,,, | Performed by: PODIATRIST

## 2024-02-01 RX ORDER — AMMONIUM LACTATE 12 G/100G
1 CREAM TOPICAL DAILY
Qty: 140 G | Refills: 3 | Status: SHIPPED | OUTPATIENT
Start: 2024-02-01

## 2024-02-01 NOTE — PROGRESS NOTES
Subjective:      Patient ID: Primitivo Mitchell III is a 78 y.o. male.    Chief Complaint: Foot Pain (Bilateral)    Primitivo is a 78 y.o. male who returns to clinic for non covered routine nail/callus trimming. Has been 7 months since last trimming. Calluses and nails bothering him. Would like some medication to help with dry skin/calluses.     Chief Complaint   Patient presents with    Foot Pain     Bilateral       Vitals:    02/01/24 0749   BP: (!) 143/75   Pulse: 76   Weight: 78.5 kg (173 lb 1 oz)   Height: 6' (1.829 m)   PainSc:   5   PainLoc: Foot           Review of Systems   Constitutional: Negative for chills, decreased appetite and fever.   Cardiovascular:  Negative for leg swelling.   Skin:  Positive for dry skin, nail changes and suspicious lesions.   Musculoskeletal:  Negative for arthritis, joint pain, joint swelling and myalgias.        Painful calluses both feet    Gastrointestinal:  Negative for nausea and vomiting.   Neurological:  Negative for loss of balance, numbness and paresthesias.           Objective:       Vitals:    02/01/24 0749   BP: (!) 143/75   Pulse: 76   Weight: 78.5 kg (173 lb 1 oz)   Height: 6' (1.829 m)   PainSc:   5   PainLoc: Foot        Physical Exam  Vitals reviewed.   Constitutional:       Appearance: He is well-developed.   Cardiovascular:      Comments: dorsalis pedis and posterior tibial pulses are palpable bilaterally. Capillary refill time is within normal limits. + pedal hair growth         Musculoskeletal:         General: No tenderness. Normal range of motion.      Comments: Pain with palpation of hyperkeratotic lesions both feet, see skin section. Adequate joint range of motion without pain, limitation, nor crepitation Bilateral feet and ankle joints. Muscle strength is 5/5 in all groups bilaterally.         Skin:     General: Skin is warm and dry.      Findings: No erythema, lesion or rash.      Comments: No open lesions, lacerations or wounds noted. Nails are  thickened, elongated, discolored yellow/brown with subungual debris and brittleness to R 1-5 and L 1-5. Interdigital spaces clean, dry and intact b/l. No erythema noted to b/l foot. Skin texture thin, atrophic, dry. Pedal hair diminished. Toes normal temperature. Hyperkeratotic lesion noted to medial L heel, plantar 2nd and 3rd met heads b/l. Skin lines present to lesion/s. No signs of deep tissue injury.          Neurological:      Mental Status: He is alert and oriented to person, place, and time.      Sensory: No sensory deficit.      Comments: [place] 5.07 monofilament is intact bilateral feet.      Psychiatric:         Behavior: Behavior normal.               Assessment:       Encounter Diagnoses   Name Primary?    Onychomycosis due to dermatophyte Yes    Corns/callosities          Plan:       Primitivo was seen today for foot pain.    Diagnoses and all orders for this visit:    Onychomycosis due to dermatophyte    Corns/callosities    Other orders  -     ammonium lactate 12 % Crea; Apply 1 g topically once daily.      I counseled the patient on his conditions, their implications and medical management.    Patient presents to the clinic for non-covered routine foot care. Patient is not a high risk foot care patient. Patient understands this is not typically a covered service and patient is aware of responsibility of payment. Pedal pulses are palpable. No know risk factors requiring routine foot care. Nails are elongated and thickened on feet. Hyperkeratotic lesions noted to b/l great toes and plantar forefoot b/l. Diagnosis is onychodystrophy and corn/callus. Calluses/corns and nails were reduced and trimmed bilaterally. Patient tolerated well.     Long discussion with patient regarding appropriate, supportive and comfortable shoes. Recommended supportive style shoe brands with adequate arch supports to alleviate abnormal pressure and improve stability of foot while walking. Avoid flat shoes and  barefoot walking as these will exacerbate or worsen symptoms.     Discussed regular and routine moisturizer to skin of both feet to help improve dry skin. Advised to apply twice daily until resolution of symptoms. Avoid between toes.     RTC 2 months Proc B, sooner PRN

## 2024-03-21 ENCOUNTER — TELEPHONE (OUTPATIENT)
Dept: PODIATRY | Facility: CLINIC | Age: 79
End: 2024-03-21
Payer: MEDICARE

## 2024-03-21 NOTE — TELEPHONE ENCOUNTER
Spoke with patient sister voice she will relate message to patient and also bring him to this appointment.

## 2024-03-25 ENCOUNTER — TELEPHONE (OUTPATIENT)
Dept: PRIMARY CARE CLINIC | Facility: CLINIC | Age: 79
End: 2024-03-25
Payer: MEDICARE

## 2024-03-25 NOTE — TELEPHONE ENCOUNTER
Called and confirmed with patient's sister about appointment April 9th at 9:30 AM. Confirmed with patient as well.

## 2024-03-25 NOTE — TELEPHONE ENCOUNTER
----- Message from Elizabeth Barnett sent at 3/25/2024 11:46 AM CDT -----  Contact: Patient @ [phone]  [email or web address]     Patient is calling for Medical Advice regarding:Patient is calling to schedule a well visit and get referrals     How long has patient had these symptoms:    Pharmacy name and phone#:    Would like response via [employer]Media[employer]: call     Comments:

## 2024-03-26 ENCOUNTER — OFFICE VISIT (OUTPATIENT)
Dept: PRIMARY CARE CLINIC | Facility: CLINIC | Age: 79
End: 2024-03-26
Payer: MEDICARE

## 2024-03-26 VITALS — DIASTOLIC BLOOD PRESSURE: 85 MMHG | SYSTOLIC BLOOD PRESSURE: 160 MMHG

## 2024-03-26 DIAGNOSIS — I50.32 CHRONIC DIASTOLIC HEART FAILURE: Primary | ICD-10-CM

## 2024-03-26 DIAGNOSIS — Z23 NEED FOR VACCINATION: ICD-10-CM

## 2024-03-26 DIAGNOSIS — M31.0 LEUKOCYTOCLASTIC VASCULITIS: ICD-10-CM

## 2024-03-26 DIAGNOSIS — R41.3 MEMORY IMPAIRMENT: ICD-10-CM

## 2024-03-26 DIAGNOSIS — I10 PRIMARY HYPERTENSION: ICD-10-CM

## 2024-03-26 DIAGNOSIS — R41.3 MEMORY LOSS OR IMPAIRMENT: ICD-10-CM

## 2024-03-26 DIAGNOSIS — M85.80 OSTEOPENIA, UNSPECIFIED LOCATION: ICD-10-CM

## 2024-03-26 DIAGNOSIS — I70.0 ATHEROSCLEROSIS OF AORTA: ICD-10-CM

## 2024-03-26 PROBLEM — D69.2 SENILE PURPURA: Status: RESOLVED | Noted: 2021-08-09 | Resolved: 2024-03-26

## 2024-03-26 PROCEDURE — 3077F SYST BP >= 140 MM HG: CPT | Mod: HCNC,CPTII,S$GLB, | Performed by: INTERNAL MEDICINE

## 2024-03-26 PROCEDURE — G0008 ADMIN INFLUENZA VIRUS VAC: HCPCS | Mod: HCNC,S$GLB,, | Performed by: INTERNAL MEDICINE

## 2024-03-26 PROCEDURE — 3079F DIAST BP 80-89 MM HG: CPT | Mod: HCNC,CPTII,S$GLB, | Performed by: INTERNAL MEDICINE

## 2024-03-26 PROCEDURE — 90694 VACC AIIV4 NO PRSRV 0.5ML IM: CPT | Mod: HCNC,S$GLB,, | Performed by: INTERNAL MEDICINE

## 2024-03-26 PROCEDURE — 1157F ADVNC CARE PLAN IN RCRD: CPT | Mod: HCNC,CPTII,S$GLB, | Performed by: INTERNAL MEDICINE

## 2024-03-26 PROCEDURE — 99215 OFFICE O/P EST HI 40 MIN: CPT | Mod: HCNC,25,S$GLB, | Performed by: INTERNAL MEDICINE

## 2024-03-26 RX ORDER — ROSUVASTATIN CALCIUM 20 MG/1
20 TABLET, COATED ORAL DAILY
Qty: 90 TABLET | Refills: 3 | Status: SHIPPED | OUTPATIENT
Start: 2024-03-26 | End: 2024-04-30 | Stop reason: DRUGHIGH

## 2024-03-26 RX ORDER — LOSARTAN POTASSIUM 50 MG/1
50 TABLET ORAL DAILY
Qty: 90 TABLET | Refills: 3 | Status: SHIPPED | OUTPATIENT
Start: 2024-03-26 | End: 2024-04-30

## 2024-03-26 RX ORDER — CHOLECALCIFEROL (VITAMIN D3) 125 MCG
5000 CAPSULE ORAL DAILY
Qty: 90 CAPSULE | Refills: 3 | Status: SHIPPED | OUTPATIENT
Start: 2024-03-26

## 2024-03-26 RX ORDER — VITAMIN B COMPLEX
1 CAPSULE ORAL DAILY
Qty: 90 CAPSULE | Refills: 3 | Status: SHIPPED | OUTPATIENT
Start: 2024-03-26

## 2024-03-26 RX ORDER — ASPIRIN 81 MG/1
81 TABLET ORAL DAILY
Qty: 90 TABLET | Refills: 3 | Status: SHIPPED | OUTPATIENT
Start: 2024-03-26

## 2024-03-26 NOTE — PROGRESS NOTES
"Primary Care Provider Appointment - [facility]  SHARED NOTE: Isac Newell ([facility] Fellow), Dr Castillo (Attending)    Subjective:      Patient ID: Primitivo Mitchell III is a 78 y.o. male with a pmh of HTN, HLD, chronic diastolic heart failure     Chief Complaint: Dementia and Hypertension    Prior to this visit, patient's last encounter with PCP was Visit date not found    Pt presents to the clinic accompanied with his sister Luciana who is concerned about memory problems. Memory loss began after prostatectomy after a approximately 2 week stay in the hospital after a prolonged ileus. She notes that pt's wife's doctors were concerned during a hospitalization along with the wife's caregivers noting that he has been frequently confused at home. His spouse is undergoing chemotherapy.   Memory loss per sister has been steadily decreasing and he is no longer able to remember to pay certain bills. Friends have noticed that he has not been taking care of appearances like he use to (not wearing a suit to , not getting regular haircuts).   He sleeps 8-9 hours a day and feels rested. He is able to ambulate normally with normal gait. Denies feeling depressed, anhedonia, guilt. Denies any headache, vision changes, hearing loss, tremors, visual hallucination, rigidity, falls. His mother had a history of Lewy-body dementia.  Was seen in 2018 by Neurology "From history does appear to have some difficulty with attention span and concentration with associated obsessive thinking that is of recent onset. Will get neuropsychological testing done and an MRI scan of the brain, without contrast, because of his multiple vascular risk factors. He will follow up after completion of the neuropsychological testing."    HTN  - Currently taking: losartan 50mg,   - How often taking BP at home: 1/week, average is: not taking at home  - Today, BP is: 160/85    HLD  - Currently taking: rosuvastatin 10mg  - Last lipid panel was on " 21  The 10-year ASCVD risk score   The 10-year ASCVD risk score (Sandrita PAUL, et al., 2019) is: 29.4%    Values used to calculate the score:      Age: 78 years      Sex: Male      Is Non- : Yes      Diabetic: No      Tobacco smoker: No      Systolic Blood Pressure: 160 mmHg      Is BP treated: Yes      HDL Cholesterol: 58 mg/dL      Total Cholesterol: 137 mg/dL      Osteopenia  - DEXA needed    Cardiology  - Missed appt w/ Dr. Verde on   - Last seen on for HTN 2022, valve disease, atherosclerosis, and wall motion abnormalities  - was followed for aortic valve disease, but lost to follow-up     Care Gaps:  - tetanus  - Covid vaccines  - DEXA  - RSV    - influenza (today: lot: [id number] exp:     Medications: Does not have pill packs, will pack meds today  ASA 81mg  Rosuvastatin 20mg  Losartan 50mg  B complex  Vit D 5000U     4Ms for Medical Decision-Making in Older Adults    Last Completed EAWV: 2023    MOBILITY:  Get Up and Go:      2023    10:39 AM 8/15/2022     3:35 PM 2021     1:35 PM 2020     2:32 PM 2019     2:52 PM 3/20/2018    11:22 AM 2017    11:26 AM   Get Up and Go   Trial 1 10 seconds 7 seconds 8 seconds 10 seconds 12 seconds 9 seconds 7 seconds     Activities of Daily Livin/18/2023    10:43 AM   Activities of Daily Living   Ambulation Independent   Dressing Independent   Transfers Independent   Toileting Continent of bladder;Continent of bowel   Feeding Independent   Cleaning home/Chores Independent   Telephone use Independent   Shopping Independent   Paying bills Independent   Taking meds Independent     Whisper Test:      2023    10:41 AM   Whisper Test   Whisper Test Normal     Disability Status:      2023    10:43 AM   Disability Status   Are you deaf or do you have serious difficulty hearing? N   Are you blind or do you have serious difficulty seeing, even when wearing glasses? N   Because of a physical,  mental, or emotional condition, do you have serious difficulty concentrating, remembering, or making decisions? N   Do you have serious difficulty walking or climbing stairs? N   Do you have difficulty dressing or bathing? N   Because of a physical, mental, or emotional condition, do you have difficulty doing errands alone such as visiting a doctor's office or shopping? N     Nutrition Screenin/18/2023    10:43 AM   Nutrition Screening   Has food intake declined over the past three months due to loss of appetite, digestive problems, chewing or swallowing difficulties? No decrease in food intake   Involuntary weight loss during the last 3 months? No weight loss   Mobility? Goes out   Has the patient suffered psychological stress or acute disease in the past three months? No   Neuropsychological problems? No psychological problems   Body Mass Index (BMI)?  BMI 23 or greater   Screening Score 14   Interpretation Normal nutritional status    Screening Score: 0-7 Malnourished, 8-11 At Risk, 12-14 Normal    MENTATION:   Depression Patient Health Questionnaire:      2023    12:09 PM   Depression Patient Health Questionnaire   PHQ-4 Total Score 0     Has Dementia Dx: No    Cognitive Function Screenin/18/2023    10:41 AM   Cognitive Function Screening   Clock Drawing Test 1   Mini-Cog 3 Minute Recall 0   Cognitive Function Screening 1     Cognitive Function Screening Total - Less than 4 = Abnormal,  Greater than or equal to 4 = Normal    MEDICATIONS:  High Risk Medications:  Total Active Medications: 0  This patient does not have an active medication from one of the medication groupers.    WHAT MATTERS MOST:  Advance Care Planning   ACP Status:   Patient has had an ACP conversation  Living Will: Yes  Power of : Yes  [place]: Yes    What is most important right now is to focus on remaining as independent as possible    Accordingly, we have decided that the best plan to meet the patient's goals  includes continuing with treatment      What matters most to patient today is: preserving memory and function                 Social History     Socioeconomic History    Marital status:     Number of children: 1    Highest education level: Bachelor's degree (e.g., BA, AB, BS)   Occupational History    Occupation: Office     Comment: [employer]   Tobacco Use    Smoking status: Never    Smokeless tobacco: Never   Substance and Sexual Activity    Alcohol use: No    Drug use: No    Sexual activity: Yes     Partners: Female   Social History Narrative    Lives with his wife.    No pets.    Retired.    Currently fills day with TV.     Social Determinants of Health     Financial Resource Strain: Low Risk  (8/18/2023)    Overall Financial Resource Strain (CARDIA)     Difficulty of Paying Living Expenses: Not hard at all   Food Insecurity: No Food Insecurity (8/18/2023)    Hunger Vital Sign     Worried About Running Out of Food in the Last Year: Never true     Ran Out of Food in the Last Year: Never true   Transportation Needs: No Transportation Needs (8/18/2023)    PRAPARE - Transportation     Lack of Transportation (Medical): No     Lack of Transportation (Non-Medical): No   Physical Activity: Inactive (8/18/2023)    Exercise Vital Sign     Days of Exercise per Week: 0 days     Minutes of Exercise per Session: 0 min   Stress: No Stress Concern Present (8/18/2023)    [employer] [place] [employer] - Occupational Stress Questionnaire     Feeling of Stress : Not at all   Social Connections: Moderately Integrated (8/18/2023)    Social Connection and Isolation Panel [NHANES]     Frequency of Communication with Friends and Family: More than three times a week     Frequency of Social Gatherings with Friends and Family: More than three times a week     Attends Religion Services: More than 4 times per year     Active Member of Clubs or Organizations: No     Attends Club or Organization Meetings: Never      Marital Status:    Housing Stability: Low Risk  (8/18/2023)    Housing Stability Vital Sign     Unable to Pay for Housing in the Last Year: No     Number of Places Lived in the Last Year: 1     Unstable Housing in the Last Year: No       Review of Systems   Constitutional:  Positive for activity change and fatigue.   Psychiatric/Behavioral:  The patient is nervous/anxious.         Memory loss       Objective:   BP (!) 160/85     Physical Exam  Cardiovascular:      Rate and Rhythm: Normal rate and regular rhythm.      Heart sounds: Murmur (systolic murmur 2nd right intercostal) heard.   Pulmonary:      Effort: Pulmonary effort is normal.      Breath sounds: Normal breath sounds.   Musculoskeletal:         General: No swelling.      Right lower leg: No edema.      Left lower leg: No edema.   Skin:     General: Skin is warm and dry.   Neurological:      Sensory: No sensory deficit.      Motor: No weakness.      Coordination: Coordination normal.      Gait: Gait normal.      Comments: 5/5 bilateral power in extremities   No cogwheel rigidity    Psychiatric:         Mood and Affect: Mood normal.      Comments: Often repeats stories during examination and tangential              Lab Results   Component Value Date    WBC 5.24 08/18/2023    HGB 14.7 08/18/2023    HCT 44.9 08/18/2023     08/18/2023    CHOL 137 08/18/2023    TRIG 143 08/18/2023    HDL 58 08/18/2023    ALT 23 08/18/2023    AST 19 08/18/2023     08/18/2023    K 3.9 08/18/2023     08/18/2023    CREATININE 1.1 08/18/2023    BUN 17 08/18/2023    CO2 25 08/18/2023    TSH 0.804 03/11/2019    PSA 2.0 09/02/2015    INR 0.9 02/25/2020       Current Outpatient Medications on File Prior to Visit   Medication Sig Dispense Refill    ammonium lactate 12 % Crea Apply 1 g topically once daily. 140 g 3    fluticasone propionate (FLONASE) 50 mcg/actuation nasal spray 2 sprays (100 mcg total) by Each Nostril route once daily. 48 g 3    ketotifen  (ZADITOR) 0.025 % (0.035 %) ophthalmic solution 1 drop 2 (two) times daily.      ondansetron (ZOFRAN) 4 MG tablet Take 1 tablet (4 mg total) by mouth every 6 (six) hours. (Patient not taking: Reported on 8/18/2023) 12 tablet 0    [DISCONTINUED] aspirin (ECOTRIN) 81 MG EC tablet Take 1 tablet (81 mg total) by mouth once daily. 90 tablet 3    [DISCONTINUED] cholecalciferol, vitamin D3, 125 mcg (5,000 unit) capsule TAKE 1 CAPSULE ONE TIME DAILY 90 capsule 3    [DISCONTINUED] losartan (COZAAR) 50 MG tablet TAKE 1 TABLET ONE TIME DAILY 90 tablet 3    [DISCONTINUED] rosuvastatin (CRESTOR) 10 MG tablet TAKE 1 TABLET ONE TIME DAILY 90 tablet 3     No current facility-administered medications on file prior to visit.         Assessment:   78 y.o. male with multiple co-morbid illnesses here to follow-up with PCP and continue work-up of chronic issues    Plan:   1. Chronic diastolic heart failure  Overview:  5/2021: Echo with grade 1 diastolic dysfunction, normal EF    Orders:  -     rosuvastatin (CRESTOR) 20 MG tablet; Take 1 tablet (20 mg total) by mouth once daily.  Dispense: 90 tablet; Refill: 3    2. Leukocytoclastic vasculitis  Overview:  Diagnosed in 2020 by Derm, related to systemic infection. treated with topical steroids.     Assessment & Plan:  monitor      3. Atherosclerosis of aorta  Overview:  9/2016: CT scan of abdomen: Atherosclerosis.     Orders:  -     aspirin (ECOTRIN) 81 MG EC tablet; Take 1 tablet (81 mg total) by mouth once daily.  Dispense: 90 tablet; Refill: 3  -     rosuvastatin (CRESTOR) 20 MG tablet; Take 1 tablet (20 mg total) by mouth once daily.  Dispense: 90 tablet; Refill: 3    4. Memory impairment  -     Ambulatory referral/consult to Adult Neuropsychology; Future; Expected date: 04/02/2024  -     cholecalciferol, vitamin D3, 125 mcg (5,000 unit) capsule; Take 1 capsule (5,000 Units total) by mouth once daily.  Dispense: 90 capsule; Refill: 3  -     b complex vitamins capsule; Take 1 capsule by  mouth once daily.  Dispense: 90 capsule; Refill: 3    5. Osteopenia, unspecified location  Overview:  Seen on DEXA in 2021, needs Vit D supplementation    Orders:  -     cholecalciferol, vitamin D3, 125 mcg (5,000 unit) capsule; Take 1 capsule (5,000 Units total) by mouth once daily.  Dispense: 90 capsule; Refill: 3    6. Primary hypertension  Overview:  2009: Diagnosed. Unclear if he is taking his medications as directed due to his memory impairment    Assessment & Plan:  Start pill packs to assist with compliance    Orders:  -     losartan (COZAAR) 50 MG tablet; Take 1 tablet (50 mg total) by mouth once daily.  Dispense: 90 tablet; Refill: 3    7. Need for vaccination  -     Influenza - Quadrivalent (Adjuvanted)    8. Memory loss or impairment  Overview:  Evaluated by Neuro in 2018, but has not participated in Neuropsych evaluation. MRI with no significant changes. Memory changes appeared after prostatectomy in 2016.    Assessment & Plan:  Refer to Neuropsych  Pill packs for compliance  Adding B vitamins  Control BP and statin  Increase statin           Health Maintenance         Date Due Completion Date    RSV Vaccine (Age 60+ and Pregnant patients) (1 - 1-dose 60+ series) Never done ---    TETANUS VACCINE 02/02/2022 2/2/2012    DEXA Scan 08/11/2023 8/11/2021    Influenza Vaccine (1) 09/01/2023 2/17/2023- TODAY    COVID-19 Vaccine (5 - 2023-24 season) 10/13/2023 8/18/2023    PROSTATE-SPECIFIC ANTIGEN 08/18/2024 8/18/2023    Lipid Panel 08/18/2024 8/18/2023        Will get labs at next appointment    Future Appointments   Date Time Provider Department Center   4/23/2024  2:00 PM Max Yepez DPM [facility] POD [facility]   6/25/2024  2:00 PM Karis Castillo MD [facility] MED CLN [facility]         Follow up in about 3 months (around 6/26/2024). Total clinical care time was 20 min      Karis Castillo MD/MPH  [facility]  [phone] [facility]

## 2024-03-26 NOTE — PATIENT INSTRUCTIONS
TODAY:  - request a medicare award letter so we can waive your copays  https://www.ssa.gov/manage-benefits/get-benefit-letter  Call [phone]   - we will help you get a Neuropsychology appointment for memory testing  - continue Vit D and B-complex, we will put in your pill packs  - positive approach to care to help family get education on dementia: [email or web address]/  - try to go for walks at the walking path

## 2024-03-26 NOTE — ASSESSMENT & PLAN NOTE
· Refer to Neuropsych  · Pill packs for compliance  · Adding B vitamins  · Control BP and statin  · Increase statin

## 2024-03-28 ENCOUNTER — TELEPHONE (OUTPATIENT)
Dept: PRIMARY CARE CLINIC | Facility: CLINIC | Age: 79
End: 2024-03-28
Payer: MEDICARE

## 2024-03-28 NOTE — TELEPHONE ENCOUNTER
Caitlin Sellers LCSW Leblanc, Domonique D  Cc: Karis Castillo MD  Please see message below, thanks.   Caitlin           Previous Messages       ----- Message -----   From: Karis Castillo MD   Sent: 3/26/2024   4:01 PM CDT   To: Caitlin Sellers LCSW; Isac Newell; *     Hi all,   Can you help us get him a Neuropsych appt?     Thanks!   Dr GONZALEZ

## 2024-04-02 ENCOUNTER — TELEPHONE (OUTPATIENT)
Dept: NEUROLOGY | Facility: CLINIC | Age: 79
End: 2024-04-02
Payer: MEDICARE

## 2024-04-23 ENCOUNTER — OFFICE VISIT (OUTPATIENT)
Dept: PODIATRY | Facility: CLINIC | Age: 79
End: 2024-04-23
Payer: MEDICARE

## 2024-04-23 VITALS
WEIGHT: 166.88 LBS | DIASTOLIC BLOOD PRESSURE: 65 MMHG | SYSTOLIC BLOOD PRESSURE: 127 MMHG | BODY MASS INDEX: 22.6 KG/M2 | HEART RATE: 61 BPM | HEIGHT: 72 IN

## 2024-04-23 DIAGNOSIS — L84 CORNS/CALLOSITIES: ICD-10-CM

## 2024-04-23 DIAGNOSIS — L60.3 ONYCHODYSTROPHY: Primary | ICD-10-CM

## 2024-04-23 PROCEDURE — 17999 UNLISTD PX SKN MUC MEMB SUBQ: CPT | Mod: CSM,HCNC,S$GLB, | Performed by: PODIATRIST

## 2024-04-23 PROCEDURE — 99999 PR PBB SHADOW E&M-EST. PATIENT-LVL III: CPT | Mod: PBBFAC,HCNC,, | Performed by: PODIATRIST

## 2024-04-23 PROCEDURE — 99499 UNLISTED E&M SERVICE: CPT | Mod: HCNC,,, | Performed by: PODIATRIST

## 2024-04-23 NOTE — PROGRESS NOTES
Patient presents to the clinic for non-covered routine foot care. Patient is not a high risk foot care patient. Patient understands this is not typically a covered service and patient is aware of responsibility of payment. Pedal pulses are palpable. No know risk factors requiring routine foot care. Nails are elongated and thickened on feet. Hyperkeratotic lesions noted to both feet. Diagnosis is onychodystrophy and corn/callus. Calluses/corns and nails were reduced and trimmed bilaterally. Patient tolerated well.    RTC 1-2 months for Proc B, sooner PRN

## 2024-04-23 NOTE — PROGRESS NOTES
NEUROPSYCHOLOGY CONSULT  Referral Information  Name: Primitivo Mitchell III  MRN: [id number]  : 1945  Age: 78 y.o.  Race: Black or   Gender: male  REFERRAL SOURCE: Karis Castillo MD  DATE CONDUCTED: 2024  SOURCES OF INFORMATION:  The following was gathered from a clinical interview with Mr. Primitivo Mitchell III and review of the available medical records. Mr. Stephen TILLMAN expressed an understanding of the purpose of the evaluation and consented to all procedures. Total licensed billing psychologists professional time including clinical interview, test administration and interpretation of tests administered by the billing psychologist, integration of test results and other clinical data, preparing the final report, and personally reporting results to the patient   [place] - 60 minutes, [id number] - 120 minutes, [id number] - 123 minutes    NEUROPSYCHOLOGICAL EVALUATION - CONFIDENTIAL    SUMMARY/TREATMENT PLAN   Mr. Mitchell is a 78 year old male with progressive cognitive and functional decline since around 2018. His family has intervened in multiple complex ADLs, but he continues to drive. Some concerns about basic ADLs as he is losing weight and not changing his clothes. His wife is in hospice for stage 4 cancer, so his sister Sherri is his primary caregiver, but lives about an hour away from him in [place]. His wife's in-home caregivers are helping to support him as well.    Current functioning and neuropsychological test results are at the level of dementia. High index of suspicion for a neurodegenerative condition. Alzheimer's disease vs Semantic dementia are highest on the differential given his memory impairment, word finding difficulty, and loss of autobiographical history. Several recommendations are offered.     Diagnoses  Problem List Items Addressed This Visit          Neuro    Neurodegenerative dementia with agitation - Primary    Current Assessment & Plan  "    Level of Care: He appears to require a higher level of care. While they have caregivers regularly at the house, they are primarily to support his wife. He appears to require more oversight for eating and hygiene.     Neuropsychiatric Symptoms: Irritability/agitation may be a point of intervention.     Driving: Recommended that he completely discontinue driving.     Speech Therapy: Consider referral in the setting of significant word finding difficulty on testing.     Follow-up: Future neuropsychological testing may be of limited utility, but happy to consult with Mr. Mitchell and his family at any point in the future.           Other Visit Diagnoses       Memory impairment               Mr. Stephen TILLMAN will be provided the results of the evaluation.     Thank you for allowing me to participate in Mr. Stephen TILLMANs care.  If you have any questions, please contact me at [phone].    Ron London Psy.D., ABPP  Board Certified in Clinical Neuropsychology  Department of Neurology    HISTORY OF PRESENT ILLNESS: Mr. Primitivo Mitchell III is a 78 y.o., right-handed, male with 16 years of education who was referred for a neuropsychological evaluation in the setting of family reported progressive cognitive decline. He initially established care with [facility] in 2018. Per Dr. Woods: "referred for evaluation of cognitive difficulties.  His spouse was present today and collaborated with the history.  She reports that she first noted the problem about 6 months ago when he was somewhat inattentive, asking the same questions or repeating himself.  Symptoms are usually intermittent.    She has also noted some behavioral issues in that he has become very obsessive specially when parking the car when he has to get out and check that he has parked exactly between the lines. He is otherwise able to take care of his day-to-day needs at home without any problems including driving and managing his medications and " "finances.  However spouse reports that she has always been taking care of bill payment.  The patient used to work as an  at [employer] however retired in 2007 and subsequently was doing some yard work until a few years ago.  He reports that he has been taking care of his mother's paperwork.  She is in her 90s and has a dementia.  The patient reports that he noted problems with attention span and concentration ever since he had his prostate surgery reporting that he had been sedated for surgery and this may have affected his memory.  However he was otherwise functioning adequately without any problems.  He denies any headaches, visual difficulties or hearing impairment.  He has no history of any significant head trauma in the past." He was scheduled for neuropsychological testing, but cancelled twice.     Mr. Mitchell was recently referred for cognitive assessment by his PCP, Dr. Castillo. His sister contact Dr. Castillo in 1/2024, reporting the following: "She reports that patient has been more forgetful over the last 5 years, however over the last 4-5 months she states that the patient has significantly declined. She reports stressors included wife's recent transition to hospice care as a cancer patient approximately 3 months ago along with Fall 2023 hospital admission of wife in [place] that resulted in doctors determining that patient needed more care support for her and her  such that discharge of patient's wife was "delayed until multiple caregivers were hired". Patient's sister that since then, patient calling his sister "for everything" and she has had to make numerous trips between her home in [place] to [place], [place] to help. Recently, patient's sister notes she has had to coordinate the caregiver's paychecks but she has had to personally oversee every step of that process on a weekly basis, however due to the freezing weather conditions and multiple road closures recently, the possibility " "of delays in pay stressed out all parties. Patient's sister also reminding patient to bathe and change his clothes when she sees him as she will notice him wearing the same clothes repeatedly. Wife's caregivers also telling patient's sister that the patient ends up eating multiple plates of food, as if he "had forgotten dinner happened already".    Mr. Mitchell was accompanied to this appointment by his sister, Sherri, and son, Candelario. Luciana provided the majority of the background. She indicated that the extent of her brother's cognitive decline is apparent to everyone who interacts with him. She has recently observed loss of episodic memory as well as memory regarding his own medical history. In fact, he will argue that a procedure or medical condition didn't occur even when shown medical records to the contrary. Sherri indicated that memory impairment has been the most prominent symptom throughout his condition. He frequently misplaces items, including the mail, which causes challenges. She also notices personality changes. For instance, their family had always tried to take preventative measures for dementia since their mother had it for 17 years. However, he eventually stated he no longer wanted to take these measures, such as take daily B12, for unclear reasons. He would also become angry when others would suggest that he needed to do them for memory loss. She is aware that he was taking notes of conversations at one point, but now seems like he can't keep up with this task.     Mr. Mitchell denied changes/problems with memory. He is aware of his family's concerns, but does not see it himself. He believes his memory is normal for age. He was tangential during the intake, frequently trying to control/direct the conversation, specifically about historical autobiographical events that were not relevant to the questions.     Neuropsychiatric Symptoms:  Hallucinations: Denied  Irritability/Agitation: Endorsed, " when people question his independently.   Depression/Labile Mood: Denied, he denied active SI, plan, or intent.   Anxiety: Denied    DAILY FUNCTIONING:  BASIC ADLS:  Feeding: independent. Family brings multiple plates each week to keep in the freezer/fridge. There are times when he seems to forget that he ate and will eat multiple prepared meals in a row. Caregivers prepare him breakfast every morning. Per medical records, he has lost 15-20lbs over the past year and a half.   Dressing: Requires prompts to change as he repeatedly wears the same clothing.   Bathing: Family is not sure about hygiene.    IADLS:  Support System: Resides with his wife, who is in hospice. She has stage 4 cancer and was given a few day prognosis initially, but now has been on hospice for 6 months. They have caregivers from 10am-9pm, which was for his wife, but they have commented that Mr. Mitchell requires just as much support. His sister, Sherri, lives in [place], but comes to their house every week at a minimum. The caregivers frequently communicate with her.   Appointment Management: He primarily schedules appointments.   Medication Compliance: He now has a pillpack. The caregivers will prompt him to take them, which causes him to become irritable toward  them, but he will take them.   Financial Management: He historically managed the finances. Son took over about 6 months ago after wife was placed on hospice. They learned that several bills were behind when they took them over.   Cooking: He has never cooked.   Driving: He still drives, a few times each week. He drove himself to a medical appointment yesterday. Last year, a hospitalist refused to discharge his wife as he did not believe Mr. Mitchell was safe to drive them home.     BRAIN HEALTH RISK FACTORS:  Hearing Loss: Denied  Falls: Denied  Sleep: He denied any problems with sleep initiation/maintenance.     MEDICAL HISTORY: Mr. Stephen TILLMAN  has a past medical history of Aortic  "valve disorders (2013), Chronic bilateral low back pain with sciatica (2017), Chronic diastolic heart failure (2021), ED (erectile dysfunction), Enlarged prostate, Floaters in visual field, History of adenomatous polyp of colon (2017), History of prostate cancer (2016), Hyperlipidemia, Hypertension, Intestinal metaplasia of gastric mucosa (2019), Neurodegenerative dementia with agitation (2024), Osteoporosis, Pre-operative cardiovascular examination (2016), Prostate cancer (2016), Radiation proctitis, Rash (2020), S/P prostatectomy (2017), Stress incontinence, male (2016), 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    NEUROIMAGIN2018 Brain MRI: "Minimal small vessel disease without evidence of acute ischemic changes."    SUBSTANCE USE: Mr. Stephen TILLMAN  reports that he has never smoked. He has never used smokeless tobacco. He reports that he does not drink alcohol and does not use drugs.    CURRENT MEDICATIONS:    Current Outpatient Medications:     ammonium lactate 12 % Crea, Apply 1 g topically once daily., Disp: 140 g, Rfl: 3    aspirin (ECOTRIN) 81 MG EC tablet, Take 1 tablet (81 mg total) by mouth once daily., Disp: 90 tablet, Rfl: 3    b complex vitamins capsule, Take 1 capsule by mouth once daily., Disp: 90 capsule, Rfl: 3    cholecalciferol, vitamin D3, 125 mcg (5,000 unit) capsule, Take 1 capsule (5,000 Units total) by mouth once daily., Disp: 90 capsule, Rfl: 3    fluticasone propionate (FLONASE) 50 mcg/actuation nasal spray, 2 sprays (100 mcg total) by Each Nostril route once daily., Disp: 48 g, Rfl: 3    losartan (COZAAR) 50 MG tablet, Take 1 tablet (50 mg total) by mouth once daily., Disp: 90 tablet, Rfl: 3    rosuvastatin (CRESTOR) 20 MG tablet, Take 1 tablet (20 mg total) by mouth once daily., Disp: 90 tablet, Rfl: 3    ketotifen (ZADITOR) 0.025 % (0.035 %) ophthalmic solution, 1 drop " "2 (two) times daily., Disp: , Rfl:     ondansetron (ZOFRAN) 4 MG tablet, Take 1 tablet (4 mg total) by mouth every 6 (six) hours. (Patient not taking: Reported on 8/18/2023), Disp: 12 tablet, Rfl: 0     FAMILY HISTORY: family history includes Alzheimer's disease in his mother; Anxiety disorder in his son; Cancer in his brother; Early death in his paternal grandmother; Heart disease in his mother; Hepatomegaly in his son; Neuropathy in his mother; Other in his father; Prostate cancer in his brother; Tremor in his mother; Ulcerative colitis in his sister.    PSYCHOSOCIAL HISTORY:   Education:   Level Attained: Bachelor's degree, but he could not recall where he went to school.   Learning Difficulties: Denied   Repeated Grade: Denied     Vocation: Worked at [employer] for over 30 years. 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     Relationship Status:   : Yes, he estimated over 30 years.    Children: Son and daughter from prior relationship.     MENTAL STATUS AND OBSERVATIONS:  APPEARANCE: Appropriately dressed/groomed.    ALERTNESS/ORIENTATION: Attentive and alert.   GAIT/MOTOR: Ambulated independently.   SENSORY: Corrective lenses.   SPEECH/LANGUAGE: Normal in rate, rhythm, tone, and volume. Expressive and receptive language were grossly intact.  STATED MOOD/AFFECT: Mostly flat affect.   INTERPERSONAL BEHAVIOR: Rapport was quickly and easily established   THOUGHT PROCESSES: Thoughts seemed logical and goal-directed.    VALIDITY/BEHAVIORAL OBSERVATIONS: Approached tasks slowly. He expressed frustration with his performance during testing, stating "How is this helping me?" He was especially frustrated during tests of learning/memory. He tended to provide reasons for his difficulties. He required clarification/repetition of test instructions. Scores appear to be a valid/reliable measure of his current cognitive abilities.      APPENDIX/TEST RESULTS:  TESTS ADMINISTERED:  Clinical Interview and Review of " Records, Jason Cognitive Assessment (MoCA), Repeatable Battery for the Assessment of Neuropsychological Status (RBANS, form A), Anthony Complex Figure (Copy Trial), Trailmaking Test ([facility] Norms), and the Geriatric Depression Scale (GDS). Manual norms were used unless otherwise indicated.       Score Label T-Score Standard Score Z-Score Scaled Score %ile Rank   Exceptionally High > 70 > 130 > 2.0  > 16 > 98   Above Average 64-69 120-129 1.4-1.9 15 91-97   High Average 57-63 110-119 0.7-1.3 12-14 75-90   Average 44-56  0.6 to -0.6 8-11 25-74   Low Average 37-43 80-89 -1.3 to -0.7 6-7 9-24   Below Average 30-36 70-79 -2.0 to -1.4 4-5 2-8   Exceptionally Low < 30 < 70  < -2.0 < 4 < 2      Mental Status: He was oriented to the year, but no other aspects of time. He was fully oriented to location.   4/2024 MoCA = 10/30     Pre-morbid/Baseline: Estimated to be in the average range based on educational/vocational attainment. A composite score of his overall cognitive abilities was exceptionally low.      Language: Letter verbal fluency was below expectation. Exceptionally low semantic fluency. Exceptionally low confrontation naming. His performance was remarkable for multiple semantic paraphasic errors with very minimal benefit from cueing.      Visuospatial: Copy of a complex figure was . He did not accurately draw a clock face. He did not include an outside contour or include all the numbers. He did not correctly set the clock hands. Exceptionally low performance on a test of line orientation. Copy of a complex figure was WNL with no evidence of visuospatial dysfunction.      Learning/Memory: Overall encoding of the RBANS word list was exceptionally low as he recalled 0, 4, 4, and 4 of 10 words. He did not freely recall any words following a long delay. Recognition was exceptionally low (10/10 hits, 4 fps). He encoded 4/5 words after 2   trials on the MoCA, freely recalling 0 following a brief delay. He recalled 1  word with categorical cueing and did not benefit from multiple choice cueing. Overall encoding of a short story was exceptionally low. He recalled 1 detail following a long delay, which was included in the instructions. He did not recall any details from a previously copied figure.      Executive Functioning: One trial learning/encoding was below expectation. He did not provide any correct responses on a serial 7 subtraction task. Exceptionally low processing speed.      Mood: Responses on a self-report inventory were not indicative of clinical depression. He did not endorse cognitive problems.       Raw Score Type of Standardized Score Standardized Score Percentile/CP   RBANS EI 0 - -    COGNITIVE SCREENING Raw Score Type of Standardized Score Standardized Score Percentile/CP   MoCA 10 - - -   Orientation - Place 2/2 - - -   Orientation - Date 1/4 - - -   RBANS       Immediate Memory - SS 53 0.1   VS/Construction - SS 81 10   Language - SS 54 0.1   Attention - SS 72 3   Delayed Memory - SS 52 0.1   Total Scale - SS 54 0.1   Subtests       List Learning 12 ss 2 0   Story Memory 6 ss 3 1   Figure Copy 17 ss 9 37   Line Orientation 11 ss - 3-9   Naming 6 ss - <2   Fluency 8 ss 3 1   Digit Span 10 ss 10 50   Coding 9 ss 1 0   List Recall 0 ss - <2   List Recognition 16 ss - <2   Story Recall 1 ss 2 0   Figure Recall 0 ss 1 0   LANGUAGE FUNCTIONING Raw Score Type of Standardized Score Standardized Score Percentile/CP   RBANS Naming 6 ss - <2   RBANS Semantic Fluency 8 ss 3 1   NAB Naming 11 Tscore 19 <0.1   NAB Naming Percent Correct After Semantic Cuing 0 - - 38   NAB Naming Percent Correct After Phonemic Cuing 20 - - 21   VISUOSPATIAL FUNCTIONING Raw Score Type of Standardized Score Standardized Score Percentile/CP   RBANS Line Orientation  11 ss - 3-9   RBANS Figure Copy 17 ss 9 37   RCFT Copy 33 - - >16   RCFT Time to Copy 988 - - <1   LEARNING & MEMORY Raw Score Type of Standardized Score Standardized Score  Percentile/CP   RBANS       Immediate Memory - SS 53 0.1   Delayed Memory - SS 52 0.1   List Learning 12 ss 2 0   List Recall 0 ss - <2   List Recognition 16 ss - <2   Story Memory 6 ss 3 1   Story Recall 1 ss 2 0.4   Figure Recall 0 ss 1 0.1   ATTENTION/WORKING MEMORY Raw Score Type of Standardized Score Standardized Score Percentile/CP   RBANS Digit Span  10 ss 10 50   MENTAL PROCESSING SPEED Raw Score Type of Standardized Score Standardized Score Percentile/CP   RBANS Coding 9 ss 1 0   MOOD & PERSONALITY Raw Score Type of Standardized Score Standardized Score Percentile/CP   GDS-30 1 - - -

## 2024-04-23 NOTE — PROGRESS NOTES
"Name: Primitivo Mitchell III  MRN: [id number]   CSN: [id number]      Date: 4-24-24      Referring physician:  Karis Castillo MD  [address] [place] [address]    Subjective:      Chief Complaint: memory loss     History of Present Illness (HPI):    Primitivo Mitchell III is a 78 y.o. right-handed male who presents today for an initial evaluation of memory loss and is accompanied by sister (Annika Zhang) and son (Candelario). He prev saw Dr. Woods 6-19-18 once for cognitive complaints. Neuropsychological evaluation (not done from what I can tell) and imaging ordered. He was lost to Follow up.     Mr. Mitchell ("Talib") interviewed alone:   Initially, he says his wife and son are with him today. After couple of minutes, he says it is his sister that is here. His wife has 4 stage cancer.     He and wife been  for "quite awhile."  He has son and dtr. Does not share children with his wife.     Son and my wife say I'm forgetting some things.  He then corrects and says his son and sister say he forgets. .   He feels he sometimes forgets something but nothing extended.   If have things need to remember, I jot it down.     Wife has 4 stage cancer. She is on hospice but doing good for awhile. She is at home right now. She never  says anything about my memory.     Sister is supposed to be getting  soon.    Still drive. Wife can't drive because of her condition. No problems, not getting lost and no accidents.     No longer work. Retired from [employer] years ago.      Handle my finances. No problems.   Manage my own medicines and hers, too.     We have sitters that I pay to be there with my wife.         Son and sister interviewed alone:     He does get upset if soemone tries to suggest something to him.     Doctor in [place] would not release his wife home from hospital if he was the one driving.  He generally does not drive anymore. He did drive yesterday to his doc appt. He called sister " today and told her that [facility] and could drive himsel., He forgot that she told him yestereday she was coming today.    He will call her and ask her the same questions.     Hospice is there for his wife. They call Annika and tell her that he needs as much help as she does.      Candelario lives in [place].     Sister has sitters there from 10a-9P (not from Hospice).      To their knowldege he is sleeping.   Most of the time, his mood is good, unless someone reminds him about taking med or telling him about driving, etc    Apptetite is excellent.   Never cooked.     Gets pill packs. This got set up last month. This was prompted after one of the sitters thought he was not takin correctly.     Candelraio manages finances.  Annika manges the sitters and payments for sitters.                   Dr. Woods note 6-29-18:   HPI   This is a 72-year-old male who was referred for evaluation of cognitive difficulties.  His spouse was present today and collaborated with the history.  She reports that she first noted the problem about 6 months ago when he was somewhat inattentive, asking the same questions or repeating himself.  Symptoms are usually intermittent.    She has also noted some behavioral issues in that he has become very obsessive specially when parking the car when he has to get out and check that he has parked exactly between the lines. He is otherwise able to take care of his day-to-day needs at home without any problems including driving and managing his medications and finances.  However spouse reports that she has always been taking care of bill payment.  The patient used to work as an  at [employer] however retired in 2007 and subsequently was doing some yard work until a few years ago.  He reports that he has been taking care of his mother's paperwork.  She is in her 90s and has a dementia.  The patient reports that he noted problems with attention span and concentration ever since he had  his prostate surgery reporting that he had been sedated for surgery and this may have affected his memory.  However he was otherwise functioning adequately without any problems.  He denies any headaches, visual difficulties or hearing impairment.  He has no history of any significant head trauma in the past.        Review of Systems   Constitutional:  Negative for appetite change and unexpected weight change.   HENT:  Negative for drooling, hearing loss and trouble swallowing.    Eyes:  Negative for visual disturbance.   Respiratory:  Positive for cough (once in a while). Negative for shortness of breath.    Cardiovascular:  Negative for chest pain and leg swelling.   Gastrointestinal:  Negative for abdominal pain, constipation and diarrhea.   Genitourinary: Negative.    Musculoskeletal:  Negative for gait problem.        Denies pain   Neurological:  Negative for dizziness, tremors, seizures and light-headedness.        No stroke   Psychiatric/Behavioral:  Negative for dysphoric mood, hallucinations and sleep disturbance.        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    Social History: The patient  reports that he has never smoked. He has never used smokeless  tobacco. He reports that he does not drink alcohol and does not use drugs.    Family History: Their family history includes Alzheimer's disease in his mother; Anxiety disorder in his son; Cancer in his brother; Early death in his paternal grandmother; Heart disease in his mother; Hepatomegaly in his son; Neuropathy in his mother; Other in his father; Prostate cancer in his brother; Tremor in his mother; Ulcerative colitis in his sister.    Allergies: Latex, natural rubber; Omnipaque 140 [iohexol]; Allegra-d 12 hour [fexofenadine-pseudoephedrine]; Azithromycin; Bactrim [sulfamethoxazole-trimethoprim]; Boniva [ibandronate]; Celebrex [celecoxib]; Ciprofloxacin; Claritin-d 12 hour [loratadine-pseudoephedrine]; Fexofenadine; Hydrocortisone; Ibandronate sodium; Imipramine; Iodinated contrast media; Loratadine; Neomycin; Neomycin-polymyxin-hc; Neurontin [gabapentin]; Nitrofuran analogues; Sulfa (sulfonamide antibiotics); Adhesive; and Androderm [testosterone]     Meds:   Current Outpatient Medications on File Prior to Visit   Medication Sig Dispense Refill    ammonium lactate 12 % Crea Apply 1 g topically once daily. 140 g 3    aspirin (ECOTRIN) 81 MG EC tablet Take 1 tablet (81 mg total) by mouth once daily. 90 tablet 3    b complex vitamins capsule Take 1 capsule by mouth once daily. 90 capsule 3    cholecalciferol, vitamin D3, 125 mcg (5,000 unit) capsule Take 1 capsule (5,000 Units total) by mouth once daily. 90 capsule 3    fluticasone propionate (FLONASE) 50 mcg/actuation nasal spray 2 sprays (100 mcg total) by Each Nostril route once daily. 48 g 3    [DISCONTINUED] losartan (COZAAR) 50 MG tablet Take 1 tablet (50 mg total) by mouth once daily. 90 tablet 3    [DISCONTINUED] rosuvastatin (CRESTOR) 20 MG tablet Take 1 tablet (20 mg total) by mouth once daily. 90 tablet 3    ketotifen (ZADITOR) 0.025 % (0.035 %) ophthalmic solution 1 drop 2 (two) times daily.      ondansetron (ZOFRAN) 4 MG tablet Take 1 tablet (4 mg  "total) by mouth every 6 (six) hours. (Patient not taking: Reported on 2023) 12 tablet 0     No current facility-administered medications on file prior to visit.       Objective:     Physical Exam:    Vitals:    24 1246   BP: 134/81   BP Location: Left arm   Patient Position: Sitting   BP Method: Medium (Automatic)   Pulse: 60   Weight: 75.1 kg (165 lb 10.8 oz)     Body mass index is 22.47 kg/m².    Constitutional  Well-developed, well-nourished, appears stated age   Cardiovascular  no LE edema bilaterally     ..  Neurological    * Mental status     - Orientation Oriented to: person, , year, day of week, [facility]  Not oriented to: age ("79 or 80"), month ("I can't believe I don't remember that")     - Memory     Impaired, repetitive at times     - Attention/concentration  Attends to interview without distraction     - Language  Spontaneous, fluent       ..  * Cranial nerves       - CN II  PERRL, visual fields full to confrontation     - CN III, IV, VI  Extraocular movements full, normal pursuits and saccades     - CN V  Sensation V1 - V3 intact     - CN VII  Face strong and symmetric bilaterally     - CN VIII  Hearing intact bilaterally     - CN IX, X  Palate raises midline and symmetric     - CN XI  SCM and trapezius 5/5 bilaterally     - CN XII  Tongue midline   * Motor  Muscle bulk normal, strength 5/5 throughout       * Coordination  No dysmetria with finger-to-nose   * Gait  See below.   * Deep tendon reflexes  1+ and symmetric throughout         ..  * Specialized movement exam  No hypophonic speech.    No facial masking.   No cogwheel rigidity.      Subtle bradykinesia LPS only, no decrementing.     No tremor with rest or kinesis.   Subtle postural and intention tremor BH.      No other dystonia, chorea, athetosis, myoclonus, or tics.   No motor impersistence.   Normal-based gait.   No shortened stride length.   No abnormal arm swing.                 Laboratory Results:  No visits with results " within 3 Month(s) from this visit.   Latest known visit with results is:   Lab Visit on 08/18/2023   Component Date Value Ref Range Status    WBC 08/18/2023 5.24  3.90 - 12.70 K/uL Final    RBC 08/18/2023 4.59 (L)  4.60 - 6.20 M/uL Final    Hemoglobin 08/18/2023 14.7  14.0 - 18.0 g/dL Final    Hematocrit 08/18/2023 44.9  40.0 - 54.0 % Final    MCV 08/18/2023 98  82 - 98 fL Final    MCH 08/18/2023 32.0 (H)  27.0 - 31.0 pg Final    MCHC 08/18/2023 32.7  32.0 - 36.0 g/dL Final    RDW 08/18/2023 13.3  11.5 - 14.5 % Final    Platelets 08/18/2023 178  150 - 450 K/uL Final    MPV 08/18/2023 10.6  9.2 - 12.9 fL Final    Immature Granulocytes 08/18/2023 0.2  0.0 - 0.5 % Final    Gran # (ANC) 08/18/2023 2.9  1.8 - 7.7 K/uL Final    Immature Grans (Abs) 08/18/2023 0.01  0.00 - 0.04 K/uL Final    Lymph # 08/18/2023 1.7  1.0 - 4.8 K/uL Final    Mono # 08/18/2023 0.5  0.3 - 1.0 K/uL Final    Eos # 08/18/2023 0.1  0.0 - 0.5 K/uL Final    Baso # 08/18/2023 0.03  0.00 - 0.20 K/uL Final    nRBC 08/18/2023 0  0 /100 WBC Final    Gran % 08/18/2023 55.4  38.0 - 73.0 % Final    Lymph % 08/18/2023 32.6  18.0 - 48.0 % Final    Mono % 08/18/2023 10.1  4.0 - 15.0 % Final    Eosinophil % 08/18/2023 1.1  0.0 - 8.0 % Final    Basophil % 08/18/2023 0.6  0.0 - 1.9 % Final    Differential Method 08/18/2023 Automated   Final    Sodium 08/18/2023 138  136 - 145 mmol/L Final    Potassium 08/18/2023 3.9  3.5 - 5.1 mmol/L Final    Chloride 08/18/2023 105  95 - 110 mmol/L Final    CO2 08/18/2023 25  23 - 29 mmol/L Final    Glucose 08/18/2023 88  70 - 110 mg/dL Final    BUN 08/18/2023 17  8 - 23 mg/dL Final    Creatinine 08/18/2023 1.1  0.5 - 1.4 mg/dL Final    Calcium 08/18/2023 9.2  8.7 - 10.5 mg/dL Final    Total Protein 08/18/2023 7.4  6.0 - 8.4 g/dL Final    Albumin 08/18/2023 4.0  3.5 - 5.2 g/dL Final    Total Bilirubin 08/18/2023 0.6  0.1 - 1.0 mg/dL Final    Alkaline Phosphatase 08/18/2023 79  55 - 135 U/L Final    AST 08/18/2023 19  10 - 40 U/L  Final    ALT 08/18/2023 23  10 - 44 U/L Final    eGFR 08/18/2023 >60.0  >60 mL/min/1.73 m^2 Final    Anion Gap 08/18/2023 8  8 - 16 mmol/L Final    Cholesterol 08/18/2023 137  120 - 199 mg/dL Final    Triglycerides 08/18/2023 143  30 - 150 mg/dL Final    HDL 08/18/2023 58  40 - 75 mg/dL Final    LDL Cholesterol 08/18/2023 50.4 (L)  63.0 - 159.0 mg/dL Final    HDL/Cholesterol Ratio 08/18/2023 42.3  20.0 - 50.0 % Final    Total Cholesterol/HDL Ratio 08/18/2023 2.4  2.0 - 5.0 Final    Non-HDL Cholesterol 08/18/2023 79  mg/dL Final    PSA Diagnostic 08/18/2023 <0.01  0.00 - 4.00 ng/mL Final           Imaging:                   Results for orders placed or performed during the hospital encounter of 07/10/18   MRI Brain Without Contrast    Narrative    EXAMINATION:  MRI BRAIN WITHOUT CONTRAST    CLINICAL HISTORY:  Oth symptoms and signs w cognitive functions and awareness;.  Other symptoms and signs involving cognitive functions and awareness    TECHNIQUE:  Multiplanar multisequence MR imaging of the brain was performed without contrast.    COMPARISON:  None    FINDINGS:  Several tiny foci of increased signal in subcortical locations of both cerebral hemispheres and right basal ganglia consistent with minimal small vessel disease no diffusion weighted sequence abnormality. No mass lesion.    Ventricles and sulci are normal in size for age without evidence of hydrocephalus.  Small bilateral hippocampal cysts.  No evidence of intra or extra-axial hemorrhage.    Normal vascular flow voids are preserved.    Mucoperiosteal thickening in ethmoid air cells and the frontal sinuses.  No air-fluid levels.    Bone marrow signal intensity is normal.      Impression    Minimal small vessel disease without evidence of acute ischemic changes.      Electronically signed by: David Lopez MD  Date:    07/14/2018  Time:    08:23         Assessment and Plan     Neurodegenerative dementia with agitation    Memory impairment  -     Ambulatory  referral/consult to Adult Neuropsychology        Medical Decision Making:      Brain scan- obtained 2018    Medications: getting in pill pack currently   Not on memory meds. Could consider.   Will see how he does on testing.     Care Management will call.     Dicussed consdieration of long term plan as well as getting healthcare and financial poa.    Dr London will call with results ot testing.     ..Total time: 50 minutes spent on the encounter, which includes face to face time and non-face to face time preparing to see the patient (eg, review of tests), Obtaining and/or reviewing separately obtained history, Documenting clinical information in the electronic or other health record, Independently interpreting results (not separately reported) and communicating results to the patient/family/caregiver, or Care coordination (not separately reported).       Cesilia Freedman, KYAW, NP-C  Division of Movement and Memory Disorders  [facility]  [phone]

## 2024-04-24 ENCOUNTER — OFFICE VISIT (OUTPATIENT)
Dept: NEUROLOGY | Facility: CLINIC | Age: 79
End: 2024-04-24
Payer: MEDICARE

## 2024-04-24 VITALS
BODY MASS INDEX: 22.47 KG/M2 | SYSTOLIC BLOOD PRESSURE: 134 MMHG | HEART RATE: 60 BPM | WEIGHT: 165.69 LBS | DIASTOLIC BLOOD PRESSURE: 81 MMHG

## 2024-04-24 DIAGNOSIS — R41.3 MEMORY IMPAIRMENT: ICD-10-CM

## 2024-04-24 DIAGNOSIS — F03.911 NEURODEGENERATIVE DEMENTIA WITH AGITATION: Primary | ICD-10-CM

## 2024-04-24 PROCEDURE — 96133 NRPSYC TST EVAL PHYS/QHP EA: CPT | Mod: HCNC,S$GLB,, | Performed by: PSYCHIATRY & NEUROLOGY

## 2024-04-24 PROCEDURE — 96116 NUBHVL XM PHYS/QHP 1ST HR: CPT | Mod: HCNC,S$GLB,, | Performed by: PSYCHIATRY & NEUROLOGY

## 2024-04-24 PROCEDURE — 99499 UNLISTED E&M SERVICE: CPT | Mod: HCNC,S$GLB,, | Performed by: PSYCHIATRY & NEUROLOGY

## 2024-04-24 PROCEDURE — 96139 PSYCL/NRPSYC TST TECH EA: CPT | Mod: HCNC,S$GLB,, | Performed by: PSYCHIATRY & NEUROLOGY

## 2024-04-24 PROCEDURE — 96132 NRPSYC TST EVAL PHYS/QHP 1ST: CPT | Mod: HCNC,S$GLB,, | Performed by: PSYCHIATRY & NEUROLOGY

## 2024-04-24 PROCEDURE — 99999 PR PBB SHADOW E&M-EST. PATIENT-LVL III: CPT | Mod: PBBFAC,HCNC,,

## 2024-04-24 PROCEDURE — 99215 OFFICE O/P EST HI 40 MIN: CPT | Mod: HCNC,S$GLB,, | Performed by: NURSE PRACTITIONER

## 2024-04-24 PROCEDURE — 96138 PSYCL/NRPSYC TECH 1ST: CPT | Mod: HCNC,S$GLB,, | Performed by: PSYCHIATRY & NEUROLOGY

## 2024-04-29 DIAGNOSIS — I10 PRIMARY HYPERTENSION: ICD-10-CM

## 2024-04-29 PROBLEM — F03.911: Status: ACTIVE | Noted: 2024-04-29

## 2024-04-29 NOTE — ASSESSMENT & PLAN NOTE
Level of Care: He appears to require a higher level of care. While they have caregivers regularly at the house, they are primarily to support his wife. He appears to require more oversight for eating and hygiene.     Neuropsychiatric Symptoms: Irritability/agitation may be a point of intervention.     Driving: Recommended that he completely discontinue driving.     Speech Therapy: Consider referral in the setting of significant word finding difficulty on testing.     Follow-up: Future neuropsychological testing may be of limited utility, but happy to consult with Mr. Mitchell and his family at any point in the future.

## 2024-04-30 ENCOUNTER — OFFICE VISIT (OUTPATIENT)
Dept: NEUROLOGY | Facility: CLINIC | Age: 79
End: 2024-04-30
Payer: MEDICARE

## 2024-04-30 DIAGNOSIS — F03.911 NEURODEGENERATIVE DEMENTIA WITH AGITATION: Primary | ICD-10-CM

## 2024-04-30 PROCEDURE — 99499 UNLISTED E&M SERVICE: CPT | Mod: HCNC,95,, | Performed by: PSYCHIATRY & NEUROLOGY

## 2024-04-30 RX ORDER — LOSARTAN POTASSIUM 50 MG/1
50 TABLET ORAL
Qty: 90 TABLET | Refills: 3 | Status: SHIPPED | OUTPATIENT
Start: 2024-04-30

## 2024-04-30 RX ORDER — ROSUVASTATIN CALCIUM 10 MG/1
10 TABLET, COATED ORAL
Qty: 90 TABLET | Refills: 3 | Status: SHIPPED | OUTPATIENT
Start: 2024-04-30

## 2024-04-30 NOTE — PROGRESS NOTES
NEUROPSYCHOLOGICAL EVALUATION - CONFIDENTIAL  FEEDBACK NOTE    On 4/30/2024, I provided Mr. Primitivo Mitchell III and his sister the neuropsychological evaluation results. Please see the full report for a comprehensive overview of the findings.     Ron London Psy.D., ABPP  Board Certified in Clinical Neuropsychology  [facility] - Department of Neurology

## 2024-05-02 ENCOUNTER — OUTPATIENT CASE MANAGEMENT (OUTPATIENT)
Dept: NEUROLOGY | Facility: CLINIC | Age: 79
End: 2024-05-02
Payer: MEDICARE

## 2024-05-02 NOTE — PROGRESS NOTES
Social Work - Dementia Care Management:    Referral received 4/30/24 from Dr. London to assess care management needs. Phoned caregiver, sister Annika. Scheduled phone consultation for 5/13/24, 11:00 AM. Emailed Zoom link.

## 2024-05-13 ENCOUNTER — OUTPATIENT CASE MANAGEMENT (OUTPATIENT)
Dept: NEUROLOGY | Facility: CLINIC | Age: 79
End: 2024-05-13
Payer: MEDICARE

## 2024-05-13 NOTE — PROGRESS NOTES
Social Work - Dementia Care Management:    Zoom consultation was scheduled today for 11:00 AM with pt's sister Annika. Sister had not logged on by 11:15 AM, so meeting was terminated. Sent email to  offering to r/s and confirming Zoom link.    ADDENDUM  5/14/24:  Email reply received from sister Annika last evening noting confusion about appt and requesting r/s. Have replied with availability.    UPDATE:   replied, appt r/s for 5/20/24, 12:00 PM.

## 2024-05-20 ENCOUNTER — TELEPHONE (OUTPATIENT)
Dept: NEUROLOGY | Facility: CLINIC | Age: 79
End: 2024-05-20
Payer: MEDICARE

## 2024-05-20 ENCOUNTER — OUTPATIENT CASE MANAGEMENT (OUTPATIENT)
Dept: NEUROLOGY | Facility: CLINIC | Age: 79
End: 2024-05-20
Payer: MEDICARE

## 2024-05-20 DIAGNOSIS — Z79.899 ENCOUNTER FOR MEDICATION MANAGEMENT: ICD-10-CM

## 2024-05-20 DIAGNOSIS — R41.3 MEMORY LOSS OR IMPAIRMENT: Primary | ICD-10-CM

## 2024-05-20 NOTE — PROGRESS NOTES
"Social Work - Dementia Care Management:    Telehealth consultation via Zoom with caregiver, sister Sherri. She reported the following:  Pt lives at home with his wife of 45 years in [place]  Pt's wife has stage 4 cancer; has hospice, has had some cognitive changes, sometimes is not herself, threatens workers; wife has very little fmly so pt's sister is managing wife's care (sitters, etc); pt had planned to place wife in a facility, but changed his mind  Sister lives in [place], brings groceries weekly, meals, supplies/household needs; sister is 70, owns several businesses, doesn't know how much longer she can continue with these responsibilities  Pt's son Candelario (~age 55)  is a nurse, brings meals on weekend, but is not reliable and has not stepped up to take on other roles  Another brother has "wiped his hands" of the situation  Nephew Andre is [place] might be able to help more  Behaviors/moods/functioning: agitated; calls sister for every little thing; is overwhelmed by all the workers coming in to care for wife and treats them bad (e.g. tells them not to touch things), causing them to quit; refuses to accept that he can't drive, is adamant that he continue to drive and is angry at sister for trying to limit, refuses to give up keys; is not able to manage finances and mail but will not let anyone handle his mail, sister writes out checks; pt used to be neat, now has papers all over the place, things in disarray, was 2-3 months behind on bills  Activities/engagement: previously worked in computers with [employer]  Pt will not assign Power of  to son; sister does not want to take it on; son uses pt's debit card to make purchases for him, but pt refuses to add son to his account; bills have been moved to online [employer]  Financially comfortable, has significant savings, owns house outright, frugal, has great credit  Sister has been told there are possible new medications    Intervention/Plan:  Provided " psycho-education, support, strategies, resource information, problem-solving  Discussed concerns about driving, strategies to intervene  Discussed challenge of managing both pt's and his wife's needs and their home; provided information on geriatric care management services  Explored options for other family members to assist  Will email info/resources to sister  Follow-up meeting via Zoom scheduled 6/17/24, 12:00 PM

## 2024-05-20 NOTE — TELEPHONE ENCOUNTER
Received message from sherrie young np  Please let his sister, Annika, know that his scores were too low for the Alzheimer's dementia infusion (Lecanemeb).  If they are interested, we could consider an oral medication but he would need an updated EKG before further deciding. If they would like me to order EKG, I am happy to do so.  If they do want to go this route, please also schedule a virtual visit to discuss these options after he has EKG.  Thanks          Called sister, Annika and they would like the order for EKG and to have a vv to discuss meds

## 2024-05-31 ENCOUNTER — OUTPATIENT CASE MANAGEMENT (OUTPATIENT)
Dept: NEUROLOGY | Facility: CLINIC | Age: 79
End: 2024-05-31
Payer: MEDICARE

## 2024-05-31 NOTE — PROGRESS NOTES
Social Work - Dementia Care Management:    Sent the following to caregiver via email:  Matt with landing page for all [facility] Dementia supports  Flkaelyn for Dementia Caregiver Support Group via Zoom  Link to [facility] Dementia Caregiver Support Groups through Alzheimer's Services [facility]   Link to search support group listings through Alzheimer's Association  Link to discussion board for caregivers through Alzheimer's Association  Links to two geriatric care management services in [place]  Two general Tip Sheets  Home Safety Checklist   Article on Driving & Dementia  Technology and Product Guide  Website for Alzheimer's Association site  Website for Family Caregiver [place] website  Website for Zita Fernandez videos and podcast  Books recs

## 2024-06-13 DIAGNOSIS — I10 PRIMARY HYPERTENSION: ICD-10-CM

## 2024-06-13 DIAGNOSIS — I50.32 CHRONIC DIASTOLIC HEART FAILURE: ICD-10-CM

## 2024-06-13 DIAGNOSIS — I70.0 ATHEROSCLEROSIS OF AORTA: ICD-10-CM

## 2024-06-13 RX ORDER — ROSUVASTATIN CALCIUM 20 MG/1
20 TABLET, COATED ORAL DAILY
Qty: 90 TABLET | Refills: 3 | Status: CANCELLED | OUTPATIENT
Start: 2024-06-13 | End: 2025-06-13

## 2024-06-13 RX ORDER — LOSARTAN POTASSIUM 50 MG/1
50 TABLET ORAL DAILY
Qty: 90 TABLET | Refills: 3 | Status: CANCELLED | OUTPATIENT
Start: 2024-06-13

## 2024-06-17 ENCOUNTER — OUTPATIENT CASE MANAGEMENT (OUTPATIENT)
Dept: NEUROLOGY | Facility: CLINIC | Age: 79
End: 2024-06-17
Payer: MEDICARE

## 2024-06-17 DIAGNOSIS — I10 PRIMARY HYPERTENSION: ICD-10-CM

## 2024-06-17 DIAGNOSIS — I50.32 CHRONIC DIASTOLIC HEART FAILURE: ICD-10-CM

## 2024-06-17 DIAGNOSIS — I70.0 ATHEROSCLEROSIS OF AORTA: ICD-10-CM

## 2024-06-17 RX ORDER — LOSARTAN POTASSIUM 50 MG/1
50 TABLET ORAL DAILY
Qty: 90 TABLET | Refills: 3 | Status: SHIPPED | OUTPATIENT
Start: 2024-06-17

## 2024-06-17 RX ORDER — ROSUVASTATIN CALCIUM 20 MG/1
20 TABLET, COATED ORAL DAILY
Qty: 90 TABLET | Refills: 3 | Status: SHIPPED | OUTPATIENT
Start: 2024-06-17 | End: 2025-06-17

## 2024-06-17 NOTE — PROGRESS NOTES
Social Work - Dementia Care Management:    Logged onto Zoom at 12:00 PM as scheduled for follow-up consultation with caregiver, sister Annika. Sister had not logged on by 12:15. Terminated Zoom connection and sent email offering to reschedule.

## 2024-06-24 ENCOUNTER — TELEPHONE (OUTPATIENT)
Dept: PRIMARY CARE CLINIC | Facility: CLINIC | Age: 79
End: 2024-06-24
Payer: MEDICARE

## 2024-06-24 NOTE — TELEPHONE ENCOUNTER
Call made  to patient and informed him that he has a 3 month supply of medication ready at pharmacy patient stated that he will stop by pharmacy and  medication

## 2024-06-24 NOTE — TELEPHONE ENCOUNTER
----- Message from Karis Hubbard sent at 6/24/2024 11:38 AM CDT -----  Regarding: medications  Contact: patient  Type:  RX Refill Request    Who Called:  patient  Refill or New Rx:  refills  RX Name and Strength:  all his daily medications  How is the patient currently taking it? (ex. 1XDay):    Is this a 30 day or 90 day RX:    Preferred Pharmacy with phone number:    [id number] - [place], [place] - [address]  [address]  [place] [address]  Phone: [phone] Fax: [phone]  Local or Mail Order:  [facility]  Ordering Provider:  Dr Jonathan Robledo Call Back Number:  [phone] (home)     Additional Information:  Patient's appt was rescheduled from 06/25/24 to 07/10/24 and he is out of many of his medications. He needs enough medication to get to the new appt time. Patient has taken his last doses today. Please call patient to advice. Thanks!

## 2024-06-27 ENCOUNTER — TELEPHONE (OUTPATIENT)
Dept: NEUROLOGY | Facility: CLINIC | Age: 79
End: 2024-06-27
Payer: MEDICARE

## 2024-06-27 NOTE — TELEPHONE ENCOUNTER
Called and left message on wife's voicemail for the cardiology department [phone] to schedule an EKG. Order has been in place.

## 2024-07-10 ENCOUNTER — TELEPHONE (OUTPATIENT)
Dept: PRIMARY CARE CLINIC | Facility: CLINIC | Age: 79
End: 2024-07-10

## 2024-07-10 ENCOUNTER — OFFICE VISIT (OUTPATIENT)
Dept: PRIMARY CARE CLINIC | Facility: CLINIC | Age: 79
End: 2024-07-10
Payer: MEDICARE

## 2024-07-10 VITALS
BODY MASS INDEX: 22.33 KG/M2 | OXYGEN SATURATION: 98 % | HEIGHT: 72 IN | WEIGHT: 164.88 LBS | TEMPERATURE: 98 F | DIASTOLIC BLOOD PRESSURE: 83 MMHG | HEART RATE: 63 BPM | SYSTOLIC BLOOD PRESSURE: 157 MMHG

## 2024-07-10 DIAGNOSIS — F03.911 NEURODEGENERATIVE DEMENTIA WITH AGITATION: ICD-10-CM

## 2024-07-10 DIAGNOSIS — L84 CORNS/CALLOSITIES: ICD-10-CM

## 2024-07-10 DIAGNOSIS — Z79.899 MEDICATION MANAGEMENT: ICD-10-CM

## 2024-07-10 DIAGNOSIS — M85.80 OSTEOPENIA, UNSPECIFIED LOCATION: Primary | ICD-10-CM

## 2024-07-10 DIAGNOSIS — M81.8 OTHER OSTEOPOROSIS WITHOUT CURRENT PATHOLOGICAL FRACTURE: ICD-10-CM

## 2024-07-10 DIAGNOSIS — I10 PRIMARY HYPERTENSION: ICD-10-CM

## 2024-07-10 LAB
OHS QRS DURATION: 80 MS
OHS QTC CALCULATION: 422 MS

## 2024-07-10 PROCEDURE — 3077F SYST BP >= 140 MM HG: CPT | Mod: HCNC,CPTII,S$GLB, | Performed by: NURSE PRACTITIONER

## 2024-07-10 PROCEDURE — 1126F AMNT PAIN NOTED NONE PRSNT: CPT | Mod: HCNC,CPTII,S$GLB, | Performed by: NURSE PRACTITIONER

## 2024-07-10 PROCEDURE — 1101F PT FALLS ASSESS-DOCD LE1/YR: CPT | Mod: HCNC,CPTII,S$GLB, | Performed by: NURSE PRACTITIONER

## 2024-07-10 PROCEDURE — 1157F ADVNC CARE PLAN IN RCRD: CPT | Mod: HCNC,CPTII,S$GLB, | Performed by: NURSE PRACTITIONER

## 2024-07-10 PROCEDURE — 3288F FALL RISK ASSESSMENT DOCD: CPT | Mod: HCNC,CPTII,S$GLB, | Performed by: NURSE PRACTITIONER

## 2024-07-10 PROCEDURE — 99214 OFFICE O/P EST MOD 30 MIN: CPT | Mod: HCNC,S$GLB,, | Performed by: NURSE PRACTITIONER

## 2024-07-10 PROCEDURE — 93005 ELECTROCARDIOGRAM TRACING: CPT | Mod: HCNC,S$GLB,, | Performed by: NURSE PRACTITIONER

## 2024-07-10 PROCEDURE — 1159F MED LIST DOCD IN RCRD: CPT | Mod: HCNC,CPTII,S$GLB, | Performed by: NURSE PRACTITIONER

## 2024-07-10 PROCEDURE — 3079F DIAST BP 80-89 MM HG: CPT | Mod: HCNC,CPTII,S$GLB, | Performed by: NURSE PRACTITIONER

## 2024-07-10 PROCEDURE — 1160F RVW MEDS BY RX/DR IN RCRD: CPT | Mod: HCNC,CPTII,S$GLB, | Performed by: NURSE PRACTITIONER

## 2024-07-10 NOTE — ASSESSMENT & PLAN NOTE
Taking pill packs as directed  Significant stress with his wife's illness  Monitor BP daily for 1 week and phone call to review

## 2024-07-10 NOTE — ASSESSMENT & PLAN NOTE
"" From last visit-Level of Care: He appears to require a higher level of care. While they have caregivers regularly at the house, they are primarily to support his wife. He appears to require more oversight for eating and hygiene.     Neuropsychiatric Symptoms: Irritability/agitation may be a point of intervention.     Driving: Recommended that he completely discontinue driving.     Speech Therapy: Consider referral in the setting of significant word finding difficulty on testing.     Follow-up: Future neuropsychological testing may be of limited utility, but happy to consult with Mr. Mitchell and his family at any point in the future. "  "

## 2024-07-10 NOTE — PATIENT INSTRUCTIONS
We will get you set up for  DEXA scan to look at your bones    We will get an EKG today so the neuropsychiatry team can work with you to start medication for your memory    Your blood pressure is above our goal of 130/80 today- check your blood pressure once a day and we will do an audio call in 1 week to review the numbers.

## 2024-07-10 NOTE — PROGRESS NOTES
"[facility]  SHARED NOTE: Pauline Landa (DNP), Dr Castillo (Attending)    Subjective:      Patient ID: Primitivo Mitchell III is a 78 y.o. male with pmh of HTN, HLD, chronic diastolic heart failure       Chief Complaint: Dementia, Hypertension    Prior to this visit, patient's last encounter with PCP was 3/26/2024.    Pt reports foot itching, main complaint today is feeling sad regarding his wife's deteriorating health  Would like to have a foot exam today as he is having itching around the areas of his callouses.  See media. No pain described, he does not that he is supposed to use a cream but hasn;t been. Does not walk barefoot. Refills are available for the ammonium lactate cream  No issues with urination  No problems with constipation or diarrhea.   He takes his pill packs faithfullly- just got some new ones in the mail and feels like things are going well.   Sleeping  pretty well. His wife is very ill with Stage 4 cancer and this is very distressing for him- she has daily sitter. She has a machine that keeps her "going and breathing". He is having a difficult tme watching her suffer. This is a recurring topic during our visit  He reports having a good appetite and drinking fluids.     Things are going pretty well from his perspective- he is taking his pill packs      HTN  - Currently taking: using pill packs  - How often taking BP at home: occasionally , average is: 122/80, no highs or lows noted.   - Today, BP is: 157/83  Eats ad rahul, he doesn't cook.  Eats things from the store (possible prepackaged foods) and things that the sitters may cook  Had grits and eggs this morning  Repeat /86  Very distrought     HLD  - Currently taking: rosuvastatin 20 mg QD  - Last lipid panel was on 8/18/2023  The 10-year ASCVD risk score (Sandirta PAUL, et al., 2019) is: 28.5%    Values used to calculate the score:      Age: 78 years      Sex: Male      Is Non- : " Yes      Diabetic: No      Tobacco smoker: No      Systolic Blood Pressure: 157 mmHg      Is BP treated: Yes      HDL Cholesterol: 58 mg/dL      Total Cholesterol: 137 mg/dL         Specialty notes (if they see heme/onc, GI, ortho, ophth, derm, etc...) neuro psych  - Last seen on 4/24/24, by NP Cesilia Freedman and Dr. London  - Current treatment plan for this problem is   Neurodegenerative dementia with agitation - Primary      Current Assessment & Plan       Level of Care: He appears to require a higher level of care. While they have caregivers regularly at the house, they are primarily to support his wife. He appears to require more oversight for eating and hygiene.      Neuropsychiatric Symptoms: Irritability/agitation may be a point of intervention.      Driving: Recommended that he completely discontinue driving.      Speech Therapy: Consider referral in the setting of significant word finding difficulty on testing.      Follow-up: Future neuropsychological testing may be of limited utility, but happy to consult with Mr. Mitchell and his family at any point in the future.              Care Gaps:  DeXA  RSV  Tet  COVID    Medications: Does have pill packs  -   Pt rep called [facility] and spoke with MALKA Padron. She understand there is a $15 copay and we can FEDEX since his address is in [place], [place]. She will discuss with patient and let us know.        Pt req called [facility]. CC info was obtained and FedEx delivery + address confirmed with patient.     FedEx 2 day Delivery to:  [address] [place] [address]     Expected arrival date:  Wed, Jun 26, 2024 8:00pm  Tracking #: [id number]         Progress Notes  Salina Taylor, PharmD (Pharmacist)  Pharmacist     Adherence packed for 84 days using Dispill:        Morning cards:  Aspirin 81mg   B complex  Losartan 50 mg  Rosuvastatin 20 mg    Vitamin D 5,000 IU      Called pt to see if he would like FEDEX delivery (address - [place], [place]) - he will call back with CC info.  TTN/SG       (if pill packs, copy/paste the most recent pill packs note from pharmacy)      FU in 1 week audio to specifically check BP, 6 weeks in person      4Ms for Medical Decision-Making in Older Adults    Last Completed EAWV: 2023    MOBILITY:  Get Up and Go:      2023    10:39 AM 8/15/2022     3:35 PM 2021     1:35 PM 2020     2:32 PM 2019     2:52 PM 3/20/2018    11:22 AM 2017    11:26 AM   Get Up and Go   Trial 1 10 seconds 7 seconds 8 seconds 10 seconds 12 seconds 9 seconds 7 seconds     Activities of Daily Livin/18/2023    10:43 AM   Activities of Daily Living   Ambulation Independent   Dressing Independent   Transfers Independent   Toileting Continent of bladder;Continent of bowel   Feeding Independent   Cleaning home/Chores Independent   Telephone use Independent   Shopping Independent   Paying bills Independent   Taking meds Independent     Whisper Test:      2023    10:41 AM   Whisper Test   Whisper Test Normal     Disability Status:      2023    10:43 AM   Disability Status   Are you deaf or do you have serious difficulty hearing? N   Are you blind or do you have serious difficulty seeing, even when wearing glasses? N   Because of a physical, mental, or emotional condition, do you have serious difficulty concentrating, remembering, or making decisions? N   Do you have serious difficulty walking or climbing stairs? N   Do you have difficulty dressing or bathing? N   Because of a physical, mental, or emotional condition, do you have difficulty doing errands alone such as visiting a doctor's office or shopping? N     Nutrition Screenin/18/2023    10:43 AM   Nutrition Screening   Has food intake declined over the past three months due to loss of appetite, digestive problems, chewing or swallowing difficulties? No decrease in food intake   Involuntary weight loss during the last 3 months? No weight loss   Mobility? Goes out   Has the patient  suffered psychological stress or acute disease in the past three months? No   Neuropsychological problems? No psychological problems   Body Mass Index (BMI)?  BMI 23 or greater   Screening Score 14   Interpretation Normal nutritional status    Screening Score: 0-7 Malnourished, 8-11 At Risk, 12-14 Normal    MENTATION:   Depression Patient Health Questionnaire:      7/10/2024    11:48 AM   Depression Patient Health Questionnaire   PHQ-4 Total Score 0     Has Dementia Dx: Yes    Cognitive Function Screenin/18/2023    10:41 AM   Cognitive Function Screening   Clock Drawing Test 1   Mini-Cog 3 Minute Recall 0   Cognitive Function Screening 1     Cognitive Function Screening Total - Less than 4 = Abnormal,  Greater than or equal to 4 = Normal    MEDICATIONS:  High Risk Medications:  Total Active Medications: 0  This patient does not have an active medication from one of the medication groupers.    WHAT MATTERS MOST:  Advance Care Planning   ACP Status:   Patient has had an ACP conversation  Living Will: Yes  Power of : Yes  [place]: Yes    What is most important right now is to focus on remaining as independent as possible    Accordingly, we have decided that the best plan to meet the patient's goals includes continuing with treatment      What matters most to patient today is: Having the ability to eat is important to him           PHQ-4 Score: 0     Social History     Socioeconomic History    Marital status:     Number of children: 1    Highest education level: Bachelor's degree (e.g., BA, AB, BS)   Occupational History    Occupation: Office     Comment: [employer]   Tobacco Use    Smoking status: Never    Smokeless tobacco: Never   Substance and Sexual Activity    Alcohol use: No    Drug use: No    Sexual activity: Yes     Partners: Female   Social History Narrative    Lives with his wife.    No pets.    Retired.    Currently fills day with TV.     Social Determinants of Health      Financial Resource Strain: Low Risk  (8/18/2023)    Overall Financial Resource Strain (CARDIA)     Difficulty of Paying Living Expenses: Not hard at all   Food Insecurity: No Food Insecurity (8/18/2023)    Hunger Vital Sign     Worried About Running Out of Food in the Last Year: Never true     Ran Out of Food in the Last Year: Never true   Transportation Needs: No Transportation Needs (8/18/2023)    PRAPARE - Transportation     Lack of Transportation (Medical): No     Lack of Transportation (Non-Medical): No   Physical Activity: Inactive (8/18/2023)    Exercise Vital Sign     Days of Exercise per Week: 0 days     Minutes of Exercise per Session: 0 min   Stress: No Stress Concern Present (8/18/2023)    [place] [employer] - Occupational Stress Questionnaire     Feeling of Stress : Not at all   Housing Stability: Low Risk  (8/18/2023)    Housing Stability Vital Sign     Unable to Pay for Housing in the Last Year: No     Number of Places Lived in the Last Year: 1     Unstable Housing in the Last Year: No       Review of Systems    Objective:   BP (!) 157/83 (BP Location: Right arm, Patient Position: Sitting, BP Method: Large (Manual))   Pulse 63   Temp 98.1 °F (36.7 °C) (Oral)   Ht 6' (1.829 m)   Wt 74.8 kg (164 lb 14.5 oz)   SpO2 98%   BMI 22.37 kg/m²     Physical Exam  Constitutional:       General: He is not in acute distress.     Appearance: He is well-developed.   HENT:      Head: Normocephalic and atraumatic.      Nose: Nose normal.      Mouth/Throat:      Mouth: Mucous membranes are dry.   Eyes:      Pupils: Pupils are equal, round, and reactive to light.   Cardiovascular:      Rate and Rhythm: Normal rate and regular rhythm.      Pulses: Normal pulses.      Heart sounds: Murmur heard.   Pulmonary:      Effort: Pulmonary effort is normal. No respiratory distress.      Breath sounds: Normal breath sounds.   Abdominal:      General: Abdomen is flat.      Palpations: Abdomen is soft.    Musculoskeletal:         General: Normal range of motion.      Cervical back: Normal range of motion and neck supple.      Right lower leg: No edema.      Left lower leg: No edema.   Neurological:      Mental Status: He is alert. Mental status is at baseline.      Sensory: No sensory deficit.      Comments: Has good recall of events but does repeat statements often   Psychiatric:         Mood and Affect: Mood normal.         Behavior: Behavior normal.             Lab Results   Component Value Date    WBC 5.24 08/18/2023    HGB 14.7 08/18/2023    HCT 44.9 08/18/2023     08/18/2023    CHOL 137 08/18/2023    TRIG 143 08/18/2023    HDL 58 08/18/2023    ALT 23 08/18/2023    AST 19 08/18/2023     08/18/2023    K 3.9 08/18/2023     08/18/2023    CREATININE 1.1 08/18/2023    BUN 17 08/18/2023    CO2 25 08/18/2023    TSH 0.804 03/11/2019    PSA 2.0 09/02/2015    INR 0.9 02/25/2020       Current Outpatient Medications on File Prior to Visit   Medication Sig Dispense Refill    ammonium lactate 12 % Crea Apply 1 g topically once daily. 140 g 3    b complex vitamins capsule Take 1 capsule by mouth once daily. 90 capsule 3    cholecalciferol, vitamin D3, 125 mcg (5,000 unit) capsule Take 1 capsule (5,000 Units total) by mouth once daily. 90 capsule 3    losartan (COZAAR) 50 MG tablet TAKE 1 TABLET EVERY DAY 90 tablet 3    rosuvastatin (CRESTOR) 20 MG tablet Take 1 tablet (20 mg total) by mouth once daily. 90 tablet 3    aspirin (ECOTRIN) 81 MG EC tablet Take 1 tablet (81 mg total) by mouth once daily. 90 tablet 3    fluticasone propionate (FLONASE) 50 mcg/actuation nasal spray 2 sprays (100 mcg total) by Each Nostril route once daily. 48 g 3    ketotifen (ZADITOR) 0.025 % (0.035 %) ophthalmic solution 1 drop 2 (two) times daily.      losartan (COZAAR) 50 MG tablet Take 1 tablet (50 mg total) by mouth once daily. 90 tablet 3    ondansetron (ZOFRAN) 4 MG tablet Take 1 tablet (4 mg total) by mouth every 6 (six)  "hours. (Patient not taking: Reported on 8/18/2023) 12 tablet 0    rosuvastatin (CRESTOR) 10 MG tablet TAKE 1 TABLET EVERY DAY 90 tablet 3     No current facility-administered medications on file prior to visit.         Assessment:   78 y.o. male with multiple co-morbid illnesses here to follow-up with PCP and continue work-up of chronic issues    Plan:   1. Osteopenia, unspecified location  Overview:  Seen on DEXA in 2021, needs Vit D supplementation    Orders:  -     DXA Bone Density Axial Skeleton 1 or more sites; Future; Expected date: 07/10/2024    2. Medication management  -     IN OFFICE EKG 12-LEAD (to Muse)    3. Other osteoporosis without current pathological fracture  -     DXA Bone Density Axial Skeleton 1 or more sites; Future; Expected date: 07/10/2024    4. Neurodegenerative dementia with agitation  Overview:  Was seen by neuropsych and an EKG pending  EKG completed today  Appreciate their expertise    Assessment & Plan:  " From last visit-Level of Care: He appears to require a higher level of care. While they have caregivers regularly at the house, they are primarily to support his wife. He appears to require more oversight for eating and hygiene.     Neuropsychiatric Symptoms: Irritability/agitation may be a point of intervention.     Driving: Recommended that he completely discontinue driving.     Speech Therapy: Consider referral in the setting of significant word finding difficulty on testing.     Follow-up: Future neuropsychological testing may be of limited utility, but happy to consult with Mr. Mitchell and his family at any point in the future. "      5. Primary hypertension  Overview:  2009: Diagnosed. Unclear if he is taking his medications as directed due to his memory impairment    Assessment & Plan:  Taking pill packs as directed  Significant stress with his wife's illness  Monitor BP daily for 1 week and phone call to review        6. Corns/callosities  Overview:  Using ammonium lactate " cream but in need of refill  Informed pt that refill is available for him at pharmacy on file    Assessment & Plan:  Continue topical treatment and close monitoring           Health Maintenance         Date Due Completion Date    RSV Vaccine (Age 60+ and Pregnant patients) (1 - 1-dose 60+ series) Never done ---    TETANUS VACCINE 02/02/2022 2/2/2012    DEXA Scan 08/11/2023 8/11/2021    COVID-19 Vaccine (7 - 2023-24 season) 10/13/2023 8/18/2023    PROSTATE-SPECIFIC ANTIGEN 08/18/2024 8/18/2023    Lipid Panel 08/18/2024 8/18/2023    Influenza Vaccine (1) 09/01/2024 3/26/2024            Future Appointments   Date Time Provider Department Center   7/15/2024  1:00 PM NOMDELIA, DEXA1 [facility] BMD [address]   7/17/2024 10:00 AM Pauline Landa, DNP [facility] MED [facility]   7/17/2024 10:15 AM Connie Lloyd DPM [facility] POD [facility]   7/24/2024  2:00 PM Rachel Kevin, NP [facility] IM [facility]   8/21/2024 10:00 AM Pauline Landa, KYAW [facility] MED CLN [facility] PCW         Follow up in about 1 week (around 7/17/2024), or if symptoms worsen or fail to improve, for Virtual Visit. The total time spent for evaluation and management on 07/10/2024 including reviewing separately obtained history, performing a medically appropriate exam and evaluation, documenting clinical information in the health record, independently interpreting results and communicating them to the patient/family/caregiver, and ordering medications/tests/procedures was >35 minutes.        Karis Castillo MD/MPH  [facility]  [phone] [place]

## 2024-07-15 ENCOUNTER — TELEPHONE (OUTPATIENT)
Dept: NEUROLOGY | Facility: CLINIC | Age: 79
End: 2024-07-15
Payer: MEDICARE

## 2024-07-15 ENCOUNTER — PATIENT MESSAGE (OUTPATIENT)
Dept: NEUROLOGY | Facility: CLINIC | Age: 79
End: 2024-07-15
Payer: MEDICARE

## 2024-07-15 NOTE — TELEPHONE ENCOUNTER
Patient called back today, it was his sister that called back. I asked them if they wanted an in person visit or virtual they said virtual would be perfect. I also let her know I will be send virtual visit. Instructions to help her out. She also informs me that she cannot get access to the patient portal. She does not know his password. I inform her that there is a forgot password option on the my [facility]. Patient sister also tell me that she does not know his user name. I inform her that there is a forgot username option to help her. She also informs me of his failing memory too. The appointment was on September 17th 4:15pm.

## 2024-07-15 NOTE — TELEPHONE ENCOUNTER
Called patient today about scheduling a new virtual visit with Cesilia. Patient did not  the call.

## 2024-07-16 ENCOUNTER — TELEPHONE (OUTPATIENT)
Dept: PRIMARY CARE CLINIC | Facility: CLINIC | Age: 79
End: 2024-07-16
Payer: MEDICARE

## 2024-07-16 NOTE — TELEPHONE ENCOUNTER
[place] called to confirm the pt's audio appt for tomorrow and I was informed that the pt's wife transitioned on sat and they are getting ready for the . They wanted to R/S the audio for another 2 weeks. [place] asked have be been taking his medications as prescribed and she stated yes. I also asked were there any immediate medical concerns and she stated no.     [place] will send an appt reminder in the mail for his upcoming in person clinic appt.

## 2024-07-30 ENCOUNTER — OFFICE VISIT (OUTPATIENT)
Dept: PRIMARY CARE CLINIC | Facility: CLINIC | Age: 79
End: 2024-07-30
Payer: MEDICARE

## 2024-07-30 ENCOUNTER — TELEPHONE (OUTPATIENT)
Dept: PRIMARY CARE CLINIC | Facility: CLINIC | Age: 79
End: 2024-07-30

## 2024-07-30 DIAGNOSIS — R41.3 MEMORY LOSS OR IMPAIRMENT: Primary | ICD-10-CM

## 2024-07-30 DIAGNOSIS — I10 HYPERTENSION, UNSPECIFIED TYPE: ICD-10-CM

## 2024-07-30 PROCEDURE — 1157F ADVNC CARE PLAN IN RCRD: CPT | Mod: HCNC,CPTII,95, | Performed by: NURSE PRACTITIONER

## 2024-07-30 PROCEDURE — 99443 PR PHYSICIAN TELEPHONE EVALUATION 21-30 MIN: CPT | Mod: HCNC,95,, | Performed by: NURSE PRACTITIONER

## 2024-07-30 NOTE — PROGRESS NOTES
Established Patient - Audio Only Telehealth Visit     The patient location is: [place], [place]  The chief complaint leading to consultation is: BP review  Visit type: Virtual visit with audio only (telephone)  Total time spent with patient: 30 minutes       The reason for the audio only service rather than synchronous audio and video virtual visit was related to technical difficulties or patient preference/necessity.     Each patient to whom I provide medical services by telemedicine is:  (1) informed of the relationship between the physician and patient and the respective role of any other health care provider with respect to management of the patient; and (2) notified that they may decline to receive medical services by telemedicine and may withdraw from such care at any time. Patient verbally consented to receive this service via voice-only telephone call.       HPI: BP review     Assessment and plan:       Visit completed with assistance from patient's sister.  Patient's wife passed away recently and she has been spending more time with him    Doing ok- has a few blood pressures to report- 140/79, 69 HR, 137/87, 147/75, HR 65  Has been taking pill packs    Having occasional headaches, no n/v or vision changes    Hoping to get an appetite stimulant    He is eating mostly prepared meals from [employer]    He has an upcoming visit in our clinic 8/21 and will bring BP log at that time    Problem List Items Addressed This Visit          Neuro    Memory loss or impairment - Primary    Overview     Evaluated by Neuro in 2018, but has not participated in Neuropsych evaluation. MRI with no significant changes. Memory changes appeared after prostatectomy in 2016.         Current Assessment & Plan     Neuropsych has seen pt and medications were discussed- EKG pending.  Completed at [facility] and notified their department  Pill packs for compliance  Adding B vitamins  Control BP and statin              Cardiac/Vascular     Hypertension    Overview     2009: Diagnosed. Unclear if he is taking his medications as directed due to his memory impairment  Losartan 50 mg in pill packs, sister reports he is taking them         Current Assessment & Plan     Taking pill packs as directed  Significant stress with his wife's illness  Monitor BP daily for 1 week and phone call to review- this was delayed due to wife's passing                 This service was not originating from a related E/M service provided within the previous 7 days nor will  to an E/M service or procedure within the next 24 hours or my soonest available appointment.  Prevailing standard of care was able to be met in this audio-only visit.

## 2024-07-30 NOTE — ASSESSMENT & PLAN NOTE
[facility] has seen pt and medications were discussed- EKG pending.  Completed at [facility] and notified their department  Pill packs for compliance  Adding B vitamins  Control BP and statin

## 2024-07-30 NOTE — ASSESSMENT & PLAN NOTE
Taking pill packs as directed  Significant stress with his wife's illness  BP review delayed due to wife's passing  Monitor sodium in diet, keep log RTC in Aug as planned

## 2024-07-31 ENCOUNTER — OFFICE VISIT (OUTPATIENT)
Dept: URGENT CARE | Facility: CLINIC | Age: 79
End: 2024-07-31
Payer: MEDICARE

## 2024-07-31 VITALS
OXYGEN SATURATION: 96 % | RESPIRATION RATE: 16 BRPM | WEIGHT: 166 LBS | HEIGHT: 72 IN | HEART RATE: 79 BPM | DIASTOLIC BLOOD PRESSURE: 82 MMHG | BODY MASS INDEX: 22.48 KG/M2 | SYSTOLIC BLOOD PRESSURE: 142 MMHG | TEMPERATURE: 99 F

## 2024-07-31 DIAGNOSIS — R05.9 COUGH, UNSPECIFIED TYPE: ICD-10-CM

## 2024-07-31 DIAGNOSIS — U07.1 COVID: Primary | ICD-10-CM

## 2024-07-31 DIAGNOSIS — U07.1 COVID-19 VIRUS DETECTED: ICD-10-CM

## 2024-07-31 LAB
CTP QC/QA: YES
SARS-COV-2 AG RESP QL IA.RAPID: POSITIVE

## 2024-07-31 PROCEDURE — 99214 OFFICE O/P EST MOD 30 MIN: CPT | Mod: S$GLB,,, | Performed by: PHYSICIAN ASSISTANT

## 2024-07-31 PROCEDURE — 87811 SARS-COV-2 COVID19 W/OPTIC: CPT | Mod: QW,S$GLB,, | Performed by: PHYSICIAN ASSISTANT

## 2024-07-31 NOTE — PROGRESS NOTES
Subjective:      Patient ID: Primitivo Mitchell III is a 78 y.o. male.    Vitals:  height is 6' (1.829 m) and weight is 75.3 kg (166 lb 0.1 oz). His oral temperature is 99 °F (37.2 °C). His blood pressure is 142/82 (abnormal) and his pulse is 79. His respiration is 16 and oxygen saturation is 96%.     Chief Complaint: Cough    Pt complains of  OCCASIONAL COUGH FOR THE LAST 2-3 DAYS.  HE DENIES FEVER CHILLS NAUSEA VOMITING DIARRHEA.  HE STATES HE FEELS WELL BUT HE JUST GOT BACK FROM A TRIP IN WANTED TO CHECK TO MAKE SURE EVERYTHING WAS OKAY. . Pt denies fever    Cough  This is a new problem. Episode onset: 2-3 days ago. The problem has been unchanged. The cough is Non-productive. Pertinent negatives include no fever or headaches. Treatments tried: cough drops. The treatment provided mild relief. There is no history of asthma, bronchitis or pneumonia.       Constitution: Negative for sweating, fatigue and fever.   HENT:  Negative for congestion, sinus pain and sinus pressure.    Respiratory:  Positive for cough. Negative for sputum production.    Gastrointestinal:  Negative for nausea, vomiting and diarrhea.   Skin:  Negative for erythema.   Neurological:  Negative for headaches.      Objective:     Physical Exam   Constitutional: He is oriented to person, place, and time. He appears well-developed. He is cooperative.  Non-toxic appearance. He does not appear ill. No distress.   HENT:   Head: Normocephalic and atraumatic.   Ears:   Right Ear: Hearing, tympanic membrane, external ear and ear canal normal.   Left Ear: Hearing, tympanic membrane, external ear and ear canal normal.   Nose: Nose normal. No mucosal edema, rhinorrhea or nasal deformity. No epistaxis. Right sinus exhibits no maxillary sinus tenderness and no frontal sinus tenderness. Left sinus exhibits no maxillary sinus tenderness and no frontal sinus tenderness.   Mouth/Throat: Uvula is midline, oropharynx is clear and moist and mucous membranes are  normal. No trismus in the jaw. Normal dentition. No uvula swelling. No oropharyngeal exudate, posterior oropharyngeal edema or posterior oropharyngeal erythema.   Eyes: Conjunctivae, EOM and lids are normal. Pupils are equal, round, and reactive to light. Right eye exhibits no discharge. Left eye exhibits no discharge. No scleral icterus.   Neck: Trachea normal and phonation normal. Neck supple. No JVD present. No tracheal deviation present. No thyromegaly present. No edema present. No erythema present. No neck rigidity present.   Cardiovascular: Normal rate, regular rhythm, normal heart sounds and normal pulses.   No murmur heard.Exam reveals no gallop and no friction rub.   Pulmonary/Chest: Effort normal and breath sounds normal. No stridor. No respiratory distress. He has no decreased breath sounds. He has no wheezes. He has no rhonchi. He has no rales. He exhibits no tenderness.   Abdominal: Normal appearance. He exhibits no distension. Soft. There is no abdominal tenderness. There is no rebound and no guarding.   Musculoskeletal: Normal range of motion.         General: No deformity. Normal range of motion.   Neurological: He is alert and oriented to person, place, and time. He exhibits normal muscle tone. Coordination normal.   Skin: Skin is warm, dry, intact, not diaphoretic, not pale and no rash. Capillary refill takes less than 2 seconds. No erythema   Psychiatric: His speech is normal and behavior is normal. Judgment and thought content normal.   Nursing note and vitals reviewed.    Results for orders placed or performed in visit on 07/31/24   SARS Coronavirus 2 Antigen, POCT Manual Read   Result Value Ref Range    SARS Coronavirus 2 Antigen Positive (A) Negative     Acceptable Yes     No results found.     Assessment:     1. COVID    2. Cough, unspecified type        Plan:       COVID  -     nirmatrelvir-ritonavir 300 mg (150 mg x 2)-100 mg copackaged tablets (EUA); Take 3 tablets by mouth  2 (two) times daily for 5 days. Each dose contains 2 nirmatrelvir (pink tablets) and 1 ritonavir (white tablet). Take all 3 tablets together  Dispense: 30 tablet; Refill: 0    Cough, unspecified type  -     Cancel: COVID-19 (SARS CoV-2) IgG Antibody Quant  -     SARS Coronavirus 2 Antigen, POCT Manual Read      Follow up if symptoms worsen or fail to improve, for F/U with PCP or ED.   Patient Instructions   Instructions for Patients with Confirmed or Suspected COVID-19    If you are awaiting your test result, you will either be called or it will be released to the patient portal.  If you have any questions about your test, please visit [email or web address] or call our COVID-19 information line at [phone].      Please isolate yourself at home.  You may leave home and/or return to work once the following conditions are met:    If you have symptoms and tested positive:  More than 5 days since symptoms first appeared AND  More than 24 hours fever free without medications AND       symptoms have improved   For five days after ending isolation, masks are required.    If you had no symptoms but tested positive:  More than 5 days since the date of the first positive test. If you develop symptoms, then use the guidelines above  For five days after ending isolation, masks are required.      Testing is not recommended if you are symptom free after completing isolation.

## 2024-07-31 NOTE — PATIENT INSTRUCTIONS
Instructions for Patients with Confirmed or Suspected COVID-19    If you are awaiting your test result, you will either be called or it will be released to the patient portal.  If you have any questions about your test, please visit [email or web address] or call our COVID-19 information line at [phone].      Please isolate yourself at home.  You may leave home and/or return to work once the following conditions are met:    If you have symptoms and tested positive:  More than 5 days since symptoms first appeared AND  More than 24 hours fever free without medications AND       symptoms have improved   For five days after ending isolation, masks are required.    If you had no symptoms but tested positive:  More than 5 days since the date of the first positive test. If you develop symptoms, then use the guidelines above  For five days after ending isolation, masks are required.      Testing is not recommended if you are symptom free after completing isolation.

## 2024-08-20 ENCOUNTER — TELEPHONE (OUTPATIENT)
Dept: PRIMARY CARE CLINIC | Facility: CLINIC | Age: 79
End: 2024-08-20
Payer: MEDICARE

## 2024-08-21 ENCOUNTER — OFFICE VISIT (OUTPATIENT)
Dept: PRIMARY CARE CLINIC | Facility: CLINIC | Age: 79
End: 2024-08-21
Payer: MEDICARE

## 2024-08-21 ENCOUNTER — LAB VISIT (OUTPATIENT)
Dept: LAB | Facility: HOSPITAL | Age: 79
End: 2024-08-21
Payer: MEDICARE

## 2024-08-21 VITALS
OXYGEN SATURATION: 98 % | WEIGHT: 163.13 LBS | BODY MASS INDEX: 22.09 KG/M2 | HEIGHT: 72 IN | HEART RATE: 65 BPM | RESPIRATION RATE: 18 BRPM | SYSTOLIC BLOOD PRESSURE: 132 MMHG | DIASTOLIC BLOOD PRESSURE: 78 MMHG | TEMPERATURE: 98 F

## 2024-08-21 DIAGNOSIS — Z85.46 HISTORY OF PROSTATE CANCER: ICD-10-CM

## 2024-08-21 DIAGNOSIS — Z12.5 ENCOUNTER FOR SCREENING FOR MALIGNANT NEOPLASM OF PROSTATE: ICD-10-CM

## 2024-08-21 DIAGNOSIS — I10 PRIMARY HYPERTENSION: ICD-10-CM

## 2024-08-21 DIAGNOSIS — M85.89 OTHER SPECIFIED DISORDERS OF BONE DENSITY AND STRUCTURE, MULTIPLE SITES: ICD-10-CM

## 2024-08-21 DIAGNOSIS — M85.80 OSTEOPENIA, UNSPECIFIED LOCATION: Primary | ICD-10-CM

## 2024-08-21 DIAGNOSIS — U07.1 COVID-19: ICD-10-CM

## 2024-08-21 LAB
ALBUMIN SERPL BCP-MCNC: 3.8 G/DL (ref 3.5–5.2)
ALP SERPL-CCNC: 68 U/L (ref 55–135)
ALT SERPL W/O P-5'-P-CCNC: 17 U/L (ref 10–44)
ANION GAP SERPL CALC-SCNC: 5 MMOL/L (ref 8–16)
AST SERPL-CCNC: 24 U/L (ref 10–40)
BASOPHILS # BLD AUTO: 0.02 K/UL (ref 0–0.2)
BASOPHILS NFR BLD: 0.4 % (ref 0–1.9)
BILIRUB SERPL-MCNC: 0.8 MG/DL (ref 0.1–1)
BUN SERPL-MCNC: 20 MG/DL (ref 8–23)
CALCIUM SERPL-MCNC: 9.7 MG/DL (ref 8.7–10.5)
CHLORIDE SERPL-SCNC: 109 MMOL/L (ref 95–110)
CO2 SERPL-SCNC: 26 MMOL/L (ref 23–29)
COMPLEXED PSA SERPL-MCNC: <0.01 NG/ML (ref 0–4)
CREAT SERPL-MCNC: 1.1 MG/DL (ref 0.5–1.4)
DIFFERENTIAL METHOD BLD: ABNORMAL
EOSINOPHIL # BLD AUTO: 0.1 K/UL (ref 0–0.5)
EOSINOPHIL NFR BLD: 1.3 % (ref 0–8)
ERYTHROCYTE [DISTWIDTH] IN BLOOD BY AUTOMATED COUNT: 13.6 % (ref 11.5–14.5)
EST. GFR  (NO RACE VARIABLE): >60 ML/MIN/1.73 M^2
GLUCOSE SERPL-MCNC: 82 MG/DL (ref 70–110)
HCT VFR BLD AUTO: 38.9 % (ref 40–54)
HGB BLD-MCNC: 12.9 G/DL (ref 14–18)
IMM GRANULOCYTES # BLD AUTO: 0.01 K/UL (ref 0–0.04)
IMM GRANULOCYTES NFR BLD AUTO: 0.2 % (ref 0–0.5)
LYMPHOCYTES # BLD AUTO: 1.3 K/UL (ref 1–4.8)
LYMPHOCYTES NFR BLD: 24.2 % (ref 18–48)
MCH RBC QN AUTO: 33.2 PG (ref 27–31)
MCHC RBC AUTO-ENTMCNC: 33.2 G/DL (ref 32–36)
MCV RBC AUTO: 100 FL (ref 82–98)
MONOCYTES # BLD AUTO: 0.6 K/UL (ref 0.3–1)
MONOCYTES NFR BLD: 10.8 % (ref 4–15)
NEUTROPHILS # BLD AUTO: 3.3 K/UL (ref 1.8–7.7)
NEUTROPHILS NFR BLD: 63.1 % (ref 38–73)
NRBC BLD-RTO: 0 /100 WBC
PLATELET # BLD AUTO: 162 K/UL (ref 150–450)
PMV BLD AUTO: 11.1 FL (ref 9.2–12.9)
POTASSIUM SERPL-SCNC: 4.5 MMOL/L (ref 3.5–5.1)
PROT SERPL-MCNC: 7.3 G/DL (ref 6–8.4)
RBC # BLD AUTO: 3.89 M/UL (ref 4.6–6.2)
SODIUM SERPL-SCNC: 140 MMOL/L (ref 136–145)
WBC # BLD AUTO: 5.29 K/UL (ref 3.9–12.7)

## 2024-08-21 PROCEDURE — 3078F DIAST BP <80 MM HG: CPT | Mod: HCNC,CPTII,S$GLB, | Performed by: NURSE PRACTITIONER

## 2024-08-21 PROCEDURE — 3075F SYST BP GE 130 - 139MM HG: CPT | Mod: HCNC,CPTII,S$GLB, | Performed by: NURSE PRACTITIONER

## 2024-08-21 PROCEDURE — 1125F AMNT PAIN NOTED PAIN PRSNT: CPT | Mod: HCNC,CPTII,S$GLB, | Performed by: NURSE PRACTITIONER

## 2024-08-21 PROCEDURE — 36415 COLL VENOUS BLD VENIPUNCTURE: CPT | Mod: HCNC | Performed by: NURSE PRACTITIONER

## 2024-08-21 PROCEDURE — 85025 COMPLETE CBC W/AUTO DIFF WBC: CPT | Mod: HCNC | Performed by: NURSE PRACTITIONER

## 2024-08-21 PROCEDURE — 1159F MED LIST DOCD IN RCRD: CPT | Mod: HCNC,CPTII,S$GLB, | Performed by: NURSE PRACTITIONER

## 2024-08-21 PROCEDURE — 99214 OFFICE O/P EST MOD 30 MIN: CPT | Mod: HCNC,S$GLB,, | Performed by: NURSE PRACTITIONER

## 2024-08-21 PROCEDURE — 1101F PT FALLS ASSESS-DOCD LE1/YR: CPT | Mod: HCNC,CPTII,S$GLB, | Performed by: NURSE PRACTITIONER

## 2024-08-21 PROCEDURE — 1157F ADVNC CARE PLAN IN RCRD: CPT | Mod: HCNC,CPTII,S$GLB, | Performed by: NURSE PRACTITIONER

## 2024-08-21 PROCEDURE — 3288F FALL RISK ASSESSMENT DOCD: CPT | Mod: HCNC,CPTII,S$GLB, | Performed by: NURSE PRACTITIONER

## 2024-08-21 PROCEDURE — 84153 ASSAY OF PSA TOTAL: CPT | Mod: HCNC | Performed by: NURSE PRACTITIONER

## 2024-08-21 PROCEDURE — 1160F RVW MEDS BY RX/DR IN RCRD: CPT | Mod: HCNC,CPTII,S$GLB, | Performed by: NURSE PRACTITIONER

## 2024-08-21 PROCEDURE — 80053 COMPREHEN METABOLIC PANEL: CPT | Mod: HCNC | Performed by: NURSE PRACTITIONER

## 2024-08-21 NOTE — PROGRESS NOTES
[facility]  Transitional Care Management (TCM) Clinic Visit  Shared Note: Pauline Landa (DNP)    Consult Requested By: Pauline Landa  Admit Date: 7/31/24  IP Discharge Date: seen in  7/31/24  Hospital Length of Stay:RRHLOS@ days  Days since discharge (from IP or SNF): 21 days   [facility] On Call Contact Note:   Hospital Diagnosis: No admission diagnoses are documented for this encounter.     HISTORY OF PRESENT ILLNESS      Patient ID: Primitivo Mitchell III is a 78 y.o. male was recently admitted to the hospital, this is their TCM encounter.    Hospital Course Synopsis:    1) Had COVID 7/31- seen by  and prescribed Paxlovid.  Completed with no issues  2) Developments since hospitalization and current needs- has a continued cough but has been able to go out and do yard work.    3) Please confirm dates of admit, discharge, LOS above are correct, yes    Has meals from [employer] for lunch and dinner  Has apples and bananas for snacks  Breakfast is prepared by a family friend  Always cold- keeps house on 76 degrees.  This is not new. No blood in his stool or other changes  Has someone who comes for 2 hours a day or so.     No sleeping issues  Eats pretty well. Does get a lot of protein.  Still cutting grass and weed eating. Has had some unplanned weight loss    Having upper right side of neck pain since around August 13- has not tried anything for it      DECISION MAKING TODAY       Assessment & Plan:  Problem List Items Addressed This Visit          Cardiac/Vascular    Hypertension    Overview     2009: Diagnosed. Unclear if he is taking his medications as directed due to his memory impairment  Losartan 50 mg in pill packs, sister reports he is taking them  BP at goal today         Current Assessment & Plan     Continue as now  Monitor MARTIN in home and keep log         Relevant Orders    COMPREHENSIVE METABOLIC PANEL (Completed)    CBC W/ AUTO DIFFERENTIAL (Completed)    LIPID PANEL       ID    COVID-19     Overview     Improved  Completed Paxlovid  Discussed vaccine in ~ 3 months         Current Assessment & Plan     As above            Oncology    History of prostate cancer    Overview     4/2016: Diagnosed.  5/23/2016: Robotic prostatectomy.           Relevant Orders    PSA, SCREENING (Completed)       Orthopedic    Osteopenia - Primary    Overview     Seen on DEXA in 2021, needs Vit D supplementation         Relevant Orders    DXA Bone Density Axial Skeleton 1 or more sites     Other Visit Diagnoses       Encounter for screening for malignant neoplasm of prostate        Relevant Orders    PSA, SCREENING (Completed)    Other specified disorders of bone density and structure, multiple sites        Relevant Orders    DXA Bone Density Axial Skeleton 1 or more sites          Problem List Items Addressed This Visit          Cardiac/Vascular    Hypertension    Overview     2009: Diagnosed. Unclear if he is taking his medications as directed due to his memory impairment  Losartan 50 mg in pill packs, sister reports he is taking them  BP at goal today         Current Assessment & Plan     Continue as now  Monitor MARTIN in home and keep log         Relevant Orders    COMPREHENSIVE METABOLIC PANEL (Completed)    CBC W/ AUTO DIFFERENTIAL (Completed)    LIPID PANEL       ID    COVID-19    Overview     Improved  Completed Paxlovid  Discussed vaccine in ~ 3 months         Current Assessment & Plan     As above            Oncology    History of prostate cancer    Overview     4/2016: Diagnosed.  5/23/2016: Robotic prostatectomy.           Relevant Orders    PSA, SCREENING (Completed)       Orthopedic    Osteopenia - Primary    Overview     Seen on DEXA in 2021, needs Vit D supplementation         Relevant Orders    DXA Bone Density Axial Skeleton 1 or more sites     Other Visit Diagnoses       Encounter for screening for malignant neoplasm of prostate        Relevant Orders    PSA, SCREENING (Completed)    Other specified disorders  of bone density and structure, multiple sites        Relevant Orders    DXA Bone Density Axial Skeleton 1 or more sites                  Medication List on Discharge:     Medication List            Accurate as of August 21, 2024 11:59 PM. If you have any questions, ask your nurse or doctor.                CONTINUE taking these medications      ammonium lactate 12 % Crea  Apply 1 g topically once daily.     aspirin 81 MG EC tablet  Commonly known as: ECOTRIN  Take 1 tablet (81 mg total) by mouth once daily.     b complex vitamins capsule  Take 1 capsule by mouth once daily.     cholecalciferol (vitamin D3) 125 mcg (5,000 unit) capsule  Take 1 capsule (5,000 Units total) by mouth once daily.     fluticasone propionate 50 mcg/actuation nasal spray  Commonly known as: FLONASE  2 sprays (100 mcg total) by Each Nostril route once daily.     ketotifen 0.025 % (0.035 %) ophthalmic solution  Commonly known as: ZADITOR  1 drop 2 (two) times daily.     * losartan 50 MG tablet  Commonly known as: COZAAR  TAKE 1 TABLET EVERY DAY     * losartan 50 MG tablet  Commonly known as: COZAAR  Take 1 tablet (50 mg total) by mouth once daily.     ondansetron 4 MG tablet  Commonly known as: ZOFRAN  Take 1 tablet (4 mg total) by mouth every 6 (six) hours.     rosuvastatin 20 MG tablet  Commonly known as: CRESTOR  Take 1 tablet (20 mg total) by mouth once daily.           * This list has 2 medication(s) that are the same as other medications prescribed for you. Read the directions carefully, and ask your doctor or other care provider to review them with you.                  Medication Reconciliation:  Were medications changed on discharge? Yes  Were medications in the home? Paxlovid picked up  Is the patient taking the medications as directed? Yes  Does the patient understand the medications and changes? Yes  Does updated med list accurately reflects meds patient is currently taking? Yes    ENVIRONMENT OF CARE      Family and/or Caregiver  present at visit?  Yes  Name of Caregiver: sister  History provided by: patient    Advance Care Planning   Advanced Care Planning Status:  Patient has had an ACP conversation  Living Will: Yes  Power of : Yes  [place]: Yes    Does Caregiver have HCPoA: Yes  Changes today: none       Needs assessment:  Functional Status: independent  Mobility: ambulatory  Nutritional access: adequate intake and access  Home Health: No, and does not need it at this time   DME/Supplies: none     Diagnostic tests reviewed/disposition: No diagnosic tests pending after this hospitalization.  Disease/illness education:  COVID-19  Establishment or re-establishment of referral orders for community resources: No other necessary community resources.   Discussion with other health care providers: No discussion with other health care providers necessary.   Does patient have an ostomy (ileostomy, colostomy, suprapubic catheter, nephrostomy tube, tracheostomy, PEG tube, pleurex catheter, cholecystostomy, etc)? No  Were BPAs reviewed? Yes    Social History     Socioeconomic History    Marital status:     Number of children: 1    Highest education level: Bachelor's degree (e.g., BA, AB, BS)   Occupational History    Occupation: Office     Comment: [employer]   Tobacco Use    Smoking status: Never     Passive exposure: Never    Smokeless tobacco: Never   Substance and Sexual Activity    Alcohol use: No    Drug use: No    Sexual activity: Yes     Partners: Female   Social History Narrative    Lives with his wife.    No pets.    Retired.    Currently fills day with TV.     Social Determinants of Health     Financial Resource Strain: Low Risk  (8/18/2023)    Overall Financial Resource Strain (CARDIA)     Difficulty of Paying Living Expenses: Not hard at all   Food Insecurity: No Food Insecurity (8/18/2023)    Hunger Vital Sign     Worried About Running Out of Food in the Last Year: Never true     Ran Out of Food in the Last  Year: Never true   Transportation Needs: No Transportation Needs (8/18/2023)    PRAPARE - Transportation     Lack of Transportation (Medical): No     Lack of Transportation (Non-Medical): No   Physical Activity: Inactive (8/18/2023)    Exercise Vital Sign     Days of Exercise per Week: 0 days     Minutes of Exercise per Session: 0 min   Stress: No Stress Concern Present (8/18/2023)    [place] [employer] - Occupational Stress Questionnaire     Feeling of Stress : Not at all   Housing Stability: Low Risk  (8/18/2023)    Housing Stability Vital Sign     Unable to Pay for Housing in the Last Year: No     Number of Places Lived in the Last Year: 1     Unstable Housing in the Last Year: No         OBJECTIVE:     Vital Signs:  Vitals:    08/21/24 1010   BP: 132/78   Pulse: 65   Resp: 18   Temp: 97.8 °F (36.6 °C)       Review of Systems   Constitutional:  Negative for diaphoresis, fatigue and unexpected weight change.   HENT:  Negative for trouble swallowing.    Eyes:  Negative for visual disturbance.   Respiratory:  Negative for chest tightness and shortness of breath.    Cardiovascular:  Negative for chest pain, palpitations and leg swelling.   Gastrointestinal:  Negative for abdominal distention.   Endocrine: Negative for polydipsia, polyphagia and polyuria.   Genitourinary:  Negative for difficulty urinating and frequency.   Musculoskeletal:  Negative for arthralgias.   Skin:  Negative for color change and pallor.   Allergic/Immunologic: Negative for immunocompromised state.   Neurological:  Negative for dizziness, syncope and light-headedness.   Hematological:  Negative for adenopathy.   Psychiatric/Behavioral:  Positive for confusion (noted by family). The patient is not nervous/anxious.        Physical Exam:  Physical Exam  Constitutional:       General: He is not in acute distress.     Appearance: He is well-developed.   HENT:      Head: Normocephalic and atraumatic.      Nose: Nose normal.       Mouth/Throat:      Mouth: Mucous membranes are dry.   Eyes:      Pupils: Pupils are equal, round, and reactive to light.   Cardiovascular:      Rate and Rhythm: Normal rate and regular rhythm.      Pulses: Normal pulses.      Heart sounds: Murmur heard.   Pulmonary:      Effort: Pulmonary effort is normal. No respiratory distress.      Breath sounds: Normal breath sounds.   Abdominal:      General: Abdomen is flat.      Palpations: Abdomen is soft.   Musculoskeletal:         General: Normal range of motion.      Cervical back: Normal range of motion and neck supple.      Right lower leg: No edema.      Left lower leg: No edema.   Neurological:      Mental Status: He is alert. Mental status is at baseline.      Sensory: No sensory deficit.      Comments: Has good recall of events but does repeat statements often   Psychiatric:         Mood and Affect: Mood normal.         Behavior: Behavior normal.         INSTRUCTIONS FOR PATIENT:     Scheduled Follow-up, Appts Reviewed with Modifications if Needed: Yes  Future Appointments   Date Time Provider Department Center   8/29/2024  1:45 PM Connie Lloyd DPM [facility] POD [address]   9/6/2024 11:20 AM LAB, APPOINTMENT [place] [facility]   9/6/2024 12:40 PM [facility], DEXA1 [facility] BMD [address]   9/6/2024  2:20 PM Pauline Landa DNP [facility] MED CLN [facility] [facility]   9/17/2024  4:15 PM Cesilia Freedman NP [facility] NEURO8 [address]       Signature: KYAW Doherty MD/MPH  [facility]  [phone] [employer]     Transition of Care Visit:  I have reviewed and updated the history and problem list.  I have reconciled the medication list.  I have discussed the hospitalization and current medical issues, prognosis and plans with the patient/family.     Has visit with neurology coming up- family encouraged to be added as proxy so they may assist with this VV

## 2024-08-21 NOTE — PROGRESS NOTES
Pt brought in BP log from home  His average BP was 129/72 with HR of 66 for period of July 30th to August 20th.

## 2024-08-21 NOTE — PATIENT INSTRUCTIONS
We will see you back in 2 weeks to check your weight    We will get labs that you will need to fast for      We will get a DEXA scan to check your bones    Try an ice pack or a warm compress for your neck pain    The swelling in your legs could be from gravity and from sodium in the diet- if it gets worse and you have shortness of breath along with it please let us know        You have a virtual visit with neurology coming up- reach out to [facility] to get assistance

## 2024-08-22 ENCOUNTER — TELEPHONE (OUTPATIENT)
Dept: PRIMARY CARE CLINIC | Facility: CLINIC | Age: 79
End: 2024-08-22
Payer: MEDICARE

## 2024-08-22 NOTE — TELEPHONE ENCOUNTER
----- Message from Pauline Landa DNP sent at 8/22/2024  1:59 PM CDT -----  Please contact the patient and let them know that their results were fine and do not require any change in treatment.

## 2024-08-22 NOTE — TELEPHONE ENCOUNTER
Spoke to both sister and pt and let them know tests results are ok.  Pt requested that info be sent through the portal as well.

## 2024-08-26 ENCOUNTER — PATIENT MESSAGE (OUTPATIENT)
Dept: PODIATRY | Facility: CLINIC | Age: 79
End: 2024-08-26
Payer: MEDICARE

## 2024-08-26 PROBLEM — U07.1 COVID-19: Status: ACTIVE | Noted: 2024-08-26

## 2024-08-29 ENCOUNTER — OFFICE VISIT (OUTPATIENT)
Dept: PODIATRY | Facility: CLINIC | Age: 79
End: 2024-08-29
Payer: MEDICARE

## 2024-08-29 ENCOUNTER — TELEPHONE (OUTPATIENT)
Dept: PRIMARY CARE CLINIC | Facility: CLINIC | Age: 79
End: 2024-08-29
Payer: MEDICARE

## 2024-08-29 VITALS
DIASTOLIC BLOOD PRESSURE: 88 MMHG | SYSTOLIC BLOOD PRESSURE: 154 MMHG | HEIGHT: 72 IN | RESPIRATION RATE: 18 BRPM | HEART RATE: 56 BPM | WEIGHT: 163.13 LBS | BODY MASS INDEX: 22.09 KG/M2

## 2024-08-29 DIAGNOSIS — L60.3 ONYCHODYSTROPHY: Primary | ICD-10-CM

## 2024-08-29 PROCEDURE — 99999 PR PBB SHADOW E&M-EST. PATIENT-LVL III: CPT | Mod: PBBFAC,HCNC,, | Performed by: PODIATRIST

## 2024-08-29 PROCEDURE — 17999 UNLISTD PX SKN MUC MEMB SUBQ: CPT | Mod: CSM,HCNC,, | Performed by: PODIATRIST

## 2024-08-29 PROCEDURE — 99499 UNLISTED E&M SERVICE: CPT | Mod: HCNC,,, | Performed by: PODIATRIST

## 2024-09-06 ENCOUNTER — HOSPITAL ENCOUNTER (OUTPATIENT)
Dept: RADIOLOGY | Facility: CLINIC | Age: 79
Discharge: HOME | End: 2024-09-06
Attending: NURSE PRACTITIONER
Payer: MEDICARE

## 2024-09-06 ENCOUNTER — OFFICE VISIT (OUTPATIENT)
Dept: PRIMARY CARE CLINIC | Facility: CLINIC | Age: 79
End: 2024-09-06
Payer: MEDICARE

## 2024-09-06 VITALS
TEMPERATURE: 98 F | DIASTOLIC BLOOD PRESSURE: 88 MMHG | HEIGHT: 72 IN | HEART RATE: 60 BPM | OXYGEN SATURATION: 99 % | SYSTOLIC BLOOD PRESSURE: 152 MMHG | WEIGHT: 164.56 LBS | BODY MASS INDEX: 22.29 KG/M2

## 2024-09-06 DIAGNOSIS — M85.89 OTHER SPECIFIED DISORDERS OF BONE DENSITY AND STRUCTURE, MULTIPLE SITES: ICD-10-CM

## 2024-09-06 DIAGNOSIS — M85.80 OSTEOPENIA, UNSPECIFIED LOCATION: ICD-10-CM

## 2024-09-06 DIAGNOSIS — F03.911 NEURODEGENERATIVE DEMENTIA WITH AGITATION: Primary | ICD-10-CM

## 2024-09-06 DIAGNOSIS — I10 PRIMARY HYPERTENSION: ICD-10-CM

## 2024-09-06 PROCEDURE — 77080 DXA BONE DENSITY AXIAL: CPT | Mod: TC,HCNC

## 2024-09-06 PROCEDURE — 3077F SYST BP >= 140 MM HG: CPT | Mod: HCNC,CPTII,S$GLB, | Performed by: NURSE PRACTITIONER

## 2024-09-06 PROCEDURE — 3079F DIAST BP 80-89 MM HG: CPT | Mod: HCNC,CPTII,S$GLB, | Performed by: NURSE PRACTITIONER

## 2024-09-06 PROCEDURE — 1101F PT FALLS ASSESS-DOCD LE1/YR: CPT | Mod: HCNC,CPTII,S$GLB, | Performed by: NURSE PRACTITIONER

## 2024-09-06 PROCEDURE — 99212 OFFICE O/P EST SF 10 MIN: CPT | Mod: HCNC,S$GLB,, | Performed by: NURSE PRACTITIONER

## 2024-09-06 PROCEDURE — 1157F ADVNC CARE PLAN IN RCRD: CPT | Mod: HCNC,CPTII,S$GLB, | Performed by: NURSE PRACTITIONER

## 2024-09-06 PROCEDURE — 3288F FALL RISK ASSESSMENT DOCD: CPT | Mod: HCNC,CPTII,S$GLB, | Performed by: NURSE PRACTITIONER

## 2024-09-06 PROCEDURE — 77080 DXA BONE DENSITY AXIAL: CPT | Mod: 26,HCNC,, | Performed by: INTERNAL MEDICINE

## 2024-09-06 PROCEDURE — 1160F RVW MEDS BY RX/DR IN RCRD: CPT | Mod: HCNC,CPTII,S$GLB, | Performed by: NURSE PRACTITIONER

## 2024-09-06 PROCEDURE — 1126F AMNT PAIN NOTED NONE PRSNT: CPT | Mod: HCNC,CPTII,S$GLB, | Performed by: NURSE PRACTITIONER

## 2024-09-06 PROCEDURE — 1159F MED LIST DOCD IN RCRD: CPT | Mod: HCNC,CPTII,S$GLB, | Performed by: NURSE PRACTITIONER

## 2024-09-06 NOTE — PROGRESS NOTES
[facility]    9/6/2024  10:55 AM    Problem list  Patient Active Problem List   Diagnosis    Gastroesophageal reflux disease    Hypertension    Aortic valve disease    Stress incontinence, male    Atherosclerosis of aorta    Erectile dysfunction following radical prostatectomy    Neuropathy    Left ventricular diastolic dysfunction with preserved systolic function    Lumbar radiculopathy    Personal history of colonic polyps    Other symptoms and signs involving cognitive functions and awareness    Osteopenia    Tortuous aorta-Noted on imaging 4/23/2019    Renal cyst, right -- no further f/u needed     Leukocytoclastic vasculitis    History of prostate cancer    DNR (do not resuscitate)    Chronic diastolic heart failure    Angiectasia    Memory loss or impairment    Neurodegenerative dementia with agitation    Corns/callosities    COVID-19       CC:  Hypertension, weight loss    HPI:  Has not had meds yet today  Having a  DEXA scan today  Still cuts own grass  Has started going back to [employer]  Asking about Namenda, they are hoping to find something that will help slow the progression  Accompanied by sister today in clinic    Medications  Current Outpatient Medications   Medication Sig Dispense Refill    ammonium lactate 12 % Crea Apply 1 g topically once daily. 140 g 3    aspirin (ECOTRIN) 81 MG EC tablet Take 1 tablet (81 mg total) by mouth once daily. 90 tablet 3    b complex vitamins capsule Take 1 capsule by mouth once daily. 90 capsule 3    cholecalciferol, vitamin D3, 125 mcg (5,000 unit) capsule Take 1 capsule (5,000 Units total) by mouth once daily. 90 capsule 3    fluticasone propionate (FLONASE) 50 mcg/actuation nasal spray 2 sprays (100 mcg total) by Each Nostril route once daily. 48 g 3    ketotifen (ZADITOR) 0.025 % (0.035 %) ophthalmic solution 1 drop 2 (two) times daily.      losartan (COZAAR) 50 MG tablet TAKE 1 TABLET EVERY DAY 90 tablet 3    losartan (COZAAR) 50 MG tablet Take 1 tablet (50  mg total) by mouth once daily. 90 tablet 3    ondansetron (ZOFRAN) 4 MG tablet Take 1 tablet (4 mg total) by mouth every 6 (six) hours. 12 tablet 0    rosuvastatin (CRESTOR) 20 MG tablet Take 1 tablet (20 mg total) by mouth once daily. 90 tablet 3     No current facility-administered medications for this visit.      Prior to Admission medications    Medication Sig Start Date End Date Taking? Authorizing Provider   ammonium lactate 12 % Crea Apply 1 g topically once daily. 2/1/24  Yes Max Yepez DPM   aspirin (ECOTRIN) 81 MG EC tablet Take 1 tablet (81 mg total) by mouth once daily. 3/26/24  Yes Karis Castillo MD   b complex vitamins capsule Take 1 capsule by mouth once daily. 3/26/24  Yes Karis Castillo MD   cholecalciferol, vitamin D3, 125 mcg (5,000 unit) capsule Take 1 capsule (5,000 Units total) by mouth once daily. 3/26/24  Yes Karis Castillo MD   fluticasone propionate (FLONASE) 50 mcg/actuation nasal spray 2 sprays (100 mcg total) by Each Nostril route once daily. 4/9/20  Yes Ron Esquivel MD   ketotifen (ZADITOR) 0.025 % (0.035 %) ophthalmic solution 1 drop 2 (two) times daily.   Yes Provider, Historical   losartan (COZAAR) 50 MG tablet TAKE 1 TABLET EVERY DAY 4/30/24  Yes Karis Castillo MD   losartan (COZAAR) 50 MG tablet Take 1 tablet (50 mg total) by mouth once daily. 6/17/24  Yes Karis Castillo MD   ondansetron (ZOFRAN) 4 MG tablet Take 1 tablet (4 mg total) by mouth every 6 (six) hours. 10/12/21  Yes Markell Maldonado MD   rosuvastatin (CRESTOR) 20 MG tablet Take 1 tablet (20 mg total) by mouth once daily. 6/17/24 6/17/25 Yes Karis Castillo MD         History  Past Medical History:   Diagnosis Date    Aortic valve disorders 5/29/2013    3/21/2013: Echo. Mehreen. Mild aortic valve sclerosis. 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    Chronic bilateral low back pain with sciatica 4/25/2017    Chronic diastolic heart failure 8/9/2021    ED (erectile  dysfunction)     Enlarged prostate     Floaters in visual field     History of adenomatous polyp of colon 5/8/2017    Last colonoscopy occurred January 2015, with diminutive tubular adenoma removed; follow-up colonoscopy in January 2018 recommended    History of prostate cancer 4/5/2016 4/2016: Diagnosed. 5/23/2016: Robotic prostatectomy.    Hyperlipidemia     Hypertension     Intestinal metaplasia of gastric mucosa 05/03/2019    outside Pathology     Neurodegenerative dementia with agitation 4/29/2024    Osteoporosis     Pre-operative cardiovascular examination 5/18/2016 5/23/2016: Plan is robotic prostatectomy.    Prostate cancer 4/5/2016    Radiation proctitis     noted on Cscope 5/3/19    Rash 2/25/2020    S/P prostatectomy 4/25/2017    Stress incontinence, male 7/11/2016    Tortuous aorta-Noted on imaging 4/23/2019 6/28/2019    Vascular ectasia of colon 05/03/2019    seen on outside colonoscopy     Past Surgical History:   Procedure Laterality Date    COLONOSCOPY      COLONOSCOPY  05/03/2019    COLONOSCOPY W/ POLYPECTOMY      ESOPHAGOGASTRODUODENOSCOPY  05/03/2019    PROSTATE SURGERY  05/23/2016    Prostatectomy for prostate cancer, done at [facility]     Social History     Socioeconomic History    Marital status:     Number of children: 1    Highest education level: Bachelor's degree (e.g., BA, AB, BS)   Occupational History    Occupation: Office     Comment: [employer]   Tobacco Use    Smoking status: Never     Passive exposure: Never    Smokeless tobacco: Never   Substance and Sexual Activity    Alcohol use: No    Drug use: No    Sexual activity: Yes     Partners: Female   Social History Narrative    Lives with his wife.    No pets.    Retired.    Currently fills day with TV.     Social Determinants of Health     Financial Resource Strain: Low Risk  (8/18/2023)    Overall Financial Resource Strain (CARDIA)     Difficulty of Paying Living Expenses: Not hard at all   Food Insecurity: No  Food Insecurity (8/18/2023)    Hunger Vital Sign     Worried About Running Out of Food in the Last Year: Never true     Ran Out of Food in the Last Year: Never true   Transportation Needs: No Transportation Needs (8/18/2023)    PRAPARE - Transportation     Lack of Transportation (Medical): No     Lack of Transportation (Non-Medical): No   Physical Activity: Inactive (8/18/2023)    Exercise Vital Sign     Days of Exercise per Week: 0 days     Minutes of Exercise per Session: 0 min   Stress: No Stress Concern Present (8/18/2023)    [employer] - Occupational Stress Questionnaire     Feeling of Stress : Not at all   Housing Stability: Low Risk  (8/18/2023)    Housing Stability Vital Sign     Unable to Pay for Housing in the Last Year: No     Number of Places Lived in the Last Year: 1     Unstable Housing in the Last Year: No         Allergies  Review of patient's allergies indicates:   Allergen Reactions    Latex, natural rubber Rash    Omnipaque 140 [iohexol] Rash     Extensive rash over trunk font and back and legs the morning after CT dye given in late afternon the previous day. Omnipaque 350: 100cc IV & 30cc oral. Rash is severe, but listed as moderate because not shortness of breath    Allegra-d 12 hour [fexofenadine-pseudoephedrine] Other (See Comments)     Raise BP    Azithromycin     Bactrim [sulfamethoxazole-trimethoprim]     Boniva [ibandronate]     Celebrex [celecoxib]     Ciprofloxacin     Claritin-d 12 hour [loratadine-pseudoephedrine]     Fexofenadine     Hydrocortisone     Ibandronate sodium     Imipramine     Iodinated contrast media     Loratadine     Neomycin     Neomycin-polymyxin-hc     Neurontin [gabapentin]     Nitrofuran analogues     Sulfa (sulfonamide antibiotics)     Adhesive Rash     Adhesive tape causes rash     Androderm [testosterone] Rash         Review of Systems   Review of Systems   Constitutional: Negative for diaphoresis and malaise/fatigue.   HENT:  Negative.     Cardiovascular:  Negative for chest pain, claudication, dyspnea on exertion, irregular heartbeat, leg swelling, near-syncope, orthopnea, palpitations, paroxysmal nocturnal dyspnea and syncope.   Respiratory:  Negative for shortness of breath.    Endocrine: Negative for polydipsia, polyphagia and polyuria.   Hematologic/Lymphatic: Does not bruise/bleed easily.   Gastrointestinal:  Negative for bloating, nausea and vomiting.   Genitourinary: Negative.    Neurological:  Negative for excessive daytime sleepiness, dizziness, light-headedness, loss of balance and weakness.   Psychiatric/Behavioral:  The patient is not nervous/anxious.    Allergic/Immunologic: Negative.          Physical Exam  Wt Readings from Last 1 Encounters:   09/06/24 74.6 kg (164 lb 9.2 oz)     BP Readings from Last 3 Encounters:   09/06/24 (!) 152/88   08/29/24 (!) 154/88   08/21/24 132/78     Pulse Readings from Last 1 Encounters:   09/06/24 60     Body mass index is 22.32 kg/m².    Physical Exam  Vitals and nursing note reviewed.   Constitutional:       Appearance: Normal appearance.   HENT:      Head: Normocephalic and atraumatic.      Mouth/Throat:      Mouth: Mucous membranes are moist.   Eyes:      Pupils: Pupils are equal, round, and reactive to light.   Cardiovascular:      Rate and Rhythm: Normal rate and regular rhythm.      Pulses:           Radial pulses are 2+ on the right side and 2+ on the left side.        Dorsalis pedis pulses are 2+ on the right side and 2+ on the left side.        Posterior tibial pulses are 2+ on the right side and 2+ on the left side.      Heart sounds: No murmur heard.  Pulmonary:      Effort: Pulmonary effort is normal. No respiratory distress.      Breath sounds: Normal breath sounds.   Abdominal:      General: There is no distension.      Tenderness: There is no abdominal tenderness.   Musculoskeletal:      Cervical back: Normal range of motion.      Right lower leg: No edema.      Left lower  leg: No edema.   Skin:     General: Skin is warm and dry.      Findings: No erythema.   Neurological:      General: No focal deficit present.      Mental Status: He is alert and oriented to person, place, and time. Mental status is at baseline.   Psychiatric:         Mood and Affect: Mood normal.         Behavior: Behavior normal.                   Problem List Items Addressed This Visit          Neuro    Neurodegenerative dementia with agitation - Primary    Overview     Was seen by neuropsych and an EKG pending  EKG completed today  Appreciate their expertise         Current Assessment & Plan     Family inquiring about medication to slow progression of disease  Will discuss with neurology            Cardiac/Vascular    Hypertension    Overview     2009: Diagnosed. Unclear if he is taking his medications as directed due to his memory impairment  Losartan 50 mg in pill packs, sister reports he is taking them  BP above goal today, has not had medications today         Current Assessment & Plan     Take medications routinely  Low Na diet as able (not cooking and does eat a good amount of takeout)  Continue as now  Monitor BP in home and keep log          Follow Up  1 week audio call to review BPs      @Pauline Landa DNP

## 2024-09-06 NOTE — PATIENT INSTRUCTIONS
Continue your current medications    Check your blood pressure daily and keep a log    Your weight is up by 1 pound! Keep up the good work    Start walking a bit everyday!    I will discuss your blood work with Dr. Leonard and will reach out to Neurology to get her opinion

## 2024-09-09 ENCOUNTER — TELEPHONE (OUTPATIENT)
Dept: PRIMARY CARE CLINIC | Facility: CLINIC | Age: 79
End: 2024-09-09
Payer: MEDICARE

## 2024-09-09 NOTE — TELEPHONE ENCOUNTER
RN spoke to pt's sister and rescheduled appt for Wednesday to next Tuesday afternoon when she will be at his home.

## 2024-09-10 ENCOUNTER — TELEPHONE (OUTPATIENT)
Dept: PRIMARY CARE CLINIC | Facility: CLINIC | Age: 79
End: 2024-09-10
Payer: MEDICARE

## 2024-09-10 NOTE — TELEPHONE ENCOUNTER
RN spoke to pt's sister and explained all results and recommendations.  She expressed understanding and will text info to her brother and his caregiver.      ----- Message from Pauline Landa DNP sent at 9/10/2024 12:33 PM CDT -----  Could you call with DEXA results? Needs god calcium intake (dietary best), along with 1000 units Vit D daily.  Weight bearing exercise (we discussed walking in his neighborhood).  Repeat BMD 2 years

## 2024-09-10 NOTE — ASSESSMENT & PLAN NOTE
Take medications routinely  Low Na diet as able (not cooking and does eat a good amount of takeout)  Continue as now  Monitor BP in home and keep log

## 2024-09-17 ENCOUNTER — TELEPHONE (OUTPATIENT)
Dept: PRIMARY CARE CLINIC | Facility: CLINIC | Age: 79
End: 2024-09-17
Payer: MEDICARE

## 2024-09-17 ENCOUNTER — OFFICE VISIT (OUTPATIENT)
Dept: PRIMARY CARE CLINIC | Facility: CLINIC | Age: 79
End: 2024-09-17
Payer: MEDICARE

## 2024-09-17 DIAGNOSIS — I10 HYPERTENSION, UNSPECIFIED TYPE: ICD-10-CM

## 2024-09-17 DIAGNOSIS — R41.3 MEMORY LOSS OR IMPAIRMENT: Primary | ICD-10-CM

## 2024-09-17 RX ORDER — DONEPEZIL HYDROCHLORIDE 5 MG/1
5 TABLET, FILM COATED ORAL NIGHTLY
Qty: 30 TABLET | Refills: 11 | Status: SHIPPED | OUTPATIENT
Start: 2024-09-17 | End: 2025-09-17

## 2024-09-17 RX ORDER — DONEPEZIL HYDROCHLORIDE 5 MG/1
5 TABLET, FILM COATED ORAL NIGHTLY
Qty: 30 TABLET | Refills: 11 | Status: SHIPPED | OUTPATIENT
Start: 2024-09-17 | End: 2024-09-17

## 2024-09-17 NOTE — TELEPHONE ENCOUNTER
"BP has been "doing pretty well", no feedback given.   Appetite is fine- eating dessert. Had breakfast, going to get   "

## 2024-09-18 NOTE — TELEPHONE ENCOUNTER
Chuck called to ask when pt's meds will be filled.  RN sent message to pharmacy.  RN called [facility] and told her meds would be fedexed and arrive on Friday.  She expressed understanding.

## 2024-09-19 NOTE — ASSESSMENT & PLAN NOTE
Neuropsych has seen pt and medications were discussed- EKG pending.  Completed at [facility] and notified their department  Pill packs for compliance  Adding B vitamins  Control BP and statin  Add in donepezil 5 mg QD- discussed with neurology who is ok with recommendations

## 2024-09-19 NOTE — PROGRESS NOTES
"Established Patient - Audio Only Telehealth Visit     The patient location is: [place]  The chief complaint leading to consultation is: BP review  Visit type: Virtual visit with audio only (telephone)  Total time spent with patient: 10 minutes       The reason for the audio only service rather than synchronous audio and video virtual visit was related to technical difficulties or patient preference/necessity.     Each patient to whom I provide medical services by telemedicine is:  (1) informed of the relationship between the physician and patient and the respective role of any other health care provider with respect to management of the patient; and (2) notified that they may decline to receive medical services by telemedicine and may withdraw from such care at any time. Patient verbally consented to receive this service via voice-only telephone call.       HPI: BP check       He is in the car for our call with his sister as they have been visiting a sick family member. Patient has been keeping a BP log but does not have it with him currently.  He reports that they have been "doing really well".  His caregiver has been helping him take it and has not reported any abnormal readings to family.    He is eating well and has no complaints.    We followed up on his sister's request about starting a medication for his memory.          Assessment and plan:    Problem List Items Addressed This Visit          Neuro    Memory loss or impairment - Primary    Overview     Evaluated by Neuro in 2018, but has not participated in Neuropsych evaluation. MRI with no significant changes. Memory changes appeared after prostatectomy in 2016.         Current Assessment & Plan     Neuropsych has seen pt and medications were discussed- EKG pending.  Completed at [facility] and notified their department  Pill packs for compliance  Adding B vitamins  Control BP and statin  Add in donepezil 5 mg QD- discussed with neurology who is ok with " recommendations            Cardiac/Vascular    Hypertension    Overview     2009: Diagnosed. Unclear if he is taking his medications as directed due to his memory impairment  Losartan 50 mg in pill packs, sister reports he is taking them  BP above goal today, has not had medications today         Current Assessment & Plan     Take medications routinely  Low Na diet as able (not cooking and does eat a good amount of takeout)  Continue as now  Monitor BP in home and keep log                                  This service was not originating from a related E/M service provided within the previous 7 days nor will  to an E/M service or procedure within the next 24 hours or my soonest available appointment.  Prevailing standard of care was able to be met in this audio-only visit.

## 2024-09-20 ENCOUNTER — TELEPHONE (OUTPATIENT)
Dept: PRIMARY CARE CLINIC | Facility: CLINIC | Age: 79
End: 2024-09-20
Payer: MEDICARE

## 2024-12-20 ENCOUNTER — TELEPHONE (OUTPATIENT)
Dept: PODIATRY | Facility: CLINIC | Age: 79
End: 2024-12-20
Payer: MEDICARE

## 2024-12-20 NOTE — TELEPHONE ENCOUNTER
Pt notified 1/15 is the earliest appt with Dr. Hi . I will add him to the wait list for a sooner appt.

## 2024-12-20 NOTE — TELEPHONE ENCOUNTER
no show called patient to reschedule no answer. Schedule appointment and mail it to patient home address

## 2025-01-13 ENCOUNTER — LAB VISIT (OUTPATIENT)
Dept: LAB | Facility: HOSPITAL | Age: 80
End: 2025-01-13
Payer: MEDICARE

## 2025-01-13 ENCOUNTER — OFFICE VISIT (OUTPATIENT)
Dept: UROLOGY | Facility: CLINIC | Age: 80
End: 2025-01-13
Payer: MEDICARE

## 2025-01-13 VITALS
SYSTOLIC BLOOD PRESSURE: 160 MMHG | HEART RATE: 59 BPM | HEIGHT: 72 IN | BODY MASS INDEX: 22.08 KG/M2 | WEIGHT: 163 LBS | DIASTOLIC BLOOD PRESSURE: 81 MMHG

## 2025-01-13 DIAGNOSIS — R10.9 RIGHT FLANK PAIN: ICD-10-CM

## 2025-01-13 DIAGNOSIS — Z00.00 ENCOUNTER FOR MEDICARE ANNUAL WELLNESS EXAM: ICD-10-CM

## 2025-01-13 DIAGNOSIS — R10.9 ABDOMINAL PAIN, UNSPECIFIED ABDOMINAL LOCATION: ICD-10-CM

## 2025-01-13 DIAGNOSIS — R10.31 GROIN PAIN, RIGHT: Primary | ICD-10-CM

## 2025-01-13 LAB
ALBUMIN SERPL BCP-MCNC: 4.2 G/DL (ref 3.5–5.2)
ALP SERPL-CCNC: 69 U/L (ref 40–150)
ALT SERPL W/O P-5'-P-CCNC: 33 U/L (ref 10–44)
ANION GAP SERPL CALC-SCNC: 10 MMOL/L (ref 8–16)
AST SERPL-CCNC: 40 U/L (ref 10–40)
BILIRUB SERPL-MCNC: 0.8 MG/DL (ref 0.1–1)
BILIRUB SERPL-MCNC: NORMAL MG/DL
BLOOD URINE, POC: NORMAL
BUN SERPL-MCNC: 23 MG/DL (ref 8–23)
CALCIUM SERPL-MCNC: 9.5 MG/DL (ref 8.7–10.5)
CHLORIDE SERPL-SCNC: 106 MMOL/L (ref 95–110)
CLARITY, POC UA: CLEAR
CO2 SERPL-SCNC: 24 MMOL/L (ref 23–29)
COLOR, POC UA: YELLOW
CREAT SERPL-MCNC: 1.2 MG/DL (ref 0.5–1.4)
EST. GFR  (NO RACE VARIABLE): >60 ML/MIN/1.73 M^2
GLUCOSE SERPL-MCNC: 81 MG/DL (ref 70–110)
GLUCOSE UR QL STRIP: NORMAL
KETONES UR QL STRIP: NORMAL
LEUKOCYTE ESTERASE URINE, POC: NORMAL
NITRITE, POC UA: NORMAL
PH, POC UA: 5
POTASSIUM SERPL-SCNC: 4.2 MMOL/L (ref 3.5–5.1)
PROT SERPL-MCNC: 7.5 G/DL (ref 6–8.4)
PROTEIN, POC: NORMAL
SODIUM SERPL-SCNC: 140 MMOL/L (ref 136–145)
SPECIFIC GRAVITY, POC UA: 1.01
UROBILINOGEN, POC UA: NORMAL

## 2025-01-13 PROCEDURE — 3077F SYST BP >= 140 MM HG: CPT | Mod: CPTII,S$GLB,,

## 2025-01-13 PROCEDURE — 36415 COLL VENOUS BLD VENIPUNCTURE: CPT

## 2025-01-13 PROCEDURE — 80053 COMPREHEN METABOLIC PANEL: CPT

## 2025-01-13 PROCEDURE — 3079F DIAST BP 80-89 MM HG: CPT | Mod: CPTII,S$GLB,,

## 2025-01-13 PROCEDURE — 1125F AMNT PAIN NOTED PAIN PRSNT: CPT | Mod: CPTII,S$GLB,,

## 2025-01-13 PROCEDURE — 99214 OFFICE O/P EST MOD 30 MIN: CPT | Mod: S$GLB,,,

## 2025-01-13 PROCEDURE — 1157F ADVNC CARE PLAN IN RCRD: CPT | Mod: CPTII,S$GLB,,

## 2025-01-13 PROCEDURE — 81002 URINALYSIS NONAUTO W/O SCOPE: CPT | Mod: S$GLB,,,

## 2025-01-13 PROCEDURE — 1159F MED LIST DOCD IN RCRD: CPT | Mod: CPTII,S$GLB,,

## 2025-01-13 PROCEDURE — 99999 PR PBB SHADOW E&M-EST. PATIENT-LVL III: CPT | Mod: PBBFAC,HCNC,,

## 2025-01-13 NOTE — PROGRESS NOTES
Subjective:       Patient ID: Primitivo Mitchell III is a 79 y.o. male.    Chief Complaint: Groin Pain (Right side)     This is a 79 y.o.  male patient that is new to me.  The patient was self referred  for right groin pain. PMH of prostate cancer s/p RALP 5/23/16 w/ Dr. Reyes, GS 3+3, pT2c, N0, M0. History of dementia also.  S/p XRT for biochemical recurrence in 2016 w/ Dr. Ochoa.  Other urologic history:  ABIOLA 2019-- 2.1 cm cyst with thin septation within the right kidney, without a significant interval change.   2020 saw Dr. Caldwell for ED, failed viagra and cialis, started on ICI.  Today pain is described as dull pain to the right suprapubic area for about 2 weeks. Reports that tylenol has helped with the pain. Denies n/v, fevers, chills, urinary changes, gross hematuria, dysuria.  Reports 1 pad per day, no leakage per patient.  Denies history of kidney stones.  US abdomen 2022-- There are 2.5 cm and 1.36 cm cyst in the right kidney. There is a 0.6 cm echogenic focus in lower pole collecting system of the left kidney with. There is no hydronephrosis.   Urine dipstick- negative for Leukocytes, RBC, and Nitrites     LAST PSA  Lab Results   Component Value Date    PSA <0.01 08/21/2024    PSA 2.0 09/02/2015    PSA 2.8 11/03/2014    PSA 2.5 05/21/2014    PSA 1.37 05/21/2013    PSA 1.41 05/21/2013    PSADIAG <0.01 08/18/2023    PSADIAG <0.01 06/16/2022    PSADIAG <0.01 04/14/2021    PSADIAG <0.01 04/14/2021    PSADIAG <0.01 04/21/2020    PSADIAG <0.01 09/19/2019    PSADIAG <0.01 03/11/2019    PSADIAG <0.01 07/05/2018    PSADIAG <0.01 12/28/2017    PSADIAG <0.01 06/20/2017    PSADIAG 0.02 02/02/2017    PSADIAG 0.41 12/08/2016    PSADIAG 0.32 11/08/2016    PSADIAG 0.06 06/27/2016    PSATOTAL 3.4 02/02/2016    PSATOTAL 1.9 08/04/2014    PSAFREE 0.38 02/02/2016    PSAFREE 0.32 08/04/2014       Lab Results   Component Value Date    CREATININE 1.1 08/21/2024       ---  PMH/PSH/Medications/Allergies/Social history  reviewed and as in chart.    Review of Systems   Constitutional:  Negative for activity change, chills and fever.   Respiratory:  Negative for shortness of breath.    Cardiovascular:  Negative for chest pain and palpitations.   Gastrointestinal:  Negative for abdominal pain and constipation.   Genitourinary:  Positive for flank pain. Negative for difficulty urinating, dysuria, frequency, hematuria and urgency.   Neurological:  Negative for dizziness and light-headedness.     Objective:      Physical Exam  HENT:      Head: Normocephalic.   Pulmonary:      Effort: Pulmonary effort is normal.   Musculoskeletal:         General: Normal range of motion.      Cervical back: Normal range of motion.   Skin:     General: Skin is warm and dry.   Neurological:      Mental Status: He is alert. Mental status is at baseline.       Assessment:     Problem Noted   Right Flank Pain 9/8/2016       Plan:     CT renal stone ordered with CMP prior.  Will call with results.  If intractable pain, nausea and vomiting limiting PO intake, fever needs to go to ED.     Follow-up pending CT results.      KEATON Berg    I spent a total of 25 minutes on the day of the visit.This includes face to face time and non-face to face time preparing to see the patient (eg, review of tests), obtaining and/or reviewing separately obtained history, documenting clinical information in the electronic or other health record, independently interpreting results and communicating results to the patient/family/caregiver, or care coordinator.

## 2025-01-15 ENCOUNTER — TELEPHONE (OUTPATIENT)
Dept: UROLOGY | Facility: CLINIC | Age: 80
End: 2025-01-15
Payer: MEDICARE

## 2025-01-15 ENCOUNTER — OFFICE VISIT (OUTPATIENT)
Dept: PODIATRY | Facility: CLINIC | Age: 80
End: 2025-01-15
Payer: MEDICARE

## 2025-01-15 ENCOUNTER — HOSPITAL ENCOUNTER (OUTPATIENT)
Dept: RADIOLOGY | Facility: HOSPITAL | Age: 80
Discharge: HOME | End: 2025-01-15
Payer: MEDICARE

## 2025-01-15 VITALS — RESPIRATION RATE: 18 BRPM | WEIGHT: 163.13 LBS | HEIGHT: 72 IN | BODY MASS INDEX: 22.09 KG/M2

## 2025-01-15 DIAGNOSIS — L60.3 ONYCHODYSTROPHY: Primary | ICD-10-CM

## 2025-01-15 DIAGNOSIS — R10.9 ABDOMINAL PAIN, UNSPECIFIED ABDOMINAL LOCATION: ICD-10-CM

## 2025-01-15 PROCEDURE — 74176 CT ABD & PELVIS W/O CONTRAST: CPT | Mod: TC

## 2025-01-15 PROCEDURE — 17999 UNLISTD PX SKN MUC MEMB SUBQ: CPT | Mod: CSM,,, | Performed by: PODIATRIST

## 2025-01-15 PROCEDURE — 99499 UNLISTED E&M SERVICE: CPT | Mod: ,,, | Performed by: PODIATRIST

## 2025-01-15 PROCEDURE — 74176 CT ABD & PELVIS W/O CONTRAST: CPT | Mod: 26,,, | Performed by: RADIOLOGY

## 2025-01-15 PROCEDURE — 99999 PR PBB SHADOW E&M-EST. PATIENT-LVL III: CPT | Mod: PBBFAC,,, | Performed by: PODIATRIST

## 2025-01-15 NOTE — TELEPHONE ENCOUNTER
----- Message from KEATON Berg sent at 1/15/2025  2:21 PM CST -----  CT scan shows no sign of pain coming from a urologically standpoint. He can follow-up PRN

## 2025-01-15 NOTE — TELEPHONE ENCOUNTER
Spoke with patient sister(Sherri) and gave test results and to follow up PRN. Tamikomelania states patient is still c/o of pain. What can you recommend for him to take?

## 2025-02-26 NOTE — PROGRESS NOTES
Pt presents for routine non-covered foot care. Pt. does not have high risk feet. Pedal pulses are palpable. Nails are elongated, thickened Bilaterally. Diagnosis is onychauxis. Nails were reduced Bilaterally. Patient tolerated well and related relief. RTC p.r.n. as PROC B

## 2025-03-10 DIAGNOSIS — M85.80 OSTEOPENIA, UNSPECIFIED LOCATION: ICD-10-CM

## 2025-03-10 DIAGNOSIS — R41.3 MEMORY IMPAIRMENT: ICD-10-CM

## 2025-03-10 DIAGNOSIS — I70.0 ATHEROSCLEROSIS OF AORTA: ICD-10-CM

## 2025-03-10 RX ORDER — VIT C/E/ZN/COPPR/LUTEIN/ZEAXAN 250MG-90MG
5000 CAPSULE ORAL DAILY
Qty: 90 CAPSULE | Refills: 3 | Status: SHIPPED | OUTPATIENT
Start: 2025-03-10

## 2025-03-10 RX ORDER — VITAMIN B COMPLEX
1 CAPSULE ORAL DAILY
Qty: 90 CAPSULE | Refills: 3 | Status: SHIPPED | OUTPATIENT
Start: 2025-03-10

## 2025-03-10 RX ORDER — ASPIRIN 81 MG/1
81 TABLET ORAL DAILY
Qty: 90 TABLET | Refills: 3 | Status: SHIPPED | OUTPATIENT
Start: 2025-03-10

## 2025-05-17 NOTE — ASSESSMENT & PLAN NOTE
Family inquiring about medication to slow progression of disease  Will discuss with neurology   impaired protrusion/impaired left lateral movement

## 2025-05-22 ENCOUNTER — TELEPHONE (OUTPATIENT)
Dept: PODIATRY | Facility: CLINIC | Age: 80
End: 2025-05-22
Payer: MEDICARE

## 2025-06-02 DIAGNOSIS — I50.32 CHRONIC DIASTOLIC HEART FAILURE: ICD-10-CM

## 2025-06-02 DIAGNOSIS — I70.0 ATHEROSCLEROSIS OF AORTA: ICD-10-CM

## 2025-06-02 DIAGNOSIS — I10 PRIMARY HYPERTENSION: ICD-10-CM

## 2025-06-03 RX ORDER — LOSARTAN POTASSIUM 50 MG/1
50 TABLET ORAL DAILY
Qty: 90 TABLET | Refills: 3 | Status: SHIPPED | OUTPATIENT
Start: 2025-06-03

## 2025-06-03 RX ORDER — ROSUVASTATIN CALCIUM 20 MG/1
20 TABLET, COATED ORAL DAILY
Qty: 90 TABLET | Refills: 3 | Status: SHIPPED | OUTPATIENT
Start: 2025-06-03 | End: 2026-06-03

## 2025-07-08 ENCOUNTER — OFFICE VISIT (OUTPATIENT)
Dept: PODIATRY | Facility: CLINIC | Age: 80
End: 2025-07-08
Payer: MEDICARE

## 2025-07-08 ENCOUNTER — OFFICE VISIT (OUTPATIENT)
Dept: PRIMARY CARE CLINIC | Facility: CLINIC | Age: 80
End: 2025-07-08
Payer: MEDICARE

## 2025-07-08 VITALS
SYSTOLIC BLOOD PRESSURE: 136 MMHG | HEIGHT: 72 IN | HEART RATE: 56 BPM | DIASTOLIC BLOOD PRESSURE: 73 MMHG | BODY MASS INDEX: 21.24 KG/M2

## 2025-07-08 VITALS
BODY MASS INDEX: 21.22 KG/M2 | TEMPERATURE: 98 F | SYSTOLIC BLOOD PRESSURE: 148 MMHG | OXYGEN SATURATION: 100 % | WEIGHT: 156.63 LBS | HEART RATE: 55 BPM | DIASTOLIC BLOOD PRESSURE: 88 MMHG | HEIGHT: 72 IN

## 2025-07-08 DIAGNOSIS — I10 HYPERTENSION, UNSPECIFIED TYPE: Primary | ICD-10-CM

## 2025-07-08 DIAGNOSIS — M31.0 LEUKOCYTOCLASTIC VASCULITIS: ICD-10-CM

## 2025-07-08 DIAGNOSIS — L60.3 ONYCHODYSTROPHY: Primary | ICD-10-CM

## 2025-07-08 DIAGNOSIS — F03.911 NEURODEGENERATIVE DEMENTIA WITH AGITATION: ICD-10-CM

## 2025-07-08 DIAGNOSIS — I50.32 CHRONIC DIASTOLIC HEART FAILURE: ICD-10-CM

## 2025-07-08 PROCEDURE — 99999 PR PBB SHADOW E&M-EST. PATIENT-LVL III: CPT | Mod: PBBFAC,HCNC,, | Performed by: PODIATRIST

## 2025-07-08 PROCEDURE — 99499 UNLISTED E&M SERVICE: CPT | Mod: HCNC,,, | Performed by: PODIATRIST

## 2025-07-08 PROCEDURE — 17999 UNLISTD PX SKN MUC MEMB SUBQ: CPT | Mod: CSM,HCNC,, | Performed by: PODIATRIST

## 2025-07-08 NOTE — PATIENT INSTRUCTIONS
Continue your pill packs- your blood pressure is higher than the goal of 130/80    You are due for your tetanus shot    You are due for your RSV shot as well    We would like to see you back in 3 months- please call us to schedule

## 2025-07-08 NOTE — PROGRESS NOTES
Carol    7/8/2025  1:18 PM    Problem list  Problem List[1]    CC:  Chronic condition review    HPI:  Patient presents fro routine visit accompanied by his son. They report that he might not have had BP meds yet today.  Weight is down about 7 lbs, reports that he eats pretty well, no problems with that per his report. Checks BP at home on occasion and gets 120/70 or so. He does not have a log today.  Still doing well with pill packs, got 84 days supply 6/9/25.  Has someone that comes over in the morning to cook breakfast and help with small tasks around the house  He does stretching exercises and has no problems doing so. No issues with constipation or diarrhea.  Sleeps pretty well- goes to bed easily, will watch TV until late, has a good routine.     Saw urology in January for groin pain but does not recall this.    His son reports that he is having some continued memory issues but overall things are stable.         Medications  Current Medications[2]   Prior to Admission medications    Medication Sig Start Date End Date Taking? Authorizing Provider   ammonium lactate 12 % Crea Apply 1 g topically once daily. 2/1/24  Yes Max Yepez DPM   aspirin (ECOTRIN) 81 MG EC tablet Take 1 tablet (81 mg total) by mouth once daily. 3/10/25  Yes Pauline Landa DNP   b complex vitamins capsule Take 1 capsule by mouth once daily. 3/10/25  Yes Pauline Landa DNP   cholecalciferol, vitamin D3, 125 mcg (5,000 unit) capsule Take 1 capsule (5,000 Units total) by mouth once daily. 3/10/25  Yes Pauline Landa DNP   donepeziL (ARICEPT) 5 MG tablet Take 1 tablet (5 mg total) by mouth every evening. 9/17/24 9/17/25 Yes Pauline Landa DNP   fluticasone propionate (FLONASE) 50 mcg/actuation nasal spray 2 sprays (100 mcg total) by Each Nostril route once daily. 4/9/20  Yes Ron Esquivel MD   ketotifen (ZADITOR) 0.025 % (0.035 %) ophthalmic solution 1 drop 2 (two) times daily.   Yes Provider, Historical   losartan  (COZAAR) 50 MG tablet TAKE 1 TABLET EVERY DAY 4/30/24  Yes Karis Castillo MD   losartan (COZAAR) 50 MG tablet Take 1 tablet (50 mg total) by mouth once daily. 6/3/25  Yes Pauline Landa DNP   ondansetron (ZOFRAN) 4 MG tablet Take 1 tablet (4 mg total) by mouth every 6 (six) hours. 10/12/21  Yes Markell Maldonado MD   rosuvastatin (CRESTOR) 20 MG tablet Take 1 tablet (20 mg total) by mouth once daily. 6/3/25 6/3/26 Yes Pauline Landa DNP         History  Past Medical History:   Diagnosis Date    Allergy     Aortic valve disorders 05/29/2013    3/21/2013: [place]. Mild aortic valve sclerosis. 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    Chronic bilateral low back pain with sciatica 04/25/2017    Chronic diastolic heart failure 08/09/2021    ED (erectile dysfunction)     Enlarged prostate     Floaters in visual field     History of adenomatous polyp of colon 05/08/2017    Last colonoscopy occurred January 2015, with diminutive tubular adenoma removed; follow-up colonoscopy in January 2018 recommended    History of prostate cancer 04/05/2016 4/2016: Diagnosed. 5/23/2016: Robotic prostatectomy.    Hyperlipidemia     Hypertension     Intestinal metaplasia of gastric mucosa 05/03/2019    outside Pathology     Neurodegenerative dementia with agitation 04/29/2024    Osteoporosis     Pre-operative cardiovascular examination 05/18/2016 5/23/2016: Plan is robotic prostatectomy.    Prostate cancer 04/05/2016    Radiation proctitis     noted on Cscope 5/3/19    Rash 02/25/2020    S/P prostatectomy 04/25/2017    Stress incontinence, male 07/11/2016    Tortuous aorta-Noted on imaging 4/23/2019 06/28/2019    Vascular ectasia of colon 05/03/2019    seen on outside colonoscopy     Past Surgical History:   Procedure Laterality Date    COLONOSCOPY      COLONOSCOPY  05/03/2019    COLONOSCOPY W/ POLYPECTOMY      ESOPHAGOGASTRODUODENOSCOPY  05/03/2019    PROSTATE SURGERY  05/23/2016    Prostatectomy for prostate  cancer, done at [facility]     Social History[3]      Allergies  Review of patient's allergies indicates:   Allergen Reactions    Latex, natural rubber Rash    Omnipaque 140 [iohexol] Rash     Extensive rash over trunk font and back and legs the morning after CT dye given in late afternon the previous day. Omnipaque 350: 100cc IV & 30cc oral. Rash is severe, but listed as moderate because not shortness of breath    Allegra-d 12 hour [fexofenadine-pseudoephedrine] Other (See Comments)     Raise BP    Azithromycin     Bactrim [sulfamethoxazole-trimethoprim]     Boniva [ibandronate]     Celebrex [celecoxib]     Ciprofloxacin     Claritin-d 12 hour [loratadine-pseudoephedrine]     Fexofenadine     Hydrocortisone     Ibandronate sodium     Imipramine     Iodinated contrast media     Loratadine     Neomycin     Neomycin-polymyxin-hc     Neurontin [gabapentin]     Nitrofuran analogues     Sulfa (sulfonamide antibiotics)     Adhesive Rash     Adhesive tape causes rash     Androderm [testosterone] Rash         Review of Systems   Review of Systems   Constitutional: Negative for diaphoresis and malaise/fatigue.   HENT: Negative.     Cardiovascular:  Negative for chest pain, claudication, dyspnea on exertion, irregular heartbeat, leg swelling, near-syncope, orthopnea, palpitations, paroxysmal nocturnal dyspnea and syncope.   Respiratory:  Negative for shortness of breath.    Endocrine: Negative for polydipsia, polyphagia and polyuria.   Hematologic/Lymphatic: Does not bruise/bleed easily.   Gastrointestinal:  Negative for bloating, nausea and vomiting.   Genitourinary: Negative.    Neurological:  Negative for excessive daytime sleepiness, dizziness, light-headedness, loss of balance and weakness.   Psychiatric/Behavioral:  The patient is not nervous/anxious.    Allergic/Immunologic: Negative.          Physical Exam  Wt Readings from Last 1 Encounters:   07/08/25 71.1 kg (156 lb 10.2 oz)     BP Readings from Last 3 Encounters:    07/08/25 (!) 148/88   01/13/25 (!) 160/81   09/06/24 (!) 152/88     Pulse Readings from Last 1 Encounters:   07/08/25 (!) 55     Body mass index is 21.24 kg/m².    Physical Exam  Vitals and nursing note reviewed.   Constitutional:       Appearance: Normal appearance.   HENT:      Head: Normocephalic and atraumatic.      Mouth/Throat:      Mouth: Mucous membranes are moist.   Eyes:      Pupils: Pupils are equal, round, and reactive to light.   Cardiovascular:      Rate and Rhythm: Normal rate and regular rhythm.      Pulses:           Radial pulses are 2+ on the right side and 2+ on the left side.        Dorsalis pedis pulses are 2+ on the right side and 2+ on the left side.        Posterior tibial pulses are 2+ on the right side and 2+ on the left side.      Heart sounds: No murmur heard.  Pulmonary:      Effort: Pulmonary effort is normal. No respiratory distress.      Breath sounds: Normal breath sounds.   Abdominal:      General: There is no distension.      Tenderness: There is no abdominal tenderness.   Musculoskeletal:      Cervical back: Normal range of motion.      Right lower leg: No edema.      Left lower leg: No edema.   Skin:     General: Skin is warm and dry.      Findings: No erythema.   Neurological:      General: No focal deficit present.      Mental Status: He is alert.   Psychiatric:         Mood and Affect: Mood normal.         Behavior: Behavior normal.         1. Hypertension, unspecified type  Overview:  2009: Diagnosed. Unclear if he is taking his medications as directed due to his memory impairment  Losartan 50 mg in pill packs, sister reports he is taking them  BP above goal today, has not had medications today    Assessment & Plan:  Take medications routinely, using pill packs  Low Na diet as able   Continue as now  Monitor BP in home and keep log      2. Leukocytoclastic vasculitis  Overview:  Diagnosed in 2020 by Derm, related to systemic infection. treated with topical steroids.      Assessment & Plan:  No current complaints  Monitor      3. Neurodegenerative dementia with agitation  Overview:  Was seen by neuropsych   Taking donepezil 5 mg QD    Assessment & Plan:  Slow progression according to family but still independent with ADLs  Continue current plan of care      4. Chronic diastolic heart failure  Overview:  5/2021: Echo with grade 1 diastolic dysfunction, normal EF    Assessment & Plan:  5/2021: Echo with grade 1 diastolic dysfunction, normal EF  Advised to continue BP control and exercise  Euvolemic on exam                The total time spent for evaluation and management on 07/08/2025 including reviewing separately obtained history, performing a medically appropriate exam and evaluation, documenting clinical information in the health record, independently interpreting results and communicating them to the patient/family/caregiver, and ordering medications/tests/procedures was >35 minutes.        Follow Up  3 months      @Pauline Landa DNP         [1]   Patient Active Problem List  Diagnosis    Gastroesophageal reflux disease    Hypertension    Aortic valve disease    Stress incontinence, male    Right flank pain    Atherosclerosis of aorta    Erectile dysfunction following radical prostatectomy    Neuropathy    Left ventricular diastolic dysfunction with preserved systolic function    Lumbar radiculopathy    Personal history of colonic polyps    Other symptoms and signs involving cognitive functions and awareness    Osteopenia    Tortuous aorta-Noted on imaging 4/23/2019    Renal cyst, right -- no further f/u needed     Leukocytoclastic vasculitis    History of prostate cancer    DNR (do not resuscitate)    Chronic diastolic heart failure    Angiectasia    Memory loss or impairment    Neurodegenerative dementia with agitation    Corns/callosities    COVID-19   [2]   Current Outpatient Medications   Medication Sig Dispense Refill    ammonium lactate 12 % Crea Apply 1 g topically  once daily. 140 g 3    aspirin (ECOTRIN) 81 MG EC tablet Take 1 tablet (81 mg total) by mouth once daily. 90 tablet 3    b complex vitamins capsule Take 1 capsule by mouth once daily. 90 capsule 3    cholecalciferol, vitamin D3, 125 mcg (5,000 unit) capsule Take 1 capsule (5,000 Units total) by mouth once daily. 90 capsule 3    donepeziL (ARICEPT) 5 MG tablet Take 1 tablet (5 mg total) by mouth every evening. 30 tablet 11    fluticasone propionate (FLONASE) 50 mcg/actuation nasal spray 2 sprays (100 mcg total) by Each Nostril route once daily. 48 g 3    ketotifen (ZADITOR) 0.025 % (0.035 %) ophthalmic solution 1 drop 2 (two) times daily.      losartan (COZAAR) 50 MG tablet TAKE 1 TABLET EVERY DAY 90 tablet 3    losartan (COZAAR) 50 MG tablet Take 1 tablet (50 mg total) by mouth once daily. 90 tablet 3    ondansetron (ZOFRAN) 4 MG tablet Take 1 tablet (4 mg total) by mouth every 6 (six) hours. 12 tablet 0    rosuvastatin (CRESTOR) 20 MG tablet Take 1 tablet (20 mg total) by mouth once daily. 90 tablet 3     No current facility-administered medications for this visit.   [3]   Social History  Socioeconomic History    Marital status:     Number of children: 1    Highest education level: Bachelor's degree (e.g., BA, AB, BS)   Occupational History    Occupation: Office     Comment: [employer]   Tobacco Use    Smoking status: Never     Passive exposure: Never    Smokeless tobacco: Never   Substance and Sexual Activity    Alcohol use: No    Drug use: No    Sexual activity: Yes     Partners: Female   Social History Narrative    Lives with his wife.    No pets.    Retired.    Currently fills day with TV.     Social Drivers of Health     Financial Resource Strain: Low Risk  (8/18/2023)    Overall Financial Resource Strain (CARDIA)     Difficulty of Paying Living Expenses: Not hard at all   Food Insecurity: No Food Insecurity (8/18/2023)    Hunger Vital Sign     Worried About Running Out of Food in the Last  Year: Never true     Ran Out of Food in the Last Year: Never true   Transportation Needs: No Transportation Needs (8/18/2023)    PRAPARE - Transportation     Lack of Transportation (Medical): No     Lack of Transportation (Non-Medical): No   Physical Activity: Inactive (8/18/2023)    Exercise Vital Sign     Days of Exercise per Week: 0 days     Minutes of Exercise per Session: 0 min   Stress: No Stress Concern Present (8/18/2023)    [employer] - Occupational Stress Questionnaire     Feeling of Stress : Not at all   Housing Stability: Low Risk  (8/18/2023)    Housing Stability Vital Sign     Unable to Pay for Housing in the Last Year: No     Number of Places Lived in the Last Year: 1     Unstable Housing in the Last Year: No

## 2025-07-08 NOTE — ASSESSMENT & PLAN NOTE
Take medications routinely, using pill packs  Low Na diet as able   Continue as now  Monitor BP in home and keep log

## 2025-07-08 NOTE — ASSESSMENT & PLAN NOTE
Slow progression according to family but still independent with ADLs  Continue current plan of care

## 2025-07-08 NOTE — ASSESSMENT & PLAN NOTE
5/2021: Echo with grade 1 diastolic dysfunction, normal EF  Advised to continue BP control and exercise  Euvolemic on exam

## 2025-08-12 ENCOUNTER — OFFICE VISIT (OUTPATIENT)
Dept: URGENT CARE | Facility: CLINIC | Age: 80
End: 2025-08-12
Payer: MEDICARE

## 2025-08-12 VITALS
OXYGEN SATURATION: 98 % | DIASTOLIC BLOOD PRESSURE: 67 MMHG | WEIGHT: 156.75 LBS | HEIGHT: 72 IN | SYSTOLIC BLOOD PRESSURE: 128 MMHG | BODY MASS INDEX: 21.23 KG/M2 | HEART RATE: 59 BPM | TEMPERATURE: 98 F | RESPIRATION RATE: 16 BRPM

## 2025-08-12 DIAGNOSIS — R31.9 HEMATURIA, UNSPECIFIED TYPE: Primary | ICD-10-CM

## 2025-08-12 DIAGNOSIS — R09.82 POST-NASAL DRIP: ICD-10-CM

## 2025-08-12 LAB
ANION GAP (OHS): 9 MMOL/L (ref 8–16)
BILIRUBIN, UA POC OHS: NEGATIVE
BLOOD, UA POC OHS: ABNORMAL
BUN SERPL-MCNC: 24 MG/DL (ref 8–23)
CALCIUM SERPL-MCNC: 9.3 MG/DL (ref 8.7–10.5)
CHLORIDE SERPL-SCNC: 106 MMOL/L (ref 95–110)
CLARITY, UA POC OHS: CLEAR
CO2 SERPL-SCNC: 26 MMOL/L (ref 23–29)
COLOR, UA POC OHS: ABNORMAL
CREAT SERPL-MCNC: 1.2 MG/DL (ref 0.5–1.4)
GFR SERPLBLD CREATININE-BSD FMLA CKD-EPI: >60 ML/MIN/1.73/M2
GLUCOSE SERPL-MCNC: 98 MG/DL (ref 70–110)
GLUCOSE, UA POC OHS: NEGATIVE
KETONES, UA POC OHS: ABNORMAL
LEUKOCYTES, UA POC OHS: NEGATIVE
NITRITE, UA POC OHS: NEGATIVE
PH, UA POC OHS: 6.5
POTASSIUM SERPL-SCNC: 5.2 MMOL/L (ref 3.5–5.1)
PROTEIN, UA POC OHS: 30
SODIUM SERPL-SCNC: 141 MMOL/L (ref 136–145)
SPECIFIC GRAVITY, UA POC OHS: 1.02
UROBILINOGEN, UA POC OHS: 1

## 2025-08-12 PROCEDURE — 87086 URINE CULTURE/COLONY COUNT: CPT | Mod: HCNC | Performed by: PHYSICIAN ASSISTANT

## 2025-08-12 PROCEDURE — 80048 BASIC METABOLIC PNL TOTAL CA: CPT | Mod: HCNC | Performed by: PHYSICIAN ASSISTANT

## 2025-08-12 PROCEDURE — 81003 URINALYSIS AUTO W/O SCOPE: CPT | Mod: QW,S$GLB,, | Performed by: PHYSICIAN ASSISTANT

## 2025-08-12 PROCEDURE — 99214 OFFICE O/P EST MOD 30 MIN: CPT | Mod: S$GLB,,, | Performed by: PHYSICIAN ASSISTANT

## 2025-08-12 RX ORDER — LEVOCETIRIZINE DIHYDROCHLORIDE 5 MG/1
5 TABLET, FILM COATED ORAL NIGHTLY
Qty: 14 TABLET | Refills: 0 | Status: SHIPPED | OUTPATIENT
Start: 2025-08-12

## 2025-08-12 RX ORDER — AZELASTINE 1 MG/ML
1 SPRAY, METERED NASAL 2 TIMES DAILY
Qty: 30 ML | Refills: 0 | Status: SHIPPED | OUTPATIENT
Start: 2025-08-12 | End: 2026-08-12

## 2025-08-14 ENCOUNTER — RESULTS FOLLOW-UP (OUTPATIENT)
Dept: URGENT CARE | Facility: CLINIC | Age: 80
End: 2025-08-14
Payer: MEDICARE

## 2025-08-14 LAB — BACTERIA UR CULT: NORMAL

## 2025-08-15 ENCOUNTER — TELEPHONE (OUTPATIENT)
Dept: PRIMARY CARE CLINIC | Facility: CLINIC | Age: 80
End: 2025-08-15
Payer: MEDICARE

## 2025-08-20 ENCOUNTER — PATIENT OUTREACH (OUTPATIENT)
Facility: OTHER | Age: 80
End: 2025-08-20
Payer: MEDICARE

## 2025-08-25 ENCOUNTER — PATIENT OUTREACH (OUTPATIENT)
Facility: OTHER | Age: 80
End: 2025-08-25
Payer: MEDICARE

## 2025-08-26 ENCOUNTER — OFFICE VISIT (OUTPATIENT)
Dept: PRIMARY CARE CLINIC | Facility: CLINIC | Age: 80
End: 2025-08-26
Payer: MEDICARE

## 2025-08-26 VITALS
SYSTOLIC BLOOD PRESSURE: 161 MMHG | TEMPERATURE: 98 F | HEIGHT: 72 IN | WEIGHT: 158.69 LBS | HEART RATE: 52 BPM | OXYGEN SATURATION: 100 % | DIASTOLIC BLOOD PRESSURE: 80 MMHG | BODY MASS INDEX: 21.49 KG/M2

## 2025-08-26 DIAGNOSIS — F03.911 NEURODEGENERATIVE DEMENTIA WITH AGITATION: ICD-10-CM

## 2025-08-26 DIAGNOSIS — Z85.46 HISTORY OF PROSTATE CANCER: ICD-10-CM

## 2025-08-26 DIAGNOSIS — R79.9 ABNORMAL FINDING OF BLOOD CHEMISTRY, UNSPECIFIED: ICD-10-CM

## 2025-08-26 DIAGNOSIS — I10 PRIMARY HYPERTENSION: ICD-10-CM

## 2025-08-26 DIAGNOSIS — R63.4 UNINTENTIONAL WEIGHT LOSS: Primary | ICD-10-CM

## 2025-08-26 PROBLEM — U07.1 COVID-19: Status: RESOLVED | Noted: 2024-08-26 | Resolved: 2025-08-26

## 2025-08-26 PROBLEM — I50.32 CHRONIC DIASTOLIC HEART FAILURE: Status: ACTIVE | Noted: 2017-04-21

## 2025-08-26 PROCEDURE — 3079F DIAST BP 80-89 MM HG: CPT | Mod: CPTII,HCNC,S$GLB, | Performed by: HOSPITALIST

## 2025-08-26 PROCEDURE — 3288F FALL RISK ASSESSMENT DOCD: CPT | Mod: CPTII,HCNC,S$GLB, | Performed by: HOSPITALIST

## 2025-08-26 PROCEDURE — 1159F MED LIST DOCD IN RCRD: CPT | Mod: CPTII,HCNC,S$GLB, | Performed by: HOSPITALIST

## 2025-08-26 PROCEDURE — 99215 OFFICE O/P EST HI 40 MIN: CPT | Mod: HCNC,S$GLB,, | Performed by: HOSPITALIST

## 2025-08-26 PROCEDURE — 3077F SYST BP >= 140 MM HG: CPT | Mod: CPTII,HCNC,S$GLB, | Performed by: HOSPITALIST

## 2025-08-26 PROCEDURE — 1100F PTFALLS ASSESS-DOCD GE2>/YR: CPT | Mod: CPTII,HCNC,S$GLB, | Performed by: HOSPITALIST

## 2025-08-26 PROCEDURE — 1157F ADVNC CARE PLAN IN RCRD: CPT | Mod: CPTII,HCNC,S$GLB, | Performed by: HOSPITALIST

## 2025-08-26 PROCEDURE — 1126F AMNT PAIN NOTED NONE PRSNT: CPT | Mod: CPTII,HCNC,S$GLB, | Performed by: HOSPITALIST

## 2025-08-28 RX ORDER — DONEPEZIL HYDROCHLORIDE 5 MG/1
5 TABLET, FILM COATED ORAL NIGHTLY
Qty: 30 TABLET | Refills: 11 | Status: SHIPPED | OUTPATIENT
Start: 2025-08-28 | End: 2026-08-28

## 2025-08-28 RX ORDER — DONEPEZIL HYDROCHLORIDE 5 MG/1
5 TABLET, FILM COATED ORAL NIGHTLY
Qty: 30 TABLET | Refills: 11 | Status: CANCELLED | OUTPATIENT
Start: 2025-08-28 | End: 2026-08-28